# Patient Record
Sex: FEMALE | Race: WHITE | NOT HISPANIC OR LATINO | Employment: FULL TIME | URBAN - METROPOLITAN AREA
[De-identification: names, ages, dates, MRNs, and addresses within clinical notes are randomized per-mention and may not be internally consistent; named-entity substitution may affect disease eponyms.]

---

## 2017-01-25 ENCOUNTER — TELEPHONE (OUTPATIENT)
Dept: SMOKING CESSATION | Facility: CLINIC | Age: 52
End: 2017-01-25

## 2017-02-20 ENCOUNTER — OFFICE VISIT (OUTPATIENT)
Dept: FAMILY MEDICINE | Facility: CLINIC | Age: 52
End: 2017-02-20
Payer: COMMERCIAL

## 2017-02-20 VITALS
SYSTOLIC BLOOD PRESSURE: 120 MMHG | DIASTOLIC BLOOD PRESSURE: 70 MMHG | RESPIRATION RATE: 16 BRPM | HEART RATE: 74 BPM | WEIGHT: 178.13 LBS | OXYGEN SATURATION: 97 % | TEMPERATURE: 98 F | BODY MASS INDEX: 34.79 KG/M2

## 2017-02-20 DIAGNOSIS — R68.89 FLU-LIKE SYMPTOMS: ICD-10-CM

## 2017-02-20 DIAGNOSIS — J06.9 UPPER RESPIRATORY TRACT INFECTION, UNSPECIFIED TYPE: ICD-10-CM

## 2017-02-20 DIAGNOSIS — Z72.0 TOBACCO ABUSE: Primary | ICD-10-CM

## 2017-02-20 PROCEDURE — 99999 PR PBB SHADOW E&M-EST. PATIENT-LVL IV: CPT | Mod: PBBFAC,,, | Performed by: FAMILY MEDICINE

## 2017-02-20 PROCEDURE — 99214 OFFICE O/P EST MOD 30 MIN: CPT | Mod: S$GLB,,, | Performed by: FAMILY MEDICINE

## 2017-02-20 RX ORDER — ALBUTEROL SULFATE 90 UG/1
AEROSOL, METERED RESPIRATORY (INHALATION)
Refills: 3 | COMMUNITY
Start: 2016-12-14 | End: 2020-12-29 | Stop reason: SDUPTHER

## 2017-02-20 RX ORDER — LISINOPRIL 40 MG/1
TABLET ORAL
Refills: 6 | COMMUNITY
Start: 2017-01-27 | End: 2019-02-08 | Stop reason: SDUPTHER

## 2017-02-20 RX ORDER — FLUTICASONE PROPIONATE 50 MCG
1 SPRAY, SUSPENSION (ML) NASAL DAILY
Qty: 1 BOTTLE | Refills: 1 | Status: ON HOLD | OUTPATIENT
Start: 2017-02-20 | End: 2021-03-12 | Stop reason: HOSPADM

## 2017-02-20 RX ORDER — ZOLPIDEM TARTRATE 5 MG/1
5 TABLET ORAL NIGHTLY PRN
Qty: 30 TABLET | Refills: 0 | Status: SHIPPED | OUTPATIENT
Start: 2017-02-20 | End: 2017-03-28 | Stop reason: SDUPTHER

## 2017-02-20 RX ORDER — METOPROLOL SUCCINATE 25 MG/1
TABLET, EXTENDED RELEASE ORAL
Refills: 6 | Status: ON HOLD | COMMUNITY
Start: 2017-01-27 | End: 2017-12-08 | Stop reason: HOSPADM

## 2017-02-20 RX ORDER — GABAPENTIN 600 MG/1
600 TABLET ORAL
COMMUNITY
End: 2017-02-20

## 2017-02-20 RX ORDER — CETIRIZINE HYDROCHLORIDE 10 MG/1
10 TABLET ORAL DAILY
Qty: 30 TABLET | Refills: 0 | Status: SHIPPED | OUTPATIENT
Start: 2017-02-20 | End: 2019-02-08

## 2017-02-20 RX ORDER — CYCLOBENZAPRINE HCL 10 MG
10 TABLET ORAL
COMMUNITY
End: 2017-02-20

## 2017-02-20 RX ORDER — NITROGLYCERIN 0.4 MG/1
TABLET SUBLINGUAL
Refills: 6 | Status: ON HOLD | COMMUNITY
Start: 2017-01-27 | End: 2018-07-31 | Stop reason: HOSPADM

## 2017-02-20 RX ORDER — IBUPROFEN 200 MG
TABLET ORAL
Refills: 3 | Status: ON HOLD | COMMUNITY
Start: 2017-01-05 | End: 2018-07-31 | Stop reason: HOSPADM

## 2017-02-20 RX ORDER — PRAVASTATIN SODIUM 40 MG/1
40 TABLET ORAL DAILY
Refills: 1 | COMMUNITY
Start: 2016-12-07 | End: 2017-02-20 | Stop reason: SDUPTHER

## 2017-02-20 RX ORDER — HYDROCODONE BITARTRATE AND ACETAMINOPHEN 5; 325 MG/1; MG/1
1 TABLET ORAL EVERY 6 HOURS PRN
Refills: 0 | COMMUNITY
Start: 2016-12-14 | End: 2017-02-20

## 2017-02-20 NOTE — PATIENT INSTRUCTIONS
Viral Upper Respiratory Illness (Adult)  You have a viral upper respiratory illness (URI), which is another term for the common cold. This illness is contagious during the first few days. It is spread through the air by coughing and sneezing. It may also be spread by direct contact (touching the sick person and then touching your own eyes, nose, or mouth). Frequent handwashing will decrease risk of spread. Most viral illnesses go away within 7 to 10 days with rest and simple home remedies. Sometimes the illness may last for several weeks. Antibiotics will not kill a virus, and they are generally not prescribed for this condition.    Home care  · If symptoms are severe, rest at home for the first 2 to 3 days. When you resume activity, don't let yourself get too tired.  · Avoid being exposed to cigarette smoke (yours or others).  · You may use acetaminophen or ibuprofen to control pain and fever, unless another medicine was prescribed. (Note: If you have chronic liver or kidney disease, have ever had a stomach ulcer or gastrointestinal bleeding, or are taking blood-thinning medicines, talk with your healthcare provider before using these medicines.) Aspirin should never be given to anyone under 18 years of age who is ill with a viral infection or fever. It may cause severe liver or brain damage.  · Your appetite may be poor, so a light diet is fine. Avoid dehydration by drinking 6 to 8 glasses of fluids per day (water, soft drinks, juices, tea, or soup). Extra fluids will help loosen secretions in the nose and lungs.  · Over-the-counter cold medicines will not shorten the length of time youre sick, but they may be helpful for the following symptoms: cough, sore throat, and nasal and sinus congestion. (Note: Do not use decongestants if you have high blood pressure.)  Follow-up care  Follow up with your healthcare provider, or as advised.  When to seek medical advice  Call your healthcare provider right away if any  of these occur:  · Cough with lots of colored sputum (mucus)  · Severe headache; face, neck, or ear pain  · Difficulty swallowing due to throat pain  · Fever of 100.4°F (38°C)  Call 911, or get immediate medical care  Call emergency services right away if any of these occur:  · Chest pain, shortness of breath, wheezing, or difficulty breathing  · Coughing up blood  · Inability to swallow due to throat pain  Date Last Reviewed: 9/13/2015  © 2413-0192 [employer]. [address] [place] [address]. All rights reserved. This information is not intended as a substitute for professional medical care. Always follow your healthcare professional's instructions.

## 2017-02-20 NOTE — MR AVS SNAPSHOT
[place] [id number] [facility] [place] [address]  Phone: [phone]  Fax: [phone]                  Loi Cordova   2017 11:20 AM   Office Visit    Description:  Female : 1965   Provider:  Almita Price MD   Department:  [facility]           Reason for Visit     Sinus Problem     Headache     Sore Throat     Otalgia     Generalized Body Aches           Diagnoses this Visit        Comments    Tobacco abuse    -  Primary     Upper respiratory tract infection, unspecified type         Flu-like symptoms                To Do List           Goals (5 Years of Data)     None      Follow-Up and Disposition     Return if symptoms worsen or fail to improve.    Follow-up and Disposition History       These Medications        Disp Refills Start End    cetirizine (ZYRTEC) 10 MG tablet 30 tablet 0 2017    Take 1 tablet (10 mg total) by mouth once daily. - Oral    Pharmacy: [facility] - [place], [place] - [address] Ph #: [phone]       fluticasone (FLONASE) 50 mcg/actuation nasal spray 1 Bottle 1 2017     1 spray by Each Nare route once daily. - Each Nare    Pharmacy: [facility] - [place], [place] - [address] Ph #: [phone]       zolpidem (AMBIEN) 5 MG Tab 30 tablet 0 2017    Take 1 tablet (5 mg total) by mouth nightly as needed. - Oral    Pharmacy: [facility] - [place], [place] - [address] Ph #: [phone]         [facility]     [facility] -  Assistance  Registered nurses in the [facility] provide clinical advisement, health education, appointment booking, and other advisory services.  Call for this free service at [phone].             Medications           Message regarding Medications     Verify the changes and/or additions to your medication regime listed below are the same as discussed  with your clinician today.  If any of these changes or additions are incorrect, please notify your healthcare provider.        START taking these NEW medications        Refills    cetirizine (ZYRTEC) 10 MG tablet 0    Sig: Take 1 tablet (10 mg total) by mouth once daily.    Class: Normal    Route: Oral    fluticasone (FLONASE) 50 mcg/actuation nasal spray 1    Si spray by Each Nare route once daily.    Class: Normal    Route: Each Nare    zolpidem (AMBIEN) 5 MG Tab 0    Sig: Take 1 tablet (5 mg total) by mouth nightly as needed.    Class: Print    Route: Oral      STOP taking these medications     ASPIR-LOW 81 mg EC tablet Take 81 mg by mouth once daily. FOR 30 DAYS    cyclobenzaprine (FLEXERIL) 10 MG tablet Take 10 mg by mouth.    gabapentin (NEURONTIN) 600 MG tablet Take 600 mg by mouth.    hydrocodone-acetaminophen 5-325mg (NORCO) 5-325 mg per tablet Take 1 tablet by mouth every 6 (six) hours as needed.    lisinopril-hydrochlorothiazide (PRINZIDE,ZESTORETIC) 20-25 mg Tab Take 1 tablet by mouth once daily.    ranitidine (ZANTAC) 75 MG tablet Take 75 mg by mouth 2 (two) times daily.           Verify that the below list of medications is an accurate representation of the medications you are currently taking.  If none reported, the list may be blank. If incorrect, please contact your healthcare provider. Carry this list with you in case of emergency.           Current Medications     albuterol sulfate 90 mcg/actuation AePB Inhale 180 mcg into the lungs 4 (four) times daily as needed.    aspirin 81 MG Chew Take 81 mg by mouth once daily.    fenofibrate 160 MG Tab Take 160 mg by mouth once daily.    lisinopril (PRINIVIL,ZESTRIL) 40 MG tablet Take 1 tablet orally once a day.    metoprolol succinate (TOPROL-XL) 25 MG 24 hr tablet Take 1 tablet orally once a day.    nicotine (NICODERM CQ) 14 mg/24 hr PLACE 1 PATCH TRANSDERMALLY ONCE DAILY EVERY 24 HOURS    nitroGLYCERIN (NITROSTAT) 0.4 MG SL tablet Take 1 tablet  (Sublingual) every 5 minutes for 3 doses then call 911    omeprazole (PRILOSEC) 40 MG capsule Take 40 mg by mouth once daily.    prasugrel (EFFIENT) 10 mg Tab Take 1 tablet (10 mg total) by mouth once daily.    pravastatin (PRAVACHOL) 80 MG tablet Take 80 mg by mouth once daily.    VENTOLIN HFA 90 mcg/actuation inhaler Inhale 2 puffs by mouth every 4-6 hours as needed.    cetirizine (ZYRTEC) 10 MG tablet Take 1 tablet (10 mg total) by mouth once daily.    fluticasone (FLONASE) 50 mcg/actuation nasal spray 1 spray by Each Nare route once daily.    loratadine (CLARITIN) 10 mg tablet Take 1 tablet (10 mg total) by mouth once daily.    zolpidem (AMBIEN) 5 MG Tab Take 1 tablet (5 mg total) by mouth nightly as needed.           Clinical Reference Information           Your Vitals Were     BP Pulse Temp Resp Weight SpO2    120/70 (BP Location: Right arm, Patient Position: Sitting, BP Method: Manual) 74 97.7 °F (36.5 °C) (Oral) 16 80.8 kg (178 lb 2.1 oz) 97%    BMI                34.79 kg/m2          Blood Pressure          Most Recent Value    BP  120/70      Allergies as of 2/20/2017     Iodine And Iodide Containing Products      Immunizations Administered on Date of Encounter - 2/20/2017     None      Orders Placed During Today's Visit     Future Labs/Procedures Expected by Expires    Influenza antigen Nasopharyngeal Swab  2/20/2017 4/21/2018      Instructions      Viral Upper Respiratory Illness (Adult)  You have a viral upper respiratory illness (URI), which is another term for the common cold. This illness is contagious during the first few days. It is spread through the air by coughing and sneezing. It may also be spread by direct contact (touching the sick person and then touching your own eyes, nose, or mouth). Frequent handwashing will decrease risk of spread. Most viral illnesses go away within 7 to 10 days with rest and simple home remedies. Sometimes the illness may last for several weeks. Antibiotics will not  kill a virus, and they are generally not prescribed for this condition.    Home care  · If symptoms are severe, rest at home for the first 2 to 3 days. When you resume activity, don't let yourself get too tired.  · Avoid being exposed to cigarette smoke (yours or others).  · You may use acetaminophen or ibuprofen to control pain and fever, unless another medicine was prescribed. (Note: If you have chronic liver or kidney disease, have ever had a stomach ulcer or gastrointestinal bleeding, or are taking blood-thinning medicines, talk with your healthcare provider before using these medicines.) Aspirin should never be given to anyone under 18 years of age who is ill with a viral infection or fever. It may cause severe liver or brain damage.  · Your appetite may be poor, so a light diet is fine. Avoid dehydration by drinking 6 to 8 glasses of fluids per day (water, soft drinks, juices, tea, or soup). Extra fluids will help loosen secretions in the nose and lungs.  · Over-the-counter cold medicines will not shorten the length of time youre sick, but they may be helpful for the following symptoms: cough, sore throat, and nasal and sinus congestion. (Note: Do not use decongestants if you have high blood pressure.)  Follow-up care  Follow up with your healthcare provider, or as advised.  When to seek medical advice  Call your healthcare provider right away if any of these occur:  · Cough with lots of colored sputum (mucus)  · Severe headache; face, neck, or ear pain  · Difficulty swallowing due to throat pain  · Fever of 100.4°F (38°C)  Call 911, or get immediate medical care  Call emergency services right away if any of these occur:  · Chest pain, shortness of breath, wheezing, or difficulty breathing  · Coughing up blood  · Inability to swallow due to throat pain  Date Last Reviewed: 9/13/2015  © 2555-5804 [employer]. [address] [place] [address], [place] [address]. All rights reserved. This information is not  intended as a substitute for professional medical care. Always follow your healthcare professional's instructions.             Language Assistance Services     ATTENTION: Language assistance services are available, free of charge. Please call [phone].      ATENCIÓN: Si eve ksy, tiene a ibarra disposición servicios gratuitos de asistencia lingüística. Llame al [phone].     CHÚ Ý: N?u b?n nói Ti?ng Vi?t, có các d?ch v? h? tr? ngôn ng? mi?n phí dành cho b?n. G?i s? [phone].         [facility] complies with applicable Federal civil rights laws and does not discriminate on the basis of race, color, national origin, age, disability, or sex.

## 2017-02-20 NOTE — PROGRESS NOTES
Subjective:       Patient ID: Loi Cordova is a 51 y.o. female.    Chief Complaint: Sinus Problem; Headache; Sore Throat; Otalgia; and Generalized Body Aches    HPI 51 year old female here for headache, sore throat, ear pain, body aches, and subjective fever for 12 hours. Patient states her boss had similar symptoms. Patient has tried tylenol, madeleine seltzer plus which provided mild relief.   Review of Systems   Constitutional: Positive for chills, fatigue and fever. Negative for appetite change.   HENT: Positive for sinus pressure and sore throat. Negative for congestion.    Respiratory: Negative for shortness of breath and wheezing.    Cardiovascular: Negative for chest pain and leg swelling.   Gastrointestinal: Negative for abdominal pain, diarrhea, nausea and vomiting.   Genitourinary: Negative for dysuria and hematuria.   Neurological: Positive for headaches. Negative for weakness.       Objective:      Vitals:    02/20/17 1129   BP: 120/70   Pulse: 74   Resp: 16   Temp: 97.7 °F (36.5 °C)     Physical Exam   Constitutional: She is oriented to person, place, and time. She appears well-developed and well-nourished. No distress.   HENT:   Right Ear: External ear normal.   Left Ear: External ear normal.   Mouth/Throat: Oropharynx is clear and moist. No oropharyngeal exudate.   TTP over maxillary sinuses   Eyes: EOM are normal. Right eye exhibits no discharge. Left eye exhibits no discharge.   Neck: Normal range of motion.   Cardiovascular: Normal rate and regular rhythm.    Pulmonary/Chest: Effort normal. She has wheezes.   Mild wheezing more pronounced in  right lung fields   Lymphadenopathy:     She has cervical adenopathy.   Neurological: She is alert and oriented to person, place, and time.   Psychiatric: She has a normal mood and affect. Her behavior is normal.       Assessment:       1. Tobacco abuse    2. Upper respiratory tract infection, unspecified type    3. Flu-like symptoms        Plan:         1. URI:  will check flu. Advised on complete tobacco cessation and adequate hydration. I have advised on symptomatic treatment with coricidin prn, salt water gargles, and/or tea with honey. Zyrtec/flonase for sinus pressure/post nasal drip. Patient advised on using inhaler as she typically gets flare up of asthma symptoms during viral illness.  Patient given work excuse.   RTC if symptoms worsen.    Tobacco abuse    Upper respiratory tract infection, unspecified type    Flu-like symptoms  -     Influenza antigen Nasopharyngeal Swab; Future; Expected date: 2/20/17    Other orders  -     cetirizine (ZYRTEC) 10 MG tablet; Take 1 tablet (10 mg total) by mouth once daily.  Dispense: 30 tablet; Refill: 0  -     fluticasone (FLONASE) 50 mcg/actuation nasal spray; 1 spray by Each Nare route once daily.  Dispense: 1 Bottle; Refill: 1      Return if symptoms worsen or fail to improve.

## 2017-03-28 RX ORDER — ZOLPIDEM TARTRATE 5 MG/1
5 TABLET ORAL NIGHTLY PRN
Qty: 30 TABLET | Refills: 0 | Status: SHIPPED | OUTPATIENT
Start: 2017-03-28 | End: 2017-04-28 | Stop reason: SDUPTHER

## 2017-03-28 RX ORDER — ZOLPIDEM TARTRATE 5 MG/1
TABLET ORAL
Qty: 30 TABLET | Status: CANCELLED | OUTPATIENT
Start: 2017-03-28

## 2017-03-28 NOTE — TELEPHONE ENCOUNTER
----- Message from Mariah Pate sent at 3/28/2017 11:31 AM CDT -----  REFILLS:   Patient is requesting a medication refill.   RX name:ambien  Strength: 5mg  Directions:   [facility] name[facility]  Pharmacy phone #:

## 2017-03-29 ENCOUNTER — TELEPHONE (OUTPATIENT)
Dept: FAMILY MEDICINE | Facility: CLINIC | Age: 52
End: 2017-03-29

## 2017-03-29 NOTE — TELEPHONE ENCOUNTER
----- Message from Mariah Pate sent at 3/29/2017 11:40 AM CDT -----  Contact: [phone]  Need written rx for ambien 5 mg.

## 2017-04-20 ENCOUNTER — OFFICE VISIT (OUTPATIENT)
Dept: UROLOGY | Facility: CLINIC | Age: 52
End: 2017-04-20
Payer: COMMERCIAL

## 2017-04-20 VITALS
DIASTOLIC BLOOD PRESSURE: 88 MMHG | WEIGHT: 183 LBS | TEMPERATURE: 98 F | HEART RATE: 90 BPM | BODY MASS INDEX: 35.93 KG/M2 | SYSTOLIC BLOOD PRESSURE: 133 MMHG | HEIGHT: 60 IN

## 2017-04-20 DIAGNOSIS — R31.0 GROSS HEMATURIA: ICD-10-CM

## 2017-04-20 DIAGNOSIS — Z72.0 TOBACCO ABUSE: ICD-10-CM

## 2017-04-20 DIAGNOSIS — N20.0 NEPHROLITHIASIS: Primary | ICD-10-CM

## 2017-04-20 DIAGNOSIS — N39.3 FEMALE STRESS INCONTINENCE: ICD-10-CM

## 2017-04-20 DIAGNOSIS — N32.81 OAB (OVERACTIVE BLADDER): ICD-10-CM

## 2017-04-20 PROCEDURE — 99204 OFFICE O/P NEW MOD 45 MIN: CPT | Mod: S$GLB,,, | Performed by: UROLOGY

## 2017-04-20 PROCEDURE — 87077 CULTURE AEROBIC IDENTIFY: CPT

## 2017-04-20 PROCEDURE — 87088 URINE BACTERIA CULTURE: CPT

## 2017-04-20 PROCEDURE — 99999 PR PBB SHADOW E&M-EST. PATIENT-LVL IV: CPT | Mod: PBBFAC,,, | Performed by: UROLOGY

## 2017-04-20 PROCEDURE — 87186 SC STD MICRODIL/AGAR DIL: CPT

## 2017-04-20 PROCEDURE — 87086 URINE CULTURE/COLONY COUNT: CPT

## 2017-04-20 RX ORDER — TAMSULOSIN HYDROCHLORIDE 0.4 MG/1
0.4 CAPSULE ORAL
Qty: 30 CAPSULE | Refills: 1 | Status: SHIPPED | OUTPATIENT
Start: 2017-04-20 | End: 2017-05-04 | Stop reason: ALTCHOICE

## 2017-04-20 RX ORDER — HYDROCODONE BITARTRATE AND ACETAMINOPHEN 7.5; 3 MG/1; MG/1
1 TABLET ORAL EVERY 6 HOURS PRN
Refills: 0 | COMMUNITY
Start: 2017-03-28 | End: 2017-05-04

## 2017-04-20 RX ORDER — ONDANSETRON 4 MG/1
4 TABLET, ORALLY DISINTEGRATING ORAL EVERY 8 HOURS PRN
Qty: 10 TABLET | Refills: 0 | Status: SHIPPED | OUTPATIENT
Start: 2017-04-20 | End: 2017-04-27

## 2017-04-20 NOTE — PROGRESS NOTES
HPI:  Loi Cordova is a 51 y.o. year old female that  presents with No chief complaint on file.  .  This patient comes for ER follow-up with gross hematuria.  She was seen in the emergency room with right flank pain where CT scan was negative.  Reviewed by me and I agree that no stones are present and kidneys appear normal.  Patient continues to have right flank pain with some nausea and no vomiting.  She has some pressure, no dysuria and no frequency.  She spontaneously passed a stone many years ago and states that this is reminiscent of that episode.    The patient is been smoking one half pack per day cigarettes since age 18    She has baseline urgency and urge incontinence.  So has leakage with coughing laughing and sneezing and uses up to 4 pads per day    She has never had any urological operations and there is no family history of urological cancer    Chart review shows a GFR yesterday greater than 60      Past Medical History:   Diagnosis Date    Allergy     Asthma     GERD (gastroesophageal reflux disease)     Hyperlipidemia     Hypertension      Social History     Social History    Marital status:      Spouse name: N/A    Number of children: N/A    Years of education: N/A     Occupational History    Not on file.     Social History Main Topics    Smoking status: Former Smoker     Packs/day: 0.50     Years: 30.00     Types: Cigarettes    Smokeless tobacco: Not on file      Comment: States she quit smoking last week.    Alcohol use No    Drug use: No    Sexual activity: Yes     Partners: Male     Other Topics Concern    Not on file     Social History Narrative     Past Surgical History:   Procedure Laterality Date    CHOLECYSTECTOMY      HYSTERECTOMY      SHOULDER SURGERY      TOTAL KNEE ARTHROPLASTY       Family History   Problem Relation Age of Onset    Heart disease Mother     Hyperlipidemia Mother     Hypertension Mother     Diabetes Mother     Heart disease Father      Hyperlipidemia Father     Cancer Father     Prostate cancer Neg Hx     Kidney disease Neg Hx            Review of Systems  The patient has no chest pains.  The patient has no shortness of breath  Patient wears glasses.  All other review of systems are negative.      Physical Exam:  /88  Pulse 90  Temp 98.3 °F (36.8 °C)  Ht 5' (1.524 m)  Wt 83 kg (183 lb)  BMI 35.74 kg/m2  General appearance: alert, cooperative, no distress  Constitutional:Oriented to person, place, and time.appears well-developed and well-nourished.   HEENT: Normocephalic, atraumatic, neck symmetrical, no nasal discharge   Eyes: conjunctivae/corneas clear, PERRL, EOM's intact  Lungs: clear to auscultation bilaterally, no dullness to percussion bilaterally  Heart: regular rate and rhythm without rub; no displacement of the PMI   Abdomen: soft, non-tender; bowel sounds normoactive; no organomegaly  :Urethral meatus without lesion, no urethral masses noted, bladder nontender /nondistended, vagina normal appearing,      no      cystocele, anus and perineum without lesions, patient status post hysterectomy   Extremities: extremities symmetric; no clubbing, cyanosis, or edema  Integument: Skin color, texture, turgor normal; no rashes; hair distrubution normal  Neurologic: Alert and oriented X 3, normal strength, normal coordination and gait  Psychiatric: no pressured speech; normal affect; no evidence of impaired cognition     LABS:    Complete Blood Count  Lab Results   Component Value Date    RBC 4.54 04/19/2017    HGB 13.6 04/19/2017    HCT 40.9 04/19/2017    MCV 90 04/19/2017    MCH 30.0 04/19/2017    MCHC 33.3 04/19/2017    RDW 13.5 04/19/2017     (H) 04/19/2017    MPV 8.8 (L) 04/19/2017    GRAN 9.7 (H) 04/19/2017    GRAN 75.4 (H) 04/19/2017    LYMPH 2.3 04/19/2017    LYMPH 17.6 (L) 04/19/2017    MONO 0.6 04/19/2017    MONO 4.5 04/19/2017    EOS 0.3 04/19/2017    BASO 0.03 04/19/2017    EOSINOPHIL 2.3 04/19/2017    BASOPHIL 0.2  04/19/2017    DIFFMETHOD Automated 04/19/2017       Comprehensive Metabolic Panel  Lab Results   Component Value Date    GLU 97 04/19/2017    BUN 15 04/19/2017    CREATININE 0.76 04/19/2017     04/19/2017    K 4.5 04/19/2017     04/19/2017    PROT 7.5 04/19/2017    ALBUMIN 4.5 04/19/2017    BILITOT 0.6 04/19/2017    AST 35 04/19/2017    ALKPHOS 71 04/19/2017    CO2 24 04/19/2017    ALT 58 (H) 04/19/2017    ANIONGAP 14 04/19/2017    EGFRNONAA >60.0 04/19/2017    ESTGFRAFRICA >60.0 04/19/2017       PSA  No results found for: PSA      Assessment:    ICD-10-CM ICD-9-CM    1. Nephrolithiasis N20.0 592.0    2. Gross hematuria R31.0 599.71 Urine culture   3. Tobacco abuse Z72.0 305.1    4. OAB (overactive bladder) N32.81 596.51    5. Female stress incontinence N39.3 625.6      The primary encounter diagnosis was Nephrolithiasis. Diagnoses of Gross hematuria, Tobacco abuse, OAB (overactive bladder), and Female stress incontinence were also pertinent to this visit.      Plan: #1 nephrolithiasis.  Plan.  Using the recent CT scan was negative, patient's history is classic for ureteral colic.  I recommend increasing fluid intake.  Patient already has pain medicine at home.  We will start her on tamsulosin to promote stone passage and see her back in 2 weeks.    Numbers 23 gross hematuria and tobacco abuse.  Plan.  Most likely episode of gross hematuria related to stone passage however given his strong smoking history, I recommend workup once this current episode resolved.  She voices understanding and agrees    #4 and 5.  Overactive bladder and stress incontinence.  Plan.  These issues will be addressed after current episode has resolved  Orders Placed This Encounter   Procedures    Urine culture           Carlos Macario MD

## 2017-04-20 NOTE — MR AVS SNAPSHOT
[facility]  [address] [place] [address] [place] [address]  Phone: [phone]                  Loi Cordova   2017 1:30 PM   Office Visit    Description:  Female : 1965   Provider:  Carlos Macario MD   Department:  [facility]           Diagnoses this Visit        Comments    Gross hematuria    -  Primary     Tobacco abuse         OAB (overactive bladder)         Female stress incontinence                To Do List           Future Appointments        Provider Department Dept Phone    2017 2:00 PM Carlos Macario MD [facility] [phone]      Goals (5 Years of Data)     None      Follow-Up and Disposition     Return in about 2 weeks (around 2017).       These Medications        Disp Refills Start End    tamsulosin (FLOMAX) 0.4 mg Cp24 30 capsule 1 2017    Take 1 capsule (0.4 mg total) by mouth after dinner. - Oral    Pharmacy: [facility] - [place] [address] [facility] Ph #: [phone]       ondansetron (ZOFRAN ODT) 4 MG TbDL 10 tablet 0 2017    Take 1 tablet (4 mg total) by mouth every 8 (eight) hours as needed. - Oral    Pharmacy: [facility] - [place] [address] [facility] Ph #: [phone]         [facility]     [facility] Care [facility] -  Assistance  Unless otherwise directed by your provider, please contact [facility], our nurse care line that is available for  assistance.     Registered nurses in the [facility] provide: appointment scheduling, clinical advisement, health education, and other advisory services.  Call: [phone] (toll free)               Medications           Message regarding Medications     Verify the changes and/or additions to your medication regime listed below are the same as discussed with your clinician today.  If any of these changes or additions are incorrect, please notify your healthcare  provider.        START taking these NEW medications        Refills    tamsulosin (FLOMAX) 0.4 mg Cp24 1    Sig: Take 1 capsule (0.4 mg total) by mouth after dinner.    Class: Normal    Route: Oral    ondansetron (ZOFRAN ODT) 4 MG TbDL 0    Sig: Take 1 tablet (4 mg total) by mouth every 8 (eight) hours as needed.    Class: Normal    Route: Oral           Verify that the below list of medications is an accurate representation of the medications you are currently taking.  If none reported, the list may be blank. If incorrect, please contact your healthcare provider. Carry this list with you in case of emergency.           Current Medications     albuterol sulfate 90 mcg/actuation AePB Inhale 180 mcg into the lungs 4 (four) times daily as needed.    aspirin 81 MG Chew Take 81 mg by mouth once daily.    cetirizine (ZYRTEC) 10 MG tablet Take 1 tablet (10 mg total) by mouth once daily.    ciprofloxacin HCl (CIPRO) 500 MG tablet Take 1 tablet (500 mg total) by mouth 2 (two) times daily.    fenofibrate 160 MG Tab Take 160 mg by mouth once daily.    fluticasone (FLONASE) 50 mcg/actuation nasal spray 1 spray by Each Nare route once daily.    hydrocodone-acetaminophen 5-325mg (NORCO) 5-325 mg per tablet Take 1 tablet by mouth every 4 (four) hours as needed for Pain.    hydrocodone-acetaminophen 7.5-300 mg Tab Take 1 tablet by mouth every 6 (six) hours as needed.    lisinopril (PRINIVIL,ZESTRIL) 40 MG tablet Take 1 tablet orally once a day.    metoprolol succinate (TOPROL-XL) 25 MG 24 hr tablet Take 1 tablet orally once a day.    nicotine (NICODERM CQ) 14 mg/24 hr PLACE 1 PATCH TRANSDERMALLY ONCE DAILY EVERY 24 HOURS    nitroGLYCERIN (NITROSTAT) 0.4 MG SL tablet Take 1 tablet (Sublingual) every 5 minutes for 3 doses then call 911    omeprazole (PRILOSEC) 40 MG capsule Take 40 mg by mouth once daily.    prasugrel (EFFIENT) 10 mg Tab Take 1 tablet (10 mg total) by mouth once daily.    pravastatin (PRAVACHOL) 80 MG tablet Take 80  mg by mouth once daily.    VENTOLIN HFA 90 mcg/actuation inhaler Inhale 2 puffs by mouth every 4-6 hours as needed.    zolpidem (AMBIEN) 5 MG Tab Take 1 tablet (5 mg total) by mouth nightly as needed.    loratadine (CLARITIN) 10 mg tablet Take 1 tablet (10 mg total) by mouth once daily.    ondansetron (ZOFRAN ODT) 4 MG TbDL Take 1 tablet (4 mg total) by mouth every 8 (eight) hours as needed.    tamsulosin (FLOMAX) 0.4 mg Cp24 Take 1 capsule (0.4 mg total) by mouth after dinner.           Clinical Reference Information           Your Vitals Were     BP Pulse Temp Height Weight BMI    133/88 90 98.3 °F (36.8 °C) 5' (1.524 m) 83 kg (183 lb) 35.74 kg/m2      Blood Pressure          Most Recent Value    BP  133/88      Allergies as of 4/20/2017     Iodine And Iodide Containing Products      Immunizations Administered on Date of Encounter - 4/20/2017     None      Orders Placed During Today's Visit      Normal Orders This Visit    Urine culture       Language Assistance Services     ATTENTION: Language assistance services are available, free of charge. Please call [phone].      ATENCIÓN: Si habla asya, tiene a ibarra disposición servicios gratuitos de asistencia lingüística. Llame al [phone].     [facility] Ý: N?u b?n nói Ti?ng Vi?t, có các d?ch v? h? tr? ngôn ng? mi?n phí dành cho b?n. G?i s? [phone].         [facility] complies with applicable Federal civil rights laws and does not discriminate on the basis of race, color, national origin, age, disability, or sex.

## 2017-04-21 DIAGNOSIS — Z12.31 OTHER SCREENING MAMMOGRAM: ICD-10-CM

## 2017-04-24 ENCOUNTER — TELEPHONE (OUTPATIENT)
Dept: UROLOGY | Facility: CLINIC | Age: 52
End: 2017-04-24

## 2017-04-24 LAB — BACTERIA UR CULT: NORMAL

## 2017-04-24 RX ORDER — NITROFURANTOIN (MACROCRYSTALS) 100 MG/1
100 CAPSULE ORAL 2 TIMES DAILY
Qty: 20 CAPSULE | Refills: 0 | Status: SHIPPED | OUTPATIENT
Start: 2017-04-24 | End: 2017-05-04

## 2017-04-24 RX ORDER — CEPHALEXIN 500 MG/1
500 CAPSULE ORAL EVERY 12 HOURS
Qty: 20 CAPSULE | Refills: 0 | Status: SHIPPED | OUTPATIENT
Start: 2017-04-24 | End: 2017-04-24

## 2017-04-24 NOTE — TELEPHONE ENCOUNTER
----- Message from Smita Nguyễn sent at 4/24/2017 10:17 AM CDT -----  Contact: self/[phone]  She is returning Dr. Macario's call.

## 2017-04-24 NOTE — TELEPHONE ENCOUNTER
Please let patient know that she still has a urinary tract infection.  Have her stop the Cipro.  Prescription for Keflex sent.  Keep scheduled follow-up appointment.

## 2017-04-24 NOTE — TELEPHONE ENCOUNTER
Please call patient and let her know that the sensitivities of her urine culture came back that the cephalexin will also not work.  I have written a prescription for Macrobid which should eradicate her infection.

## 2017-04-28 ENCOUNTER — TELEPHONE (OUTPATIENT)
Dept: FAMILY MEDICINE | Facility: CLINIC | Age: 52
End: 2017-04-28

## 2017-04-28 RX ORDER — ZOLPIDEM TARTRATE 5 MG/1
5 TABLET ORAL NIGHTLY PRN
Qty: 30 TABLET | Refills: 3 | Status: SHIPPED | OUTPATIENT
Start: 2017-04-28 | End: 2017-08-02 | Stop reason: SDUPTHER

## 2017-04-28 NOTE — TELEPHONE ENCOUNTER
----- Message from Robyn Castellanos sent at 4/28/2017  1:01 PM CDT -----  Needs script for her Ambien      Call when ready  /[phone]

## 2017-05-04 ENCOUNTER — OFFICE VISIT (OUTPATIENT)
Dept: UROLOGY | Facility: CLINIC | Age: 52
End: 2017-05-04
Payer: COMMERCIAL

## 2017-05-04 VITALS
WEIGHT: 183 LBS | SYSTOLIC BLOOD PRESSURE: 132 MMHG | HEART RATE: 98 BPM | HEIGHT: 60 IN | TEMPERATURE: 98 F | DIASTOLIC BLOOD PRESSURE: 88 MMHG | BODY MASS INDEX: 35.93 KG/M2

## 2017-05-04 DIAGNOSIS — Z72.0 TOBACCO ABUSE: ICD-10-CM

## 2017-05-04 DIAGNOSIS — R31.9 URINARY TRACT INFECTION WITH HEMATURIA, SITE UNSPECIFIED: ICD-10-CM

## 2017-05-04 DIAGNOSIS — N39.0 URINARY TRACT INFECTION WITH HEMATURIA, SITE UNSPECIFIED: ICD-10-CM

## 2017-05-04 DIAGNOSIS — R31.0 GROSS HEMATURIA: Primary | ICD-10-CM

## 2017-05-04 DIAGNOSIS — N39.3 FEMALE STRESS INCONTINENCE: ICD-10-CM

## 2017-05-04 DIAGNOSIS — N32.81 OAB (OVERACTIVE BLADDER): ICD-10-CM

## 2017-05-04 PROCEDURE — 99213 OFFICE O/P EST LOW 20 MIN: CPT | Mod: S$GLB,,, | Performed by: UROLOGY

## 2017-05-04 PROCEDURE — 87077 CULTURE AEROBIC IDENTIFY: CPT

## 2017-05-04 PROCEDURE — 87088 URINE BACTERIA CULTURE: CPT

## 2017-05-04 PROCEDURE — 87086 URINE CULTURE/COLONY COUNT: CPT

## 2017-05-04 PROCEDURE — 99999 PR PBB SHADOW E&M-EST. PATIENT-LVL III: CPT | Mod: PBBFAC,,, | Performed by: UROLOGY

## 2017-05-04 PROCEDURE — 87186 SC STD MICRODIL/AGAR DIL: CPT

## 2017-05-04 RX ORDER — OXYBUTYNIN CHLORIDE 5 MG/1
5 TABLET ORAL 3 TIMES DAILY
Qty: 90 TABLET | Refills: 12 | Status: SHIPPED | OUTPATIENT
Start: 2017-05-04 | End: 2017-06-03

## 2017-05-04 NOTE — MR AVS SNAPSHOT
[facility]  [address] [place] [place] [address] [place] [address]  Phone: [phone]                  Loi Cordova   2017 2:00 PM   Office Visit    Description:  Female : 1965   Provider:  Carlos Macario MD   Department:  [facility]           Reason for Visit     Nephrolithiasis           Diagnoses this Visit        Comments    Gross hematuria    -  Primary     Tobacco abuse         Urinary tract infection with hematuria, site unspecified         OAB (overactive bladder)         Female stress incontinence                To Do List           Future Appointments        Provider Department Dept Phone    5/10/2017 9:00 AM Carlos Macario MD [facility] [phone]      Goals (5 Years of Data)     None       These Medications        Disp Refills Start End    oxybutynin (DITROPAN) 5 MG Tab 90 tablet 12 2017 6/3/2017    Take 1 tablet (5 mg total) by mouth 3 (three) times daily. - Oral    Pharmacy: [facility] - [place], [place] - [address] Ph #: [phone]         [facility]     [facility] Care [facility] -  Assistance  Unless otherwise directed by your provider, please contact [facility], our nurse care line that is available for  assistance.     Registered nurses in the [facility] provide: appointment scheduling, clinical advisement, health education, and other advisory services.  Call: [phone] (toll free)               Medications           Message regarding Medications     Verify the changes and/or additions to your medication regime listed below are the same as discussed with your clinician today.  If any of these changes or additions are incorrect, please notify your healthcare provider.        START taking these NEW medications        Refills    oxybutynin (DITROPAN) 5 MG Tab 12    Sig: Take 1 tablet (5 mg total) by mouth 3 (three) times daily.    Class: Normal    Route: Oral      STOP taking these  medications     hydrocodone-acetaminophen 7.5-300 mg Tab Take 1 tablet by mouth every 6 (six) hours as needed.    loratadine (CLARITIN) 10 mg tablet Take 1 tablet (10 mg total) by mouth once daily.    albuterol sulfate 90 mcg/actuation AePB Inhale 180 mcg into the lungs 4 (four) times daily as needed.    tamsulosin (FLOMAX) 0.4 mg Cp24 Take 1 capsule (0.4 mg total) by mouth after dinner.           Verify that the below list of medications is an accurate representation of the medications you are currently taking.  If none reported, the list may be blank. If incorrect, please contact your healthcare provider. Carry this list with you in case of emergency.           Current Medications     aspirin 81 MG Chew Take 81 mg by mouth once daily.    cetirizine (ZYRTEC) 10 MG tablet Take 1 tablet (10 mg total) by mouth once daily.    fenofibrate 160 MG Tab Take 160 mg by mouth once daily.    fluticasone (FLONASE) 50 mcg/actuation nasal spray 1 spray by Each Nare route once daily.    hydrocodone-acetaminophen 5-325mg (NORCO) 5-325 mg per tablet Take 1 tablet by mouth every 4 (four) hours as needed for Pain.    lisinopril (PRINIVIL,ZESTRIL) 40 MG tablet Take 1 tablet orally once a day.    nicotine (NICODERM CQ) 14 mg/24 hr PLACE 1 PATCH TRANSDERMALLY ONCE DAILY EVERY 24 HOURS    nitrofurantoin (MACRODANTIN) 100 MG capsule Take 1 capsule (100 mg total) by mouth 2 (two) times daily.    omeprazole (PRILOSEC) 40 MG capsule Take 40 mg by mouth once daily.    prasugrel (EFFIENT) 10 mg Tab Take 1 tablet (10 mg total) by mouth once daily.    pravastatin (PRAVACHOL) 80 MG tablet Take 80 mg by mouth once daily.    VENTOLIN HFA 90 mcg/actuation inhaler Inhale 2 puffs by mouth every 4-6 hours as needed.    zolpidem (AMBIEN) 5 MG Tab Take 1 tablet (5 mg total) by mouth nightly as needed.    metoprolol succinate (TOPROL-XL) 25 MG 24 hr tablet Take 1 tablet orally once a day.    nitroGLYCERIN (NITROSTAT) 0.4 MG SL tablet Take 1 tablet  (Sublingual) every 5 minutes for 3 doses then call 911    oxybutynin (DITROPAN) 5 MG Tab Take 1 tablet (5 mg total) by mouth 3 (three) times daily.           Clinical Reference Information           Your Vitals Were     BP Pulse Temp Height Weight BMI    132/88 98 97.7 °F (36.5 °C) (Oral) 5' (1.524 m) 83 kg (182 lb 15.7 oz) 35.74 kg/m2      Blood Pressure          Most Recent Value    BP  132/88      Allergies as of 5/4/2017     Iodine And Iodide Containing Products      Immunizations Administered on Date of Encounter - 5/4/2017     None      Orders Placed During Today's Visit      Normal Orders This Visit    Urine culture     Future Labs/Procedures Expected by Expires    Cystoscopy  As directed 5/4/2018      Smoking Cessation     If you would like to quit smoking:   You may be eligible for free services if you are a [place] resident and started smoking cigarettes before September 1, 1988.  Call the Smoking Cessation [employer] ([facility]) toll free at [phone] or [phone] 984-2554.   Call 1-800-QUIT-NOW if you do not meet the above criteria.   Contact us via email: [email or web address]   View our website for more information: [email or web address]        Language Assistance Services     ATTENTION: Language assistance services are available, free of charge. Please call [phone].      ATENCIÓN: Si habla español, tiene a ibarra disposición servicios gratuitos de asistencia lingüística. Llame al [phone].     CHÚ Ý: N?u b?n nói Ti?ng Vi?t, có các d?ch v? h? tr? ngôn ng? mi?n phí dành cho b?n. G?i s? [phone].         [facility] complies with applicable Federal civil rights laws and does not discriminate on the basis of race, color, national origin, age, disability, or sex.

## 2017-05-04 NOTE — PROGRESS NOTES
This patient was last seen by me April 20, 2017 in follow-up for possible stone versus a tract infection.  Her in culture came back positive and she is currently finishing a course of Macrobid.  Resolved and she reports no further gross hematuria.  Stone protocol CT did not show any specific stone    Patient has both stress incontinence and urinary urgency with occasional urge incontinence.    Physical exam reveals reveals a well-developed well-nourished patient  in no acute distress.  Patient is alert and oriented ×3 with normal mood and affect.  Respiratory effort is normal and there is no peripheral edema.  Skin is normal to inspection and palpation    /88  Pulse 98  Temp 97.7 °F (36.5 °C) (Oral)   Ht 5' (1.524 m)  Wt 83 kg (182 lb 15.7 oz)  BMI 35.74 kg/m2  Review of Systems  General ROS: negative for chills, fever or weight loss  Respiratory ROS: no cough, shortness of breath, or wheezing  Cardiovascular ROS: no chest pain or dyspnea on exertion  Musculoskeletal ROS: negative for gait disturbance or muscular weakness    Family History   Problem Relation Age of Onset    Heart disease Mother     Hyperlipidemia Mother     Hypertension Mother     Diabetes Mother     Heart disease Father     Hyperlipidemia Father     Cancer Father     Prostate cancer Neg Hx     Kidney disease Neg Hx      Past Medical History:   Diagnosis Date    Allergy     Asthma     GERD (gastroesophageal reflux disease)     Hyperlipidemia     Hypertension      Family History   Problem Relation Age of Onset    Heart disease Mother     Hyperlipidemia Mother     Hypertension Mother     Diabetes Mother     Heart disease Father     Hyperlipidemia Father     Cancer Father     Prostate cancer Neg Hx     Kidney disease Neg Hx      Social History   Substance Use Topics    Smoking status: Current Every Day Smoker     Packs/day: 0.50     Years: 30.00     Types: Cigarettes    Smokeless tobacco: Not on file      Comment:  down to 6 cigarettes daily/ trying to quit    Alcohol use No       Impression:      ICD-10-CM ICD-9-CM    1. Gross hematuria R31.0 599.71 Cystoscopy   2. Tobacco abuse Z72.0 305.1    3. Urinary tract infection with hematuria, site unspecified N39.0 599.0 Urine culture    R31.9     4. OAB (overactive bladder) N32.81 596.51    5. Female stress incontinence N39.3 625.6        Plan:    #1.  Gross hematuria and #2 strong smoking history.  Plan.  Recommend cystoscopy to rule out the presence of bladder cancer.  Patient voices understanding and agrees.  This would be scheduled at next available time slot    #3 UTI.  Finish course of Macrobid.  Urine culture today to assure sterility    Numbers for 5.  Overactive bladder and stress incontinence.  Plan.  I discussed at length in detail the differences between these 2 entities.  Discussed risk of infection with use of anticholinergic.  Patient voices understanding and wants to give it a try which I think reasonable.Prescription for oxybutynin 5 mg is given to the patient.  Possible side effects including dry mouth, constipation, and difficulty voiding were discussed.  Patient will titrate to up to a maximum 5 mg 3 times a day and adjust downward as needed according to side effects.  Patient voices understanding    Follow-up with cystoscopy at next available time slot  PLEASE NOTE:  Please be advised that portions of this note were dictated using voice recognition software and may contain dictation related errors in spelling/grammar/appropriate pronouns/syntax or other errors that might have not been found and or corrected on text review.

## 2017-05-05 RX ORDER — ZOLPIDEM TARTRATE 5 MG/1
TABLET ORAL
Qty: 30 TABLET | OUTPATIENT
Start: 2017-05-05

## 2017-05-08 ENCOUNTER — TELEPHONE (OUTPATIENT)
Dept: UROLOGY | Facility: CLINIC | Age: 52
End: 2017-05-08

## 2017-05-08 LAB — BACTERIA UR CULT: NORMAL

## 2017-05-08 RX ORDER — NITROFURANTOIN (MACROCRYSTALS) 100 MG/1
100 CAPSULE ORAL 2 TIMES DAILY
Qty: 20 CAPSULE | Refills: 0 | Status: SHIPPED | OUTPATIENT
Start: 2017-05-08 | End: 2017-05-18

## 2017-05-08 NOTE — TELEPHONE ENCOUNTER
Please inform patient that she needs another course of the Macrobid.  Prescription written.  Keep scheduled appointment for cystoscopy

## 2017-05-08 NOTE — TELEPHONE ENCOUNTER
----- Message from Kristal Bui sent at 5/8/2017  3:03 PM CDT -----  Contact: [phone]/ self   Patient called in returning your call. Please advise

## 2017-05-10 ENCOUNTER — PROCEDURE VISIT (OUTPATIENT)
Dept: UROLOGY | Facility: CLINIC | Age: 52
End: 2017-05-10
Payer: COMMERCIAL

## 2017-05-10 VITALS
HEART RATE: 93 BPM | DIASTOLIC BLOOD PRESSURE: 86 MMHG | SYSTOLIC BLOOD PRESSURE: 134 MMHG | HEIGHT: 60 IN | BODY MASS INDEX: 35.73 KG/M2 | TEMPERATURE: 98 F | WEIGHT: 182 LBS

## 2017-05-10 DIAGNOSIS — N32.81 OAB (OVERACTIVE BLADDER): Primary | ICD-10-CM

## 2017-05-10 DIAGNOSIS — R31.0 GROSS HEMATURIA: ICD-10-CM

## 2017-05-10 DIAGNOSIS — Z87.440 HISTORY OF UTI: ICD-10-CM

## 2017-05-10 PROCEDURE — 87086 URINE CULTURE/COLONY COUNT: CPT

## 2017-05-10 PROCEDURE — 87186 SC STD MICRODIL/AGAR DIL: CPT

## 2017-05-10 PROCEDURE — 52000 CYSTOURETHROSCOPY: CPT | Mod: S$GLB,,, | Performed by: UROLOGY

## 2017-05-10 PROCEDURE — 87088 URINE BACTERIA CULTURE: CPT

## 2017-05-10 PROCEDURE — 87077 CULTURE AEROBIC IDENTIFY: CPT

## 2017-05-10 PROCEDURE — 99213 OFFICE O/P EST LOW 20 MIN: CPT | Mod: 25,S$GLB,, | Performed by: UROLOGY

## 2017-05-10 NOTE — PROCEDURES
Procedures PLEASE NOTE:  Please be advised that portions of this note were dictated using voice recognition software and may contain dictation related errors in spelling/grammar/appropriate pronouns/syntax or other errors that might have not been found and or corrected on text review.      Procedures: Flexible cystourethroscopy    Pre Procedure Diagnosis: Gross hematuria    Post Procedure Diagnosis: Same of presumed benign etiology    Date of Procedure. 05/10/2017    Indications.  This female with gross hematuria presents now for evaluation.  Previous stone protocol CT was negative.  She is currently on appropriate antibiotics for positive urine culture.  She has no dysuria or gross hematuria.    Surgeon: Carlos Macario MD    Anesthesia: 2% uro-jet lidocaine jelly for local analgesia    Flexible cysto-urethroscopy was performed after consent was obtained.    2% lidocaine urojet was used for local analgesia.  The genitalia were prepped and draped in the usual sterile fashion.    The flexible scope was advanced into the urethra and into the bladder.  Bilateral ureteral orifices were evaluated and noted to be normal with clear efflux.  The bladder was completely surveyed in a systematic fashion.   No bladder tumors or lesions were seen.    The patient tolerated the procedure well without complication.    Impression: Gross hematuria of presumed benign etiology.    Plan.  Gross hematuria of presumed benign etiology.    Plan.  This completes necessary evaluation on this patient.  No further evaluation needed unless patient develops recurrent gross hematuria.  I discussed that he did not have a contrast CT study and therefore evaluation would require cystoscopy with retrograde pyelography versus repeat CT scan with contrast.  At this point patient wants to hold off on further study.  She will contact me if she has recurrent gross hematuria.    #2 overactive bladder.  Plan.  Patient just started the oxybutynin.  She is  not sure that it is helping.  She will continue this and we'll see her back in 6 weeks for recheck    #3.  History of urinary tract infection.  Plan.Patient's urine will be cultured and once results are available ,we will contact the patient if antibiotics are indicated.    Physical exam reveals reveals a well-developed well-nourished patient  in no acute distress.  Patient is alert and oriented ×3 with normal mood and affect.  Respiratory effort is normal and there is no peripheral edema.  Skin is normal to inspection and palpation.  Patient has normal female external genitalia.  Urethra normal.  No lesions and perineum.    /86  Pulse 93  Temp 97.8 °F (36.6 °C)  Ht 5' (1.524 m)  Wt 82.6 kg (182 lb)  BMI 35.54 kg/m2  Review of Systems  General ROS: negative for chills, fever or weight loss  Respiratory ROS: no cough, shortness of breath, or wheezing  Cardiovascular ROS: no chest pain or dyspnea on exertion  Musculoskeletal ROS: negative for gait disturbance or muscular weakness    Family History   Problem Relation Age of Onset    Heart disease Mother     Hyperlipidemia Mother     Hypertension Mother     Diabetes Mother     Heart disease Father     Hyperlipidemia Father     Cancer Father     Prostate cancer Neg Hx     Kidney disease Neg Hx      Past Medical History:   Diagnosis Date    Allergy     Asthma     GERD (gastroesophageal reflux disease)     Hyperlipidemia     Hypertension      Family History   Problem Relation Age of Onset    Heart disease Mother     Hyperlipidemia Mother     Hypertension Mother     Diabetes Mother     Heart disease Father     Hyperlipidemia Father     Cancer Father     Prostate cancer Neg Hx     Kidney disease Neg Hx      Social History   Substance Use Topics    Smoking status: Current Every Day Smoker     Packs/day: 0.50     Years: 30.00     Types: Cigarettes    Smokeless tobacco: Not on file      Comment: down to 6 cigarettes daily/ trying to quit     Alcohol use No       Impression:      ICD-10-CM ICD-9-CM    1. OAB (overactive bladder) N32.81 596.51    2. Gross hematuria R31.0 599.71 Cystoscopy      Urine culture   3. History of UTI Z87.440 V13.02

## 2017-05-12 LAB — BACTERIA UR CULT: NORMAL

## 2017-06-02 ENCOUNTER — TELEPHONE (OUTPATIENT)
Dept: FAMILY MEDICINE | Facility: CLINIC | Age: 52
End: 2017-06-02

## 2017-07-10 RX ORDER — TAMSULOSIN HYDROCHLORIDE 0.4 MG/1
0.4 CAPSULE ORAL
Qty: 30 CAPSULE | OUTPATIENT
Start: 2017-07-10 | End: 2017-08-09

## 2017-08-07 RX ORDER — TAMSULOSIN HYDROCHLORIDE 0.4 MG/1
CAPSULE ORAL
Refills: 1 | COMMUNITY
Start: 2017-07-08 | End: 2018-01-16

## 2017-08-07 RX ORDER — OMEPRAZOLE 40 MG/1
40 CAPSULE, DELAYED RELEASE ORAL DAILY
Qty: 30 CAPSULE | Refills: 3 | Status: SHIPPED | OUTPATIENT
Start: 2017-08-07 | End: 2017-11-11 | Stop reason: SDUPTHER

## 2017-08-07 RX ORDER — ZOLPIDEM TARTRATE 5 MG/1
TABLET ORAL
Qty: 30 TABLET | Refills: 3 | Status: SHIPPED | OUTPATIENT
Start: 2017-09-01 | End: 2018-01-16

## 2017-08-07 RX ORDER — OXYBUTYNIN CHLORIDE 5 MG/1
5 TABLET ORAL 3 TIMES DAILY
Refills: 12 | COMMUNITY
Start: 2017-06-05 | End: 2018-01-16

## 2017-08-07 NOTE — TELEPHONE ENCOUNTER
----- Message from Mariah Pate sent at 8/7/2017  9:05 AM CDT -----  Contact: [phone] work  REFILLS:   Patient is requesting a medication refill.   RX name:heartburn  Strength:   Directions:   [facility]   Pharmacy phone #:

## 2017-08-07 NOTE — TELEPHONE ENCOUNTER
----- Message from Mariah Pate sent at 8/7/2017  9:05 AM CDT -----  Contact: [phone] work  REFILLS:   Patient is requesting a medication refill.   RX name:heartburn  Strength:   Directions:   [facility]   Pharmacy phone #:

## 2017-09-06 RX ORDER — ZOLPIDEM TARTRATE 5 MG/1
TABLET ORAL
Qty: 30 TABLET | OUTPATIENT
Start: 2017-09-06

## 2017-09-08 ENCOUNTER — TELEPHONE (OUTPATIENT)
Dept: FAMILY MEDICINE | Facility: CLINIC | Age: 52
End: 2017-09-08

## 2017-09-08 NOTE — TELEPHONE ENCOUNTER
----- Message from Ana Castaneda sent at 9/8/2017 11:33 AM CDT -----  Contact: [phone]  REFILLS:   Patient is requesting a medication refill.   RX name: Marcus  Strength:   Directions:   Pharmacy name: [facility]  Pharmacy phone #:

## 2017-09-21 ENCOUNTER — OFFICE VISIT (OUTPATIENT)
Dept: FAMILY MEDICINE | Facility: CLINIC | Age: 52
End: 2017-09-21
Payer: COMMERCIAL

## 2017-09-21 VITALS
DIASTOLIC BLOOD PRESSURE: 92 MMHG | BODY MASS INDEX: 38.34 KG/M2 | WEIGHT: 195.31 LBS | SYSTOLIC BLOOD PRESSURE: 154 MMHG | RESPIRATION RATE: 18 BRPM | HEART RATE: 96 BPM | HEIGHT: 60 IN | OXYGEN SATURATION: 97 %

## 2017-09-21 DIAGNOSIS — M25.561 ACUTE PAIN OF RIGHT KNEE: Primary | ICD-10-CM

## 2017-09-21 PROCEDURE — 99999 PR PBB SHADOW E&M-EST. PATIENT-LVL V: CPT | Mod: PBBFAC,,, | Performed by: NURSE PRACTITIONER

## 2017-09-21 PROCEDURE — 96372 THER/PROPH/DIAG INJ SC/IM: CPT | Mod: S$GLB,,, | Performed by: NURSE PRACTITIONER

## 2017-09-21 PROCEDURE — 99214 OFFICE O/P EST MOD 30 MIN: CPT | Mod: 25,S$GLB,, | Performed by: NURSE PRACTITIONER

## 2017-09-21 PROCEDURE — 3077F SYST BP >= 140 MM HG: CPT | Mod: S$GLB,,, | Performed by: NURSE PRACTITIONER

## 2017-09-21 PROCEDURE — 3008F BODY MASS INDEX DOCD: CPT | Mod: S$GLB,,, | Performed by: NURSE PRACTITIONER

## 2017-09-21 PROCEDURE — 3080F DIAST BP >= 90 MM HG: CPT | Mod: S$GLB,,, | Performed by: NURSE PRACTITIONER

## 2017-09-21 RX ORDER — CYCLOBENZAPRINE HCL 10 MG
10 TABLET ORAL
COMMUNITY
End: 2018-01-16

## 2017-09-21 RX ORDER — DICLOFENAC SODIUM 10 MG/G
2 GEL TOPICAL 4 TIMES DAILY
Qty: 100 G | Refills: 0 | Status: ON HOLD | OUTPATIENT
Start: 2017-09-21 | End: 2017-12-08 | Stop reason: HOSPADM

## 2017-09-21 RX ORDER — KETOROLAC TROMETHAMINE 30 MG/ML
60 INJECTION, SOLUTION INTRAMUSCULAR; INTRAVENOUS
Status: COMPLETED | OUTPATIENT
Start: 2017-09-21 | End: 2017-09-21

## 2017-09-21 RX ADMIN — KETOROLAC TROMETHAMINE 60 MG: 30 INJECTION, SOLUTION INTRAMUSCULAR; INTRAVENOUS at 03:09

## 2017-09-21 NOTE — PROGRESS NOTES
Subjective:       Patient ID: Loi Cordova is a 52 y.o. female.    Chief Complaint: Knee Pain (right)    Patient was seen in the ER on Monday (9/18/17). No imaging done at that time. Patient referred to Ortho, but when arrived to appointment her insurance was not accepted.       Knee Pain    Incident onset: 3 days ago. The incident occurred at work. Injury mechanism: Walking and when she came to a stop her knee popped. The pain is present in the right knee. The pain is at a severity of 7/10. The pain is moderate. The pain has been intermittent since onset. Associated symptoms include an inability to bear weight. Pertinent negatives include no loss of sensation, numbness or tingling. She reports no foreign bodies present. The symptoms are aggravated by weight bearing and movement. She has tried NSAIDs for the symptoms. The treatment provided mild relief.     Review of Systems   Constitutional: Negative.    Musculoskeletal: Positive for joint swelling.        Right knee pain   Skin: Negative.    Neurological: Negative for tingling and numbness.   Psychiatric/Behavioral: Negative.        Objective:      Physical Exam   Constitutional: She is oriented to person, place, and time. She appears well-developed and well-nourished.   HENT:   Head: Normocephalic and atraumatic.   Eyes: EOM are normal. Pupils are equal, round, and reactive to light.   Neck: Normal range of motion.   Cardiovascular: Normal rate and intact distal pulses.    Pulmonary/Chest: Effort normal. No respiratory distress.   Musculoskeletal: She exhibits no edema.        Right knee: She exhibits decreased range of motion and swelling. She exhibits no laceration and no erythema.        Legs:  Increased pain with knee extension and flexion. Patient currently ambulating with crutches.    Neurological: She is alert and oriented to person, place, and time. Coordination normal.   Skin: Skin is warm and dry.   Psychiatric: She has a normal mood and affect. Her  behavior is normal. Judgment and thought content normal.   Nursing note and vitals reviewed.      Assessment:       1. Acute pain of right knee        Plan:       Loi was seen today for knee pain.    Diagnoses and all orders for this visit:    Acute pain of right knee  -     diclofenac sodium 1 % Gel; Apply 2 g topically 4 (four) times daily.  -     ketorolac injection 60 mg; Inject 2 mLs (60 mg total) into the muscle one time.  -     X-Ray Knee 3 View Right; Future  -     Ambulatory referral to Orthopedics    Other orders  1. Take OTC acetaminophen (Tylenol) 325-1000 mg orally every 6 hours as needed for pain OR Ibuprofen (if use, start tomorrow).   2. Start using Diclofenac gel on tomorrow.     Follow-up in 2 weeks if symptoms worsen or fail to improve.     Roseann Oviedo NP

## 2017-09-21 NOTE — PATIENT INSTRUCTIONS
1. Take OTC acetaminophen (Tylenol) 325-1000 mg orally every 6 hours as needed for pain OR Ibuprofen (if use, start tomorrow).   2. Start using Diclofenac gel on tomorrow.   3. Follow-up in 2 weeks if symptoms worsen or fail to improve.

## 2017-09-28 ENCOUNTER — OFFICE VISIT (OUTPATIENT)
Dept: ORTHOPEDICS | Facility: CLINIC | Age: 52
End: 2017-09-28
Payer: COMMERCIAL

## 2017-09-28 VITALS — WEIGHT: 195.31 LBS | HEIGHT: 60 IN | BODY MASS INDEX: 38.34 KG/M2

## 2017-09-28 DIAGNOSIS — M25.361 KNEE INSTABILITY, RIGHT: ICD-10-CM

## 2017-09-28 DIAGNOSIS — M25.561 ACUTE PAIN OF RIGHT KNEE: Primary | ICD-10-CM

## 2017-09-28 PROCEDURE — 99203 OFFICE O/P NEW LOW 30 MIN: CPT | Mod: S$GLB,,, | Performed by: PHYSICIAN ASSISTANT

## 2017-09-28 PROCEDURE — 99999 PR PBB SHADOW E&M-EST. PATIENT-LVL IV: CPT | Mod: PBBFAC,,, | Performed by: PHYSICIAN ASSISTANT

## 2017-09-28 RX ORDER — HYDROCODONE BITARTRATE AND ACETAMINOPHEN 5; 325 MG/1; MG/1
1 TABLET ORAL EVERY 6 HOURS PRN
Qty: 20 TABLET | Refills: 0 | Status: SHIPPED | OUTPATIENT
Start: 2017-09-28 | End: 2018-01-16

## 2017-09-28 NOTE — LETTER
September 28, 2017      Roseann Oviedo, DARRELL  [address]   [address]  [facility] [address]           [facility]  [address]  [place] [address]  Phone: [phone]  Fax: [phone]          Patient: Loi Cordova   MR Number: [id number]   YOB: 1965   Date of Visit: 9/28/2017       Dear Roseann Oviedo:    Thank you for referring Loi Cordova to me for evaluation. Attached you will find relevant portions of my assessment and plan of care.    If you have questions, please do not hesitate to call me. I look forward to following Loi Cordova along with you.    Sincerely,    Jamar Lau PA-C    Enclosure  CC:  No Recipients    If you would like to receive this communication electronically, please contact [email or web address] or [phone] to request more information on [employer] Link access.    For providers and/or their staff who would like to refer a patient to [facility], please contact us through our one-stop-shop provider referral line, Critical access hospitalierge, at [phone].    If you feel you have received this communication in error or would no longer like to receive these types of communications, please e-mail [email or web address]

## 2017-09-28 NOTE — PROGRESS NOTES
SUBJECTIVE:     Chief Complaint & History of Present Illness:  Loi Cordova is a New patient 52 y.o. female who is seen here today with a complaint of    Chief Complaint   Patient presents with    Right Knee - Pain    . She reports developing sudden onset right knee pain and giving way while standing in line at her office. She had great difficulty bearing weight and heard an audible pop in the knee. She was seen by ED and PCP has been placed on crutches and is here for follow on care.  On a scale of 1-10, with 10 being worst pain imaginable, he rates this pain as 4 on good days and 10 on bad days.  she describes the pain as tender.    Review of patient's allergies indicates:   Allergen Reactions    Iodine and iodide containing products Swelling     Pt states that she experiences throat swelling when eating crawfish         Current Outpatient Prescriptions   Medication Sig Dispense Refill    aspirin 81 MG Chew Take 81 mg by mouth once daily.      cetirizine (ZYRTEC) 10 MG tablet Take 1 tablet (10 mg total) by mouth once daily. 30 tablet 0    cyclobenzaprine (FLEXERIL) 10 MG tablet Take 10 mg by mouth.      diclofenac sodium 1 % Gel Apply 2 g topically 4 (four) times daily. 100 g 0    fenofibrate 160 MG Tab Take 160 mg by mouth once daily.      fluticasone (FLONASE) 50 mcg/actuation nasal spray 1 spray by Each Nare route once daily. 1 Bottle 1    hydrocodone-acetaminophen 5-325mg (NORCO) 5-325 mg per tablet Take 1 tablet by mouth every 6 (six) hours as needed for Pain. 20 tablet 0    lisinopril (PRINIVIL,ZESTRIL) 40 MG tablet Take 1 tablet orally once a day.  6    metoprolol succinate (TOPROL-XL) 25 MG 24 hr tablet Take 1 tablet orally once a day.  6    nicotine (NICODERM CQ) 14 mg/24 hr PLACE 1 PATCH TRANSDERMALLY ONCE DAILY EVERY 24 HOURS  3    nitroGLYCERIN (NITROSTAT) 0.4 MG SL tablet Take 1 tablet (Sublingual) every 5 minutes for 3 doses then call 911  6    omeprazole (PRILOSEC) 40 MG capsule Take  1 capsule (40 mg total) by mouth once daily. 30 capsule 3    oxybutynin (DITROPAN) 5 MG Tab Take 5 mg by mouth 3 (three) times daily.  12    prasugrel (EFFIENT) 10 mg Tab Take 1 tablet (10 mg total) by mouth once daily. 30 tablet 11    pravastatin (PRAVACHOL) 80 MG tablet Take 80 mg by mouth once daily.      tamsulosin (FLOMAX) 0.4 mg Cp24 Take 1 capsule (0.4 mg total) by mouth after dinner.  1    VENTOLIN HFA 90 mcg/actuation inhaler Inhale 2 puffs by mouth every 4-6 hours as needed.  3    zolpidem (AMBIEN) 5 MG Tab TAKE 1 TABLET BY MOUTH AT BEDTIME AS NEEDED 30 tablet 3     No current facility-administered medications for this visit.        Past Medical History:   Diagnosis Date    Allergy     Asthma     GERD (gastroesophageal reflux disease)     Hyperlipidemia     Hypertension        Past Surgical History:   Procedure Laterality Date    CHOLECYSTECTOMY      HYSTERECTOMY      SHOULDER SURGERY      TOTAL KNEE ARTHROPLASTY         Vital Signs (Most Recent)  There were no vitals filed for this visit.        Review of Systems:  ROS:  Constitutional: no fever or chills  Eyes: no visual changes  ENT: no nasal congestion or sore throat  Respiratory: no cough or shortness of breath, Positive for Asthma  Cardiovascular: no chest pain or palpitations, Positive PAD, CAD,   Gastrointestinal: no nausea or vomiting, tolerating diet, positive for GERD  Genitourinary: no hematuria or dysuria  Integument/Breast: no rash or pruritis  Hematologic/Lymphatic: no easy bruising or lymphadenopathy  Musculoskeletal: no arthralgias or myalgias  Neurological: no seizures or tremors  Behavioral/Psych: no auditory or visual hallucinations  Endocrine: no heat or cold intolerance                OBJECTIVE:     PHYSICAL EXAM:  Height: 5' (152.4 cm) Weight: 88.6 kg (195 lb 5.2 oz), General Appearance: Well nourished, well developed, in no acute distress.  Neurological: Mood & affect are normal.  right Knee Exam:  Knee Range of  Motion:3-90 degrees flexion   Effusion:not significant  Condition of skin:intact  Location of tenderness:Medial joint line and popliteal fossa   Strength:limited by pain  Stability:  Lachman: stable, LCL: stable, MCL: stable, PCL: stable and posteromedial (dial): unstable  Varus /Valgus stress:  normal  Juan:   Positive Medial/Positive Medial    left Knee Exam: S/P TKA  Knee Range of Motion:0-115 degrees flexion   Effusion:none  Condition of skin:intact  Location of tenderness:None   Strength:5 of 5  Stability:  stable to testing  Varus /Valgus stress:  normal  Juan:   negative/negative      Hip Examination:  normal    RADIOGRAPHS:  X-rays from previous visit demonstrate no Fracture dislocation or bony abnormality     ASSESSMENT/PLAN:     Plan: We discussed with the patient at length all the different treatment options available for  the knee including anti-inflammatories, acetaminophen, rest, ice, knee strengthening exercise, occasional cortisone injections for temporary relief, Viscosupplimentation injections, arthroscopic debridement osteotomy, and finally knee arthroplasty.   Knee immoblizer  Crutch walking WBAT  Norco 5-325 pain control  MRI right knee  Follow up after MRI

## 2017-09-29 DIAGNOSIS — Z12.11 COLON CANCER SCREENING: ICD-10-CM

## 2017-10-05 ENCOUNTER — OFFICE VISIT (OUTPATIENT)
Dept: ORTHOPEDICS | Facility: CLINIC | Age: 52
End: 2017-10-05
Payer: COMMERCIAL

## 2017-10-05 VITALS — HEIGHT: 60 IN | BODY MASS INDEX: 36.71 KG/M2 | WEIGHT: 187 LBS

## 2017-10-05 DIAGNOSIS — M17.11 PRIMARY OSTEOARTHRITIS OF RIGHT KNEE: Primary | ICD-10-CM

## 2017-10-05 PROCEDURE — 99999 PR PBB SHADOW E&M-EST. PATIENT-LVL III: CPT | Mod: PBBFAC,,, | Performed by: NURSE PRACTITIONER

## 2017-10-05 PROCEDURE — 20610 DRAIN/INJ JOINT/BURSA W/O US: CPT | Mod: RT,S$GLB,, | Performed by: NURSE PRACTITIONER

## 2017-10-05 PROCEDURE — 99213 OFFICE O/P EST LOW 20 MIN: CPT | Mod: 25,S$GLB,, | Performed by: NURSE PRACTITIONER

## 2017-10-05 RX ORDER — TRIAMCINOLONE ACETONIDE 40 MG/ML
40 INJECTION, SUSPENSION INTRA-ARTICULAR; INTRAMUSCULAR
Status: COMPLETED | OUTPATIENT
Start: 2017-10-05 | End: 2017-10-05

## 2017-10-05 RX ORDER — IBUPROFEN 800 MG/1
800 TABLET ORAL 3 TIMES DAILY
Qty: 90 TABLET | Refills: 0 | Status: SHIPPED | OUTPATIENT
Start: 2017-10-05 | End: 2018-01-16

## 2017-10-05 RX ADMIN — TRIAMCINOLONE ACETONIDE 40 MG: 40 INJECTION, SUSPENSION INTRA-ARTICULAR; INTRAMUSCULAR at 07:10

## 2017-10-05 NOTE — PROGRESS NOTES
CC: Pain of the Right Knee      HPI: Pt with right knee pain for the past month or so. She was seen by INOCENC[facility] Moreno and already had an MRI which showed cartilage fissuring under the medial femoral condyle and patellar facet. She has been taking ibuprofen 800mg and norco for the pain. She has been non weight bearing and partial weight bearing with crutches. 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 The pain is aching and medial and worse with weight bearing activity.    ROS  General: denies fever and chills  Resp: no c/o sob  CVS: no c/o cp  MSK: c/o right knee pain which is aching and medial and worse with weight bearing    PE  General: AAOx3, pleasant and cooperative  Resp: respirations even and unlabored  MSK: right knee exam  0 degrees extension  90 degrees flexion  No warmth or erythema   - effusion    Xray:  Reviewed MRI with the patient    Assessment:  Right knee djd    Plan:  Discussed treatment options with the patient. She would like to try a cortisone injection tonight and continue ibuprofen prn. She will message me if the cortisone injection doesn't help her pain and we will try euflexxa injections. Continue RICE and ibuprofen  F/u prn    Knee Injection Procedure Note    Pre-operative Diagnosis: right knee degenerative arthritis    Post-operative Diagnosis: same    Indications: right knee pain    Anesthesia: none    Procedure Details     Verbal consent was obtained for the procedure. The injection site was identified and the skin was prepared with alcohol. The right knee was injected from an anterolateral approach with 1 ml of Kenalog and 5 ml Lidocaine under sterile technique using a 22 gauge needle. The needle was removed and the area cleansed and dressed.    Complications:  None; patient tolerated the procedure well.    she was advised to rest the knee today, using ice and elevation as needed for comfort and swelling. she did receive immediate relief of  the knee pain. she was told this would be short lived and is secondary to the lidocaine. she may have an increase in discomfort tonight followed by steady improvement over the next several days. It may take 1-3 weeks following the injection to get the full benefit of the medication.

## 2017-11-13 RX ORDER — OMEPRAZOLE 40 MG/1
CAPSULE, DELAYED RELEASE ORAL
Qty: 30 CAPSULE | Refills: 3 | Status: SHIPPED | OUTPATIENT
Start: 2017-11-13 | End: 2018-04-20 | Stop reason: SDUPTHER

## 2017-11-29 DIAGNOSIS — M17.11 PRIMARY OSTEOARTHRITIS OF RIGHT KNEE: Primary | ICD-10-CM

## 2017-11-29 RX ORDER — HYALURONATE SODIUM 20 MG/2 ML
20 SYRINGE (ML) INTRAARTICULAR WEEKLY
Status: DISCONTINUED | OUTPATIENT
Start: 2017-12-08 | End: 2019-02-08

## 2017-11-29 NOTE — PROGRESS NOTES
Pt with right knee djd. She had a cortisone injection 10/5 with relief until now. She would like to try the euflexxa injections since the cortisone didn't last. Orders entered and she will be scheduled to start once insurance authorization is obtained.

## 2017-12-07 PROBLEM — I20.9 ANGINA, CLASS III: Status: ACTIVE | Noted: 2017-12-07

## 2017-12-07 PROBLEM — I20.9 ANGINA, CLASS III: Status: RESOLVED | Noted: 2017-12-07 | Resolved: 2017-12-07

## 2017-12-07 PROBLEM — I50.33 ACUTE ON CHRONIC DIASTOLIC CHF (CONGESTIVE HEART FAILURE): Status: ACTIVE | Noted: 2017-12-07

## 2017-12-28 ENCOUNTER — OFFICE VISIT (OUTPATIENT)
Dept: ORTHOPEDICS | Facility: CLINIC | Age: 52
End: 2017-12-28
Payer: COMMERCIAL

## 2017-12-28 VITALS — BODY MASS INDEX: 38.09 KG/M2 | WEIGHT: 194 LBS | HEIGHT: 60 IN

## 2017-12-28 DIAGNOSIS — M17.11 PRIMARY OSTEOARTHRITIS OF RIGHT KNEE: ICD-10-CM

## 2017-12-28 PROCEDURE — 99499 UNLISTED E&M SERVICE: CPT | Mod: S$GLB,,, | Performed by: NURSE PRACTITIONER

## 2017-12-28 PROCEDURE — 99999 PR PBB SHADOW E&M-EST. PATIENT-LVL IV: CPT | Mod: PBBFAC,,, | Performed by: NURSE PRACTITIONER

## 2017-12-28 PROCEDURE — 20610 DRAIN/INJ JOINT/BURSA W/O US: CPT | Mod: RT,S$GLB,, | Performed by: NURSE PRACTITIONER

## 2017-12-28 RX ADMIN — Medication 20 MG: at 06:12

## 2017-12-29 NOTE — PROGRESS NOTES
Loi Cordova presents to clinic today for the first right knee Euflexxa injection.    Exam demonstrates the no effusion in the  right knee, and the skin is intact.    Diagnosis: osteoarthritis knee    After time out was performed and patient ID, side, and site were verified, the  right  knee was sterilly prepped in the standard fashion.  A 22-gauge needle was introduced into right knee joint from an marychuy-lateral site without complication and knee was then injected with 2 ml of Euflexxa.  Sterile dressing was applied.  The patient was instructed to resume activities as tolerated and to call with any problems.     We will see Loi Cordova back next week for the second injection.

## 2018-01-03 RX ORDER — ZOLPIDEM TARTRATE 5 MG/1
TABLET ORAL
Qty: 30 TABLET | OUTPATIENT
Start: 2018-01-03

## 2018-01-04 ENCOUNTER — OFFICE VISIT (OUTPATIENT)
Dept: ORTHOPEDICS | Facility: CLINIC | Age: 53
End: 2018-01-04
Payer: COMMERCIAL

## 2018-01-04 VITALS — HEIGHT: 60 IN | WEIGHT: 194 LBS | BODY MASS INDEX: 38.09 KG/M2

## 2018-01-04 DIAGNOSIS — M17.11 PRIMARY OSTEOARTHRITIS OF RIGHT KNEE: ICD-10-CM

## 2018-01-04 PROCEDURE — 20610 DRAIN/INJ JOINT/BURSA W/O US: CPT | Mod: RT,S$GLB,, | Performed by: NURSE PRACTITIONER

## 2018-01-04 PROCEDURE — 99999 PR PBB SHADOW E&M-EST. PATIENT-LVL IV: CPT | Mod: PBBFAC,,, | Performed by: NURSE PRACTITIONER

## 2018-01-04 PROCEDURE — 99499 UNLISTED E&M SERVICE: CPT | Mod: S$GLB,,, | Performed by: NURSE PRACTITIONER

## 2018-01-04 RX ADMIN — Medication 20 MG: at 05:01

## 2018-01-04 NOTE — PROGRESS NOTES
Loi Cordova presents to clinic today for the second right knee Euflexxa injection.    Exam demonstrates the no effusion in the  right knee, and the skin is intact.    Diagnosis: osteoarthritis knee    After time out was performed and patient ID, side, and site were verified, the  right  knee was sterilly prepped in the standard fashion.  A 22-gauge needle was introduced into right knee joint from an marychuy-lateral site without complication and knee was then injected with 2 ml of Euflexxa.  Sterile dressing was applied.  The patient was instructed to resume activities as tolerated and to call with any problems.     We will see Loi Cordova back next week for the third injection.

## 2018-01-11 ENCOUNTER — OFFICE VISIT (OUTPATIENT)
Dept: ORTHOPEDICS | Facility: CLINIC | Age: 53
End: 2018-01-11
Payer: COMMERCIAL

## 2018-01-11 VITALS — BODY MASS INDEX: 38.09 KG/M2 | HEIGHT: 60 IN | WEIGHT: 194 LBS

## 2018-01-11 DIAGNOSIS — M17.11 PRIMARY OSTEOARTHRITIS OF RIGHT KNEE: ICD-10-CM

## 2018-01-11 PROCEDURE — 99499 UNLISTED E&M SERVICE: CPT | Mod: S$GLB,,, | Performed by: NURSE PRACTITIONER

## 2018-01-11 PROCEDURE — 20610 DRAIN/INJ JOINT/BURSA W/O US: CPT | Mod: RT,S$GLB,, | Performed by: NURSE PRACTITIONER

## 2018-01-11 PROCEDURE — 99999 PR PBB SHADOW E&M-EST. PATIENT-LVL IV: CPT | Mod: PBBFAC,,, | Performed by: NURSE PRACTITIONER

## 2018-01-11 RX ADMIN — Medication 20 MG: at 06:01

## 2018-01-12 NOTE — PROGRESS NOTES
Loi Cordova presents to clinic today for the third right knee Euflexxa injection.    Exam demonstrates the no effusion in the  right knee, and the skin is intact.    Diagnosis: osteoarthritis knee    After time out was performed and patient ID, side, and site were verified, the  right  knee was sterilly prepped in the standard fashion.  A 22-gauge needle was introduced into right knee joint from an marychuy-lateral site without complication and knee was then injected with 2 ml of Euflexxa.  Sterile dressing was applied.  The patient was instructed to resume activities as tolerated and to call with any problems.     Pt is not having relief at this point, but she will continue to ice and elevate. She will f/u if no improvement.

## 2018-01-16 ENCOUNTER — OFFICE VISIT (OUTPATIENT)
Dept: FAMILY MEDICINE | Facility: CLINIC | Age: 53
End: 2018-01-16
Payer: COMMERCIAL

## 2018-01-16 VITALS
SYSTOLIC BLOOD PRESSURE: 120 MMHG | HEART RATE: 79 BPM | DIASTOLIC BLOOD PRESSURE: 70 MMHG | BODY MASS INDEX: 38.26 KG/M2 | HEIGHT: 60 IN | OXYGEN SATURATION: 97 % | WEIGHT: 194.88 LBS

## 2018-01-16 DIAGNOSIS — I10 ESSENTIAL HYPERTENSION: Primary | ICD-10-CM

## 2018-01-16 DIAGNOSIS — R73.9 HYPERGLYCEMIA: ICD-10-CM

## 2018-01-16 DIAGNOSIS — Z72.0 TOBACCO ABUSE: ICD-10-CM

## 2018-01-16 DIAGNOSIS — F51.04 PSYCHOPHYSIOLOGICAL INSOMNIA: ICD-10-CM

## 2018-01-16 DIAGNOSIS — Z98.61 CAD S/P PERCUTANEOUS CORONARY ANGIOPLASTY: ICD-10-CM

## 2018-01-16 DIAGNOSIS — D72.829 LEUKOCYTOSIS, UNSPECIFIED TYPE: ICD-10-CM

## 2018-01-16 DIAGNOSIS — E78.00 PURE HYPERCHOLESTEROLEMIA: ICD-10-CM

## 2018-01-16 DIAGNOSIS — E66.01 CLASS 2 SEVERE OBESITY DUE TO EXCESS CALORIES WITH SERIOUS COMORBIDITY AND BODY MASS INDEX (BMI) OF 38.0 TO 38.9 IN ADULT: ICD-10-CM

## 2018-01-16 DIAGNOSIS — I25.10 CAD S/P PERCUTANEOUS CORONARY ANGIOPLASTY: ICD-10-CM

## 2018-01-16 PROBLEM — R68.89 FLU-LIKE SYMPTOMS: Status: RESOLVED | Noted: 2017-02-20 | Resolved: 2018-01-16

## 2018-01-16 PROBLEM — J06.9 UPPER RESPIRATORY TRACT INFECTION: Status: RESOLVED | Noted: 2017-02-20 | Resolved: 2018-01-16

## 2018-01-16 PROBLEM — E66.812 CLASS 2 SEVERE OBESITY DUE TO EXCESS CALORIES WITH SERIOUS COMORBIDITY AND BODY MASS INDEX (BMI) OF 38.0 TO 38.9 IN ADULT: Status: ACTIVE | Noted: 2018-01-16

## 2018-01-16 PROCEDURE — 99214 OFFICE O/P EST MOD 30 MIN: CPT | Mod: S$GLB,,, | Performed by: INTERNAL MEDICINE

## 2018-01-16 PROCEDURE — 99999 PR PBB SHADOW E&M-EST. PATIENT-LVL III: CPT | Mod: PBBFAC,,, | Performed by: INTERNAL MEDICINE

## 2018-01-16 RX ORDER — PRASUGREL HYDROCHLORIDE 10 MG/1
TABLET, COATED ORAL
Refills: 11 | COMMUNITY
Start: 2017-12-09 | End: 2019-12-18

## 2018-01-16 RX ORDER — IBUPROFEN 200 MG
TABLET ORAL
Refills: 3 | Status: ON HOLD | COMMUNITY
Start: 2017-12-14 | End: 2018-07-31 | Stop reason: HOSPADM

## 2018-01-16 RX ORDER — TRAZODONE HYDROCHLORIDE 50 MG/1
50 TABLET ORAL NIGHTLY
Qty: 30 TABLET | Refills: 3 | Status: SHIPPED | OUTPATIENT
Start: 2018-01-16 | End: 2018-04-20 | Stop reason: SDUPTHER

## 2018-01-16 RX ORDER — BUPROPION HYDROCHLORIDE 150 MG/1
TABLET ORAL
Refills: 6 | Status: ON HOLD | COMMUNITY
Start: 2017-12-08 | End: 2018-08-25 | Stop reason: HOSPADM

## 2018-01-16 RX ORDER — POTASSIUM CHLORIDE 20 MEQ/1
TABLET, EXTENDED RELEASE ORAL
Refills: 3 | COMMUNITY
Start: 2017-11-08 | End: 2018-01-16 | Stop reason: SDUPTHER

## 2018-01-16 NOTE — PROGRESS NOTES
Subjective:       Patient ID: Loi Cordova is a 52 y.o. female.    Chief Complaint: Medication Refill and Insomnia    The patient is new to me. She is here to establish care. She has been taking zolpidem for years to help her sleep. She has found that it is losing it's effectiveness. She has never tried anything else. She is on wellbutrin for smoking cessation, and it seems to help some. She has had high BP in the past, but it has been good since her cardiologist added zestoretic. She had a stent angioplastied just before christmas.       Medication Refill   This is a chronic problem. The current episode started more than 1 year ago. The problem occurs daily. The problem has been gradually worsening. Associated symptoms include fatigue. Pertinent negatives include no abdominal pain, arthralgias, change in bowel habit, chest pain, chills, congestion, coughing, fever, headaches, joint swelling, myalgias, nausea, numbness, rash, sore throat, urinary symptoms or vomiting. Nothing aggravates the symptoms. Treatments tried: zolpidem. The treatment provided mild relief.     Review of Systems   Constitutional: Positive for fatigue. Negative for chills and fever.   HENT: Negative for congestion, ear discharge, ear pain, rhinorrhea and sore throat.    Eyes: Negative for discharge, redness and itching.   Respiratory: Negative for cough, chest tightness, shortness of breath and wheezing.    Cardiovascular: Negative for chest pain, palpitations and leg swelling.   Gastrointestinal: Negative for abdominal pain, change in bowel habit, constipation, diarrhea, nausea and vomiting.   Endocrine: Negative for cold intolerance and heat intolerance.   Genitourinary: Negative for dysuria, flank pain, frequency and hematuria.   Musculoskeletal: Negative for arthralgias, back pain, joint swelling and myalgias.   Skin: Negative for color change and rash.   Neurological: Negative for dizziness, tremors, numbness and headaches.    Psychiatric/Behavioral: Negative for dysphoric mood and sleep disturbance. The patient is not nervous/anxious.        Objective:      Physical Exam   Constitutional: She appears well-developed and well-nourished.   HENT:   Head: Normocephalic and atraumatic.   Right Ear: External ear normal. Tympanic membrane is not erythematous. No middle ear effusion.   Left Ear: External ear normal. Tympanic membrane is not erythematous.  No middle ear effusion.   Mouth/Throat: No posterior oropharyngeal edema or posterior oropharyngeal erythema. No tonsillar exudate.   Eyes: Conjunctivae and EOM are normal. Pupils are equal, round, and reactive to light.   Neck: No thyromegaly present.   Cardiovascular: Normal rate, regular rhythm, normal heart sounds and intact distal pulses.    No murmur heard.  Pulmonary/Chest: Effort normal and breath sounds normal. She has no wheezes.   Abdominal: She exhibits no distension. There is no tenderness.   Musculoskeletal: She exhibits no edema or tenderness.   Lymphadenopathy:     She has no cervical adenopathy.   Neurological: She displays normal reflexes. No cranial nerve deficit.   Skin: Skin is warm and dry. No rash noted.   Psychiatric: She has a normal mood and affect. Her behavior is normal.       Assessment and Plan:     Problem List Items Addressed This Visit     CAD S/P percutaneous coronary angioplasty - follow-up with cardiology. She must stop smoking also.     Essential hypertension - Primary - BP looks good on current meds.     Tobacco abuse- Continue wellbutrin. She says that she is already enrolled in the smoking cessation program.     Class 2 severe obesity due to excess calories with serious comorbidity and body mass index (BMI) of 38.0 to 38.9 in adult- we encouraged diet and exercise.     Pure hypercholesterolemia- we will check her cholesterol. She is on fenofibrate.     Hyperglycemia - she needs to return for a HgbA1c. She had very high BS on her last labs.     Relevant  Orders    Hemoglobin A1c      Other Visit Diagnoses     Psychophysiological insomnia   - Continue wellbutrin. We will try trazodone at night. Recheck in 1 month.      Relevant Medications    traZODone (DESYREL) 50 MG tablet    Leukocytosis, unspecified type   - I have requested a repeat cbc in the next 2 weeks to make sure the WBC returned to normal.      Relevant Orders    CBC auto differential

## 2018-01-16 NOTE — PATIENT INSTRUCTIONS
I have prescribed trazodone instead of ambien for sleep. If it is not effective, we can try other things. We also discussed relaxation and writing down tasks the night before so you can address them later. Continue working on smoking cessation. I would like you to get cbc repeated in the next week or so because it was high last time.

## 2018-02-20 ENCOUNTER — OFFICE VISIT (OUTPATIENT)
Dept: FAMILY MEDICINE | Facility: CLINIC | Age: 53
End: 2018-02-20
Payer: COMMERCIAL

## 2018-02-20 VITALS
BODY MASS INDEX: 38.84 KG/M2 | SYSTOLIC BLOOD PRESSURE: 162 MMHG | OXYGEN SATURATION: 98 % | WEIGHT: 198.88 LBS | RESPIRATION RATE: 16 BRPM | HEART RATE: 76 BPM | DIASTOLIC BLOOD PRESSURE: 94 MMHG

## 2018-02-20 DIAGNOSIS — I10 ESSENTIAL HYPERTENSION: ICD-10-CM

## 2018-02-20 DIAGNOSIS — Z98.890 HISTORY OF BACK SURGERY: ICD-10-CM

## 2018-02-20 DIAGNOSIS — D72.829 LEUKOCYTOSIS, UNSPECIFIED TYPE: ICD-10-CM

## 2018-02-20 DIAGNOSIS — M54.50 ACUTE RIGHT-SIDED LOW BACK PAIN WITHOUT SCIATICA: Primary | ICD-10-CM

## 2018-02-20 PROCEDURE — 3008F BODY MASS INDEX DOCD: CPT | Mod: S$GLB,,, | Performed by: INTERNAL MEDICINE

## 2018-02-20 PROCEDURE — 99999 PR PBB SHADOW E&M-EST. PATIENT-LVL III: CPT | Mod: PBBFAC,,, | Performed by: INTERNAL MEDICINE

## 2018-02-20 PROCEDURE — 96372 THER/PROPH/DIAG INJ SC/IM: CPT | Mod: S$GLB,,, | Performed by: INTERNAL MEDICINE

## 2018-02-20 PROCEDURE — 99213 OFFICE O/P EST LOW 20 MIN: CPT | Mod: 25,S$GLB,, | Performed by: INTERNAL MEDICINE

## 2018-02-20 RX ORDER — MELOXICAM 15 MG/1
15 TABLET ORAL DAILY
Qty: 30 TABLET | Refills: 3 | Status: SHIPPED | OUTPATIENT
Start: 2018-02-20 | End: 2018-05-18 | Stop reason: SDUPTHER

## 2018-02-20 RX ORDER — CYCLOBENZAPRINE HCL 10 MG
10 TABLET ORAL 3 TIMES DAILY PRN
Qty: 30 TABLET | Refills: 0 | Status: SHIPPED | OUTPATIENT
Start: 2018-02-20 | End: 2018-03-02

## 2018-02-20 RX ORDER — TRIAMCINOLONE ACETONIDE 40 MG/ML
40 INJECTION, SUSPENSION INTRA-ARTICULAR; INTRAMUSCULAR
Status: COMPLETED | OUTPATIENT
Start: 2018-02-20 | End: 2018-02-20

## 2018-02-20 RX ADMIN — TRIAMCINOLONE ACETONIDE 40 MG: 40 INJECTION, SUSPENSION INTRA-ARTICULAR; INTRAMUSCULAR at 03:02

## 2018-02-20 NOTE — PATIENT INSTRUCTIONS
I am not suere of the cause of your back pain. It appears to be related to the old injury, but it could be a new injury. We will treat with a steroid shot and meloxicam. I have also sent your a prescription for some flexeril. Please only take it when you are going to be at home. It lasts about 8 hours in your system and might make you drowsy or dizzy. If not getting better, let me know and we can order an x-ray.

## 2018-02-20 NOTE — PROGRESS NOTES
Subjective:       Patient ID: Loi Cordova is a 52 y.o. female.    Chief Complaint: No chief complaint on file.    I have reviewed PMH, SH and Fh. She is a return patient. She presents today because of back pain. It started this weekend. She went to the Er. They did a CT abdomen which only showed a fatty liver. She has a history of back surgery 3-4 years ago. This pain is not getting better with muscle relaxer. She does not recall an  Inciting event. She has a numbness that radiates around her right flank. Her UA in the ER was Ok.   This started about 2 weeks ago. No rashes.       Back Pain   This is a new problem. The current episode started 1 to 4 weeks ago. The problem occurs constantly. The problem is unchanged. The pain is present in the lumbar spine. The quality of the pain is described as aching and burning. The pain is moderate. The pain is the same all the time. The symptoms are aggravated by lying down. Associated symptoms include numbness and tingling. Pertinent negatives include no abdominal pain, chest pain, dysuria, fever, headaches, paresis or weakness. Risk factors: hx of back surgery a few years ago. She has tried muscle relaxant for the symptoms. The treatment provided mild relief.     Review of Systems   Constitutional: Negative for chills, fatigue and fever.   HENT: Negative for congestion, ear discharge, ear pain, rhinorrhea and sore throat.    Eyes: Negative for discharge, redness and itching.   Respiratory: Negative for cough, chest tightness, shortness of breath and wheezing.    Cardiovascular: Negative for chest pain, palpitations and leg swelling.   Gastrointestinal: Negative for abdominal pain, constipation, diarrhea, nausea and vomiting.   Endocrine: Negative for cold intolerance and heat intolerance.   Genitourinary: Negative for dysuria, flank pain, frequency and hematuria.   Musculoskeletal: Positive for back pain. Negative for arthralgias, joint swelling and myalgias.   Skin: Negative  for color change and rash.   Neurological: Positive for tingling and numbness. Negative for dizziness, tremors, weakness and headaches.   Psychiatric/Behavioral: Negative for dysphoric mood and sleep disturbance. The patient is not nervous/anxious.        Objective:      Physical Exam   Constitutional: She appears well-developed and well-nourished.   HENT:   Head: Normocephalic and atraumatic.   Right Ear: External ear normal. Tympanic membrane is not erythematous. No middle ear effusion.   Left Ear: External ear normal. Tympanic membrane is not erythematous.  No middle ear effusion.   Mouth/Throat: No posterior oropharyngeal edema or posterior oropharyngeal erythema. No tonsillar exudate.   Eyes: Conjunctivae and EOM are normal. Pupils are equal, round, and reactive to light.   Neck: No thyromegaly present.   Cardiovascular: Normal rate, regular rhythm, normal heart sounds and intact distal pulses.    No murmur heard.  Pulmonary/Chest: Effort normal and breath sounds normal. She has no wheezes.   Abdominal: She exhibits no distension. There is no tenderness.   Musculoskeletal: She exhibits no edema or tenderness.   Lymphadenopathy:     She has no cervical adenopathy.   Neurological: She displays normal reflexes. No cranial nerve deficit.   Skin: Skin is warm and dry. No rash noted.   Scar midline back about 2-3 cm long.    Psychiatric: She has a normal mood and affect. Her behavior is normal.       Assessment and Plan:     Problem List Items Addressed This Visit     Essential hypertension - BP looks good on current meds.       History of back surgery - She had fusion in lumbar spine a few years ago.       Other Visit Diagnoses     Acute right-sided low back pain without sciatica    -  Primary - We will treat with steroid shot and NSAIDs. I will also give a few flexeril with precautions.       Relevant Medications    triamcinolone acetonide injection 40 mg (Completed)    cyclobenzaprine (FLEXERIL) 10 MG tablet     meloxicam (MOBIC) 15 MG tablet    Leukocytosis, unspecified type    - CBC in ER was normal.

## 2018-04-20 DIAGNOSIS — F51.04 PSYCHOPHYSIOLOGICAL INSOMNIA: ICD-10-CM

## 2018-04-20 NOTE — MR AVS SNAPSHOT
[facility]  [address] [place] [address] Ave  [place] [address]  Phone: [phone]                  Loi Cordova   5/10/2017 9:00 AM   Procedure visit    Description:  Female : 1965   Provider:  Carlos Macario MD   Department:  [facility] [facility]           Diagnoses this Visit        Comments    OAB (overactive bladder)    -  Primary     Gross hematuria         History of UTI                To Do List           Future Appointments        Provider Department Dept Phone    2017 9:30 AM Carlos Macario MD [facility] [phone]      Goals (5 Years of Data)     None      Follow-Up and Disposition     Return in about 6 weeks (around 2017).      [facility]     [facility] On Call Nurse Care Line -  Assistance  Unless otherwise directed by your provider, please contact [facility], our nurse care line that is available for  assistance.     Registered nurses in the [facility] On Call Center provide: appointment scheduling, clinical advisement, health education, and other advisory services.  Call: [phone] (toll free)               Medications           Message regarding Medications     Verify the changes and/or additions to your medication regime listed below are the same as discussed with your clinician today.  If any of these changes or additions are incorrect, please notify your healthcare provider.             Verify that the below list of medications is an accurate representation of the medications you are currently taking.  If none reported, the list may be blank. If incorrect, please contact your healthcare provider. Carry this list with you in case of emergency.           Current Medications     aspirin 81 MG Chew Take 81 mg by mouth once daily.    cetirizine (ZYRTEC) 10 MG tablet Take 1 tablet (10 mg total) by mouth once daily.    fenofibrate 160 MG Tab Take 160 mg by mouth once daily.    fluticasone (FLONASE) 50 mcg/actuation nasal spray 1 spray by Each Nare  route once daily.    hydrocodone-acetaminophen 5-325mg (NORCO) 5-325 mg per tablet Take 1 tablet by mouth every 4 (four) hours as needed for Pain.    lisinopril (PRINIVIL,ZESTRIL) 40 MG tablet Take 1 tablet orally once a day.    metoprolol succinate (TOPROL-XL) 25 MG 24 hr tablet Take 1 tablet orally once a day.    nicotine (NICODERM CQ) 14 mg/24 hr PLACE 1 PATCH TRANSDERMALLY ONCE DAILY EVERY 24 HOURS    nitrofurantoin (MACRODANTIN) 100 MG capsule Take 1 capsule (100 mg total) by mouth 2 (two) times daily.    nitroGLYCERIN (NITROSTAT) 0.4 MG SL tablet Take 1 tablet (Sublingual) every 5 minutes for 3 doses then call 911    omeprazole (PRILOSEC) 40 MG capsule Take 40 mg by mouth once daily.    oxybutynin (DITROPAN) 5 MG Tab Take 1 tablet (5 mg total) by mouth 3 (three) times daily.    prasugrel (EFFIENT) 10 mg Tab Take 1 tablet (10 mg total) by mouth once daily.    pravastatin (PRAVACHOL) 80 MG tablet Take 80 mg by mouth once daily.    VENTOLIN HFA 90 mcg/actuation inhaler Inhale 2 puffs by mouth every 4-6 hours as needed.    zolpidem (AMBIEN) 5 MG Tab Take 1 tablet (5 mg total) by mouth nightly as needed.           Clinical Reference Information           Your Vitals Were     BP Pulse Temp Height Weight BMI    134/86 93 97.8 °F (36.6 °C) 5' (1.524 m) 82.6 kg (182 lb) 35.54 kg/m2      Blood Pressure          Most Recent Value    BP  134/86      Allergies as of 5/10/2017     Iodine And Iodide Containing Products      Immunizations Administered on Date of Encounter - 5/10/2017     None      Orders Placed During Today's Visit      Normal Orders This Visit    Cystoscopy     Urine culture       Smoking Cessation     If you would like to quit smoking:   You may be eligible for free services if you are a [place] resident and started smoking cigarettes before September 1, 1988.  Call the Smoking Cessation [employer] (SCT) toll free at (717) 430-1184 or (770) 331-5496.   Call 800-QUIT-NOW if you do not meet the above  criteria.   Contact us via email: [email or web address]   View our website for more information: [email or web address]        Language Assistance Services     ATTENTION: Language assistance services are available, free of charge. Please call [phone].      ATENCIÓN: Si hablori sky, tiene a ibarra disposición servicios gratuitos de asistencia lingüística. Llame al [phone].     CHÚ Ý: N?u b?n nói Ti?ng Vi?t, có các d?ch v? h? tr? ngôn ng? mi?n phí dành cho b?n. G?i s? [phone].         [facility] complies with applicable Federal civil rights laws and does not discriminate on the basis of race, color, national origin, age, disability, or sex.         infrequent

## 2018-04-22 RX ORDER — OMEPRAZOLE 40 MG/1
40 CAPSULE, DELAYED RELEASE ORAL DAILY
Qty: 30 CAPSULE | Refills: 0 | Status: SHIPPED | OUTPATIENT
Start: 2018-04-22 | End: 2018-06-04 | Stop reason: SDUPTHER

## 2018-04-22 RX ORDER — TRAZODONE HYDROCHLORIDE 50 MG/1
TABLET ORAL
Qty: 30 TABLET | Refills: 0 | Status: SHIPPED | OUTPATIENT
Start: 2018-04-22 | End: 2018-06-20 | Stop reason: SDUPTHER

## 2018-05-18 DIAGNOSIS — M54.50 ACUTE RIGHT-SIDED LOW BACK PAIN WITHOUT SCIATICA: ICD-10-CM

## 2018-05-18 RX ORDER — MELOXICAM 15 MG/1
TABLET ORAL
Qty: 30 TABLET | Refills: 2 | Status: ON HOLD | OUTPATIENT
Start: 2018-05-18 | End: 2018-07-31 | Stop reason: HOSPADM

## 2018-06-01 ENCOUNTER — PATIENT MESSAGE (OUTPATIENT)
Dept: ORTHOPEDICS | Facility: CLINIC | Age: 53
End: 2018-06-01

## 2018-06-04 RX ORDER — ATORVASTATIN CALCIUM 80 MG/1
TABLET, FILM COATED ORAL
Refills: 6 | COMMUNITY
Start: 2018-05-18 | End: 2019-02-08 | Stop reason: SDUPTHER

## 2018-06-04 RX ORDER — OMEPRAZOLE 40 MG/1
CAPSULE, DELAYED RELEASE ORAL
Qty: 30 CAPSULE | Refills: 3 | Status: SHIPPED | OUTPATIENT
Start: 2018-06-04 | End: 2018-09-24 | Stop reason: SDUPTHER

## 2018-06-20 DIAGNOSIS — F51.04 PSYCHOPHYSIOLOGICAL INSOMNIA: ICD-10-CM

## 2018-06-20 RX ORDER — TRAZODONE HYDROCHLORIDE 50 MG/1
50 TABLET ORAL NIGHTLY
Qty: 30 TABLET | Refills: 2 | Status: SHIPPED | OUTPATIENT
Start: 2018-06-20 | End: 2018-09-24 | Stop reason: SDUPTHER

## 2018-06-20 NOTE — TELEPHONE ENCOUNTER
----- Message from Janeth Delacruz LPN sent at 6/20/2018  8:37 AM CDT -----    Loi Cordova would like a refill of the following medications:         traZODone (DESYREL) 50 MG tablet [Delicia Fierro MD]     Preferred pharmacy: [facility] [place] - [address]   Delivery method: Pickup

## 2018-06-22 DIAGNOSIS — Z12.39 BREAST CANCER SCREENING: ICD-10-CM

## 2018-07-13 ENCOUNTER — OFFICE VISIT (OUTPATIENT)
Dept: FAMILY MEDICINE | Facility: CLINIC | Age: 53
End: 2018-07-13
Payer: COMMERCIAL

## 2018-07-13 VITALS
SYSTOLIC BLOOD PRESSURE: 120 MMHG | HEIGHT: 60 IN | OXYGEN SATURATION: 96 % | WEIGHT: 202 LBS | HEART RATE: 80 BPM | DIASTOLIC BLOOD PRESSURE: 84 MMHG | BODY MASS INDEX: 39.66 KG/M2 | TEMPERATURE: 98 F

## 2018-07-13 DIAGNOSIS — R53.83 FATIGUE, UNSPECIFIED TYPE: Primary | ICD-10-CM

## 2018-07-13 DIAGNOSIS — G25.81 RESTLESS LEG SYNDROME: ICD-10-CM

## 2018-07-13 DIAGNOSIS — I50.32 CHRONIC DIASTOLIC CHF (CONGESTIVE HEART FAILURE): ICD-10-CM

## 2018-07-13 DIAGNOSIS — E87.6 HYPOKALEMIA: ICD-10-CM

## 2018-07-13 DIAGNOSIS — J30.1 SEASONAL ALLERGIC RHINITIS DUE TO POLLEN: ICD-10-CM

## 2018-07-13 PROCEDURE — 3008F BODY MASS INDEX DOCD: CPT | Mod: CPTII,S$GLB,, | Performed by: INTERNAL MEDICINE

## 2018-07-13 PROCEDURE — 99999 PR PBB SHADOW E&M-EST. PATIENT-LVL III: CPT | Mod: PBBFAC,,, | Performed by: INTERNAL MEDICINE

## 2018-07-13 PROCEDURE — 3074F SYST BP LT 130 MM HG: CPT | Mod: CPTII,S$GLB,, | Performed by: INTERNAL MEDICINE

## 2018-07-13 PROCEDURE — 3079F DIAST BP 80-89 MM HG: CPT | Mod: CPTII,S$GLB,, | Performed by: INTERNAL MEDICINE

## 2018-07-13 PROCEDURE — 99214 OFFICE O/P EST MOD 30 MIN: CPT | Mod: S$GLB,,, | Performed by: INTERNAL MEDICINE

## 2018-07-13 RX ORDER — ROPINIROLE 1 MG/1
1 TABLET, FILM COATED ORAL 2 TIMES DAILY
Qty: 60 TABLET | Refills: 3 | Status: SHIPPED | OUTPATIENT
Start: 2018-07-13 | End: 2019-02-08

## 2018-07-13 NOTE — PATIENT INSTRUCTIONS
Let's check some labs to further evaluate fatigue. Keep your appointment with Dr. Hicks because chf could be contributing to your fatigue. Let's try requip for restless leg syndrome. Also, try switching zyrtec to allegra or claritin because zyrtec can cause sedation.

## 2018-07-15 NOTE — PROGRESS NOTES
Subjective:       Patient ID: Loi Cordova is a 52 y.o. female.    Chief Complaint: Fatigue and Leg Pain     I have reviewed the PMH,  and  for this patient. She is here for evaluation of severe fatigue. She also complains of pains in her legs that occur when she is sitting for a while. She feels like she is tired all the time. She does take zyrtec for allergic rhinitis. She sees Dr. Hicks for CHF. She has been having some swelling in her legs, but they  Look OK today. She has an appointment with Dr. Hicks on Thursday. Cholesterol was high last time with LDL of 144.       Fatigue   This is a new problem. The current episode started 1 to 4 weeks ago. The problem occurs constantly. The problem has been unchanged. Associated symptoms include fatigue. Pertinent negatives include no abdominal pain, anorexia, arthralgias, change in bowel habit, chest pain, chills, congestion, coughing, diaphoresis, fever, headaches, joint swelling, myalgias, nausea, neck pain, numbness, rash, sore throat, swollen glands, urinary symptoms, vertigo, visual change, vomiting or weakness. Nothing aggravates the symptoms. She has tried nothing for the symptoms. The treatment provided no relief.     Review of Systems   Constitutional: Positive for fatigue. Negative for chills, diaphoresis and fever.   HENT: Negative for congestion, ear discharge, ear pain, rhinorrhea and sore throat.    Eyes: Negative for discharge, redness and itching.   Respiratory: Negative for cough, chest tightness, shortness of breath and wheezing.    Cardiovascular: Negative for chest pain, palpitations and leg swelling.   Gastrointestinal: Negative for abdominal pain, anorexia, change in bowel habit, constipation, diarrhea, nausea and vomiting.   Endocrine: Negative for cold intolerance and heat intolerance.   Genitourinary: Negative for dysuria, flank pain, frequency and hematuria.   Musculoskeletal: Negative for arthralgias, back pain, joint swelling, myalgias and  neck pain.   Skin: Negative for color change and rash.   Neurological: Negative for dizziness, vertigo, tremors, weakness, numbness and headaches.   Psychiatric/Behavioral: Negative for dysphoric mood and sleep disturbance. The patient is not nervous/anxious.        Objective:      Physical Exam   Constitutional: She appears well-developed and well-nourished.   HENT:   Head: Normocephalic and atraumatic.   Right Ear: External ear normal. Tympanic membrane is not erythematous. No middle ear effusion.   Left Ear: External ear normal. Tympanic membrane is not erythematous.  No middle ear effusion.   Mouth/Throat: No posterior oropharyngeal edema or posterior oropharyngeal erythema. No tonsillar exudate.   Eyes: Conjunctivae and EOM are normal. Pupils are equal, round, and reactive to light.   Neck: No thyromegaly present.   Cardiovascular: Normal rate, regular rhythm, normal heart sounds and intact distal pulses.    No murmur heard.  Pulmonary/Chest: Effort normal and breath sounds normal. She has no wheezes.   Abdominal: She exhibits no distension. There is no tenderness.   Musculoskeletal: She exhibits no edema or tenderness.   Lymphadenopathy:     She has no cervical adenopathy.   Neurological: She displays normal reflexes. No cranial nerve deficit.   Skin: Skin is warm and dry. No rash noted.   Psychiatric: She has a normal mood and affect. Her behavior is normal.       Assessment and Plan:     Problem List Items Addressed This Visit     Chronic diastolic CHF (congestive heart failure) - follow-up with Dr. Hicks.       Hypokalemia - check levels.       Restless leg syndrome - I believe this is the cause of her leg pain. Try Requip BID since it occurs at work.     Relevant Medications    rOPINIRole (REQUIP) 1 MG tablet    Seasonal allergic rhinitis due to pollen - switch zyrtec to allegra or claritin.       Other Visit Diagnoses     Fatigue, unspecified type    -  Primary - uncertain cause. Check labs.     Relevant  Orders    CBC auto differential (Completed)    TSH (Completed)    Basic metabolic panel (Completed)    Vitamin B12 (Completed)

## 2018-07-18 DIAGNOSIS — D75.839 THROMBOCYTOSIS: Primary | ICD-10-CM

## 2018-07-19 ENCOUNTER — TELEPHONE (OUTPATIENT)
Dept: FAMILY MEDICINE | Facility: CLINIC | Age: 53
End: 2018-07-19

## 2018-07-19 NOTE — TELEPHONE ENCOUNTER
----- Message from Delicia Fierro MD sent at 7/18/2018  3:56 PM CDT -----  Vitamin B12 was low-normal. Consider taking OTC supplement. TSH was normal. BMP showed elevated blood sugar. Platelet count is very high. I recommend that she see hematology. I will place order.

## 2018-07-30 PROBLEM — I50.31 ACUTE DIASTOLIC CHF (CONGESTIVE HEART FAILURE): Status: ACTIVE | Noted: 2018-07-30

## 2018-08-15 ENCOUNTER — LAB VISIT (OUTPATIENT)
Dept: LAB | Facility: HOSPITAL | Age: 53
End: 2018-08-15
Attending: INTERNAL MEDICINE
Payer: COMMERCIAL

## 2018-08-15 ENCOUNTER — CLINICAL SUPPORT (OUTPATIENT)
Dept: SMOKING CESSATION | Facility: CLINIC | Age: 53
End: 2018-08-15
Payer: COMMERCIAL

## 2018-08-15 ENCOUNTER — OFFICE VISIT (OUTPATIENT)
Dept: HEMATOLOGY/ONCOLOGY | Facility: CLINIC | Age: 53
End: 2018-08-15
Payer: COMMERCIAL

## 2018-08-15 VITALS
RESPIRATION RATE: 16 BRPM | SYSTOLIC BLOOD PRESSURE: 148 MMHG | BODY MASS INDEX: 39.78 KG/M2 | TEMPERATURE: 98 F | OXYGEN SATURATION: 96 % | HEART RATE: 73 BPM | WEIGHT: 203.69 LBS | DIASTOLIC BLOOD PRESSURE: 85 MMHG

## 2018-08-15 DIAGNOSIS — F17.210 MODERATE SMOKER (20 OR LESS PER DAY): Primary | ICD-10-CM

## 2018-08-15 DIAGNOSIS — D75.839 THROMBOCYTOSIS: Primary | ICD-10-CM

## 2018-08-15 DIAGNOSIS — Z98.61 CAD S/P PERCUTANEOUS CORONARY ANGIOPLASTY: ICD-10-CM

## 2018-08-15 DIAGNOSIS — D75.839 THROMBOCYTOSIS: ICD-10-CM

## 2018-08-15 DIAGNOSIS — I73.9 PAD (PERIPHERAL ARTERY DISEASE): ICD-10-CM

## 2018-08-15 DIAGNOSIS — I25.10 CAD S/P PERCUTANEOUS CORONARY ANGIOPLASTY: ICD-10-CM

## 2018-08-15 PROBLEM — Z98.890 HISTORY OF BACK SURGERY: Status: RESOLVED | Noted: 2018-02-20 | Resolved: 2018-08-15

## 2018-08-15 LAB
ALBUMIN SERPL BCP-MCNC: 3.8 G/DL
ALP SERPL-CCNC: 107 U/L
ALT SERPL W/O P-5'-P-CCNC: 53 U/L
ANION GAP SERPL CALC-SCNC: 9 MMOL/L
AST SERPL-CCNC: 26 U/L
BASOPHILS # BLD AUTO: 0.02 K/UL
BASOPHILS NFR BLD: 0.3 %
BILIRUB SERPL-MCNC: 0.4 MG/DL
BUN SERPL-MCNC: 14 MG/DL
CALCIUM SERPL-MCNC: 9.6 MG/DL
CHLORIDE SERPL-SCNC: 104 MMOL/L
CO2 SERPL-SCNC: 26 MMOL/L
CREAT SERPL-MCNC: 0.9 MG/DL
DIFFERENTIAL METHOD: ABNORMAL
EOSINOPHIL # BLD AUTO: 0.1 K/UL
EOSINOPHIL NFR BLD: 1.8 %
ERYTHROCYTE [DISTWIDTH] IN BLOOD BY AUTOMATED COUNT: 13.8 %
ERYTHROCYTE [SEDIMENTATION RATE] IN BLOOD BY WESTERGREN METHOD: 31 MM/HR
EST. GFR  (AFRICAN AMERICAN): >60 ML/MIN/1.73 M^2
EST. GFR  (NON AFRICAN AMERICAN): >60 ML/MIN/1.73 M^2
FERRITIN SERPL-MCNC: 110 NG/ML
GLUCOSE SERPL-MCNC: 118 MG/DL
HCT VFR BLD AUTO: 34.8 %
HGB BLD-MCNC: 11.3 G/DL
IRON SERPL-MCNC: 60 UG/DL
LYMPHOCYTES # BLD AUTO: 1.8 K/UL
LYMPHOCYTES NFR BLD: 28 %
MCH RBC QN AUTO: 29.4 PG
MCHC RBC AUTO-ENTMCNC: 32.5 G/DL
MCV RBC AUTO: 90 FL
MONOCYTES # BLD AUTO: 0.3 K/UL
MONOCYTES NFR BLD: 4.9 %
NEUTROPHILS # BLD AUTO: 4.2 K/UL
NEUTROPHILS NFR BLD: 64.8 %
PLATELET # BLD AUTO: 464 K/UL
PMV BLD AUTO: 8.8 FL
POTASSIUM SERPL-SCNC: 3.5 MMOL/L
PROT SERPL-MCNC: 6.9 G/DL
RBC # BLD AUTO: 3.85 M/UL
SATURATED IRON: 13 %
SODIUM SERPL-SCNC: 139 MMOL/L
TOTAL IRON BINDING CAPACITY: 474 UG/DL
TRANSFERRIN SERPL-MCNC: 320 MG/DL
WBC # BLD AUTO: 6.49 K/UL

## 2018-08-15 PROCEDURE — 99404 PREV MED CNSL INDIV APPRX 60: CPT | Mod: S$GLB,,,

## 2018-08-15 PROCEDURE — 3077F SYST BP >= 140 MM HG: CPT | Mod: CPTII,S$GLB,, | Performed by: INTERNAL MEDICINE

## 2018-08-15 PROCEDURE — 85652 RBC SED RATE AUTOMATED: CPT

## 2018-08-15 PROCEDURE — 81270 JAK2 GENE: CPT

## 2018-08-15 PROCEDURE — 99204 OFFICE O/P NEW MOD 45 MIN: CPT | Mod: S$GLB,,, | Performed by: INTERNAL MEDICINE

## 2018-08-15 PROCEDURE — 85025 COMPLETE CBC W/AUTO DIFF WBC: CPT

## 2018-08-15 PROCEDURE — 99999 PR PBB SHADOW E&M-EST. PATIENT-LVL III: CPT | Mod: PBBFAC,,, | Performed by: INTERNAL MEDICINE

## 2018-08-15 PROCEDURE — 80053 COMPREHEN METABOLIC PANEL: CPT

## 2018-08-15 PROCEDURE — 3079F DIAST BP 80-89 MM HG: CPT | Mod: CPTII,S$GLB,, | Performed by: INTERNAL MEDICINE

## 2018-08-15 PROCEDURE — 3008F BODY MASS INDEX DOCD: CPT | Mod: CPTII,S$GLB,, | Performed by: INTERNAL MEDICINE

## 2018-08-15 PROCEDURE — 81219 CALR GENE COM VARIANTS: CPT

## 2018-08-15 PROCEDURE — 82728 ASSAY OF FERRITIN: CPT

## 2018-08-15 PROCEDURE — 88271 CYTOGENETICS DNA PROBE: CPT

## 2018-08-15 PROCEDURE — 83540 ASSAY OF IRON: CPT

## 2018-08-15 PROCEDURE — 81403 MOPATH PROCEDURE LEVEL 4: CPT

## 2018-08-15 PROCEDURE — 88275 CYTOGENETICS 100-300: CPT

## 2018-08-15 NOTE — PROGRESS NOTES
Subjective:       Patient ID: Loi Cordova is a 52 y.o. female.    Chief Complaint: high wbc    HPI HISTORY OF PRESENT ILLNESS:  Ms. Cordova is referred for evaluation of thrombocytosis.  This is a 52-year-old female who was noted to have a platelet count of 544,000 on a CBC done last month.  Hence referred here for further evaluation.  Prior labs showed that her thrombocytosis has been present at least since December 2016 when her platelet count was 617,000, and it has ranged between that level and a low of 499,000.  Hemoglobin and WBC are unremarkable.    She has coronary artery disease and was taking a combination of aspirin and Plavix and was recently switched to aspirin plus Effient.  She sees Dr. Santy Hicks.    She smokes cigarettes and is trying to quit.    Review of Systems   Constitutional: Negative for fever and unexpected weight change.   HENT: Negative for mouth sores and nosebleeds.    Eyes: Negative for photophobia and pain.   Respiratory: Negative for wheezing and stridor.    Cardiovascular: Negative for chest pain and palpitations.   Gastrointestinal: Negative for abdominal pain and vomiting.   Skin: Negative for rash and wound.   Neurological: Negative for seizures and syncope.   Hematological: Negative for adenopathy. Does not bruise/bleed easily.   Psychiatric/Behavioral: Negative for behavioral problems and confusion.       Objective:      Physical Exam   Constitutional: She is cooperative. She does not appear ill. No distress.   HENT:   Head: Head is without laceration, without right periorbital erythema and without left periorbital erythema.   Nose: No epistaxis.   Mouth/Throat: Oropharynx is clear and moist. No oropharyngeal exudate. No tonsillar exudate.   Eyes: Conjunctivae are normal.   Neck: Neck supple. No thyroid mass and no thyromegaly present.   Cardiovascular: Normal rate and regular rhythm. Exam reveals no friction rub.   Pulmonary/Chest: Breath sounds normal. No accessory muscle  usage or stridor. No tachypnea. No respiratory distress. Chest wall is not dull to percussion. She exhibits no tenderness.   Abdominal: Soft. She exhibits no distension, no ascites and no mass. There is no hepatosplenomegaly.   Musculoskeletal: She exhibits no edema.   Lymphadenopathy:        Head (right side): No submental and no submandibular adenopathy present.        Head (left side): No submental and no submandibular adenopathy present.     She has no cervical adenopathy.     She has no axillary adenopathy.   Neurological: She is alert. She has normal strength. No cranial nerve deficit.   Skin: No bruising, no petechiae and no rash noted. She is not diaphoretic.   Psychiatric: Her behavior is normal. Thought content normal. Cognition and memory are normal. She does not exhibit a depressed mood.   Vitals reviewed.      Assessment:       1. Thrombocytosis    2. CAD S/P percutaneous coronary angioplasty    3. PAD (peripheral artery disease)        Plan:   Ms. Cordova has chronic thrombocytosis.  This could be primary or secondary/reactive.  I discussed the potential etiologies for thrombocytosis with her.    We will repeat a CBC to review the smear.  We will check BCR-ABL gene rearrangement testing, EDD-2 mutation testing, iron studies, and ESR.  I will determine if she has a primary thrombocythemia versus reactive thrombocythemia based on these investigations.  I will call her with the test results.    She had abdominal imaging done in the past and did not have splenomegaly.  All questions answered.      Addendum 08/30/2018 - repeat labs reviewed.  BCR-ABL gene rearrangement and JAK2 mutation analysis with reflex testing came back normal/unremarkable.  I reviewed the results with the patient over the phone.  She does not require any active treatment for her thrombocytosis.  This is chronic and can be monitored.  Return to clinic p.r.n..  She will continue dual antiplatelet therapy

## 2018-08-15 NOTE — Clinical Note
The patient is smoking 10 cigarettes/day and is taking Wellbutrin prescribed by another MD.  Has tried the patch, gum, etc and would like to try the Chantix 1 mg BID starter pack. Will be able to attend sessions. She is very motivated and ready to quit due to health reasons.The patient will continue individual sessions and medication monitoring by CTTS. Prescribed medication management will be by practitoner.

## 2018-08-16 RX ORDER — VARENICLINE TARTRATE 0.5 (11)-1
KIT ORAL
Qty: 53 TABLET | Refills: 0 | Status: SHIPPED | OUTPATIENT
Start: 2018-08-16 | End: 2019-01-16 | Stop reason: DRUGHIGH

## 2018-08-22 ENCOUNTER — CLINICAL SUPPORT (OUTPATIENT)
Dept: SMOKING CESSATION | Facility: CLINIC | Age: 53
End: 2018-08-22
Payer: COMMERCIAL

## 2018-08-22 DIAGNOSIS — F17.210 MODERATE SMOKER (20 OR LESS PER DAY): Primary | ICD-10-CM

## 2018-08-22 PROCEDURE — 90853 GROUP PSYCHOTHERAPY: CPT | Mod: S$GLB,,,

## 2018-08-22 NOTE — Clinical Note
The patient has cut down to 6-7 cigarettes/day using the 1 mg Chantix BID. However, taking the 1 mg BID instead of the 0.5 mg BID for 2 days, she has been very constipated and threw up x 1 . She ate a small amount of bread before taking the pill, but the next day ate more and drank water. She said the nausea was a little better. Suggested if it got worse cut doses in half or hold to see if improved.  She is excited about her progress so far.completion of TCRS (Tobacco Cessation Rating Scale) reviewed strategies, cues, and triggers. Introduced the negative impact of tobacco on health, the health advantages of discontinuing the use of tobacco, time line improved health changes after a quit, withdrawal issues to expect from nicotine and habit, and ways to achieve the goal of a quit.The patient will continue individual sessions and medication monitoring by CTTS. Prescribed medication management will be by practitoner.The patient denies any abnormal behavioral or mental changes at this time.

## 2018-08-23 PROBLEM — R07.9 CHEST PAIN: Status: ACTIVE | Noted: 2018-08-23

## 2018-08-23 LAB
CLINICAL CYTOGENETICIST REVIEW: NORMAL
MBCR DISCLAIMER: NORMAL
MBCR REASON FOR REFERRAL: NORMAL
MBCR RELEASED BY: NORMAL
MBCR RESULT SUMMARY: NORMAL
MBCR RESULT TABLE: NORMAL
MBCR SPECIMEN: NORMAL
MPNR  FINAL DIAGNOSIS: NORMAL
MPNR  SPECIMEN TYPE: NORMAL
MPNR RESULT: NORMAL
REF LAB TEST METHOD: NORMAL
SERVICE CMNT-IMP: NORMAL
SPECIMEN SOURCE: NORMAL
T(9;22)(ABL1,BCR) BLD/T FISH: NORMAL

## 2018-08-23 NOTE — PROGRESS NOTES
Individual Follow-Up Form # 1     8/23/2018    Quit Date: TBD    Clinical Status of Patient: Outpatient    Length of Service: 45 minutes    Continuing Medication: yes  Chantix    Other Medications: none     Target Symptoms: Withdrawal and medication side effects. The following were  rated moderate (3) to severe (4) on TCRS:  · Moderate (3): constipation  Nausea, vomiting heartburn nicotine replacement therapy, nicotine withdrawal.  · Severe (4): none    Comments: The patient has cut down to 6-7 cigarettes/day using the 1 mg Chantix BID. However, she stated since she started taking the 1 mg BID instead of the 0.5 mg BID for 2 days, she has been very constipated and threw up x 1 . She ate a small amount of bread before taking the pill, but the next day ate more and drank water. She said the nausea wa a little better. Suggested if it got worse cut doses in half or hold to see if improved.  She is excited about her progress so far.completion of TCRS (Tobacco Cessation Rating Scale) reviewed strategies, cues, and triggers. Introduced the negative impact of tobacco on health, the health advantages of discontinuing the use of tobacco, time line improved health changes after a quit, withdrawal issues to expect from nicotine and habit, and ways to achieve the goal of a quit.  The patient will continue individual sessions and medication monitoring by CTTS. Prescribed medication management will be by practitoner.The patient denies any abnormal behavioral or mental changes at this time.        Diagnosis: F17.210    Next Visit: 2 weeks

## 2018-08-24 PROBLEM — R07.9 CHEST PAIN: Status: ACTIVE | Noted: 2018-08-24

## 2018-08-24 PROBLEM — I20.0 UNSTABLE ANGINA: Status: ACTIVE | Noted: 2018-08-24

## 2018-08-25 PROBLEM — R07.9 CHEST PAIN: Status: RESOLVED | Noted: 2018-08-24 | Resolved: 2018-08-25

## 2018-09-06 ENCOUNTER — CLINICAL SUPPORT (OUTPATIENT)
Dept: SMOKING CESSATION | Facility: CLINIC | Age: 53
End: 2018-09-06
Payer: COMMERCIAL

## 2018-09-06 VITALS — OXYGEN SATURATION: 97 %

## 2018-09-06 DIAGNOSIS — F17.210 LIGHT SMOKER: Primary | ICD-10-CM

## 2018-09-06 PROCEDURE — 90853 GROUP PSYCHOTHERAPY: CPT | Mod: S$GLB,,,

## 2018-09-06 NOTE — PROGRESS NOTES
Smoking Cessation Group Session #2    Site: [facility]  Date:  9/6/2018  Clinical Status of Patient: Outpatient   Length of Service and Code: 60 minutes - 33356   Number in Attendance: 4  Group Activities/Focus of Group:  Sharing last weeks challenges, triggers, and coping activities to remain quit and/ or keep making progress toward cessation, completion of TCRS (Tobacco Cessation Rating Scale) learned addiction model, personal reasons for quitting, medications, goals, quit date.    Specific session focus: completion of TCRS (Tobacco Cessation Rating Scale) reviewed strategies, cues, and triggers. Introduced the negative impact of tobacco on health, the health advantages of discontinuing the use of tobacco, time line improved health changes after a quit, withdrawal issues to expect from nicotine and habit, and ways to achieve the goal of a quit.        Target symptoms:  withdrawal and medication side effects             The following were rated moderate (3) to severe (4) on TCRS:       Moderate 3: desire/crave, anxiety, fatigue nicotine withdrawal nicotine replacement      Severe 4:   Nausea, upset stomach nicotine replacement therapy.   Patient's Response to Intervention: The patient is smoking 4 cigarettes/day using the 1 mg Chantix BID, had to stop for a period while having surgery. She started taking the medication again and is having trouble with nausea. She is eating first, but possibly taking the pill too soon after eating. Did explain this is a side effect of the medication and she may need to adjust the dosage to get started again then build back up. She is doing well with the program. Will continue to try to reduce to 2 cigarettes. Will notify me if nausea continues.  Progress Toward Goals and Other Mental Status Changes: The patient denies any abnormal behavioral or mental changes at this time.    Diagnosis:F17.210    Plan: The patient will continue with group therapy sessions and medication regimen  prescribed with management by physician or by the Cessation Clinic Provider. Patient will inform Smoking Cessation Counselor of symptoms as rated high on TCRS.    Return to Clinic: 2 weeks    Quit Date: TBMARIELY

## 2018-09-06 NOTE — Clinical Note
The patient is smoking 4 cigarettes/day using the 1 mg Chantix BID, had to stop for a period while having surgery. She started taking the medication again and is having trouble with nausea. She is eating first, but possibly taking the pill too soon after eating. Did explain this is a side effect of the medication and she may need to adjust the dosage to get started again then build back up. She is doing well with the program. Will continue to try to reduce to 2 cigarettes. Will notify me if nausea continues.The patient denies any abnormal behavioral or mental changes at this time.

## 2018-09-12 ENCOUNTER — CLINICAL SUPPORT (OUTPATIENT)
Dept: SMOKING CESSATION | Facility: CLINIC | Age: 53
End: 2018-09-12
Payer: COMMERCIAL

## 2018-09-12 DIAGNOSIS — F17.210 LIGHT SMOKER: Primary | ICD-10-CM

## 2018-09-12 PROCEDURE — 90853 GROUP PSYCHOTHERAPY: CPT | Mod: S$GLB,,,

## 2018-09-12 NOTE — Clinical Note
The patient is smoking 4-5 cigarettes/day using the 1 mg Chantix BID. She was having trouble with nausea, but it has improved. She has been around other smokers and did well. Still concerned about occasional alcohol consumption but will take it slow. Will set quit date. completion of TCRS (Tobacco Cessation Rating Scale) reviewed strategies, controlling environment, cues, triggers, new goals set. Introduced high risk situations with preparation interventions, caffeine similarities with withdrawal issues of habit and nicotine, Alcohol, Understanding urges, cravings, stress and relaxation. The patient denies any abnormal behavioral or mental changes at this time.

## 2018-09-13 NOTE — PROGRESS NOTES
Smoking Cessation Group Session #3    Site: [facility]  Date:  9/13/2018  Clinical Status of Patient: Outpatient   Length of Service and Code: 90 minutes - 43047   Number in Attendance: 5  Group Activities/Focus of Group:  Sharing last weeks challenges, triggers, and coping activities to remain quit and/ or keep making progress toward cessation, completion of TCRS (Tobacco Cessation Rating Scale) learned addiction model, personal reasons for quitting, medications, goals, quit date.    Specific session focus: completion of TCRS (Tobacco Cessation Rating Scale) reviewed strategies, controlling environment, cues, triggers, new goals set. Introduced high risk situations with preparation interventions, caffeine similarities with withdrawal issues of habit and nicotine, Alcohol, Understanding urges, cravings, stress and relaxation. Open discussion with intervention discussion.      Target symptoms:  withdrawal and medication side effects: anxiuos,irriatated nose, constipation,fatigue nicotine withdrawal habit             The following were rated moderate (3) to severe (4) on TCRS:       Moderate 3: withdrawal and medication side effects: anxiuos,irriatated nose, constipation,fatigue nicotine withdrawal habit     Severe 4:   none  Patient's Response to Intervention: The patient is smoking 4-5 cigarettes/day using the 1 mg Chantix BID. She was having trouble with nausea, but it has improved. She has been around other smokers and did well. Still concerned about occasional alcohol consumption but will take it slow. Will set quit date.   Progress Toward Goals and Other Mental Status Changes: The patient denies any abnormal behavioral or mental changes at this time.    Diagnosis: F17.210      Plan: The patient will continue with group therapy sessions and medication regimen prescribed with management by physician or by the Cessation Clinic Provider. Patient will inform Smoking Cessation Counselor of symptoms as rated high on  TCRS.    Return to Clinic: 1 week    Quit Date: TBD   Planned Quit Date: TBD

## 2018-09-24 ENCOUNTER — PATIENT MESSAGE (OUTPATIENT)
Dept: HEMATOLOGY/ONCOLOGY | Facility: CLINIC | Age: 53
End: 2018-09-24

## 2018-09-24 DIAGNOSIS — F51.04 PSYCHOPHYSIOLOGICAL INSOMNIA: ICD-10-CM

## 2018-09-24 DIAGNOSIS — E53.8 B12 DEFICIENCY: Primary | ICD-10-CM

## 2018-09-24 RX ORDER — CYANOCOBALAMIN 1000 UG/ML
1000 INJECTION, SOLUTION INTRAMUSCULAR; SUBCUTANEOUS
Qty: 10 ML | Refills: 1 | Status: SHIPPED | OUTPATIENT
Start: 2018-09-24 | End: 2019-02-08

## 2018-09-24 RX ORDER — TRAZODONE HYDROCHLORIDE 50 MG/1
TABLET ORAL
Qty: 30 TABLET | Refills: 2 | Status: SHIPPED | OUTPATIENT
Start: 2018-09-24 | End: 2018-11-19 | Stop reason: SDUPTHER

## 2018-09-24 RX ORDER — OMEPRAZOLE 40 MG/1
CAPSULE, DELAYED RELEASE ORAL
Qty: 30 CAPSULE | Refills: 3 | Status: SHIPPED | OUTPATIENT
Start: 2018-09-24 | End: 2019-02-24 | Stop reason: SDUPTHER

## 2018-09-24 NOTE — TELEPHONE ENCOUNTER
Low b12 levels noted. Pt feels tired despite oral B12. Per her request gave her prescription for B12 injections

## 2018-09-26 ENCOUNTER — CLINICAL SUPPORT (OUTPATIENT)
Dept: SMOKING CESSATION | Facility: CLINIC | Age: 53
End: 2018-09-26
Payer: COMMERCIAL

## 2018-09-26 DIAGNOSIS — F17.210 LIGHT SMOKER: Primary | ICD-10-CM

## 2018-09-26 PROCEDURE — 90853 GROUP PSYCHOTHERAPY: CPT | Mod: S$GLB,,,

## 2018-09-26 NOTE — Clinical Note
The patient is smoking 4-5 cigarettes/day using  The 1 mg Chantix BID when she remembers to take both pills. Discussed using the correct dosage daily and decided on quit date 10/31/18. Discussed has not been really trying after last surgery, but need to focus on quitting.by getting ready to deal with stress. Will encourage pt.pt. To quit and work with motivation to quit.completion of TCRS (Tobacco Cessation Rating Scale) reviewed strategies, habitual behavior, stress, and high risk situations. Introduced stress with addition interventions, SOLVE, relaxation with interventions, nutrition, exercise, weight gain, and the importance of rewarding oneself for accomplishments toward becoming tobacco free. Open discussion of all items with interventions.

## 2018-09-27 NOTE — PROGRESS NOTES
Smoking Cessation Group Session #4    Site: [facility]  Date:  9/27/2018  Clinical Status of Patient: Outpatient   Length of Service and Code: 60 minutes - 10703   Number in Attendance: 5  Group Activities/Focus of Group:  Sharing last weeks challenges, triggers, and coping activities to remain quit and/ or keep making progress toward cessation, completion of TCRS (Tobacco Cessation Rating Scale) learned addiction model, personal reasons for quitting, medications, goals, quit date.    Specific session focus: completion of TCRS (Tobacco Cessation Rating Scale) reviewed strategies, habitual behavior, stress, and high risk situations. Introduced stress with addition interventions, SOLVE, relaxation with interventions, nutrition, exercise, weight gain, and the importance of rewarding oneself for accomplishments toward becoming tobacco free. Open discussion of all items with interventions.       Target symptoms:  withdrawal and medication side effects             The following were rated moderate (3) to severe (4) on TCRS:       Moderate 3: desire/crave habit     Severe 4:   none  Patient's Response to Intervention: The patient is smoking 4-5 cigarettes/day using  The 1 mg Chantix BID when she remembers to take both pills. Discussed using the correct dosage daily and decided on quit date 10/31/18. Discussed has not been really trying after last surgery, but need to focus on quitting.by getting ready to deal with stress. Will encourage pt.pt. To quit and work with motivation to quit.  Progress Toward Goals and Other Mental Status Changes: The patient denies any abnormal behavioral or mental changes at this time.    Interval History: 0    Diagnosis: F17.210  Plan: The patient will continue with group therapy sessions and medication regimen prescribed with management by physician or by the Cessation Clinic Provider. Patient will inform Smoking Cessation Counselor of symptoms as rated high on TCRS.    Return to Clinic: 2  weeks    Quit Date: n/a     Planned Quit Date: 10/31/18

## 2018-10-02 ENCOUNTER — TELEPHONE (OUTPATIENT)
Dept: HEMATOLOGY/ONCOLOGY | Facility: CLINIC | Age: 53
End: 2018-10-02

## 2018-10-02 NOTE — TELEPHONE ENCOUNTER
Pt states she has had stomach bug, so a friend will come and  her B12 injection prescription.     ----- Message from Kalpana Gallegos sent at 10/2/2018  3:34 PM CDT -----  Contact: self, [phone]  Patient called in returning your call. Please advise.

## 2018-10-02 NOTE — TELEPHONE ENCOUNTER
LM returning pt call. Pt was unavailable at number left in original message.     ----- Message from Darby Ocasio sent at 10/2/2018  2:20 PM CDT -----  Contact: Pt [phone]  Pt called to speak to the nurse regarding her care and Rx and would like a call back today asap.    Pt can be reached at [phone].    Thanks

## 2018-10-10 ENCOUNTER — CLINICAL SUPPORT (OUTPATIENT)
Dept: SMOKING CESSATION | Facility: CLINIC | Age: 53
End: 2018-10-10
Payer: COMMERCIAL

## 2018-10-10 DIAGNOSIS — F17.210 LIGHT SMOKER: Primary | ICD-10-CM

## 2018-10-10 PROCEDURE — 99402 PREV MED CNSL INDIV APPRX 30: CPT | Mod: S$GLB,,,

## 2018-10-10 NOTE — Clinical Note
The patient is smoking 5-6 cigarettes/day using 1 mg Chantix QD. The patient set quit date 10/31/18 and will try to remember to take both pills. She has not increased in amount. The patient will continue individual sessions and medication monitoring by CTTS. Prescribed medication management will be by practitoner. completion of TCRS (Tobacco Cessation Rating Scale) reviewed strategies, habitual behavior, stress, and high risk situations. Introduced stress with addition interventions, SOLVE, relaxation with interventions, nutrition, exercise, weight gain, and the importance of rewarding oneself for accomplishments toward becoming tobacco free. The patient denies any abnormal behavioral or mental changes at this time.

## 2018-10-12 NOTE — PROGRESS NOTES
Individual Follow-Up Form    10/10/18    Quit Date: 10/31/18    Clinical Status of Patient: Outpatient    Length of Service: 30 minutes    Continuing Medication: yes  Chantix    Other Medications: none     Target Symptoms: Withdrawal and medication side effects. The following were  rated moderate (3) to severe (4) on TCRS:  · Moderate (3): desire/crave habit  · Severe (4): none    Comments: The patient is smoking 5-6 cigarettes/day using 1 mg Chantix QD. The patient set quit date 10/31/18 and will try to remember to take both pills. She has not increased in amount. The patient will continue individual sessions and medication monitoring by CTTS. Prescribed medication management will be by practitoner. completion of TCRS (Tobacco Cessation Rating Scale) reviewed strategies, habitual behavior, stress, and high risk situations. Introduced stress with addition interventions, SOLVE, relaxation with interventions, nutrition, exercise, weight gain, and the importance of rewarding oneself for accomplishments toward becoming tobacco free. The patient denies any abnormal behavioral or mental changes at this time.    Diagnosis: F17.210    Next Visit: 2 weeks

## 2018-10-17 ENCOUNTER — CLINICAL SUPPORT (OUTPATIENT)
Dept: SMOKING CESSATION | Facility: CLINIC | Age: 53
End: 2018-10-17
Payer: COMMERCIAL

## 2018-10-17 DIAGNOSIS — F17.210 LIGHT SMOKER: Primary | ICD-10-CM

## 2018-10-17 PROCEDURE — 90853 GROUP PSYCHOTHERAPY: CPT | Mod: S$GLB,,,

## 2018-10-17 NOTE — Clinical Note
The patient is smoking 5-6 cigarettes/day using the 1 mg  Chantix BID. She states the cigarettes are beginning to taste bad and she does not like the smell. She does smoke in the car and suggested she start to detail her car. She will try to cut back to 3-4 cigarettes/day.

## 2018-10-17 NOTE — PROGRESS NOTES
Smoking Cessation Group Session #3    Site: [facility]  Date:  10/17/2018  Clinical Status of Patient: Outpatient   Length of Service and Code: 60 minutes - 60239   Number in Attendance: 2  Group Activities/Focus of Group:  Sharing last weeks challenges, triggers, and coping activities to remain quit and/ or keep making progress toward cessation, completion of TCRS (Tobacco Cessation Rating Scale) learned addiction model, personal reasons for quitting, medications, goals, quit date.    Specific session focus: completion of TCRS (Tobacco Cessation Rating Scale) reviewed strategies, controlling environment, cues, triggers, new goals set. Introduced high risk situations with preparation interventions, caffeine similarities with withdrawal issues of habit and nicotine, Alcohol, Understanding urges, cravings, stress and relaxation. Open discussion with intervention discussion.      Target symptoms:  withdrawal and medication side effects             The following were rated moderate (3) to severe (4) on TCRS:       Moderate 3: desire/crave habit     Severe 4:   none  Patient's Response to Intervention: The patient is smoking 5-6 cigarettes/day using the 1 mg  Chantix BID. She states the cigarettes are beginning to taste bad and she does not like the smell. She does smoke in the car and suggested she start to detail her car. She will try to cut back to 3-4 cigarettes/day.  Progress Toward Goals and Other Mental Status Changes: The patient denies any abnormal behavioral or mental changes at this time.  Interval History: 0    Diagnosis: The patient denies any abnormal behavioral or mental changes at this time.    Plan: The patient will continue with group therapy sessions and medication regimen prescribed with management by physician or by the Cessation Clinic Provider. Patient will inform Smoking Cessation Counselor of symptoms as rated high on TCRS.    Return to Clinic: 1 week    Quit Date: 10/31/18    Planned Quit Date:  10/31/18

## 2018-10-22 DIAGNOSIS — Z12.11 COLON CANCER SCREENING: ICD-10-CM

## 2018-10-24 ENCOUNTER — CLINICAL SUPPORT (OUTPATIENT)
Dept: SMOKING CESSATION | Facility: CLINIC | Age: 53
End: 2018-10-24
Payer: COMMERCIAL

## 2018-10-24 DIAGNOSIS — F17.210 LIGHT SMOKER: Primary | ICD-10-CM

## 2018-10-24 PROCEDURE — 90853 GROUP PSYCHOTHERAPY: CPT | Mod: S$GLB,,,

## 2018-10-24 NOTE — Clinical Note
The patient is smoking 2-3 cigarettes/ day but is using the BlowEZ vap with 0 nicotine with 4 puffs/ session with the watermelon flavor. She still wants to reach her goal of not smoking by 10/31/18 and states she is beginning to take 1 puff of a cigarette and that is enough. She is still only taking 1 mg Chantix QD due to the nausea.Progress Toward Goals and Other Mental Status Changes: The patient denies any abnormal behavioral or mental changes at this time.

## 2018-10-25 NOTE — PROGRESS NOTES
Smoking Cessation Group Session #4    Site: [facility]  Date:  10/25/2018  Clinical Status of Patient: Outpatient   Length of Service and Code: 60 minutes - 11362   Number in Attendance: 3  Group Activities/Focus of Group:  Sharing last weeks challenges, triggers, and coping activities to remain quit and/ or keep making progress toward cessation, completion of TCRS (Tobacco Cessation Rating Scale) learned addiction model, personal reasons for quitting, medications, goals, quit date..     Specific session focus: completion of TCRS (Tobacco Cessation Rating Scale) reviewed strategies, habitual behavior, stress, and high risk situations. Introduced stress with addition interventions, SOLVE, relaxation with interventions, nutrition, exercise, weight gain, and the importance of rewarding oneself for accomplishments toward becoming tobacco free. Open discussion of all items with interventions.       Target symptoms:  withdrawal and medication side effects             The following were rated moderate (3) to severe (4) on TCRS:       Moderate 3: increased appetite, anxious, heartburn, unusual/vivid dreams,fatigue,- nicotine withdrawal, habit     Severe 4:   Insomnia, nausea, nicotine replacement therapy  Patient's Response to Intervention: The patient is smoking 2-3 cigarettes/ day but is using the BlowEZ vap with 0 nicotine with 4 puffs/ session with the watermelon flavor. She still wants to reach her goal of not smoking by 10/31/18 and states she is beginning to take 1 puff of a cigarette and that is enough. She is still only taking 1 mg Chantix QD due to the nausea.  Progress Toward Goals and Other Mental Status Changes: The patient denies any abnormal behavioral or mental changes at this time.    Interval History:     Diagnosis: The patient denies any abnormal behavioral or mental changes at this time.    Plan: The patient will continue with group therapy sessions and medication regimen prescribed with management by  physician or by the Cessation Clinic Provider. Patient will inform Smoking Cessation Counselor of symptoms as rated high on TCRS.    Return to Clinic: 1 week    Quit Date: TBD   Planned Quit Date: TBD

## 2018-10-31 ENCOUNTER — CLINICAL SUPPORT (OUTPATIENT)
Dept: SMOKING CESSATION | Facility: CLINIC | Age: 53
End: 2018-10-31
Payer: COMMERCIAL

## 2018-10-31 DIAGNOSIS — F17.210 LIGHT SMOKER: Primary | ICD-10-CM

## 2018-10-31 PROCEDURE — 90853 GROUP PSYCHOTHERAPY: CPT | Mod: S$GLB,,,

## 2018-10-31 NOTE — Clinical Note
The patient is smoking 4 cigarettes/day bit has set quit date as 10/13/18 a the last cigarette using the 1m g Chantix QD. She still has some nausea and stomach issues with the Chantix, but can handle it better at the lower does. Will begin dry runs to stop.

## 2018-10-31 NOTE — PROGRESS NOTES
Smoking Cessation Group Session #4    Site: [facility]  Date:  10/31/2018  Clinical Status of Patient: Outpatient   Length of Service and Code: 60 minutes - 17554   Number in Attendance: 5  Group Activities/Focus of Group:  Sharing last weeks challenges, triggers, and coping activities to remain quit and/ or keep making progress toward cessation, completion of TCRS (Tobacco Cessation Rating Scale) learned addiction model, personal reasons for quitting, medications, goals, quit date.    Specific session focus: completion of TCRS (Tobacco Cessation Rating Scale) reviewed strategies, habitual behavior, stress, and high risk situations. Introduced stress with addition interventions, SOLVE, relaxation with interventions, nutrition, exercise, weight gain, and the importance of rewarding oneself for accomplishments toward becoming tobacco free. Open discussion of all items with interventions.       Target symptoms:  withdrawal and medication side effects             The following were rated moderate (3) to severe (4) on TCRS:       Moderate 3: increased appetite, diarrhea, nausea, fatigue. Habit, nicotine replacement      Severe 4:   none  Patient's Response to Intervention: The patient is smoking 4 cigarettes/day bit has set quit date as 10/13/18 a the last cigarette using the 1m g Chantix QD. She still has some nausea and stomach issues with the Chantix, but can handle it better at the lower does. Will begin dry runs to stop.   Progress Toward Goals and Other Mental Status Changes: The patient denies any abnormal behavioral or mental changes at this time.    Interval History:     Diagnosis: The patient denies any abnormal behavioral or mental changes at this time.    Plan: The patient will continue with group therapy sessions and medication regimen prescribed with management by physician or by the Cessation Clinic Provider. Patient will inform Smoking Cessation Counselor of symptoms as rated high on TCRS.    Return to Clinic:  1 week    Quit Date: TBD   Planned Quit Date: 10/31/18

## 2018-11-07 ENCOUNTER — CLINICAL SUPPORT (OUTPATIENT)
Dept: SMOKING CESSATION | Facility: CLINIC | Age: 53
End: 2018-11-07
Payer: COMMERCIAL

## 2018-11-07 DIAGNOSIS — F17.210 LIGHT SMOKER: Primary | ICD-10-CM

## 2018-11-07 PROCEDURE — 99403 PREV MED CNSL INDIV APPRX 45: CPT | Mod: S$GLB,,,

## 2018-11-07 NOTE — Clinical Note
The patient is not smoking cigarettes but is vaping with watermelon flavor with no nicotine. I do not agree with vaping, but watching patient closely.  She understands the hazards of vaping, but is concerned with coming this far after last heart stent. She is really doing well with the nicotine cravings and was congratulated. Will start working more on habit and not vaping  Vapes about 4 x / day, 2 puffs each time. The patient will continue with group therapy sessions and medication monitoring by CTTS. Prescribed medication management will be by physician. The patient denies any abnormal behavioral or mental changes at this time.

## 2018-11-08 ENCOUNTER — PATIENT MESSAGE (OUTPATIENT)
Dept: HEMATOLOGY/ONCOLOGY | Facility: CLINIC | Age: 53
End: 2018-11-08

## 2018-11-08 NOTE — PROGRESS NOTES
Individual Follow-Up Form    11/7/8    Quit Date: TBBD    Clinical Status of Patient: Outpatient    Length of Service: 45 minutes    Continuing Medication: yes  Chantix    Other Medications: none     Target Symptoms: Withdrawal and medication side effects. The following were  rated moderate (3) to severe (4) on TCRS:  · Moderate (3): none  · Severe (4): none    Comments: The patient is not smoking cigarettes but is vaping with watermelon flavor with no nicotine. I do not agree with vaping, but watching patient closely.  She understands the hazards of vaping, but is concerned with coming this far after last heart stent. She is really doing well with the nicotine cravings and was congratulated. Will start working more on habit and not vaping  Vapes about 4 x / day, 2 puffs each time. The patient will continue with group therapy sessions and medication monitoring by CTTS. Prescribed medication management will be by physician. The patient denies any abnormal behavioral or mental changes at this time.        Diagnosis: F17.210    Next Visit: 2 weeks

## 2018-11-09 ENCOUNTER — PATIENT MESSAGE (OUTPATIENT)
Dept: HEMATOLOGY/ONCOLOGY | Facility: CLINIC | Age: 53
End: 2018-11-09

## 2018-11-09 ENCOUNTER — PATIENT MESSAGE (OUTPATIENT)
Dept: ADMINISTRATIVE | Facility: OTHER | Age: 53
End: 2018-11-09

## 2018-11-19 DIAGNOSIS — F51.04 PSYCHOPHYSIOLOGICAL INSOMNIA: ICD-10-CM

## 2018-11-19 RX ORDER — TRAZODONE HYDROCHLORIDE 50 MG/1
TABLET ORAL
Qty: 30 TABLET | Refills: 2 | Status: SHIPPED | OUTPATIENT
Start: 2018-11-19 | End: 2019-04-23 | Stop reason: SDUPTHER

## 2018-12-11 ENCOUNTER — TELEPHONE (OUTPATIENT)
Dept: SMOKING CESSATION | Facility: CLINIC | Age: 53
End: 2018-12-11

## 2018-12-11 DIAGNOSIS — F17.210 LIGHT CIGARETTE SMOKER (1-9 CIGS/DAY): Primary | ICD-10-CM

## 2018-12-11 RX ORDER — VARENICLINE TARTRATE 1 MG/1
1 TABLET, FILM COATED ORAL 2 TIMES DAILY
Qty: 60 TABLET | Refills: 1 | Status: SHIPPED | OUTPATIENT
Start: 2018-12-11 | End: 2019-02-08

## 2018-12-12 NOTE — TELEPHONE ENCOUNTER
Ordered Chantix. Pt .stated she has gone back  Up to smoking 10/day. Did run out of Chantix. Renewed smoking trust benenfits. Will continue sessions.

## 2018-12-19 ENCOUNTER — CLINICAL SUPPORT (OUTPATIENT)
Dept: SMOKING CESSATION | Facility: CLINIC | Age: 53
End: 2018-12-19
Payer: COMMERCIAL

## 2018-12-19 DIAGNOSIS — F17.210 LIGHT CIGARETTE SMOKER (1-9 CIGS/DAY): Primary | ICD-10-CM

## 2018-12-19 PROCEDURE — 90853 GROUP PSYCHOTHERAPY: CPT | Mod: S$GLB,,,

## 2018-12-19 RX ORDER — IBUPROFEN 200 MG
1 TABLET ORAL DAILY
Qty: 28 PATCH | Refills: 0 | Status: SHIPPED | OUTPATIENT
Start: 2018-12-19 | End: 2019-02-08

## 2018-12-19 NOTE — PROGRESS NOTES
Site: [facility]  Date:  12/19/2018  Clinical Status of Patient: Outpatient   Length of Service and Code: 60 minutes - 32450   Number in Attendance: 3  Group Activities/Focus of Group:  orientation, client introductions, completion of TCRS (Tobacco Cessation Rating Scale) learned addiction model, cues/triggers, personal reasons for quitting, medications, goals, quit date    Target symptoms:  withdrawal and medication side effects             The following were rated moderate (3) to severe (4) on TCRS:       Moderate 3: desire/crave, frustration- habit      Severe 4:   none  Patient's Response to Intervention: The patient has smoked 6-7 cigarettes/day, but had run out of 1 mg Chanix QD for 5 days. She has been back on the Chantix for 3 days. Discussed using the 14 mg nicotine patches to help with the cravings and since she cannot take the full dose of Chantix due to nausea. She stated she did quit with the patches before. She stated she is ready to stop smoking but does not know how to get going again after being late to get her medication.   Progress Toward Goals and Other Mental Status Changes: The patient denies any abnormal behavioral or mental changes at this time.  Interval History: 0    Diagnosis:F17.210    Plan: The patient will continue with group therapy sessions and medication regimen prescribed with management by physician or Cessation Clinic Provider. Patient will inform Smoking Clinic Cessation Counselor of symptoms as rated high on TCRS.    Return to Clinic: 3 weeks

## 2018-12-19 NOTE — Clinical Note
The patient has smoked 6-7 cigarettes/day, but had run out of 1 mg Chanix QD for 5 days. She has been back on the Chantix for 3 days. Discussed using the 14 mg nicotine patches to help with the cravings and since she cannot take the full dose of Chantix due to nausea. She stated she did quit with the patches before. She stated she is ready to stop smoking but does not know how to get going again after being late to get her medication.

## 2018-12-27 ENCOUNTER — CLINICAL SUPPORT (OUTPATIENT)
Dept: SMOKING CESSATION | Facility: CLINIC | Age: 53
End: 2018-12-27
Payer: COMMERCIAL

## 2018-12-27 DIAGNOSIS — F17.200 NICOTINE DEPENDENCE: Primary | ICD-10-CM

## 2018-12-27 PROCEDURE — 99407 BEHAV CHNG SMOKING > 10 MIN: CPT | Mod: S$GLB,,,

## 2018-12-27 NOTE — PROGRESS NOTES
Successful contact with patient regarding tobacco cessation quit #1. Pt states, she currently smoke 6 cigarettes per day; down from one pack and she is determined to quit. Pt is scheduled to follow up with CTTS via group therapy on 1/2/2019. Pt commended for the accomplishment thus far.  Pt informed of her  benefit status, future telephone follow ups, and contact information. Will update the tobacco cessation smart form for 3 months on quit #1.

## 2019-01-03 ENCOUNTER — CLINICAL SUPPORT (OUTPATIENT)
Dept: SMOKING CESSATION | Facility: CLINIC | Age: 54
End: 2019-01-03
Payer: COMMERCIAL

## 2019-01-03 DIAGNOSIS — F17.210 LIGHT CIGARETTE SMOKER (1-9 CIGS/DAY): Primary | ICD-10-CM

## 2019-01-03 PROCEDURE — 90853 PR GROUP PSYCHOTHERAPY: ICD-10-PCS | Mod: S$GLB,,,

## 2019-01-03 PROCEDURE — 90853 GROUP PSYCHOTHERAPY: CPT | Mod: S$GLB,,,

## 2019-01-03 NOTE — PROGRESS NOTES
Smoking Cessation Group Session #2    Site: [facility]  Date:  1/3/2019  Clinical Status of Patient: Outpatient   Length of Service and Code: 60 minutes - 94203   Number in Attendance: 2  Group Activities/Focus of Group:  Sharing last weeks challenges, triggers, and coping activities to remain quit and/ or keep making progress toward cessation, completion of TCRS (Tobacco Cessation Rating Scale) learned addiction model, personal reasons for quitting, medications, goals, quit date.    Specific session focus: completion of TCRS (Tobacco Cessation Rating Scale) reviewed strategies, habitual behavior, stress, and high risk situations. Introduced stress with addition interventions, SOLVE, relaxation with interventions, nutrition, exercise, weight gain, and the importance of rewarding oneself for accomplishments toward becoming tobacco free. Open discussion of all items with interventions.       Target symptoms:  withdrawal and medication side effects             The following were rated moderate (3) to severe (4) on TCRS:       Moderate 3: none     Severe 4:   Insomnia, desire/crave- nicotine withdrawal, nicotine replacement.  Patient's Response to Intervention: The patient is smoking 6 cigarettes/day using the 14 mg nicotine patch when she remembers. She has not made progress, but has not slipped. She states she is ready to quit and notices that cigarettes have a bad smell. She will make an effort to wear the patch and to start cutting back on the amount she smokes.   Progress Toward Goals and Other Mental Status Changes: The patient denies any abnormal behavioral or mental changes at this time.    Interval History: 0    Diagnosis: F17.210    Plan: The patient will continue with group therapy sessions and medication regimen prescribed with management by physician or by the Cessation Clinic Provider. Patient will inform Smoking Cessation Counselor of symptoms as rated high on TCRS.    Return to Clinic: 1 week    Quit Date:  TBD   Planned Quit Date: TBD

## 2019-01-03 NOTE — Clinical Note
he patient is smoking 6 cigarettes/day using the 14 mg nicotine patch when she remembers. She has not made progress, but has not slipped. She states she is ready to quit and notices that cigarettes have a bad smell. She will make an effort to wear the patch and to start cutting back on the amount she smokes.

## 2019-01-09 ENCOUNTER — CLINICAL SUPPORT (OUTPATIENT)
Dept: SMOKING CESSATION | Facility: CLINIC | Age: 54
End: 2019-01-09
Payer: COMMERCIAL

## 2019-01-09 DIAGNOSIS — F17.210 LIGHT CIGARETTE SMOKER (1-9 CIGS/DAY): Primary | ICD-10-CM

## 2019-01-09 PROCEDURE — 90853 PR GROUP PSYCHOTHERAPY: ICD-10-PCS | Mod: S$GLB,,,

## 2019-01-09 PROCEDURE — 90853 GROUP PSYCHOTHERAPY: CPT | Mod: S$GLB,,,

## 2019-01-09 NOTE — PROGRESS NOTES
Smoking Cessation Group Session #3    Site: [facility]  Date:  1/9/2019  Clinical Status of Patient: Outpatient   Length of Service and Code: 60 minutes - 37865   Number in Attendance: 2  Group Activities/Focus of Group:  Sharing last weeks challenges, triggers, and coping activities to remain quit and/ or keep making progress toward cessation, completion of TCRS (Tobacco Cessation Rating Scale) learned addiction model, personal reasons for quitting, medications, goals, quit date.    Specific session focus: completion of TCRS (Tobacco Cessation Rating Scale) reviewed strategies, controlling environment, cues, triggers, new goals set. Introduced high risk situations with preparation interventions, caffeine similarities with withdrawal issues of habit and nicotine, Alcohol, Understanding urges, cravings, stress and relaxation. Open discussion with intervention discussion.    Target symptoms:  withdrawal and medication side effects             The following were rated moderate (3) to severe (4) on TCRS:       Moderate 3: desire/crave, insomnia,drowsiness- nicotine withdrawal habit     Severe 4:   none  Patient's Response to Intervention: The patient is smoking 3-6 cigarettes/day using the 1 mg Chantix QD and was trying the 14 mg nicotine patch, but it would not stay on. Explained to move away from arms more toward trunk and make sure the area is clean. States she is beginning to notice her 's menthol cigarette smells and his smoking inside is beginning to make her sick. Suggested she may need a filtration system or see if he will not smoke outside more. Congratulated on her progress.  Progress Toward Goals and Other Mental Status Changes: The patient denies any abnormal behavioral or mental changes at this time.    Interval History: 0    Diagnosis: F17.210    Plan: The patient will continue with group therapy sessions and medication regimen prescribed with management by physician or by the Cessation Clinic Provider.  Patient will inform Smoking Cessation Counselor of symptoms as rated high on TCRS.    Return to Clinic: 1 week    Quit Date: TBD    Planned Quit Date: TBD

## 2019-01-16 ENCOUNTER — CLINICAL SUPPORT (OUTPATIENT)
Dept: SMOKING CESSATION | Facility: CLINIC | Age: 54
End: 2019-01-16
Payer: COMMERCIAL

## 2019-01-16 DIAGNOSIS — F17.210 LIGHT CIGARETTE SMOKER (1-9 CIGS/DAY): Primary | ICD-10-CM

## 2019-01-16 PROCEDURE — 99403 PREV MED CNSL INDIV APPRX 45: CPT | Mod: S$GLB,,,

## 2019-01-16 PROCEDURE — 99403 PR PREVENT COUNSEL,INDIV,45 MIN: ICD-10-PCS | Mod: S$GLB,,,

## 2019-01-16 NOTE — Clinical Note
The patient did not smoke yesterday but has been smoking 3-4 cigarettes using the above medications. She states cigarettes are beginning to taste bad and her 's smoke is bothering her. She states she has upper respiratory congestion now and tried to smoke and it made her sick. She is going to try not to smoke, but does state the craving is there. Will use diversion and avoidance to not to smoke. The patient will continue individual sessions and medication monitoring by CTTS. Prescribed medication management will be by practitoner.The patient denies any abnormal behavioral or mental changes at this time.

## 2019-01-17 ENCOUNTER — OFFICE VISIT (OUTPATIENT)
Dept: URGENT CARE | Facility: CLINIC | Age: 54
End: 2019-01-17
Payer: COMMERCIAL

## 2019-01-17 VITALS
SYSTOLIC BLOOD PRESSURE: 118 MMHG | DIASTOLIC BLOOD PRESSURE: 82 MMHG | WEIGHT: 200 LBS | BODY MASS INDEX: 39.27 KG/M2 | TEMPERATURE: 97 F | HEART RATE: 95 BPM | HEIGHT: 60 IN | RESPIRATION RATE: 16 BRPM | OXYGEN SATURATION: 97 %

## 2019-01-17 DIAGNOSIS — J11.1 INFLUENZA: Primary | ICD-10-CM

## 2019-01-17 LAB
CTP QC/QA: YES
FLUAV AG NPH QL: POSITIVE
FLUBV AG NPH QL: NEGATIVE

## 2019-01-17 PROCEDURE — 87804 POCT INFLUENZA A/B: ICD-10-PCS | Mod: QW,S$GLB,, | Performed by: NURSE PRACTITIONER

## 2019-01-17 PROCEDURE — 3008F PR BODY MASS INDEX (BMI) DOCUMENTED: ICD-10-PCS | Mod: CPTII,S$GLB,, | Performed by: NURSE PRACTITIONER

## 2019-01-17 PROCEDURE — 3008F BODY MASS INDEX DOCD: CPT | Mod: CPTII,S$GLB,, | Performed by: NURSE PRACTITIONER

## 2019-01-17 PROCEDURE — 3074F PR MOST RECENT SYSTOLIC BLOOD PRESSURE < 130 MM HG: ICD-10-PCS | Mod: CPTII,S$GLB,, | Performed by: NURSE PRACTITIONER

## 2019-01-17 PROCEDURE — 99214 PR OFFICE/OUTPT VISIT, EST, LEVL IV, 30-39 MIN: ICD-10-PCS | Mod: S$GLB,,, | Performed by: NURSE PRACTITIONER

## 2019-01-17 PROCEDURE — 3079F DIAST BP 80-89 MM HG: CPT | Mod: CPTII,S$GLB,, | Performed by: NURSE PRACTITIONER

## 2019-01-17 PROCEDURE — 3079F PR MOST RECENT DIASTOLIC BLOOD PRESSURE 80-89 MM HG: ICD-10-PCS | Mod: CPTII,S$GLB,, | Performed by: NURSE PRACTITIONER

## 2019-01-17 PROCEDURE — 99214 OFFICE O/P EST MOD 30 MIN: CPT | Mod: S$GLB,,, | Performed by: NURSE PRACTITIONER

## 2019-01-17 PROCEDURE — 3074F SYST BP LT 130 MM HG: CPT | Mod: CPTII,S$GLB,, | Performed by: NURSE PRACTITIONER

## 2019-01-17 PROCEDURE — 87804 INFLUENZA ASSAY W/OPTIC: CPT | Mod: QW,S$GLB,, | Performed by: NURSE PRACTITIONER

## 2019-01-17 RX ORDER — BENZONATATE 100 MG/1
100 CAPSULE ORAL EVERY 6 HOURS PRN
Qty: 30 CAPSULE | Refills: 1 | Status: SHIPPED | OUTPATIENT
Start: 2019-01-17 | End: 2019-02-08

## 2019-01-17 RX ORDER — OSELTAMIVIR PHOSPHATE 75 MG/1
75 CAPSULE ORAL 2 TIMES DAILY
Qty: 10 CAPSULE | Refills: 0 | Status: SHIPPED | OUTPATIENT
Start: 2019-01-17 | End: 2019-01-22

## 2019-01-17 RX ORDER — METOPROLOL SUCCINATE 100 MG/1
100 TABLET, EXTENDED RELEASE ORAL DAILY
Refills: 6 | COMMUNITY
Start: 2018-12-24 | End: 2019-02-08 | Stop reason: SDUPTHER

## 2019-01-17 RX ORDER — CODEINE PHOSPHATE AND GUAIFENESIN 10; 100 MG/5ML; MG/5ML
10 SOLUTION ORAL NIGHTLY PRN
Qty: 50 ML | Refills: 0 | Status: SHIPPED | OUTPATIENT
Start: 2019-01-17 | End: 2019-01-22

## 2019-01-17 NOTE — PATIENT INSTRUCTIONS
"Please follow up with your Primary care provider within 2-5 days if your signs and symptoms have not resolved or worsen.  The usual course of cold symptoms are 10-14 days.     If your condition worsens or fails to improve we recommend that you receive another evaluation at the emergency room immediately or contact your primary medical clinic to discuss your concerns.     You must understand that you have received an Urgent Care treatment only and that you may be released before all of your medical problems are known or treated.   You, the patient, will arrange for follow up care as instructed.     Tylenol or Ibuprofen can also be used as directed for pain/fever unless you have an allergy to them or medical condition such as stomach ulcers, kidney or liver disease or blood thinners etc for which you should not be taking these type of medications.     Take over the counter cough medication as directed as needed for cough.  You should avoid medications with pseudoephedrine or phenylephrine (any medication with "D") if you have high blood pressure as this can cause an elevation in your blood pressure. Instead consider Corcidin HBP as needed to prevent an elevated blood pressure.     Natural remedies of symptoms (as needed) include humidification, saline nasal sprays, and/or steamy showers.  Increase fluids, warm tea with honey, cough drops as needed.  You may also use salt water gargles for sore throat.    IF you received a steroid shot today - As discussed, this can elevate your blood pressure, elevate your blood sugar, water weight gain, nervous energy, redness to the face and dimpling of the skin at the injection site.   You have been given an antiviral today for treatment of your condition.  Please complete the antiviral as directed.     If the antiviral is Tamiflu: It can cause nausea and/or vomiting due to irritation of the GI tract in some people.  Please take tamiflu with food.      The Flu (Influenza)     The " virus that causes the flu spreads through the air in droplets when someone who has the flu coughs, sneezes, laughs, or talks.   The flu (influenza) is an infection that affects your respiratory tract. This tract is made up of your mouth, nose, and lungs, and the passages between them. Unlike a cold, the flu can make you very ill. And it can lead to pneumonia, a serious lung infection. The flu can have serious complications and even cause death.  Who is at risk for the flu?  Anyone can get the flu. But you are more likely to become infected if you:  · Have a weakened immune system  · Work in a healthcare setting where you may be exposed to flu germs  · Live or work with someone who has the flu  · Havent had an annual flu shot  How does the flu spread?  The flu is caused by a virus. The virus spreads through the air in droplets when someone who has the flu coughs, sneezes, laughs, or talks. You can become infected when you inhale these viruses directly. You can also become infected when you touch a surface on which the droplets have landed and then transfer the germs to your eyes, nose, or mouth. Touching used tissues, or sharing utensils, drinking glasses, or a toothbrush from an infected person can expose you to flu viruses, too.  What are the symptoms of the flu?  Flu symptoms tend to come on quickly and may last a few days to a few weeks. They include:  · Fever usually higher than 100.4°F  (38°C) and chills  · Sore throat and headache  · Dry cough  · Runny nose  · Tiredness and weakness  · Muscle aches  Who is at risk for flu complications?  For some people, the flu can be very serious. The risk for complications is greater for:  · Children younger than age 5  · Adults ages 65 and older  · People with a chronic illness such as diabetes or heart, kidney, or lung disease  · People who live in a nursing home or long-term care facility   How is the flu treated?  The flu usually gets better after 7 days or so. In some  cases, your healthcare provider may prescribe an antiviral medicine. This may help you get well a little sooner. For the medicine to help, you need to take it as soon as possible (ideally within 48 hours) after your symptoms start. If you develop pneumonia or other serious illness, you may need to stay in the hospital.  Easing flu symptoms  · Drink lots of fluids such as water, juice, and warm soup. A good rule is to drink enough so that you urinate your normal amount.  · Get plenty of rest.  · Ask your healthcare provider what to take for fever and pain.  · Call your provider if your fever is 100.4°F (38°C) or higher, or you become dizzy, lightheaded, or short of breath.  Taking steps to protect others  · Wash your hands often, especially after coughing or sneezing. Or clean your hands with an alcohol-based hand  containing at least 60% alcohol.  · Cough or sneeze into a tissue. Then throw the tissue away and wash your hands. If you dont have a tissue, cough and sneeze into your elbow.  · Stay home until at least 24 hours after you no longer have a fever or chills. Be sure the fever isnt being hidden by fever-reducing medicine.  · Dont share food, utensils, drinking glasses, or a toothbrush with others.  · Ask your healthcare provider if others in your household should get antiviral medicine to help them avoid infection.  How can the flu be prevented?  · One of the best ways to avoid the flu is to get a flu vaccine each year. The virus that causes the flu changes from year to year. For that reason, healthcare providers recommend getting the flu vaccine each year, as soon as it's available in your area. The vaccine is given as a shot. Your healthcare provider can tell you which vaccine is right for you. A nasal spray is also available but is not recommended for the 4451-9520 flu season. The CDC says this is because the nasal spray did not seem to protect against the flu over the last several flu seasons.  In the past, it was meant for people ages 2 to 49.  · Wash your hands often. Frequent handwashing is a proven way to help prevent infection.  · Carry an alcohol-based hand gel containing at least 60% alcohol. Use it when you can't use soap and water. Then wash your hands as soon as you can.  · Avoid touching your eyes, nose, and mouth.  · At home and work, clean phones, computer keyboards, and toys often with disinfectant wipes.  · If possible, avoid close contact with others who have the flu or symptoms of the flu.  Handwashing tips  Handwashing is one of the best ways to prevent many common infections. If you are caring for or visiting someone with the flu, wash your hands each time you enter and leave the room. Follow these steps:  · Use warm water and plenty of soap. Rub your hands together well.  · Clean the whole hand, including under your nails, between your fingers, and up the wrists.  · Wash for at least 15 seconds.  · Rinse, letting the water run down your fingers, not up your wrists.  · Dry your hands well. Use a paper towel to turn off the faucet and open the door.  Using alcohol-based hand   Alcohol-based hand  are also a good choice. Use them when you can't use soap and water. Follow these steps:  · Squeeze about a tablespoon of gel into the palm of one hand.  · Rub your hands together briskly, cleaning the backs of your hands, the palms, between your fingers, and up the wrists.  · Rub until the gel is gone and your hands are completely dry.  Preventing the flu in healthcare settings  The flu is a special concern for people in hospitals and long-term care facilities. To help prevent the spread of flu, many hospitals and nursing homes take these steps:  · Healthcare providers wash their hands or use an alcohol-based hand  before and after treating each patient.  · People with the flu have private rooms and bathrooms or share a room with someone with the same infection.  · People  who are at high risk for the flu but don't have it are encouraged to get the flu and pneumonia vaccines.  · All healthcare workers are encouraged or required to get flu shots.   Date Last Reviewed: 12/1/2016  © 8754-4786 [employer]. [address] [place] [address], [place] [address]. All rights reserved. This information is not intended as a substitute for professional medical care. Always follow your healthcare professional's instructions.

## 2019-01-17 NOTE — PROGRESS NOTES
Individual Follow-Up Form    1/16/2019    Quit Date: TBD    Clinical Status of Patient: Outpatient    Length of Service: 45 minutes    Continuing Medication: yes  Chantix    Other Medications: 14 mg nicotine patch     Target Symptoms: Withdrawal and medication side effects. The following were  rated moderate (3) to severe (4) on TCRS:  · Moderate (3): insomnia, nausea,constipation, nicotine withdrawal habit  · Severe (4): desire/crave habit.    Comments: The patient did not smoke yesterday but has been smoking 3-4 cigarettes using the above medications. She states cigarettes are beginning to taste bad and her 's smoke is bothering her. She states she has upper respiratory congestion now and tried to smoke and it made her sick. She is going to try not to smoke, but does state the craving is there. Will use diversion and avoidance to not to smoke. The patient will continue individual sessions and medication monitoring by CTTS. Prescribed medication management will be by practitoner.The patient denies any abnormal behavioral or mental changes at this time.      Diagnosis: F17.210    Next Visit: 1 week

## 2019-01-17 NOTE — PROGRESS NOTES
Subjective:       Patient ID: Loi Cordova is a 53 y.o. female.    Vitals:  height is 5' (1.524 m) and weight is 90.7 kg (200 lb). Her oral temperature is 97 °F (36.1 °C). Her blood pressure is 118/82 and her pulse is 95. Her respiration is 16 and oxygen saturation is 97%.     Chief Complaint: URI    Allergy symptoms for 2 weeks.       URI    This is a new problem. The current episode started in the past 7 days (2 days ago). The problem has been gradually worsening. There has been no fever. Associated symptoms include congestion, coughing, diarrhea, ear pain, headaches, a plugged ear sensation and sneezing. Pertinent negatives include no abdominal pain, chest pain, dysuria, joint pain, joint swelling, nausea, neck pain, rash, rhinorrhea, sinus pain, sore throat, swollen glands, vomiting or wheezing. Treatments tried: vitamin C. The treatment provided no relief.       Constitution: Negative for chills, sweating, fatigue and fever.   HENT: Positive for ear pain and congestion. Negative for sinus pain, sinus pressure, sore throat and voice change.    Neck: Negative for neck pain and painful lymph nodes.   Cardiovascular: Negative for chest pain.   Eyes: Negative for eye redness.   Respiratory: Positive for cough. Negative for chest tightness, sputum production, bloody sputum, COPD, shortness of breath, stridor, wheezing and asthma.    Gastrointestinal: Positive for diarrhea. Negative for abdominal pain, nausea and vomiting.   Genitourinary: Negative for dysuria.   Musculoskeletal: Negative for muscle ache.   Skin: Negative for rash.   Allergic/Immunologic: Positive for sneezing. Negative for seasonal allergies and asthma.   Neurological: Positive for headaches.   Hematologic/Lymphatic: Negative for swollen lymph nodes.       Objective:      Physical Exam    Assessment:       1. Influenza        Plan:       Results for orders placed or performed in visit on 01/17/19   POCT Influenza A/B   Result Value Ref Range    Rapid  "Influenza A Ag Positive (A) Negative    Rapid Influenza B Ag Negative Negative     Acceptable Yes        Influenza  -     POCT Influenza A/B  -     oseltamivir (TAMIFLU) 75 MG capsule; Take 1 capsule (75 mg total) by mouth 2 (two) times daily. for 5 days  Dispense: 10 capsule; Refill: 0  -     benzonatate (TESSALON PERLES) 100 MG capsule; Take 1 capsule (100 mg total) by mouth every 6 (six) hours as needed for Cough.  Dispense: 30 capsule; Refill: 1  -     guaifenesin-codeine 100-10 mg/5 ml (TUSSI-ORGANIDIN NR)  mg/5 mL syrup; Take 10 mLs by mouth nightly as needed.  Dispense: 50 mL; Refill: 0      Patient Instructions     Please follow up with your Primary care provider within 2-5 days if your signs and symptoms have not resolved or worsen.  The usual course of cold symptoms are 10-14 days.     If your condition worsens or fails to improve we recommend that you receive another evaluation at the emergency room immediately or contact your primary medical clinic to discuss your concerns.     You must understand that you have received an Urgent Care treatment only and that you may be released before all of your medical problems are known or treated.   You, the patient, will arrange for follow up care as instructed.     Tylenol or Ibuprofen can also be used as directed for pain/fever unless you have an allergy to them or medical condition such as stomach ulcers, kidney or liver disease or blood thinners etc for which you should not be taking these type of medications.     Take over the counter cough medication as directed as needed for cough.  You should avoid medications with pseudoephedrine or phenylephrine (any medication with "D") if you have high blood pressure as this can cause an elevation in your blood pressure. Instead consider Corcidin HBP as needed to prevent an elevated blood pressure.     Natural remedies of symptoms (as needed) include humidification, saline nasal sprays, and/or steamy " showers.  Increase fluids, warm tea with honey, cough drops as needed.  You may also use salt water gargles for sore throat.    IF you received a steroid shot today - As discussed, this can elevate your blood pressure, elevate your blood sugar, water weight gain, nervous energy, redness to the face and dimpling of the skin at the injection site.   You have been given an antiviral today for treatment of your condition.  Please complete the antiviral as directed.     If the antiviral is Tamiflu: It can cause nausea and/or vomiting due to irritation of the GI tract in some people.  Please take tamiflu with food.      The Flu (Influenza)     The virus that causes the flu spreads through the air in droplets when someone who has the flu coughs, sneezes, laughs, or talks.   The flu (influenza) is an infection that affects your respiratory tract. This tract is made up of your mouth, nose, and lungs, and the passages between them. Unlike a cold, the flu can make you very ill. And it can lead to pneumonia, a serious lung infection. The flu can have serious complications and even cause death.  Who is at risk for the flu?  Anyone can get the flu. But you are more likely to become infected if you:  · Have a weakened immune system  · Work in a healthcare setting where you may be exposed to flu germs  · Live or work with someone who has the flu  · Havent had an annual flu shot  How does the flu spread?  The flu is caused by a virus. The virus spreads through the air in droplets when someone who has the flu coughs, sneezes, laughs, or talks. You can become infected when you inhale these viruses directly. You can also become infected when you touch a surface on which the droplets have landed and then transfer the germs to your eyes, nose, or mouth. Touching used tissues, or sharing utensils, drinking glasses, or a toothbrush from an infected person can expose you to flu viruses, too.  What are the symptoms of the flu?  Flu symptoms  tend to come on quickly and may last a few days to a few weeks. They include:  · Fever usually higher than 100.4°F  (38°C) and chills  · Sore throat and headache  · Dry cough  · Runny nose  · Tiredness and weakness  · Muscle aches  Who is at risk for flu complications?  For some people, the flu can be very serious. The risk for complications is greater for:  · Children younger than age 5  · Adults ages 65 and older  · People with a chronic illness such as diabetes or heart, kidney, or lung disease  · People who live in a nursing home or long-term care facility   How is the flu treated?  The flu usually gets better after 7 days or so. In some cases, your healthcare provider may prescribe an antiviral medicine. This may help you get well a little sooner. For the medicine to help, you need to take it as soon as possible (ideally within 48 hours) after your symptoms start. If you develop pneumonia or other serious illness, you may need to stay in the hospital.  Easing flu symptoms  · Drink lots of fluids such as water, juice, and warm soup. A good rule is to drink enough so that you urinate your normal amount.  · Get plenty of rest.  · Ask your healthcare provider what to take for fever and pain.  · Call your provider if your fever is 100.4°F (38°C) or higher, or you become dizzy, lightheaded, or short of breath.  Taking steps to protect others  · Wash your hands often, especially after coughing or sneezing. Or clean your hands with an alcohol-based hand  containing at least 60% alcohol.  · Cough or sneeze into a tissue. Then throw the tissue away and wash your hands. If you dont have a tissue, cough and sneeze into your elbow.  · Stay home until at least 24 hours after you no longer have a fever or chills. Be sure the fever isnt being hidden by fever-reducing medicine.  · Dont share food, utensils, drinking glasses, or a toothbrush with others.  · Ask your healthcare provider if others in your household  should get antiviral medicine to help them avoid infection.  How can the flu be prevented?  · One of the best ways to avoid the flu is to get a flu vaccine each year. The virus that causes the flu changes from year to year. For that reason, healthcare providers recommend getting the flu vaccine each year, as soon as it's available in your area. The vaccine is given as a shot. Your healthcare provider can tell you which vaccine is right for you. A nasal spray is also available but is not recommended for the 3026-8201 flu season. The CDC says this is because the nasal spray did not seem to protect against the flu over the last several flu seasons. In the past, it was meant for people ages 2 to 49.  · Wash your hands often. Frequent handwashing is a proven way to help prevent infection.  · Carry an alcohol-based hand gel containing at least 60% alcohol. Use it when you can't use soap and water. Then wash your hands as soon as you can.  · Avoid touching your eyes, nose, and mouth.  · At home and work, clean phones, computer keyboards, and toys often with disinfectant wipes.  · If possible, avoid close contact with others who have the flu or symptoms of the flu.  Handwashing tips  Handwashing is one of the best ways to prevent many common infections. If you are caring for or visiting someone with the flu, wash your hands each time you enter and leave the room. Follow these steps:  · Use warm water and plenty of soap. Rub your hands together well.  · Clean the whole hand, including under your nails, between your fingers, and up the wrists.  · Wash for at least 15 seconds.  · Rinse, letting the water run down your fingers, not up your wrists.  · Dry your hands well. Use a paper towel to turn off the faucet and open the door.  Using alcohol-based hand   Alcohol-based hand  are also a good choice. Use them when you can't use soap and water. Follow these steps:  · Squeeze about a tablespoon of gel into the  palm of one hand.  · Rub your hands together briskly, cleaning the backs of your hands, the palms, between your fingers, and up the wrists.  · Rub until the gel is gone and your hands are completely dry.  Preventing the flu in healthcare settings  The flu is a special concern for people in hospitals and long-term care facilities. To help prevent the spread of flu, many hospitals and nursing homes take these steps:  · Healthcare providers wash their hands or use an alcohol-based hand  before and after treating each patient.  · People with the flu have private rooms and bathrooms or share a room with someone with the same infection.  · People who are at high risk for the flu but don't have it are encouraged to get the flu and pneumonia vaccines.  · All healthcare workers are encouraged or required to get flu shots.   Date Last Reviewed: 12/1/2016  © 9699-3257 [employer]. [address] [place] [address], [place] [address]. All rights reserved. This information is not intended as a substitute for professional medical care. Always follow your healthcare professional's instructions.

## 2019-01-17 NOTE — LETTER
January 17, 2019      [facility]  [address] [address], [address]  [place] [address]  Phone: [phone]  Fax: [phone]       Patient: Loi Cordova   YOB: 1965  Date of Visit: 01/17/2019    To Whom It May Concern:    Paul Cordova  was at [facility] on 01/17/2019. She may return to work/school on 1/21/2019 with no restrictions. If you have any questions or concerns, or if I can be of further assistance, please do not hesitate to contact me.    Sincerely,      Sarah Kohli, NP

## 2019-01-20 ENCOUNTER — TELEPHONE (OUTPATIENT)
Dept: URGENT CARE | Facility: CLINIC | Age: 54
End: 2019-01-20

## 2019-01-23 ENCOUNTER — CLINICAL SUPPORT (OUTPATIENT)
Dept: SMOKING CESSATION | Facility: CLINIC | Age: 54
End: 2019-01-23
Payer: COMMERCIAL

## 2019-01-23 DIAGNOSIS — F17.210 LIGHT CIGARETTE SMOKER (1-9 CIGS/DAY): Primary | ICD-10-CM

## 2019-01-23 PROCEDURE — 99404 PREV MED CNSL INDIV APPRX 60: CPT | Mod: S$GLB,,,

## 2019-01-23 PROCEDURE — 99404 PR PREVENT COUNSEL,INDIV,60 MIN: ICD-10-PCS | Mod: S$GLB,,,

## 2019-01-23 NOTE — PROGRESS NOTES
Individual Follow-Up Form    1/23/2019    Quit Date: TBD    Clinical Status of Patient: Outpatient    Length of Service: 45 minutes    Continuing Medication: yes  Chantix    Other Medications: 14 mg nicotine patches     Target Symptoms: Withdrawal and medication side effects. The following were  rated moderate (3) to severe (4) on TCRS:  · Moderate (3): nausea nicotine replacement therapy   · Severe (4): desire/crave, drowsiness -nicotine withdrawal habit,     Comments: The patient is smoking 4-5 cigarettes/day using the 1 mg Chantix QD and 14 mg nicotine patch, but she has trouble with the patch sticking on. She states her cravings desires are still there.Suggest she start dry runs and see how long she can go.completion of TCRS (Tobacco Cessation Rating Scale) reviewed strategies, habitual behavior, stress, and high risk situations. Introduced stress with addition interventions, SOLVE, relaxation with interventions, nutrition, exercise, weight gain, and the importance of rewarding oneself for accomplishments toward becoming tobacco free.The patient will continue individual sessions and medication monitoring by CTTS. Prescribed medication management will be by practitoner.            Diagnosis: F17.210    Next Visit: 1 week

## 2019-01-23 NOTE — Clinical Note
The patient is smoking 4-5 cigarettes/day using the 1 mg Chantix QD and 14 mg nicotine patch, but she has trouble with the patch sticking on. She states her cravings desires are still there.Suggest she start dry runs and see how long she can go.completion of TCRS (Tobacco Cessation Rating Scale) reviewed strategies, habitual behavior, stress, and high risk situations. Introduced stress with addition interventions,

## 2019-01-29 ENCOUNTER — OFFICE VISIT (OUTPATIENT)
Dept: URGENT CARE | Facility: CLINIC | Age: 54
End: 2019-01-29
Payer: COMMERCIAL

## 2019-01-29 VITALS
DIASTOLIC BLOOD PRESSURE: 92 MMHG | BODY MASS INDEX: 39.27 KG/M2 | WEIGHT: 200 LBS | TEMPERATURE: 97 F | SYSTOLIC BLOOD PRESSURE: 171 MMHG | HEIGHT: 60 IN | OXYGEN SATURATION: 97 % | RESPIRATION RATE: 18 BRPM | HEART RATE: 93 BPM

## 2019-01-29 DIAGNOSIS — R31.9 HEMATURIA, UNSPECIFIED TYPE: Primary | ICD-10-CM

## 2019-01-29 LAB
BILIRUB UR QL STRIP: NEGATIVE
GLUCOSE UR QL STRIP: NEGATIVE
KETONES UR QL STRIP: NEGATIVE
LEUKOCYTE ESTERASE UR QL STRIP: NEGATIVE
PH, POC UA: 5.5 (ref 5–8)
POC BLOOD, URINE: POSITIVE
POC NITRATES, URINE: NEGATIVE
PROT UR QL STRIP: NEGATIVE
SP GR UR STRIP: 1.02 (ref 1–1.03)
UROBILINOGEN UR STRIP-ACNC: NORMAL (ref 0.1–1.1)

## 2019-01-29 PROCEDURE — 81003 URINALYSIS AUTO W/O SCOPE: CPT | Mod: QW,S$GLB,, | Performed by: EMERGENCY MEDICINE

## 2019-01-29 PROCEDURE — 3008F PR BODY MASS INDEX (BMI) DOCUMENTED: ICD-10-PCS | Mod: CPTII,S$GLB,, | Performed by: EMERGENCY MEDICINE

## 2019-01-29 PROCEDURE — 99000 PR SPECIMEN HANDLING,DR OFF->LAB: ICD-10-PCS | Mod: S$GLB,,, | Performed by: EMERGENCY MEDICINE

## 2019-01-29 PROCEDURE — 99000 SPECIMEN HANDLING OFFICE-LAB: CPT | Mod: S$GLB,,, | Performed by: EMERGENCY MEDICINE

## 2019-01-29 PROCEDURE — 99214 OFFICE O/P EST MOD 30 MIN: CPT | Mod: 25,S$GLB,, | Performed by: EMERGENCY MEDICINE

## 2019-01-29 PROCEDURE — 3080F DIAST BP >= 90 MM HG: CPT | Mod: CPTII,S$GLB,, | Performed by: EMERGENCY MEDICINE

## 2019-01-29 PROCEDURE — 99214 PR OFFICE/OUTPT VISIT, EST, LEVL IV, 30-39 MIN: ICD-10-PCS | Mod: 25,S$GLB,, | Performed by: EMERGENCY MEDICINE

## 2019-01-29 PROCEDURE — 3080F PR MOST RECENT DIASTOLIC BLOOD PRESSURE >= 90 MM HG: ICD-10-PCS | Mod: CPTII,S$GLB,, | Performed by: EMERGENCY MEDICINE

## 2019-01-29 PROCEDURE — 3008F BODY MASS INDEX DOCD: CPT | Mod: CPTII,S$GLB,, | Performed by: EMERGENCY MEDICINE

## 2019-01-29 PROCEDURE — 81003 POCT URINALYSIS, DIPSTICK, AUTOMATED, W/O SCOPE: ICD-10-PCS | Mod: QW,S$GLB,, | Performed by: EMERGENCY MEDICINE

## 2019-01-29 PROCEDURE — 3077F SYST BP >= 140 MM HG: CPT | Mod: CPTII,S$GLB,, | Performed by: EMERGENCY MEDICINE

## 2019-01-29 PROCEDURE — 3077F PR MOST RECENT SYSTOLIC BLOOD PRESSURE >= 140 MM HG: ICD-10-PCS | Mod: CPTII,S$GLB,, | Performed by: EMERGENCY MEDICINE

## 2019-01-29 NOTE — PATIENT INSTRUCTIONS
Layo Champagne MD  Go to the Emergency Department for any problems  Call your PCP for follow up next available.    Hematuria: Possible Causes     Many things can lead to blood in the urine (hematuria). The blood may be seen with the eye (macroscopic or gross hematuria). Or it may only be seen when the urine is looked at under a microscope (microscopic hematuria). Some of the most common causes of blood in the urine are listed below. Often, no cause for the blood can be found. This is called idiopathic hematuria.  · Kidney or bladder stones are collections of crystals. They form in the urine. Stones may be found anywhere in the urinary tract. But they form most often in the kidneys or bladder. In addition to blood in the urine, they can cause severe pain.  · BPH stands for benign prostatic hyperplasia. It is enlargement of the prostate gland. It happens as men age. BPH often causes problems with urination. It sometimes causes blood in the urine.  · A urinary tract infection (UTI) is due to bacteria growing in the urinary tract. It can cause blood in the urine. Other symptoms include burning or pain with urination. You may need to urinate often or urgently. You may also have a fever.  · Damage to the urinary tract may cause blood in the urine. This damage may be due to a blow or accident. It may also result from the use of a urinary catheter. Very hard exercise may sometimes irritate the urinary tract and cause bleeding.  · Cancer may occur anywhere in the urinary tract. A tumor may sometimes cause no symptoms other than bleeding.     Other possible causes of bleeding include:  · Prostatitis (infection of the prostate gland)  · Taking anticoagulants  · Blockage in the urinary tract  · Disease or inflammation of the kidney  · Cystic diseases of the kidneys  · Sickle cell anemia  · Vigorous exercise  · Endometriosis  Date Last Reviewed: 12/1/2016  © 9264-4294 [employer]. [address] [place] [address]  [place] [address]. All rights reserved. This information is not intended as a substitute for professional medical care. Always follow your healthcare professional's instructions.

## 2019-01-29 NOTE — PROGRESS NOTES
Subjective:       Patient ID: Loi Cordova is a 53 y.o. female.    Vitals:  height is 5' (1.524 m) and weight is 90.7 kg (200 lb). Her oral temperature is 97.2 °F (36.2 °C). Her blood pressure is 171/92 (abnormal) and her pulse is 93. Her respiration is 18 and oxygen saturation is 97%.     Chief Complaint: Dysuria    Noticed blood in urine PTA, no fever/pain, hx of same 2017 and txed for UTI, taking blood thinners, NOC.      Dysuria    This is a new problem. The current episode started today. The problem has been gradually improving. The quality of the pain is described as aching. The pain is at a severity of 4/10. The pain is moderate. There has been no fever. Associated symptoms include frequency, hematuria and urgency. Pertinent negatives include no chills, nausea, vomiting or rash. She has tried nothing for the symptoms.       Constitution: Negative for chills and fever.   Neck: Negative for painful lymph nodes.   Gastrointestinal: Negative for abdominal pain, nausea and vomiting.   Genitourinary: Positive for frequency, urgency and hematuria. Negative for dysuria, urine decreased, history of kidney stones, painful menstruation, irregular menstruation, missed menses, heavy menstrual bleeding, ovarian cysts, genital trauma, vaginal pain, vaginal discharge, vaginal bleeding, vaginal odor, painful intercourse, genital sore, painful ejaculation and pelvic pain.   Musculoskeletal: Positive for back pain.   Skin: Negative for rash and lesion.   Hematologic/Lymphatic: Negative for swollen lymph nodes.       Objective:      Physical Exam   Constitutional: She is oriented to person, place, and time. She appears well-developed and well-nourished.   Overweight   Neck: Normal range of motion. Neck supple.   Cardiovascular: Normal rate, regular rhythm and normal heart sounds.   Pulmonary/Chest: Breath sounds normal.   Abdominal: Soft. There is no tenderness.   No bilat CVAT/bladder tenderness to palp   Musculoskeletal:  Normal range of motion.   Neurological: She is alert and oriented to person, place, and time.   Skin: Skin is warm and dry.   Psychiatric: She has a normal mood and affect. Her behavior is normal.       Assessment:       1. Hematuria, unspecified type        Plan:         Hematuria, unspecified type  -     POCT Urinalysis, Dipstick, Automated, W/O Scope  -     Urine culture        Layo Champagne MD  Go to the Emergency Department for any problems  Call your PCP for follow up next available.

## 2019-01-30 ENCOUNTER — CLINICAL SUPPORT (OUTPATIENT)
Dept: SMOKING CESSATION | Facility: CLINIC | Age: 54
End: 2019-01-30
Payer: COMMERCIAL

## 2019-01-30 DIAGNOSIS — F17.210 LIGHT CIGARETTE SMOKER (1-9 CIGS/DAY): Primary | ICD-10-CM

## 2019-01-30 PROCEDURE — 90853 PR GROUP PSYCHOTHERAPY: ICD-10-PCS | Mod: S$GLB,,,

## 2019-01-30 PROCEDURE — 90853 GROUP PSYCHOTHERAPY: CPT | Mod: S$GLB,,,

## 2019-01-30 NOTE — Clinical Note
The patient is smoking  cigarettes 4-5 / day using nothing. Has trouble keeping the patch on, does nor stick. Discussed placing he patch in a different area where it will stay and to use cream if skin turns red. Has identified when she smokes and mostly when watching tv or bored. Will use diversion at the first sign of a craving to work toward quit. Provided sheets to work on habit and cigarette log.

## 2019-01-31 NOTE — PROGRESS NOTES
Smoking Cessation Group Session #4    Site: [facility]  Date:  1/30/2019  Clinical Status of Patient: Outpatient   Length of Service and Code: 60 minutes - 27600   Number in Attendance: 6  Group Activities/Focus of Group:  Sharing last weeks challenges, triggers, and coping activities to remain quit and/ or keep making progress toward cessation, completion of TCRS (Tobacco Cessation Rating Scale) learned addiction model, personal reasons for quitting, medications, goals, quit date.    Specific session focus: completion of TCRS (Tobacco Cessation Rating Scale) reviewed strategies, habitual behavior, stress, and high risk situations. Introduced stress with addition interventions, SOLVE, relaxation with interventions, nutrition, exercise, weight gain, and the importance of rewarding oneself for accomplishments toward becoming tobacco free. Open discussion of all items with interventions.       Target symptoms:  withdrawal and medication side effects             The following were rated moderate (3) to severe (4) on TCRS:       Moderate 3: desire,insomnia,diarrhea, urinate more often- - Nicotine replacement therapy, withdrawal symptoms      Severe 4:   none  Patient's Response to Intervention: The patient is smoking  cigarettes 4-5 / day using nothing. Has trouble keeping the patch on, does nor stick. Discussed placing he patch in a different area where it will stay and to use cream if skin turns red. Has identified when she smokes and mostly when watching tv or bored. Will use diversion at the first sign of a craving to work toward quit. Provided sheets to work on habit and cigarette log.   Progress Toward Goals and Other Mental Status Changes: The patient denies any abnormal behavioral or mental changes at this time.    Interval History: 0    Diagnosis: F17.210    Plan: The patient will continue with group therapy sessions and medication regimen prescribed with management by physician or by the Cessation [facility] Provider.  Patient will inform Smoking Cessation Counselor of symptoms as rated high on TCRS.    Return to Clinic: 1 week  Planned Quit Date: 3/1/19

## 2019-02-01 ENCOUNTER — OFFICE VISIT (OUTPATIENT)
Dept: UROLOGY | Facility: CLINIC | Age: 54
End: 2019-02-01
Payer: COMMERCIAL

## 2019-02-01 VITALS — TEMPERATURE: 99 F | DIASTOLIC BLOOD PRESSURE: 94 MMHG | SYSTOLIC BLOOD PRESSURE: 160 MMHG | HEART RATE: 93 BPM

## 2019-02-01 DIAGNOSIS — Z87.440 HISTORY OF UTI: ICD-10-CM

## 2019-02-01 DIAGNOSIS — N32.81 OAB (OVERACTIVE BLADDER): ICD-10-CM

## 2019-02-01 DIAGNOSIS — R31.0 GROSS HEMATURIA: Primary | ICD-10-CM

## 2019-02-01 LAB
BILIRUB SERPL-MCNC: ABNORMAL MG/DL
BLOOD URINE, POC: 250
COLOR, POC UA: YELLOW
GLUCOSE UR QL STRIP: 100
KETONES UR QL STRIP: ABNORMAL
LEUKOCYTE ESTERASE URINE, POC: ABNORMAL
NITRITE, POC UA: ABNORMAL
PH, POC UA: 5
PROTEIN, POC: ABNORMAL
SPECIFIC GRAVITY, POC UA: 1.02
UROBILINOGEN, POC UA: ABNORMAL

## 2019-02-01 PROCEDURE — 87077 CULTURE AEROBIC IDENTIFY: CPT

## 2019-02-01 PROCEDURE — 99999 PR PBB SHADOW E&M-EST. PATIENT-LVL III: CPT | Mod: PBBFAC,,, | Performed by: UROLOGY

## 2019-02-01 PROCEDURE — 81002 URINALYSIS NONAUTO W/O SCOPE: CPT | Mod: S$GLB,,, | Performed by: UROLOGY

## 2019-02-01 PROCEDURE — 81002 POCT URINE DIPSTICK WITHOUT MICROSCOPE: ICD-10-PCS | Mod: S$GLB,,, | Performed by: UROLOGY

## 2019-02-01 PROCEDURE — 99999 PR PBB SHADOW E&M-EST. PATIENT-LVL III: ICD-10-PCS | Mod: PBBFAC,,, | Performed by: UROLOGY

## 2019-02-01 PROCEDURE — 87088 URINE BACTERIA CULTURE: CPT

## 2019-02-01 PROCEDURE — 99213 OFFICE O/P EST LOW 20 MIN: CPT | Mod: 25,S$GLB,, | Performed by: UROLOGY

## 2019-02-01 PROCEDURE — 87086 URINE CULTURE/COLONY COUNT: CPT

## 2019-02-01 PROCEDURE — 87186 SC STD MICRODIL/AGAR DIL: CPT

## 2019-02-01 PROCEDURE — 99213 PR OFFICE/OUTPT VISIT, EST, LEVL III, 20-29 MIN: ICD-10-PCS | Mod: 25,S$GLB,, | Performed by: UROLOGY

## 2019-02-01 RX ORDER — NITROFURANTOIN (MACROCRYSTALS) 100 MG/1
100 CAPSULE ORAL 2 TIMES DAILY
Qty: 20 CAPSULE | Refills: 0 | Status: SHIPPED | OUTPATIENT
Start: 2019-02-01 | End: 2019-02-08

## 2019-02-01 NOTE — PROGRESS NOTES
This patient was last seen by me May 2017 at which time she had negative workup for gross hematuria.  Noncontrast CT scan obtained due to allergy    At that time she was on oxybutynin for overactive bladder. Patient states that she is not currently taking this and she is not bother with symptoms referable to overactive bladder    Patient has had recent gross hematuria.  Patient currently having no dysuria but does have suprapubic pressure.  No fever    Patient seen at urgent care over urine culture not sent    Back in May 2017 she had an E coli infection    Physical exam reveals reveals a well-developed well-nourished patient  in no acute distress.  Patient is alert and oriented ×3 with normal mood and affect.  Respiratory effort is normal and there is no peripheral edema.  Skin is normal to inspection and palpation    LMP 07/29/1990 (Within Years) Comment: Partical Hysterectomy in 1990, Gutierrez Ooopherectomy in 2000  Review of Systems  General ROS: negative for chills, fever or weight loss  Respiratory ROS: no cough, shortness of breath, or wheezing  Cardiovascular ROS: no chest pain or dyspnea on exertion  Musculoskeletal ROS: negative for gait disturbance or muscular weakness    Family History   Problem Relation Age of Onset    Heart disease Mother     Hyperlipidemia Mother     Hypertension Mother     Diabetes Mother     Heart disease Father     Hyperlipidemia Father     Cancer Father     Prostate cancer Neg Hx     Kidney disease Neg Hx      Past Medical History:   Diagnosis Date    Allergy     Asthma     GERD (gastroesophageal reflux disease)     History of back surgery 2/20/2018    Hyperlipidemia     Hypertension      Family History   Problem Relation Age of Onset    Heart disease Mother     Hyperlipidemia Mother     Hypertension Mother     Diabetes Mother     Heart disease Father     Hyperlipidemia Father     Cancer Father     Prostate cancer Neg Hx     Kidney disease Neg Hx      Social  History     Tobacco Use    Smoking status: Current Every Day Smoker     Packs/day: 1.00     Years: 34.00     Pack years: 34.00     Types: Cigarettes    Smokeless tobacco: Never Used    Tobacco comment: down to 6 cigarettes daily/ trying to quit   Substance Use Topics    Alcohol use: Yes     Comment: occassionly       Impression:      ICD-10-CM ICD-9-CM    1. Gross hematuria R31.0 599.71 Urine culture   2. History of UTI Z87.440 V13.02    3. OAB (overactive bladder) N32.81 596.51        Plan:    #1 And 2.  Gross hematuria history of urinary tract infection.  Will presumptively treat with a course of Macrobid.  Culture urine today and adjust as needed.  Follow-up 1 month for recheck.  If urine culture negative and if gross hematuria continues, then will discussed with the patient possible need for cystoscopy and retrograde pyelography    3.  Overactive bladder. Symptoms have resolved.  Not currently taking oxybutynin and anticholinergic not needed    PLEASE NOTE:  Please be advised that portions of this note were dictated using voice recognition software and may contain dictation related errors in spelling/grammar/appropriate pronouns/syntax or other errors that might have not been found and or corrected on text review.

## 2019-02-02 ENCOUNTER — TELEPHONE (OUTPATIENT)
Dept: URGENT CARE | Facility: CLINIC | Age: 54
End: 2019-02-02

## 2019-02-02 LAB
BACTERIA UR CULT: ABNORMAL
BACTERIA UR CULT: ABNORMAL
OTHER ANTIBIOTIC SUSC ISLT: ABNORMAL

## 2019-02-02 NOTE — TELEPHONE ENCOUNTER
Left msg to call back re:  Urine culture results.  Pt saw Urology yesterday, taking macro bid, OK.

## 2019-02-02 NOTE — TELEPHONE ENCOUNTER
2/1/2019 tried to contact patient to give negative urine culture result .Left message requesting call back ;was logged in binder as first attempt.

## 2019-02-04 LAB — BACTERIA UR CULT: NORMAL

## 2019-02-05 ENCOUNTER — TELEPHONE (OUTPATIENT)
Dept: URGENT CARE | Facility: CLINIC | Age: 54
End: 2019-02-05

## 2019-02-07 ENCOUNTER — CLINICAL SUPPORT (OUTPATIENT)
Dept: SMOKING CESSATION | Facility: CLINIC | Age: 54
End: 2019-02-07
Payer: COMMERCIAL

## 2019-02-07 DIAGNOSIS — F17.210 LIGHT CIGARETTE SMOKER (1-9 CIGS/DAY): Primary | ICD-10-CM

## 2019-02-07 PROCEDURE — 90853 PR GROUP PSYCHOTHERAPY: ICD-10-PCS | Mod: S$GLB,,,

## 2019-02-07 PROCEDURE — 90853 GROUP PSYCHOTHERAPY: CPT | Mod: S$GLB,,,

## 2019-02-07 NOTE — PROGRESS NOTES
Smoking Cessation Group Session #3    Site: [facility]  Date:  2/7/2019  Clinical Status of Patient: Outpatient   Length of Service and Code: 60 minutes - 46167   Number in Attendance: 2  Group Activities/Focus of Group:  Sharing last weeks challenges, triggers, and coping activities to remain quit and/ or keep making progress toward cessation, completion of TCRS (Tobacco Cessation Rating Scale) learned addiction model, personal reasons for quitting, medications, goals, quit date.    Specific session focus: completion of TCRS (Tobacco Cessation Rating Scale) reviewed strategies, controlling environment, cues, triggers, new goals set. Introduced high risk situations with preparation interventions, caffeine similarities with withdrawal issues of habit and nicotine, Alcohol, Understanding urges, cravings, stress and relaxation. Open discussion with intervention discussion.      Target symptoms:  withdrawal and medication side effects             The following were rated moderate (3) to severe (4) on TCRS:       Moderate 3: urinate more often, drowsiness, fatigue,      Severe 4:   Desire/crave,insomnia,diarrhea, -- Nicotine replacement therapy, withdrawal symptoms   Patient's Response to Intervention: The patient is smoking cigarettes 6-7 / day using 14 mg nicotine patch and is not using the Chantix due to diarrhea, stomach ache, and has only been able to take 1 dose which has not been effective. Tried patches, but not staying on, will try another brand, but may mot be paid for by the trust. Is ready to quit due to health reasons.   Progress Toward Goals and Other Mental Status Changes: The patient denies any abnormal behavioral or mental changes at this time.    Interval History: 0    Diagnosis: F17.210  Plan: The patient will continue with group therapy sessions and medication regimen prescribed with management by physician or by the Cessation [facility] Provider. Patient will inform Smoking Cessation Counselor of symptoms as  rated high on TCRS.    Return to Clinic: 1 week    Quit Date: TBD   Planned Quit Date: TBD

## 2019-02-08 ENCOUNTER — OFFICE VISIT (OUTPATIENT)
Dept: FAMILY MEDICINE | Facility: CLINIC | Age: 54
End: 2019-02-08
Payer: COMMERCIAL

## 2019-02-08 VITALS
DIASTOLIC BLOOD PRESSURE: 80 MMHG | WEIGHT: 201.19 LBS | SYSTOLIC BLOOD PRESSURE: 130 MMHG | HEART RATE: 85 BPM | HEIGHT: 60 IN | RESPIRATION RATE: 16 BRPM | TEMPERATURE: 98 F | BODY MASS INDEX: 39.5 KG/M2 | OXYGEN SATURATION: 96 %

## 2019-02-08 DIAGNOSIS — E11.42 TYPE 2 DIABETES MELLITUS WITH DIABETIC POLYNEUROPATHY, WITHOUT LONG-TERM CURRENT USE OF INSULIN: Primary | ICD-10-CM

## 2019-02-08 DIAGNOSIS — I25.10 CORONARY ARTERY DISEASE, ANGINA PRESENCE UNSPECIFIED, UNSPECIFIED VESSEL OR LESION TYPE, UNSPECIFIED WHETHER NATIVE OR TRANSPLANTED HEART: ICD-10-CM

## 2019-02-08 DIAGNOSIS — Z12.39 SCREENING FOR BREAST CANCER: ICD-10-CM

## 2019-02-08 DIAGNOSIS — E78.00 PURE HYPERCHOLESTEROLEMIA: ICD-10-CM

## 2019-02-08 DIAGNOSIS — I50.32 CHRONIC DIASTOLIC CHF (CONGESTIVE HEART FAILURE): ICD-10-CM

## 2019-02-08 DIAGNOSIS — E66.01 CLASS 2 SEVERE OBESITY DUE TO EXCESS CALORIES WITH SERIOUS COMORBIDITY AND BODY MASS INDEX (BMI) OF 38.0 TO 38.9 IN ADULT: ICD-10-CM

## 2019-02-08 DIAGNOSIS — E11.9 TYPE 2 DIABETES MELLITUS WITHOUT COMPLICATION, UNSPECIFIED WHETHER LONG TERM INSULIN USE: ICD-10-CM

## 2019-02-08 DIAGNOSIS — I10 ESSENTIAL HYPERTENSION: ICD-10-CM

## 2019-02-08 PROCEDURE — 3045F PR MOST RECENT HEMOGLOBIN A1C LEVEL 7.0-9.0%: CPT | Mod: CPTII,S$GLB,, | Performed by: INTERNAL MEDICINE

## 2019-02-08 PROCEDURE — 3008F PR BODY MASS INDEX (BMI) DOCUMENTED: ICD-10-PCS | Mod: CPTII,S$GLB,, | Performed by: INTERNAL MEDICINE

## 2019-02-08 PROCEDURE — 99214 PR OFFICE/OUTPT VISIT, EST, LEVL IV, 30-39 MIN: ICD-10-PCS | Mod: S$GLB,,, | Performed by: INTERNAL MEDICINE

## 2019-02-08 PROCEDURE — 3045F PR MOST RECENT HEMOGLOBIN A1C LEVEL 7.0-9.0%: ICD-10-PCS | Mod: CPTII,S$GLB,, | Performed by: INTERNAL MEDICINE

## 2019-02-08 PROCEDURE — 3079F DIAST BP 80-89 MM HG: CPT | Mod: CPTII,S$GLB,, | Performed by: INTERNAL MEDICINE

## 2019-02-08 PROCEDURE — 99214 OFFICE O/P EST MOD 30 MIN: CPT | Mod: S$GLB,,, | Performed by: INTERNAL MEDICINE

## 2019-02-08 PROCEDURE — 3008F BODY MASS INDEX DOCD: CPT | Mod: CPTII,S$GLB,, | Performed by: INTERNAL MEDICINE

## 2019-02-08 PROCEDURE — 3079F PR MOST RECENT DIASTOLIC BLOOD PRESSURE 80-89 MM HG: ICD-10-PCS | Mod: CPTII,S$GLB,, | Performed by: INTERNAL MEDICINE

## 2019-02-08 PROCEDURE — 3075F PR MOST RECENT SYSTOLIC BLOOD PRESS GE 130-139MM HG: ICD-10-PCS | Mod: CPTII,S$GLB,, | Performed by: INTERNAL MEDICINE

## 2019-02-08 PROCEDURE — 3075F SYST BP GE 130 - 139MM HG: CPT | Mod: CPTII,S$GLB,, | Performed by: INTERNAL MEDICINE

## 2019-02-08 PROCEDURE — 99999 PR PBB SHADOW E&M-EST. PATIENT-LVL IV: ICD-10-PCS | Mod: PBBFAC,,, | Performed by: INTERNAL MEDICINE

## 2019-02-08 PROCEDURE — 99999 PR PBB SHADOW E&M-EST. PATIENT-LVL IV: CPT | Mod: PBBFAC,,, | Performed by: INTERNAL MEDICINE

## 2019-02-08 RX ORDER — LISINOPRIL 40 MG/1
40 TABLET ORAL DAILY
Qty: 90 TABLET | Refills: 1 | Status: SHIPPED | OUTPATIENT
Start: 2019-02-08 | End: 2019-08-07 | Stop reason: SDUPTHER

## 2019-02-08 RX ORDER — METFORMIN HYDROCHLORIDE 500 MG/1
500 TABLET ORAL
Qty: 90 TABLET | Refills: 1 | Status: SHIPPED | OUTPATIENT
Start: 2019-02-08 | End: 2020-04-25

## 2019-02-08 RX ORDER — METOPROLOL SUCCINATE 100 MG/1
100 TABLET, EXTENDED RELEASE ORAL DAILY
Qty: 90 TABLET | Refills: 1 | Status: SHIPPED | OUTPATIENT
Start: 2019-02-08 | End: 2019-11-05

## 2019-02-08 RX ORDER — NITROGLYCERIN 0.4 MG/1
TABLET SUBLINGUAL
Status: ON HOLD | COMMUNITY
Start: 2019-02-01 | End: 2021-03-11

## 2019-02-08 RX ORDER — ATORVASTATIN CALCIUM 80 MG/1
80 TABLET, FILM COATED ORAL DAILY
Qty: 90 TABLET | Refills: 1 | Status: SHIPPED | OUTPATIENT
Start: 2019-02-08 | End: 2019-08-07 | Stop reason: SDUPTHER

## 2019-02-08 NOTE — PATIENT INSTRUCTIONS
We have reviewed your prescription medications. I have refilled your cholesterol and BP medicines. I have prescribed metformin for diabetes. I have given you a prescription for a glucose meter. Sugars should be  before eating in the morning. We will refer to dietician. We did your foot exam today. I am ordering eye exam and mammogram.

## 2019-02-11 NOTE — PROGRESS NOTES
Subjective:       Patient ID: Loi Cordova is a 53 y.o. female.    Chief Complaint: Abnormal Labs (showing High Reading A1C on 2/4/2019); Rash (rash generalize started yesterday evening with itching); Diabetic Foot Exam (pt due); Diabetic Eye Photo (pt due); Tetanus (pt due); Pneumonia Vaccine (pt due); Mammogram (pt due); Colonoscopy (pt due); and Aspirin (pt taking 81 mg daily)     I have reviewed the PMH,  and  for this patient.  The patient presents today for follow-up of chronic conditions. She  has a past medical history of Allergy, Asthma, GERD (gastroesophageal reflux disease), History of back surgery (2/20/2018), Hyperlipidemia, Hypertension, and Type 2 diabetes mellitus with diabetic polyneuropathy, without long-term current use of insulin (2/8/2019). She has been watching her diet for diabetes for a long time. Recently, her HgbA1c became elevated to 7.2.  She is here to get started on diabetic medication. She is not sure what she should be eating. She would like to see a dietician.     She is currently smoking. She is trying to quit. She has a rash on her leg. She thinks it is due to something she ate last night. She has been seeing urology for recurrent uti.       Active Ambulatory Problems     Diagnosis Date Noted    CAD S/P percutaneous coronary angioplasty 12/06/2016    PAD (peripheral artery disease) 12/06/2016    Essential hypertension 12/06/2016    GERD (gastroesophageal reflux disease) 12/06/2016    Tobacco abuse 12/07/2016    Coronary artery disease 12/13/2016    Chronic diastolic CHF (congestive heart failure) 12/07/2017    Class 2 severe obesity due to excess calories with serious comorbidity and body mass index (BMI) of 38.0 to 38.9 in adult 01/16/2018    Pure hypercholesterolemia 01/16/2018    Hyperglycemia 01/16/2018    Hypokalemia 07/13/2018    Restless leg syndrome 07/13/2018    Seasonal allergic rhinitis due to pollen 07/13/2018    Acute diastolic CHF (congestive heart  failure) 07/30/2018    Unstable angina 08/24/2018    Hematuria 01/29/2019    Type 2 diabetes mellitus with diabetic polyneuropathy, without long-term current use of insulin 02/08/2019     Resolved Ambulatory Problems     Diagnosis Date Noted    Chest pain 12/06/2016    Upper respiratory tract infection 02/20/2017    Flu-like symptoms 02/20/2017    Angina, class III 12/07/2017    History of back surgery 02/20/2018    Chest pain 08/24/2018     Past Medical History:   Diagnosis Date    Allergy     Asthma     GERD (gastroesophageal reflux disease)     History of back surgery 2/20/2018    Hyperlipidemia     Hypertension     Type 2 diabetes mellitus with diabetic polyneuropathy, without long-term current use of insulin 2/8/2019       HEALTH MAINTENANCE:   Health Maintenance   Topic Date Due    Eye Exam  09/20/1977    TETANUS VACCINE  09/20/1985    Pneumococcal Vaccine (Medium Risk) (1 of 1 - PPSV23) 09/20/1986    Mammogram  09/20/2007    Colonoscopy  09/20/2017    Hemoglobin A1c  08/04/2019    Lipid Panel  02/04/2020    Foot Exam  02/08/2020    Influenza Vaccine  Completed       Review of Systems   Constitutional: Negative for chills, fatigue and fever.   HENT: Negative for congestion, ear discharge, ear pain, rhinorrhea and sore throat.    Eyes: Negative for discharge, redness and itching.   Respiratory: Negative for cough, chest tightness, shortness of breath and wheezing.    Cardiovascular: Negative for chest pain, palpitations and leg swelling.   Gastrointestinal: Negative for abdominal pain, constipation, diarrhea, nausea and vomiting.   Endocrine: Negative for cold intolerance and heat intolerance.   Genitourinary: Negative for dysuria, flank pain, frequency and hematuria.   Musculoskeletal: Negative for arthralgias, back pain, joint swelling and myalgias.   Skin: Negative for color change and rash.   Neurological: Negative for dizziness, tremors, numbness and headaches.    Psychiatric/Behavioral: Negative for dysphoric mood and sleep disturbance. The patient is not nervous/anxious.        Objective:          LABS    CMP  Sodium   Date Value Ref Range Status   02/04/2019 141 136 - 145 mmol/L Final     Potassium   Date Value Ref Range Status   02/04/2019 4.3 3.5 - 5.1 mmol/L Final     Chloride   Date Value Ref Range Status   02/04/2019 104 95 - 110 mmol/L Final     CO2   Date Value Ref Range Status   02/04/2019 23 23 - 29 mmol/L Final     Glucose   Date Value Ref Range Status   02/04/2019 137 (H) 70 - 110 mg/dL Final     BUN, Bld   Date Value Ref Range Status   02/04/2019 12 7 - 17 mg/dL Final     Creatinine   Date Value Ref Range Status   02/04/2019 0.73 0.50 - 1.40 mg/dL Final     Calcium   Date Value Ref Range Status   02/04/2019 10.0 8.7 - 10.5 mg/dL Final     Total Protein   Date Value Ref Range Status   02/04/2019 8.0 6.0 - 8.4 g/dL Final     Albumin   Date Value Ref Range Status   02/04/2019 4.3 3.5 - 5.2 g/dL Final     Total Bilirubin   Date Value Ref Range Status   02/04/2019 0.4 0.1 - 1.0 mg/dL Final     Comment:     For infants and newborns, interpretation of results should be based  on gestational age, weight and in agreement with clinical  observations.  Premature Infant recommended reference ranges:  Up to 24 hours.............<8.0 mg/dL  Up to 48 hours............<12.0 mg/dL  3-5 days..................<15.0 mg/dL  6-29 days.................<15.0 mg/dL       Alkaline Phosphatase   Date Value Ref Range Status   02/04/2019 129 (H) 38 - 126 U/L Final     AST   Date Value Ref Range Status   02/04/2019 26 15 - 46 U/L Final     ALT   Date Value Ref Range Status   02/04/2019 42 10 - 44 U/L Final     Anion Gap   Date Value Ref Range Status   02/04/2019 14 8 - 16 mmol/L Final     eGFR if    Date Value Ref Range Status   02/04/2019 >60.0 >60 mL/min/1.73 m^2 Final     eGFR if non    Date Value Ref Range Status   02/04/2019 >60.0 >60 mL/min/1.73 m^2  Final     Comment:     Calculation used to obtain the estimated glomerular filtration  rate (eGFR) is the CKD-EPI equation.          Lab Results   Component Value Date    WBC 9.73 02/04/2019    HGB 13.8 02/04/2019    HCT 42.0 02/04/2019    MCV 89 02/04/2019     (H) 02/04/2019       Recent Labs   Lab Result Units 02/04/19  0826   TSH uIU/mL 1.540   HDL mg/dL 51   Cholesterol mg/dL 302*   Triglycerides mg/dL 341*   LDL Cholesterol mg/dL 182.8*   HDL/Chol Ratio % 16.9*   Non-HDL Cholesterol mg/dL 251   Total Cholesterol/HDL Ratio  5.9*         A1C:  Recent Labs   Lab Result Units 02/04/19  0832   Hemoglobin A1C % 7.2*         Physical Exam   Constitutional: She appears well-developed and well-nourished.   HENT:   Head: Normocephalic and atraumatic.   Right Ear: External ear normal. Tympanic membrane is not erythematous. No middle ear effusion.   Left Ear: External ear normal. Tympanic membrane is not erythematous.  No middle ear effusion.   Mouth/Throat: No posterior oropharyngeal edema or posterior oropharyngeal erythema. No tonsillar exudate.   Eyes: Conjunctivae and EOM are normal. Pupils are equal, round, and reactive to light.   Neck: No thyromegaly present.   Cardiovascular: Normal rate, regular rhythm, normal heart sounds and intact distal pulses.   No murmur heard.  Pulses:       Dorsalis pedis pulses are 2+ on the right side, and 2+ on the left side.        Posterior tibial pulses are 2+ on the right side, and 2+ on the left side.   Pulmonary/Chest: Effort normal and breath sounds normal. She has no wheezes.   Abdominal: She exhibits no distension. There is no tenderness.   Musculoskeletal: She exhibits no edema or tenderness.        Right foot: There is normal range of motion and no deformity.        Left foot: There is normal range of motion and no deformity.   Feet:   Right Foot:   Protective Sensation: 10 sites tested. 7 sites sensed.   Skin Integrity: Positive for callus. Negative for ulcer,  blister, skin breakdown, erythema, warmth or dry skin.   Left Foot:   Protective Sensation: 10 sites tested. 7 sites sensed.   Skin Integrity: Positive for callus. Negative for ulcer, blister, skin breakdown, erythema, warmth or dry skin.   Lymphadenopathy:     She has no cervical adenopathy.   Neurological: She displays normal reflexes. No cranial nerve deficit.   Skin: Skin is warm and dry. Purpura and rash noted.   Psychiatric: She has a normal mood and affect. Her behavior is normal.             Assessment and Plan:     Problem List Items Addressed This Visit        Cardiac/Vascular    Essential hypertension  - stable. Continue current meds.       Coronary artery disease - stable. Follow-up with cardiology      Chronic diastolic CHF (congestive heart failure) - stable. Follow-up with cradiology      Pure hypercholesterolemia - on lipitor. We will check labs again.        Endocrine    Type 2 diabetes mellitus with diabetic polyneuropathy, without long-term current use of insulin - Primary - begin metformin for diabetes. We will also refer to dietician for counseling. Foot exam done today.     Relevant Medications    metFORMIN (GLUCOPHAGE) 500 MG tablet    Other Relevant Orders    Diabetic Eye Screening Photo    Ambulatory referral to Nutrition Services       Other    Class 2 severe obesity due to excess calories with serious comorbidity and body mass index (BMI) of 38.0 to 38.9 in adult - working on diet. Refer to dietician.       Other Visit Diagnoses     Screening for breast cancer        Relevant Orders    Mammo Digital Screening Bilateral With CAD          Orders Placed This Encounter    Mammo Digital Screening Bilateral With CAD    Ambulatory referral to Nutrition Services    Diabetic Eye Screening Photo    atorvastatin (LIPITOR) 80 MG tablet    lisinopril (PRINIVIL,ZESTRIL) 40 MG tablet    metoprolol succinate (TOPROL-XL) 100 MG 24 hr tablet    metFORMIN (GLUCOPHAGE) 500 MG tablet         Follow-up  with me in 1 month.

## 2019-02-12 ENCOUNTER — TELEPHONE (OUTPATIENT)
Dept: FAMILY MEDICINE | Facility: CLINIC | Age: 54
End: 2019-02-12

## 2019-02-12 DIAGNOSIS — E11.9 DIABETES MELLITUS WITHOUT COMPLICATION: Primary | ICD-10-CM

## 2019-02-12 DIAGNOSIS — E11.42 TYPE 2 DIABETES MELLITUS WITH POLYNEUROPATHY: ICD-10-CM

## 2019-02-12 NOTE — TELEPHONE ENCOUNTER
----- Message from Ana Castaneda sent at 2/12/2019  2:37 PM CST -----  Patient has a referral to see the Nutrition for diabetic type 2, just wanted to clarify it is not Diabetic education.

## 2019-02-13 ENCOUNTER — CLINICAL SUPPORT (OUTPATIENT)
Dept: SMOKING CESSATION | Facility: CLINIC | Age: 54
End: 2019-02-13
Payer: COMMERCIAL

## 2019-02-13 DIAGNOSIS — F17.210 LIGHT CIGARETTE SMOKER (1-9 CIGS/DAY): Primary | ICD-10-CM

## 2019-02-13 PROCEDURE — 90853 PR GROUP PSYCHOTHERAPY: ICD-10-PCS | Mod: S$GLB,,,

## 2019-02-13 PROCEDURE — 90853 GROUP PSYCHOTHERAPY: CPT | Mod: S$GLB,,,

## 2019-02-13 NOTE — Clinical Note
The patient is smoking 4-6 cigarettes/day using 14 mg nicotine patch when it would stay on. Encouraged patient to move patch to where it would stick. Pt . Recently diagnosed with Diabetes. Discussed the effects of smoking causing diabetes had to regulate possibly due to the sugar added to the cigarettes to disguise the taste. She stated she understood and has been able to wear the patch without problems for 24 hrs. She states she knows now she has got to quit. Ordered more 14 mg nicotine patches to help with quit.

## 2019-02-14 ENCOUNTER — TELEPHONE (OUTPATIENT)
Dept: FAMILY MEDICINE | Facility: CLINIC | Age: 54
End: 2019-02-14

## 2019-02-14 DIAGNOSIS — Z12.39 BREAST CANCER SCREENING: ICD-10-CM

## 2019-02-14 DIAGNOSIS — R92.8 ABNORMAL MAMMOGRAM OF RIGHT BREAST: Primary | ICD-10-CM

## 2019-02-14 RX ORDER — IBUPROFEN 200 MG
1 TABLET ORAL DAILY
Qty: 28 PATCH | Refills: 0 | Status: SHIPPED | OUTPATIENT
Start: 2019-02-14 | End: 2019-04-06 | Stop reason: SDUPTHER

## 2019-02-14 NOTE — TELEPHONE ENCOUNTER
----- Message from Delicia Fierro MD sent at 2/14/2019 11:40 AM CST -----  There is assymetry in one of her breasts. Please make sure that she has follow-up studies ordered. If not, I can order them.

## 2019-02-14 NOTE — TELEPHONE ENCOUNTER
Spoke with patient. Informed patient that mammo shows assymetry in one of her breasts and Dr. Fierro recommends further testing to be completed. Pt verbalizes understanding. Additional testing is currently in the computer. Call forwarded to [facility] for scheduling.

## 2019-02-14 NOTE — PROGRESS NOTES
Smoking Cessation Group Session #4    Site: [facility]  Date:  2/14/2019  Clinical Status of Patient: Outpatient   Length of Service and Code: 60 minutes - 03994   Number in Attendance: 4  Group Activities/Focus of Group:  Sharing last weeks challenges, triggers, and coping activities to remain quit and/ or keep making progress toward cessation, completion of TCRS (Tobacco Cessation Rating Scale) learned addiction model, personal reasons for quitting, medications, goals, quit date.    Specific session focus: completion of TCRS (Tobacco Cessation Rating Scale) reviewed strategies, habitual behavior, high risks situations, understanding urges and cravings, stress and relaxation with open discussion and additional interventions, Introduced lapses, relapses, understanding them and analyzing the situation of a lapse, conflict issues that may be linked to a lapse.       Target symptoms:  withdrawal and medication side effects             The following were rated moderate (3) to severe (4) on TCRS:       Moderate 3: angry, frustrated desire/crave - Nicotine replacement therapy, withdrawal symptoms      Severe 4:   none  Patient's Response to Intervention: The patient is smoking 4-6 cigarettes/day using 14 mg nicotine patch when it would stay on. Encouraged patient to move patch to where it would stick. Pt . Recently diagnosed with Diabetes. Discussed the effects of smoking causing diabetes had to regulate possibly due to the sugar added to the cigarettes to disguise the taste. She stated she understood and has been able to wear the patch without problems for 24 hrs. She states she knows now she has got to quit. Ordered more 14 mg nicotine patches to help with quit.   Progress Toward Goals and Other Mental Status Changes: The patient denies any abnormal behavioral or mental changes at this time.  Interval History:     Diagnosis: The patient denies any abnormal behavioral or mental changes at this time.    Plan: The patient will  continue with group therapy sessions and medication regimen prescribed with management by physician or by the Cessation Clinic Provider. Patient will inform Smoking Cessation Counselor of symptoms as rated high on TCRS.    Return to Clinic: 1 week    Quit Date: 4/1/19   Planned Quit Date: 4/1/19

## 2019-02-20 ENCOUNTER — CLINICAL SUPPORT (OUTPATIENT)
Dept: SMOKING CESSATION | Facility: CLINIC | Age: 54
End: 2019-02-20
Payer: COMMERCIAL

## 2019-02-20 DIAGNOSIS — F17.210 LIGHT CIGARETTE SMOKER (1-9 CIGS/DAY): Primary | ICD-10-CM

## 2019-02-20 PROCEDURE — 90853 PR GROUP PSYCHOTHERAPY: ICD-10-PCS | Mod: S$GLB,,,

## 2019-02-20 PROCEDURE — 90853 GROUP PSYCHOTHERAPY: CPT | Mod: S$GLB,,,

## 2019-02-21 ENCOUNTER — CLINICAL SUPPORT (OUTPATIENT)
Dept: DIABETES | Facility: CLINIC | Age: 54
End: 2019-02-21
Payer: COMMERCIAL

## 2019-02-21 DIAGNOSIS — E11.42 TYPE 2 DIABETES MELLITUS WITH POLYNEUROPATHY: ICD-10-CM

## 2019-02-21 PROCEDURE — G0108 PR DIAB MANAGE TRN  PER INDIV: ICD-10-PCS | Mod: S$GLB,,, | Performed by: INTERNAL MEDICINE

## 2019-02-21 PROCEDURE — 99999 PR PBB SHADOW E&M-EST. PATIENT-LVL I: CPT | Mod: PBBFAC,,,

## 2019-02-21 PROCEDURE — G0108 DIAB MANAGE TRN  PER INDIV: HCPCS | Mod: S$GLB,,, | Performed by: INTERNAL MEDICINE

## 2019-02-21 PROCEDURE — 99999 PR PBB SHADOW E&M-EST. PATIENT-LVL I: ICD-10-PCS | Mod: PBBFAC,,,

## 2019-02-21 NOTE — PROGRESS NOTES
Smoking Cessation Group Session #7    Site: [facility]  Date:  2/20/19  Clinical Status of Patient: Outpatient   Length of Service and Code: 60 minutes - 98763   Number in Attendance: 2  Group Activities/Focus of Group:  Sharing last weeks challenges, triggers, and coping activities to remain quit and/ or keep making progress toward cessation, completion of TCRS (Tobacco Cessation Rating Scale) learned addiction model, personal reasons for quitting, medications, goals, quit date.    Specific session focus: completion of TCRS (Tobacco Cessation Rating Scale) reviewed strategies, habitual behavior, high risks situations, understanding urges and cravings, stress and relaxation with open discussion and additional interventions, Introduced lapses, relapses, understanding them and analyzing the situation of a lapse, conflict issues that may be linked to a lapse.       Target symptoms:  withdrawal and medication side effects             The following were rated moderate (3) to severe (4) on TCRS:       Moderate 3: angry frustrated - Nicotine replacement therapy, withdrawal symptoms      Severe 4:   none  Patient's Response to Intervention: The patient is smoking 6-7  cigarettes/day using 14 mg nicotine patch and is still having trouble with them sticking. Found a [place] brand that is thicker that may stay on better. Discussed cues and triggers which was smoking in the car. Does not smoke with child in car so may have a stuffed animal in the car seat like a child, will look for hand toys, listen to talk shows, etc. May use vap with 0 nicotine, knows she is not nicotine dependent, knows it is not good, but will limit to 10 puffs/day then reduce.   Progress Toward Goals and Other Mental Status Changes: The patient denies any abnormal behavioral or mental changes at this time.      Interval History: 0    Diagnosis: Z72.0  Plan: The patient will continue with group therapy sessions and medication regimen prescribed with management by  physician or by the Cessation Clinic Provider. Patient will inform Smoking Cessation Counselor of symptoms as rated high on TCRS.    Return to Clinic: 1 week    Quit Date: 4/1/19   Planned Quit Date: 4/1/19

## 2019-02-22 VITALS — WEIGHT: 196 LBS | BODY MASS INDEX: 38.28 KG/M2

## 2019-02-22 NOTE — PROGRESS NOTES
Diabetes Education  Author: Apryl Hood RN  Date: 2/22/2019    Diabetes Care Management Summary  Diabetes Education Record Assessment/Progress: Initial  Current Diabetes Risk Level: Moderate   Last A1c:   Lab Results   Component Value Date    HGBA1C 7.2 (H) 02/04/2019     Last visit with Diabetes Educator: Last Education Visit: Not Found  Diabetes Type  Diabetes Type : Type II  Diabetes History  Diabetes Diagnosis: 0-1 year  Current Treatment: Oral Medication, Diet, Exercise  Health Maintenance was reviewed today with patient. Discussed with patient importance of routine eye exams, foot exams/foot care, blood work (i.e.: A1c, microalbumin, and lipid), dental visits, yearly flu vaccine, and pneumonia vaccine as indicated by PCP. Patient verbalized understanding.     Health Maintenance Topics with due status: Not Due       Topic Last Completion Date    Lipid Panel 02/04/2019    Hemoglobin A1c 02/04/2019    High Dose Statin 02/08/2019    Aspirin/Antiplatelet Therapy 02/08/2019    Foot Exam 02/08/2019    Mammogram 02/18/2019     Health Maintenance Due   Topic Date Due    Eye Exam  09/20/1977    TETANUS VACCINE  09/20/1985    Pneumococcal Vaccine (Medium Risk) (1 of 1 - PPSV23) 09/20/1986    Colonoscopy  09/20/2017   Nutrition  Meal Planning: water, 3 meals per day, diet drinks, eats out often  What type of sweetener do you use?: Splenda  What type of beverages do you drink?: diet soda/tea, water  Meal Plan 24 Hour Recall - Breakfast: english muffin with sausage and egg  Meal Plan 24 Hour Recall - Lunch: cheese burger and onion rings  Meal Plan 24 Hour Recall - Dinner: 2 pieces of pizza  Monitoring   Self Monitoring : pt has a meter and is testing 2 times a day. instructed pt to test once a day fasting in the am. instructed pt on the normal bs ranges. instructed pt to keep a record of her bs's and to bring the record to her md visits,  Blood Glucose Logs: No  Do you use a personal continuous glucose monitor?:  No  In the last month, how often have you had a low blood sugar reaction?: never  Can you tell when your blood sugar is too high?: no  Exercise   Exercise Type: none  Current Diabetes Treatment   Current Treatment: Oral Medication, Diet, Exercise  Social History  Preferred Learning Method: Face to Face, Demonstration, Reading Materials  Primary Support: Spouse  Educational Level: High School  Occupation: pt access  Smoking Status: Current Smoker  Alcohol Use: Rarely    DDS-2 Score  ( > 3 = SIGNIFICANT DISTRESS): 1        Barriers to Change  Barriers to Change: None  Learning Challenges : None  Readiness to Learn   Readiness to Learn : Acceptance  Cultural Influences  Cultural Influences: None  Diabetes Education Assessment/Progress  Diabetes Disease Process (diabetes disease process and treatment options): Discussion, Instructed, Demonstrates Understanding/Competency(verbalizes/demonstrates), Individual Session, Written Materials Provided  Nutrition (Incorporating nutritional management into one's lifestyle): Discussion, Demonstration, Instructed, Individual Session, Demonstrates Understanding/Competency (verbalizes/demonstrates), Written Materials Provided  Physical Activity (incorporating physical activity into one's lifestyle): Discussion, Instructed, Demonstrates Understanding/Competency (verbalizes/demonstrates), Individual Session, Written Materials Provided  Medications (states correct name, dose, onset, peak, duration, side effects & timing of meds): Discussion, Instructed, Demonstrates Understanding/Competency(verbalizes/demonstrates), Individual Session, Written Materials Provided  Monitoring (monitoring blood glucose/other parameters & using results): Discussion, Instructed, Demonstrates Understanding/Competency (verbalizes/demonstrates), Individual Session, Written Materials Provided  Acute Complications (preventing, detecting, and treating acute complications): Discussion, Instructed, Demonstrates  Understanding/Competency (verbalizes/demonstrates), Individual Session, Written Materials Provided  Chronic Complications (preventing, detecting, and treating chronic complications): Discussion, Instructed, Demonstrates Understanding/Competency (verbalizes/demonstrates), Individual Session, Written Materials Provided  Clinical (diabetes, other pertinent medical history, and relevant comorbidities reviewed during visit): Discussion, Instructed, Demonstrates Understanding/Competency (verbalizes/demonstrates), Individual Session  Cognitive (knowledge of self-management skills, functional health literacy): Discussion, Instructed, Demonstrates Understanding/Competency (verbalizes/demonstrates), Individual Session  Psychosocial (emotional response to diabetes): Discussion, Instructed, Demonstrates Understanding/Competency (verbalizes/demonstrates), Individual Session  Diabetes Distress and Support Systems: Discussion, Instructed, Demonstrates Understanding/Competency (verbalizes/demonstrates), Individual Session  Behavioral (readiness for change, lifestyle practices, self-care behaviors): Discussion, Instructed, Demonstrates Understanding/Competency (verbalizes/demonstrates), Individual Session  Goals  Patient has selected/evaluated goals during today's session: Yes, selected  Healthy Eating: Set  Start Date: 02/21/19  Target Date: 05/22/19  Diabetes Care Plan/Intervention  Education Plan/Intervention: Individual Follow-Up DSMT, Foot Exam  Diabetes Meal Plan  Restrictions: Restricted Carbohydrate  Calories: 1800  Carbohydrate Per Meal: 30-45g  Carbohydrate Per Snack : 15-20g  Instructed pt on the food groups, how to read labels and count carbs. Pt was given sample menus and meal plans as examples. Pt eats lunch out every day in the cafe. Pt states that she has stopped frying her food. Discussed with pt the importance of eating 3 balanced and portioned meals per day. Discussed with pt how to put her meals together.  Discussed with pt foods that she likes that she could include in her meal plan. Pt had good understanding of meal plan and compliance is expected. Pt was advised to call for any problems or questions. Pt unable to attend group class due to work schedule.  Today's Self-Management Care Plan was developed with the patient's input and is based on barriers identified during today's assessment.    The long and short-term goals in the care plan were written with the patient/caregiver's input. The patient has agreed to work toward these goals to improve her overall diabetes control.      The patient received a copy of today's self-management plan and verbalized understanding of the care plan, goals, and all of today's instructions.      The patient was encouraged to communicate with her physician and care team regarding her condition(s) and treatment.  I provided the patient with my contact information today and encouraged her to contact me via phone or patient portal as needed.     Education Units of Time   Time Spent: 60 min

## 2019-02-24 RX ORDER — OMEPRAZOLE 40 MG/1
CAPSULE, DELAYED RELEASE ORAL
Qty: 30 CAPSULE | Refills: 5 | Status: SHIPPED | OUTPATIENT
Start: 2019-02-24 | End: 2019-04-08 | Stop reason: SDUPTHER

## 2019-02-27 ENCOUNTER — CLINICAL SUPPORT (OUTPATIENT)
Dept: SMOKING CESSATION | Facility: CLINIC | Age: 54
End: 2019-02-27
Payer: COMMERCIAL

## 2019-02-27 DIAGNOSIS — F17.210 LIGHT CIGARETTE SMOKER (1-9 CIGS/DAY): Primary | ICD-10-CM

## 2019-02-27 PROCEDURE — 90853 PR GROUP PSYCHOTHERAPY: ICD-10-PCS | Mod: S$GLB,,,

## 2019-02-27 PROCEDURE — 90853 GROUP PSYCHOTHERAPY: CPT | Mod: S$GLB,,,

## 2019-02-27 NOTE — Clinical Note
The patient is smoking 1 cigarettes/day using 14 mg nicotine patch and is vaping with 0 nicotine 8 /2 puffs/day because she feels she needs to do something with her hands. Will try vaping for 2 weeks, then quit. Will continue to use the patches and work on doing something with her hands. Will work on diversion - not smoking in car. Would perfer not to use cap, but will set time limit.

## 2019-02-28 NOTE — PROGRESS NOTES
Smoking Cessation Group Session #8    Site: [facility]  Date:  2/28/2019  Clinical Status of Patient: Outpatient   Length of Service and Code: 90 minutes - 24362   Number in Attendance: 2  Group Activities/Focus of Group:  Sharing last weeks challenges, triggers, and coping activities to remain quit and/ or keep making progress toward cessation, completion of TCRS (Tobacco Cessation Rating Scale) learned addiction model, personal reasons for quitting, medications, goals, quit date.    Specific session focus: completion of TCRS (Tobacco Cessation Rating Scale) reviewed strategies, habitual behavior, stress, and high risk situations. Introduced stress with addition interventions, SOLVE, relaxation with interventions, nutrition, exercise, weight gain, and the importance of rewarding oneself for accomplishments toward becoming tobacco free. Open discussion of all items with interventions.       Target symptoms:  withdrawal and medication side effects             The following were rated moderate (3) to severe (4) on TCRS:       Moderate 3: desire/crave The patient is smoking 1 cigarettes/day using 14 mg nicotine patch and is vaping with 0 nicotine 8 /2 puffs/day because she feels she is      Severe 4:   none  Patient's Response to Intervention: The patient is smoking 1 cigarettes/day using 14 mg nicotine patch and is vaping with 0 nicotine 8 /2 puffs/day because she feels she needs to do something with her hands. Will try vaping for 2 weeks, then quit. Will continue to use the patches and work on doing something with her hands. Will work on diversion - not smoking in car. Would perfer not to use cap, but will set time limit.     Progress Toward Goals and Other Mental Status Changes: The patient denies any abnormal behavioral or mental changes at this time.    Interval History: o    Diagnosis: F17.210  Plan: The patient will continue with group therapy sessions and medication regimen prescribed with management by physician or  by the Cessation Clinic Provider. Patient will inform Smoking Cessation Counselor of symptoms as rated high on TCRS.    Return to Clinic: 1 week    Quit Date: 3/17/19   Planned Quit Date: 3/17/19

## 2019-03-06 ENCOUNTER — CLINICAL SUPPORT (OUTPATIENT)
Dept: SMOKING CESSATION | Facility: CLINIC | Age: 54
End: 2019-03-06
Payer: COMMERCIAL

## 2019-03-06 DIAGNOSIS — F17.210 LIGHT CIGARETTE SMOKER (1-9 CIGS/DAY): Primary | ICD-10-CM

## 2019-03-06 PROCEDURE — 99404 PR PREVENT COUNSEL,INDIV,60 MIN: ICD-10-PCS | Mod: S$GLB,,,

## 2019-03-06 PROCEDURE — 99404 PREV MED CNSL INDIV APPRX 60: CPT | Mod: S$GLB,,,

## 2019-03-07 NOTE — PROGRESS NOTES
Individual Follow-Up Form    3/6/19    Quit Date: TBD    Clinical Status of Patient: Outpatient    Length of Service: 60 minutes    Continuing Medication: yes  Patches    Other Medications: none     Target Symptoms: Withdrawal and medication side effects. The following were  rated moderate (3) to severe (4) on TCRS:  · Moderate (3): desire/crave - Nicotine replacement therapy, withdrawal symptoms   · Severe (4): none    Comments: The patient is smoking 0-8 cigarettes/day using the 14 mg nicotine patch. She was using her vap with 0 nicotine and doing well, till [place] and drinking, then had 8. She had 3 cigarettes then, but will continue to vap for 2 weeks. She working on [employer] to keep hands busy. Will stress the need to watch alcohol intake, which is not often, but try to limit what she drinks at one time. memory loss.completion of TCRS (Tobacco Cessation Rating Scale) reviewed strategies, cues, triggers, high risk situations, lapses, relapses, diet, exercise, stress, relaxation, sleep, habitual behavior, and life style changes.The patient will continue individual sessions and medication monitoring by CTTS. Prescribed medication management will be by practitoner The patient denies any abnormal behavioral or mental changes at this time.         Diagnosis: F17.210    Next Visit: 1 week

## 2019-03-14 ENCOUNTER — CLINICAL SUPPORT (OUTPATIENT)
Dept: SMOKING CESSATION | Facility: CLINIC | Age: 54
End: 2019-03-14
Payer: COMMERCIAL

## 2019-03-14 DIAGNOSIS — F17.210 LIGHT CIGARETTE SMOKER (1-9 CIGS/DAY): Primary | ICD-10-CM

## 2019-03-14 PROCEDURE — 99403 PREV MED CNSL INDIV APPRX 45: CPT | Mod: S$GLB,,,

## 2019-03-14 PROCEDURE — 99403 PR PREVENT COUNSEL,INDIV,45 MIN: ICD-10-PCS | Mod: S$GLB,,,

## 2019-03-14 NOTE — Clinical Note
: The patient is smoking 5 cigarettes/day using 14 mg nicotine patch. She had gotten to 0 with vaping no nicotine, then to 2 when working and 5 on the week-ends. She had quit using the vap, so we missed the 2 week vap deadline. Discussed setting a quit date and making sure she wears the patches. Will work on consistency with smoking and not going over a certain amount. .The patient will continue individual sessions and medication monitoring by CTTS. Prescribed medication management will be by practitoner.The patient denies any abnormal behavioral or mental changes at this time.

## 2019-03-15 NOTE — PROGRESS NOTES
Individual Follow-Up Form 4  3/14/2019    Quit Date: TBD    Clinical Status of Patient: Outpatient    Length of Service: 45 minutes    Continuing Medication: yes  Patches    Other Medications: none     Target Symptoms: Withdrawal and medication side effects. The following were  rated moderate (3) to severe (4) on TCRS:  · Moderate (3): desire/crve - Nicotine replacement therapy, withdrawal symptoms   · Severe (4): none    Comments: The patient is smoking 5 cigarettes/day using 14 mg nicotine patch. She had gotten to 0 with vaping no nicotine, then to 2 when working and 5 on the week-ends. She had quit using the vap, so we missed the 2 week vap deadline. Discussed setting a quit date and making sure she wears the patches. Will work on consistency with smoking and not going over a certain amount. .The patient will continue individual sessions and medication monitoring by CTTS. Prescribed medication management will be by practitoner.The patient denies any abnormal behavioral or mental changes at this time.        Diagnosis: F17.210    Next Visit: 1 week

## 2019-03-21 ENCOUNTER — CLINICAL SUPPORT (OUTPATIENT)
Dept: SMOKING CESSATION | Facility: CLINIC | Age: 54
End: 2019-03-21
Payer: COMMERCIAL

## 2019-03-21 DIAGNOSIS — F17.210 LIGHT CIGARETTE SMOKER (1-9 CIGS/DAY): Primary | ICD-10-CM

## 2019-03-21 PROCEDURE — 99403 PREV MED CNSL INDIV APPRX 45: CPT | Mod: S$GLB,,,

## 2019-03-21 PROCEDURE — 99403 PR PREVENT COUNSEL,INDIV,45 MIN: ICD-10-PCS | Mod: S$GLB,,,

## 2019-03-21 NOTE — Clinical Note
The patient is smoking 5-6 cigarettes/day using 14 mg nicotine patch except for the [place] parade where other people were smoking and she smoked about 15 cigarettes. Discussed quitting tips, reasons for smoking, stress and what to do and the best strategy to quit. Will try just not keeping any an see if she can quit. She very concerned now about her child and emotional problems, but should be getting some resolution soon. The patient will continue individual sessions and medication monitoring by CTTS. Prescribed medication management will be by practitioner The patient denies any abnormal behavioral or mental changes at this time.

## 2019-03-21 NOTE — PROGRESS NOTES
Individual Follow-Up Form    3/21/2019    Quit Date: TBD    Clinical Status of Patient: Outpatient    Length of Service: 45 minutes    Continuing Medication: yes  Patches    Other Medications: none     Target Symptoms: Withdrawal and medication side effects. The following were  rated moderate (3) to severe (4) on TCRS:  · Moderate (3): diarrhea, drowsiness - Nicotine replacement therapy, withdrawal symptoms   · Severe (4): desire/crave - Nicotine replacement therapy, withdrawal symptoms     Comments: The patient is smoking 5-6 cigarettes/day using 14 mg nicotine patch except for the [employer] parade where other people were smoking and she smoked about 15 cigarettes. Discussed quitting tips, reasons for smoking, stress and what to do and the best strategy to quit. Will try just not keeping any an see if she can quit. She very concerned now about her child and emotional problems, but should be getting some resolution soon. The patient will continue individual sessions and medication monitoring by CTTS. Prescribed medication management will be by practitioner The patient denies any abnormal behavioral or mental changes at this time.  Diagnosis: F17.210    Next Visit: 2 weeks

## 2019-04-06 DIAGNOSIS — F17.210 LIGHT CIGARETTE SMOKER (1-9 CIGS/DAY): ICD-10-CM

## 2019-04-08 RX ORDER — IBUPROFEN 200 MG
1 TABLET ORAL DAILY
Qty: 28 PATCH | Refills: 0 | Status: SHIPPED | OUTPATIENT
Start: 2019-04-08 | End: 2019-07-12

## 2019-04-08 RX ORDER — OMEPRAZOLE 40 MG/1
CAPSULE, DELAYED RELEASE ORAL
Qty: 90 CAPSULE | Refills: 1 | Status: SHIPPED | OUTPATIENT
Start: 2019-04-08 | End: 2019-09-19 | Stop reason: SDUPTHER

## 2019-04-10 ENCOUNTER — CLINICAL SUPPORT (OUTPATIENT)
Dept: SMOKING CESSATION | Facility: CLINIC | Age: 54
End: 2019-04-10
Payer: COMMERCIAL

## 2019-04-10 DIAGNOSIS — F17.210 LIGHT CIGARETTE SMOKER (1-9 CIGS/DAY): Primary | ICD-10-CM

## 2019-04-10 PROCEDURE — 99402 PR PREVENT COUNSEL,INDIV,30 MIN: ICD-10-PCS | Mod: S$GLB,,,

## 2019-04-10 PROCEDURE — 99402 PREV MED CNSL INDIV APPRX 30: CPT | Mod: S$GLB,,,

## 2019-04-10 NOTE — Clinical Note
The patient is smoking 4 cigarettes/day using nothing. She had been doing well with vaping with 0 nicotine, but was concerned about the safety issues and quit vaping. She had other medical and family issues that started taking extra time an has been unable to focus on quitting. She decided to focus on working with her diabetes and child to get stable first and will get focused on smoking again.  She has run out of trust benefits but has managed to stay stable at 4-5 cigarettes/day. She was congratulated on her efforts.

## 2019-04-11 NOTE — PROGRESS NOTES
Individual Follow-Up Form    4/10/2019    Quit Date: TBD    Clinical Status of Patient: Outpatient    Length of Service: 30 minutes    Continuing Medication: no    Other Medications: none     Target Symptoms: Withdrawal and medication side effects. The following were  rated moderate (3) to severe (4) on TCRS:  · Moderate (3): desire/crave - Nicotine replacement therapy, withdrawal symptoms   · Severe (4): none    Comments: The patient is smoking 4 cigarettes/day using nothing. She had been doing well with vaping with 0 nicotine, but was concerned about the safety issues and quit vaping. She had other medical and family issues that started taking extra time an has been unable to focus on quitting. She decided to focus on working with her diabetes and child to get stable first and will get focused on smoking again.  She has run out of trust benefits but has managed to stay stable at 4-5 cigarettes/day. She was congratulated on her efforts.     Diagnosis: F17.210    Next Visit: finished program

## 2019-04-23 DIAGNOSIS — F51.04 PSYCHOPHYSIOLOGICAL INSOMNIA: ICD-10-CM

## 2019-04-23 RX ORDER — TRAZODONE HYDROCHLORIDE 50 MG/1
TABLET ORAL
Qty: 30 TABLET | Refills: 2 | Status: SHIPPED | OUTPATIENT
Start: 2019-04-23 | End: 2019-07-23 | Stop reason: SDUPTHER

## 2019-06-26 ENCOUNTER — PATIENT OUTREACH (OUTPATIENT)
Dept: ADMINISTRATIVE | Facility: OTHER | Age: 54
End: 2019-06-26

## 2019-06-27 ENCOUNTER — OFFICE VISIT (OUTPATIENT)
Dept: GASTROENTEROLOGY | Facility: CLINIC | Age: 54
End: 2019-06-27
Payer: COMMERCIAL

## 2019-06-27 VITALS
HEART RATE: 78 BPM | HEIGHT: 60 IN | WEIGHT: 198 LBS | BODY MASS INDEX: 38.87 KG/M2 | DIASTOLIC BLOOD PRESSURE: 92 MMHG | SYSTOLIC BLOOD PRESSURE: 150 MMHG

## 2019-06-27 DIAGNOSIS — K52.9 CHRONIC DIARRHEA: Primary | ICD-10-CM

## 2019-06-27 DIAGNOSIS — Z12.11 SCREEN FOR COLON CANCER: ICD-10-CM

## 2019-06-27 DIAGNOSIS — R10.84 ABDOMINAL PAIN, GENERALIZED: ICD-10-CM

## 2019-06-27 PROBLEM — I50.31 ACUTE DIASTOLIC CHF (CONGESTIVE HEART FAILURE): Status: RESOLVED | Noted: 2018-07-30 | Resolved: 2019-06-27

## 2019-06-27 PROBLEM — I20.0 UNSTABLE ANGINA: Status: RESOLVED | Noted: 2018-08-24 | Resolved: 2019-06-27

## 2019-06-27 PROCEDURE — 3008F PR BODY MASS INDEX (BMI) DOCUMENTED: ICD-10-PCS | Mod: CPTII,S$GLB,, | Performed by: INTERNAL MEDICINE

## 2019-06-27 PROCEDURE — 99999 PR PBB SHADOW E&M-EST. PATIENT-LVL III: ICD-10-PCS | Mod: PBBFAC,,, | Performed by: INTERNAL MEDICINE

## 2019-06-27 PROCEDURE — 3008F BODY MASS INDEX DOCD: CPT | Mod: CPTII,S$GLB,, | Performed by: INTERNAL MEDICINE

## 2019-06-27 PROCEDURE — 3077F PR MOST RECENT SYSTOLIC BLOOD PRESSURE >= 140 MM HG: ICD-10-PCS | Mod: CPTII,S$GLB,, | Performed by: INTERNAL MEDICINE

## 2019-06-27 PROCEDURE — 99204 OFFICE O/P NEW MOD 45 MIN: CPT | Mod: S$GLB,,, | Performed by: INTERNAL MEDICINE

## 2019-06-27 PROCEDURE — 3080F DIAST BP >= 90 MM HG: CPT | Mod: CPTII,S$GLB,, | Performed by: INTERNAL MEDICINE

## 2019-06-27 PROCEDURE — 99999 PR PBB SHADOW E&M-EST. PATIENT-LVL III: CPT | Mod: PBBFAC,,, | Performed by: INTERNAL MEDICINE

## 2019-06-27 PROCEDURE — 3080F PR MOST RECENT DIASTOLIC BLOOD PRESSURE >= 90 MM HG: ICD-10-PCS | Mod: CPTII,S$GLB,, | Performed by: INTERNAL MEDICINE

## 2019-06-27 PROCEDURE — 3077F SYST BP >= 140 MM HG: CPT | Mod: CPTII,S$GLB,, | Performed by: INTERNAL MEDICINE

## 2019-06-27 PROCEDURE — 99204 PR OFFICE/OUTPT VISIT, NEW, LEVL IV, 45-59 MIN: ICD-10-PCS | Mod: S$GLB,,, | Performed by: INTERNAL MEDICINE

## 2019-06-27 RX ORDER — DICYCLOMINE HYDROCHLORIDE 20 MG/1
20 TABLET ORAL 3 TIMES DAILY PRN
Qty: 60 TABLET | Refills: 2 | Status: SHIPPED | OUTPATIENT
Start: 2019-06-27 | End: 2020-04-25

## 2019-06-27 RX ORDER — MONTELUKAST SODIUM 4 MG/1
1 TABLET, CHEWABLE ORAL 2 TIMES DAILY
Qty: 60 TABLET | Refills: 2 | Status: SHIPPED | OUTPATIENT
Start: 2019-06-27 | End: 2019-12-18

## 2019-06-27 NOTE — LETTER
June 27, 2019      Delicia Fierro MD  [address] [facility] [address]  [address]   [facility] [address]           [place] [facility]  [address] [place] [address], [address]   [facility] [address]  Phone: [phone]  Fax: [phone]          Patient: Loi Cordova   MR Number: [id number]   YOB: 1965   Date of Visit: 6/27/2019       Dear Dr. Delicia Fierro:    Thank you for referring Loi Cordova to me for evaluation. Attached you will find relevant portions of my assessment and plan of care.    If you have questions, please do not hesitate to call me. I look forward to following Loi Cordova along with you.    Sincerely,    Sarah Gamez MD    Enclosure  CC:  No Recipients    If you would like to receive this communication electronically, please contact [email or web address] or [phone] to request more information on [email or web address] Link access.    For providers and/or their staff who would like to refer a patient to [facility], please contact us through our one-stop-shop provider referral line, [facility], at [phone].    If you feel you have received this communication in error or would no longer like to receive these types of communications, please e-mail [email or web address]

## 2019-06-27 NOTE — PATIENT INSTRUCTIONS
PREPOPIK Instructions    You are scheduled for a colonoscopy with Dr. Gamez on Tuesday, July 16 at [facility] located at [address] in [place].  Enter through the [facility] Entrance.  Check-in at Same Day Surgery.      You will receive a call 1-2 days before your colonoscopy to tell you the time to arrive.  If you have not received a call by the day before your procedure, call the Endoscopy Lab at [phone].    To ensure that your test is accurate and complete, you MUST follow these instructions listed below.  If you have any questions, please call our office at [phone].  Plan on being at the hospital for your procedure for 3-4 hours.    1.  Follow a CLEAR LIQUID DIET for the entire day before your scheduled colonoscopy.  This means no solid food the entire day starting when you wake.  You may have as much of the clear liquids as you want throughout the day.   CLEAR LIQUID DIET:   - Avoid Red, Orange, Purple, and/or Blue food coloring   - NO DAIRY   - You can have:  Coffee with sugar (no creamer), tea, water, soda, apple or white grape juice, chicken or beef broth/bouillon (no meat, noodles, or veggies), green/yellow popsicles, green/yellow Jell-O, lemonade.    2.  AT 5 pm the evening before your colonoscopy, FILL THE DOSING CUP (provided inside the Prepopik box) WITH COOL WATER TO THE LOWER LINE. POUR PACKET #1 INTO CONTAINER AND STIR FOR 3 MINUTES TO MIX WELL. (it is normal for the cup to feel warm as the medicine dissolves). DRINK THE ENTIRE MIXTURE. THEN DRINK FIVE (5) DOSING CUPS OF WATER TO THE UPPER LINE OVER THE NEXT FIVE (5) HOURS.     3.  The endoscopy department will call you 2 days before your colonoscopy to tell you the exact time to arrive, AND to tell you the exact time to drink the 2nd portion of your prep (which will be FIVE HOURS BEFORE YOUR ARRIVAL TIME).  At this time given to you, FILL THE DOSING CUP (provided inside the Prepopik box) WITH COOL WATER TO THE  LOWER LINE. POUR PACKET #1 INTO CONTAINER AND STIR FOR 3 MINUTES TO MIX WELL. (it is normal for the cup to feel warm as the medicine dissolves). DRINK THE ENTIRE MIXTURE. THEN DRINK THREE (3) DOSING CUPS OF WATER TO THE UPPER LINE OVER THE NEXT ONE (1) HOUR. Once this is complete, you may not have ANYTHING else by mouth!    4.  You must have someone with you to DRIVE YOU HOME since you will be receiving IV sedation for the colonoscopy.    5.  It is ok to take your heart, blood pressure, and seizure medications in the morning of your test with a SIP of water.  Hold other medications until after your procedure.  Do NOT have anything else to eat or drink the morning of your colonoscopy.  It is ok to brush your teeth.    6.  If you are on blood thinners THAT YOU HAVE BEEN INSTRUCTED TO HOLD BY YOUR DOCTOR FOR THIS PROCEDURE, then do NOT take this the morning of your colonoscopy.  Do NOT stop these medications on your own, they must be approved to be held by your doctor.  Your colonoscopy can NOT be done if you are on these medications.  Examples of blood thinners include: Coumadin, Aggrenox, Plavix, Pradaxa, Reapro, Pletal, Xarelto, Ticagrelor, Brilinta, Eliquis, and high dose aspirin (325 mg).  You do not have to stop baby aspirin 81 mg.    7.  IF YOU ARE DIABETIC:  NO INSULIN OR ORAL MEDICATIONS THE MORNING OF THE COLONOSCOPY.  TAKE ONLY HALF THE DOSE OF YOUR INSULIN THE DAY BEFORE THE COLONOSCOPY.  DO NOT TAKE ANY ORAL DIABETIC MEDICATIONS THE DAY BEFORE THE COLONOSCOPY.  IF YOU ARE AN INSULIN DEPENDENT DIABETIC WITH UNSTABLE BLOOD SUGARS, NOTIFY YOUR PRIMARY CARE PHYSICIAN FOR INSTRUCTIONS.

## 2019-06-27 NOTE — PROGRESS NOTES
Subjective:       Patient ID: Loi Cordova is a 53 y.o. female.    Chief Complaint: Diarrhea and Abdominal Pain    54 yo F complains of chronic diarrhea and abdominal pain.  She states that since having her gallbladder removed 3 years ago she has postprandial diarrhea.  Every time she eats, she has a watery BM approximately 5 mins later.  If she doesn't eat, she won't have a BM.  She has had issues with it being very urgent and having accidents as a result.  She denies rectal bleeding or weight loss.  She has chronic intermittent generalized abdominal pain that does not seem related to the diarrhea but is associated with bloating.  These symptoms have not changed since cholecystectomy.  Imodium helped initially but does not help any longer.  She had a colonoscopy 10 years ago due to maternal aunt having CRC and she did not have polyps, and neither did her mother have polyps.    Review of Systems   Constitutional: Negative for appetite change and unexpected weight change.   Eyes: Negative for photophobia and visual disturbance.   Respiratory: Negative for chest tightness, shortness of breath and wheezing.    Cardiovascular: Negative for chest pain, palpitations and leg swelling.   Genitourinary: Negative for dysuria, flank pain and hematuria.   Musculoskeletal: Negative for joint swelling and myalgias.   Skin: Negative for color change and rash.   Neurological: Negative for dizziness and speech difficulty.   Psychiatric/Behavioral: Negative for confusion and hallucinations.       Objective:       BP (!) 150/92 (BP Location: Right arm, Patient Position: Sitting)   Pulse 78   Ht 5' (1.524 m)   Wt 89.8 kg (198 lb)   LMP 07/29/1990 (Within Years) Comment: Partical Hysterectomy in 1990, Gutierrez Ooopherectomy in 2000  BMI 38.67 kg/m²     Physical Exam   Constitutional: She is oriented to person, place, and time. She appears well-developed and well-nourished.   Eyes: Pupils are equal, round, and reactive to light. EOM are  normal.   Neck: Normal range of motion. Neck supple.   Cardiovascular: Normal rate and regular rhythm.   Pulmonary/Chest: Effort normal and breath sounds normal.   Abdominal: Soft. Bowel sounds are normal. She exhibits no distension and no mass. There is no tenderness. There is no rebound and no guarding.   Musculoskeletal: Normal range of motion. She exhibits no edema.   Neurological: She is alert and oriented to person, place, and time.   Psychiatric: She has a normal mood and affect. Her behavior is normal. Judgment and thought content normal.       Lab Results   Component Value Date    WBC 9.73 02/04/2019    HGB 13.8 02/04/2019    HCT 42.0 02/04/2019    MCV 89 02/04/2019     (H) 02/04/2019     CMP  Sodium   Date Value Ref Range Status   02/04/2019 141 136 - 145 mmol/L Final     Potassium   Date Value Ref Range Status   02/04/2019 4.3 3.5 - 5.1 mmol/L Final     Chloride   Date Value Ref Range Status   02/04/2019 104 95 - 110 mmol/L Final     CO2   Date Value Ref Range Status   02/04/2019 23 23 - 29 mmol/L Final     Glucose   Date Value Ref Range Status   02/04/2019 137 (H) 70 - 110 mg/dL Final     BUN, Bld   Date Value Ref Range Status   02/04/2019 12 7 - 17 mg/dL Final     Creatinine   Date Value Ref Range Status   02/04/2019 0.73 0.50 - 1.40 mg/dL Final     Calcium   Date Value Ref Range Status   02/04/2019 10.0 8.7 - 10.5 mg/dL Final     Total Protein   Date Value Ref Range Status   02/04/2019 8.0 6.0 - 8.4 g/dL Final     Albumin   Date Value Ref Range Status   02/04/2019 4.3 3.5 - 5.2 g/dL Final     Total Bilirubin   Date Value Ref Range Status   02/04/2019 0.4 0.1 - 1.0 mg/dL Final     Comment:     For infants and newborns, interpretation of results should be based  on gestational age, weight and in agreement with clinical  observations.  Premature Infant recommended reference ranges:  Up to 24 hours.............<8.0 mg/dL  Up to 48 hours............<12.0 mg/dL  3-5 days..................<15.0  "mg/dL  6-29 days.................<15.0 mg/dL       Alkaline Phosphatase   Date Value Ref Range Status   02/04/2019 129 (H) 38 - 126 U/L Final     AST   Date Value Ref Range Status   02/04/2019 26 15 - 46 U/L Final     ALT   Date Value Ref Range Status   02/04/2019 42 10 - 44 U/L Final     Anion Gap   Date Value Ref Range Status   02/04/2019 14 8 - 16 mmol/L Final     eGFR if    Date Value Ref Range Status   02/04/2019 >60.0 >60 mL/min/1.73 m^2 Final     eGFR if non    Date Value Ref Range Status   02/04/2019 >60.0 >60 mL/min/1.73 m^2 Final     Comment:     Calculation used to obtain the estimated glomerular filtration  rate (eGFR) is the CKD-EPI equation.        CXR was independently visualized and reviewed by me and showed poor quality film with enlarged cardiac silhouette.    Assessment:       1. Chronic diarrhea    2. Abdominal pain, generalized    3. Screen for colon cancer        Plan:       Chronic diarrhea; Abdominal pain, generalized  -     colestipol (COLESTID) 1 gram Tab; Take 1 tablet (1 g total) by mouth 2 (two) times daily.  Dispense: 60 tablet; Refill: 2  -     dicyclomine (BENTYL) 20 mg tablet; Take 1 tablet (20 mg total) by mouth 3 (three) times daily as needed.  Dispense: 60 tablet; Refill: 2  -     Despite her having "Och1" it does not appear that cholestyramine nor Colestid are covered.  If not, will have to try something else if Bentyl alone is not sufficient.    Screen for colon cancer  -     PREPOPIK 10 mg-3.5 gram-12 gram PwPk; Take 1 Package by mouth once. Use as directed on prep instructions given to you from the office for 1 dose  Dispense: 1 each; Refill: 0  -     Case request GI: COLONOSCOPY           "

## 2019-07-23 ENCOUNTER — TELEPHONE (OUTPATIENT)
Dept: GASTROENTEROLOGY | Facility: CLINIC | Age: 54
End: 2019-07-23

## 2019-07-23 DIAGNOSIS — F51.04 PSYCHOPHYSIOLOGICAL INSOMNIA: ICD-10-CM

## 2019-07-23 RX ORDER — TRAZODONE HYDROCHLORIDE 50 MG/1
TABLET ORAL
Qty: 30 TABLET | Refills: 0 | Status: SHIPPED | OUTPATIENT
Start: 2019-07-23 | End: 2019-08-28

## 2019-07-23 NOTE — TELEPHONE ENCOUNTER
Patient was notified of test results and aware of follow-up care plan. Recall was made for 3-6 months. Patient is to RTC as needed.

## 2019-07-23 NOTE — TELEPHONE ENCOUNTER
----- Message from Sarah Gamez MD sent at 7/23/2019 10:17 AM CDT -----  Large benign adenomatous polyp.  She needs repeat 6-12 months due to poor prep.  Her children need colonoscopy at age 40 and her siblings need colonoscopy now.  RTC as needed.

## 2019-07-29 ENCOUNTER — PATIENT MESSAGE (OUTPATIENT)
Dept: ADMINISTRATIVE | Facility: OTHER | Age: 54
End: 2019-07-29

## 2019-07-30 DIAGNOSIS — E11.9 TYPE 2 DIABETES MELLITUS: ICD-10-CM

## 2019-08-07 RX ORDER — ATORVASTATIN CALCIUM 80 MG/1
TABLET, FILM COATED ORAL
Qty: 30 TABLET | Refills: 0 | Status: SHIPPED | OUTPATIENT
Start: 2019-08-07 | End: 2020-12-29 | Stop reason: SDUPTHER

## 2019-08-07 RX ORDER — LISINOPRIL 40 MG/1
TABLET ORAL
Qty: 30 TABLET | Refills: 0 | Status: SHIPPED | OUTPATIENT
Start: 2019-08-07 | End: 2019-08-12

## 2019-08-12 RX ORDER — LISINOPRIL 40 MG/1
40 TABLET ORAL DAILY
Qty: 30 TABLET | Refills: 0 | Status: SHIPPED | OUTPATIENT
Start: 2019-08-12 | End: 2019-11-05

## 2019-08-19 ENCOUNTER — CLINICAL SUPPORT (OUTPATIENT)
Dept: SMOKING CESSATION | Facility: CLINIC | Age: 54
End: 2019-08-19
Payer: COMMERCIAL

## 2019-08-19 DIAGNOSIS — F17.200 NICOTINE DEPENDENCE: Primary | ICD-10-CM

## 2019-08-19 PROCEDURE — 99407 PR TOBACCO USE CESSATION INTENSIVE >10 MINUTES: ICD-10-PCS | Mod: S$GLB,,,

## 2019-08-19 PROCEDURE — 99407 BEHAV CHNG SMOKING > 10 MIN: CPT | Mod: S$GLB,,,

## 2019-08-19 NOTE — PROGRESS NOTES
Spoke with patient today in regards to smoking cessation progress for 6,12  month follow up, states not tobacco free at this time. Patient has scheduled an appointment for Quit #2. Informed patient of benefit period, future follow ups, and contact information. Will resolve episode and complete smart form for Quit attempt #1.

## 2019-08-21 ENCOUNTER — CLINICAL SUPPORT (OUTPATIENT)
Dept: SMOKING CESSATION | Facility: CLINIC | Age: 54
End: 2019-08-21
Payer: COMMERCIAL

## 2019-08-21 DIAGNOSIS — F17.200 NICOTINE DEPENDENCE: Primary | ICD-10-CM

## 2019-08-21 PROCEDURE — 99404 PR PREVENT COUNSEL,INDIV,60 MIN: ICD-10-PCS | Mod: S$GLB,,,

## 2019-08-21 PROCEDURE — 99404 PREV MED CNSL INDIV APPRX 60: CPT | Mod: S$GLB,,,

## 2019-08-21 RX ORDER — BUPROPION HYDROCHLORIDE 150 MG/1
TABLET, EXTENDED RELEASE ORAL
Qty: 60 TABLET | Refills: 0 | Status: SHIPPED | OUTPATIENT
Start: 2019-08-21 | End: 2019-11-06 | Stop reason: SDUPTHER

## 2019-08-21 RX ORDER — VARENICLINE TARTRATE 0.5 (11)-1
KIT ORAL
Qty: 53 TABLET | Refills: 0 | Status: SHIPPED | OUTPATIENT
Start: 2019-08-21 | End: 2019-09-21

## 2019-08-21 NOTE — Clinical Note
Pt was seen today for quit attempt #2. Patient is smoking 1 ppd.  Patient will be participating in bi-weekly tobacco cessation meetings and will begin the prescribed tobacco cessation medication regimen of the Chantix starter pack and 150 mg Wellbutrin BID. She has used both before and had no negative side effects. Pt is to follow up in 2 weeks for individual session.

## 2019-08-22 ENCOUNTER — PATIENT OUTREACH (OUTPATIENT)
Dept: ADMINISTRATIVE | Facility: HOSPITAL | Age: 54
End: 2019-08-22

## 2019-08-22 RX ORDER — ATORVASTATIN CALCIUM 80 MG/1
80 TABLET, FILM COATED ORAL DAILY
Qty: 90 TABLET | Refills: 1 | OUTPATIENT
Start: 2019-08-22

## 2019-08-22 NOTE — TELEPHONE ENCOUNTER
Spoke with pt she states she has an appointment scheduled for 9/5/19. I also informed Dr. Fierro of appointment.

## 2019-08-28 DIAGNOSIS — F51.04 PSYCHOPHYSIOLOGICAL INSOMNIA: ICD-10-CM

## 2019-08-28 RX ORDER — TRAZODONE HYDROCHLORIDE 50 MG/1
TABLET ORAL
Qty: 30 TABLET | Refills: 0 | Status: SHIPPED | OUTPATIENT
Start: 2019-08-28 | End: 2019-09-30

## 2019-08-30 ENCOUNTER — TELEPHONE (OUTPATIENT)
Dept: FAMILY MEDICINE | Facility: CLINIC | Age: 54
End: 2019-08-30

## 2019-08-30 DIAGNOSIS — E11.9 TYPE 2 DIABETES MELLITUS WITHOUT COMPLICATION: ICD-10-CM

## 2019-08-30 NOTE — TELEPHONE ENCOUNTER
"----- Message from Venu Doty sent at 8/30/2019  8:25 AM CDT -----  Contact: self  [phone]  "Please reschedule my appointment Tuesday or Wednesday. Please call me."    Thanks.  "

## 2019-08-30 NOTE — TELEPHONE ENCOUNTER
Spoke to patient, Dr. Fierro does not have appointments next Tuesday or Wednesday. Due to pts work schedule restrictions appt had to be rescheduled to 10/1/19 bc she needed appointment after 3 pm.

## 2019-09-04 ENCOUNTER — CLINICAL SUPPORT (OUTPATIENT)
Dept: SMOKING CESSATION | Facility: CLINIC | Age: 54
End: 2019-09-04
Payer: COMMERCIAL

## 2019-09-04 DIAGNOSIS — F17.200 NICOTINE DEPENDENCE: Primary | ICD-10-CM

## 2019-09-04 PROCEDURE — 99407 PR TOBACCO USE CESSATION INTENSIVE >10 MINUTES: ICD-10-PCS | Mod: S$GLB,,,

## 2019-09-04 PROCEDURE — 99407 BEHAV CHNG SMOKING > 10 MIN: CPT | Mod: S$GLB,,,

## 2019-09-04 NOTE — PROGRESS NOTES
Called patient in regards to missed appointment for smoking cessation. Patient is down from 1 ppd to 15 cpd. Patient remains on the prescribed tobacco cessation medication regimen of 1 mg Chantix QAM and 150 mg Wellbutrin BID; without any major negative side effects. Informed patient to increase her water intake for the dry mouth. Patient is to continue to with rate reduction and will start using the 14 mg nicotine patches she already has from coming through program the first time. Pt stated the cigarettes are starting to taste bad, but she continues to smoke out of habit. Her goal for the week is to start wearing patch, cut back to < 10cpd, and move location of where she keeps her cigarettes. Advised patient to call me with any questions or concerns. Pt is to follow up in clinic next week.

## 2019-09-09 ENCOUNTER — PATIENT MESSAGE (OUTPATIENT)
Dept: FAMILY MEDICINE | Facility: CLINIC | Age: 54
End: 2019-09-09

## 2019-09-10 ENCOUNTER — OFFICE VISIT (OUTPATIENT)
Dept: FAMILY MEDICINE | Facility: CLINIC | Age: 54
End: 2019-09-10
Payer: COMMERCIAL

## 2019-09-10 VITALS
SYSTOLIC BLOOD PRESSURE: 160 MMHG | HEART RATE: 80 BPM | HEIGHT: 61 IN | TEMPERATURE: 98 F | BODY MASS INDEX: 36.94 KG/M2 | DIASTOLIC BLOOD PRESSURE: 90 MMHG | OXYGEN SATURATION: 97 % | WEIGHT: 195.63 LBS | RESPIRATION RATE: 18 BRPM

## 2019-09-10 DIAGNOSIS — I10 ESSENTIAL HYPERTENSION: ICD-10-CM

## 2019-09-10 DIAGNOSIS — E78.00 PURE HYPERCHOLESTEROLEMIA: ICD-10-CM

## 2019-09-10 DIAGNOSIS — J20.9 ACUTE BRONCHITIS, UNSPECIFIED ORGANISM: ICD-10-CM

## 2019-09-10 DIAGNOSIS — Z72.0 TOBACCO ABUSE: ICD-10-CM

## 2019-09-10 DIAGNOSIS — J01.00 ACUTE NON-RECURRENT MAXILLARY SINUSITIS: ICD-10-CM

## 2019-09-10 DIAGNOSIS — Z98.61 CAD S/P PERCUTANEOUS CORONARY ANGIOPLASTY: ICD-10-CM

## 2019-09-10 DIAGNOSIS — I25.10 CAD S/P PERCUTANEOUS CORONARY ANGIOPLASTY: ICD-10-CM

## 2019-09-10 DIAGNOSIS — E11.42 TYPE 2 DIABETES MELLITUS WITH DIABETIC POLYNEUROPATHY, WITHOUT LONG-TERM CURRENT USE OF INSULIN: Primary | ICD-10-CM

## 2019-09-10 DIAGNOSIS — I25.10 CORONARY ARTERY DISEASE, ANGINA PRESENCE UNSPECIFIED, UNSPECIFIED VESSEL OR LESION TYPE, UNSPECIFIED WHETHER NATIVE OR TRANSPLANTED HEART: ICD-10-CM

## 2019-09-10 PROCEDURE — 3008F BODY MASS INDEX DOCD: CPT | Mod: CPTII,S$GLB,, | Performed by: INTERNAL MEDICINE

## 2019-09-10 PROCEDURE — 3008F PR BODY MASS INDEX (BMI) DOCUMENTED: ICD-10-PCS | Mod: CPTII,S$GLB,, | Performed by: INTERNAL MEDICINE

## 2019-09-10 PROCEDURE — 99999 PR PBB SHADOW E&M-EST. PATIENT-LVL IV: ICD-10-PCS | Mod: PBBFAC,,, | Performed by: INTERNAL MEDICINE

## 2019-09-10 PROCEDURE — 99214 OFFICE O/P EST MOD 30 MIN: CPT | Mod: 25,S$GLB,, | Performed by: INTERNAL MEDICINE

## 2019-09-10 PROCEDURE — 3080F PR MOST RECENT DIASTOLIC BLOOD PRESSURE >= 90 MM HG: ICD-10-PCS | Mod: CPTII,S$GLB,, | Performed by: INTERNAL MEDICINE

## 2019-09-10 PROCEDURE — 3045F PR MOST RECENT HEMOGLOBIN A1C LEVEL 7.0-9.0%: CPT | Mod: CPTII,S$GLB,, | Performed by: INTERNAL MEDICINE

## 2019-09-10 PROCEDURE — 3077F SYST BP >= 140 MM HG: CPT | Mod: CPTII,S$GLB,, | Performed by: INTERNAL MEDICINE

## 2019-09-10 PROCEDURE — 96372 THER/PROPH/DIAG INJ SC/IM: CPT | Mod: S$GLB,,, | Performed by: INTERNAL MEDICINE

## 2019-09-10 PROCEDURE — 3080F DIAST BP >= 90 MM HG: CPT | Mod: CPTII,S$GLB,, | Performed by: INTERNAL MEDICINE

## 2019-09-10 PROCEDURE — 3077F PR MOST RECENT SYSTOLIC BLOOD PRESSURE >= 140 MM HG: ICD-10-PCS | Mod: CPTII,S$GLB,, | Performed by: INTERNAL MEDICINE

## 2019-09-10 PROCEDURE — 3045F PR MOST RECENT HEMOGLOBIN A1C LEVEL 7.0-9.0%: ICD-10-PCS | Mod: CPTII,S$GLB,, | Performed by: INTERNAL MEDICINE

## 2019-09-10 PROCEDURE — 99999 PR PBB SHADOW E&M-EST. PATIENT-LVL IV: CPT | Mod: PBBFAC,,, | Performed by: INTERNAL MEDICINE

## 2019-09-10 PROCEDURE — 96372 PR INJECTION,THERAP/PROPH/DIAG2ST, IM OR SUBCUT: ICD-10-PCS | Mod: S$GLB,,, | Performed by: INTERNAL MEDICINE

## 2019-09-10 PROCEDURE — 99214 PR OFFICE/OUTPT VISIT, EST, LEVL IV, 30-39 MIN: ICD-10-PCS | Mod: 25,S$GLB,, | Performed by: INTERNAL MEDICINE

## 2019-09-10 RX ORDER — AZITHROMYCIN 250 MG/1
TABLET, FILM COATED ORAL
Qty: 6 TABLET | Refills: 0 | Status: SHIPPED | OUTPATIENT
Start: 2019-09-10 | End: 2019-09-15

## 2019-09-10 RX ORDER — TRIAMCINOLONE ACETONIDE 40 MG/ML
40 INJECTION, SUSPENSION INTRA-ARTICULAR; INTRAMUSCULAR
Status: COMPLETED | OUTPATIENT
Start: 2019-09-10 | End: 2019-09-10

## 2019-09-10 RX ORDER — AMLODIPINE BESYLATE 5 MG/1
5 TABLET ORAL DAILY
Qty: 30 TABLET | Refills: 3 | Status: SHIPPED | OUTPATIENT
Start: 2019-09-10 | End: 2020-03-30

## 2019-09-10 RX ORDER — BENZONATATE 200 MG/1
200 CAPSULE ORAL 3 TIMES DAILY PRN
Qty: 30 CAPSULE | Refills: 1 | Status: SHIPPED | OUTPATIENT
Start: 2019-09-10 | End: 2020-04-17

## 2019-09-10 RX ADMIN — TRIAMCINOLONE ACETONIDE 40 MG: 40 INJECTION, SUSPENSION INTRA-ARTICULAR; INTRAMUSCULAR at 03:09

## 2019-09-10 NOTE — PATIENT INSTRUCTIONS
We have reviewed your prescription medications. We will treat your sinus infectiona nd bronchitis with a zpak and a steroid shot. You have been given a steroid shot to help manage your symptoms. Possible side effects of this medication are sleeplessness, irritability, and increased hunger. The steroid shot may make your sugars go up also. I have ordered labs to get done before you return in a month. I am adding amlodipine for blood pressure. Good work in reducing cigarette use.

## 2019-09-10 NOTE — PROGRESS NOTES
Subjective:       Patient ID: Loi Cordova is a 53 y.o. female.    Chief Complaint: Sinus Problem; Cough; and Numbness (right side of face )     I have reviewed the PMH,  and  for this patient. She  has a past medical history of Allergy, Asthma, Coronary artery disease, GERD (gastroesophageal reflux disease), History of back surgery (2/20/2018), Hyperlipidemia, Hypertension, and Type 2 diabetes mellitus with diabetic polyneuropathy, without long-term current use of insulin (2/8/2019).  She is overdue for follow-up of chronic conditions.  She presents today for evaluation of sinus issues.  She reports that she has had her medicines today, but her blood pressure is very high.  She has not been checking her blood pressure at home.  She has not been to see Dr. Hicks either.  Her last lab work here was done in February of 2019.  She reports that she has been having sinus issues for about 10 days.  She has been having green nasal discharge and a cough.  She has tenderness in her maxillary sinuses especially on the right.  She also has a cough.  She reports that she has been taking allergy medicine but it does not seem to be helping.  Her blood pressure was initially 160/90.  On repeat I got 170/90.  She states that she will come back for a follow-up on her blood pressure in about a month.    Active Ambulatory Problems     Diagnosis Date Noted    CAD S/P percutaneous coronary angioplasty 12/06/2016    PAD (peripheral artery disease) 12/06/2016    Essential hypertension 12/06/2016    GERD (gastroesophageal reflux disease) 12/06/2016    Tobacco abuse 12/07/2016    Coronary artery disease 12/13/2016    Chronic diastolic CHF (congestive heart failure) 12/07/2017    Class 2 severe obesity due to excess calories with serious comorbidity and body mass index (BMI) of 38.0 to 38.9 in adult 01/16/2018    Pure hypercholesterolemia 01/16/2018    Hyperglycemia 01/16/2018    Hypokalemia 07/13/2018    Restless leg  syndrome 07/13/2018    Seasonal allergic rhinitis due to pollen 07/13/2018    Hematuria 01/29/2019    Type 2 diabetes mellitus with diabetic polyneuropathy, without long-term current use of insulin 02/08/2019    Chronic diarrhea 06/27/2019    Screen for colon cancer 06/27/2019    Abdominal pain, generalized 06/27/2019     Resolved Ambulatory Problems     Diagnosis Date Noted    Chest pain 12/06/2016    Upper respiratory tract infection 02/20/2017    Flu-like symptoms 02/20/2017    Angina, class III 12/07/2017    History of back surgery 02/20/2018    Acute diastolic CHF (congestive heart failure) 07/30/2018    Unstable angina 08/24/2018    Chest pain 08/24/2018     Past Medical History:   Diagnosis Date    Allergy     Asthma     Coronary artery disease     GERD (gastroesophageal reflux disease)     History of back surgery 2/20/2018    Hyperlipidemia     Hypertension     Type 2 diabetes mellitus with diabetic polyneuropathy, without long-term current use of insulin 2/8/2019         MEDICATIONS:  Current Outpatient Medications:     aspirin 81 MG Chew, Take 81 mg by mouth once daily., Disp: , Rfl:     atorvastatin (LIPITOR) 80 MG tablet, TAKE 1 TABLET BY MOUTH EVERY DAY, Disp: 30 tablet, Rfl: 0    buPROPion (WELLBUTRIN SR) 150 MG TBSR 12 hr tablet, Take one tablet by mouth once daily X3 days, then increase to one tablet twice daily, Disp: 60 tablet, Rfl: 0    dicyclomine (BENTYL) 20 mg tablet, Take 1 tablet (20 mg total) by mouth 3 (three) times daily as needed., Disp: 60 tablet, Rfl: 2    EFFIENT 10 mg Tab, Take 1 tablet orally once a day., Disp: , Rfl: 11    fluticasone (FLONASE) 50 mcg/actuation nasal spray, 1 spray by Each Nare route once daily., Disp: 1 Bottle, Rfl: 1    furosemide (LASIX) 40 MG tablet, Take 1 tablet (40 mg total) by mouth 2 (two) times daily., Disp: , Rfl:     lisinopril (PRINIVIL,ZESTRIL) 40 MG tablet, TAKE 1 TABLET BY MOUTH EVERY DAY, Disp: 30 tablet, Rfl: 0     metFORMIN (GLUCOPHAGE) 500 MG tablet, Take 1 tablet (500 mg total) by mouth daily with breakfast., Disp: 90 tablet, Rfl: 1    metoprolol succinate (TOPROL-XL) 100 MG 24 hr tablet, Take 1 tablet (100 mg total) by mouth once daily., Disp: 90 tablet, Rfl: 1    nitroGLYCERIN (NITROSTAT) 0.4 MG SL tablet, , Disp: , Rfl:     omeprazole (PRILOSEC) 40 MG capsule, TAKE 1 CAPSULE BY MOUTH DAILY, Disp: 90 capsule, Rfl: 1    potassium chloride (MICRO-K) 10 MEQ CpSR, Take 20 mEq by mouth 2 (two) times daily. , Disp: , Rfl:     prasugrel (EFFIENT) 10 mg Tab, Take 1 tablet (10 mg total) by mouth once daily., Disp: 90 tablet, Rfl: 4    traZODone (DESYREL) 50 MG tablet, TAKE 1 TABLET BY MOUTH EVERY EVENING, Disp: 30 tablet, Rfl: 0    varenicline (CHANTIX STARTING MONTH BOX) 0.5 mg (11)- 1 mg (42) tablet, Take 1/2 tablet by mouth once daily X3 days,then increase to 1/2 tablet twice daily X4 days,then increase one tablet twice daily, Disp: 53 tablet, Rfl: 0    VENTOLIN HFA 90 mcg/actuation inhaler, Inhale 2 puffs by mouth every 4-6 hours as needed., Disp: , Rfl: 3    amLODIPine (NORVASC) 5 MG tablet, Take 1 tablet (5 mg total) by mouth once daily., Disp: 30 tablet, Rfl: 3    azithromycin (Z-IGNACIA) 250 MG tablet, Take 2 tablets by mouth on day 1; then Take 1 tablet by mouth on days 2-5, Disp: 6 tablet, Rfl: 0    benzonatate (TESSALON) 200 MG capsule, Take 1 capsule (200 mg total) by mouth 3 (three) times daily as needed for Cough., Disp: 30 capsule, Rfl: 1    colestipol (COLESTID) 1 gram Tab, Take 1 tablet (1 g total) by mouth 2 (two) times daily., Disp: 60 tablet, Rfl: 2  No current facility-administered medications for this visit.     Facility-Administered Medications Ordered in Other Visits:     0.9%  NaCl infusion, , Intravenous, Continuous, Sarah Gamez MD, Stopped at 07/16/19 1113    sodium chloride 0.9% flush 10 mL, 10 mL, Intravenous, PRN, Sarah Gamez MD      HEALTH MAINTENANCE:   Health Maintenance    Topic Date Due    Eye Exam  09/20/1977    Pneumococcal Vaccine (Medium Risk) (1 of 1 - PPSV23) 09/20/1986    Hemoglobin A1c  05/04/2019    Lipid Panel  02/04/2020    Foot Exam  02/08/2020    High Dose Statin  09/10/2020    Aspirin/Antiplatelet Therapy  09/10/2020    Mammogram  02/18/2021    TETANUS VACCINE  04/08/2029    Colonoscopy  07/16/2029       Review of Systems   Constitutional: Negative for chills, fatigue and fever.   HENT: Positive for congestion, rhinorrhea and sinus pressure. Negative for ear discharge, ear pain and sore throat.    Eyes: Negative for discharge, redness and itching.   Respiratory: Positive for cough. Negative for chest tightness, shortness of breath and wheezing.    Cardiovascular: Negative for chest pain, palpitations and leg swelling.   Gastrointestinal: Negative for abdominal pain, constipation, diarrhea, nausea and vomiting.   Endocrine: Negative for cold intolerance and heat intolerance.   Genitourinary: Negative for dysuria, flank pain, frequency and hematuria.   Musculoskeletal: Negative for arthralgias, back pain, joint swelling and myalgias.   Skin: Negative for color change and rash.   Neurological: Negative for dizziness, tremors, numbness and headaches.   Psychiatric/Behavioral: Negative for dysphoric mood and sleep disturbance. The patient is not nervous/anxious.        Objective:          A1C:  Recent Labs   Lab 12/23/16  0933 02/04/19  0832   Hemoglobin A1C 6.3 H 7.2 H     CBC:  Recent Labs   Lab 11/30/17  1024 12/06/17  1029 12/08/17  0413 02/16/18  0053 04/12/18  1642 07/13/18  1505 07/31/18  0407 08/15/18  1530 08/23/18  1432 08/24/18  0522 08/25/18  0557 02/04/19  0832   WBC 8.95 7.56 15.09 H 6.99 8.24 8.92 12.42 6.49 9.74 12.95 H 14.64 H 9.73   RBC 4.64 4.56 4.47 4.55 4.29 4.28 4.25 3.85 L 4.38 4.11 4.43 4.70   Hemoglobin 13.8 13.4 13.3 13.5 12.8 12.8 12.8 11.3 L 13.1 12.4 13.3 13.8   Hematocrit 42.2 41.0 40.5 40.5 39.0 39.8 39.4 34.8 L 40.1 37.8 40.5 42.0    Platelets 553 H 517 H 499 H 557 H 541 H 580 H 544 H 464 H 516 H 472 H 507 H 441 H   Mean Corpuscular Volume 91 90 91 89 91 93 93 90 92 92 91 89   Mean Corpuscular Hemoglobin 29.7 29.4 29.8 29.7 29.8 29.9 30.1 29.4 29.9 30.2 30.0 29.4   Mean Corpuscular Hemoglobin Conc 32.7 32.7 32.8 33.3 32.8 32.2 32.5 32.5 32.7 32.8 32.8 32.9     CMP:  Recent Labs   Lab 04/19/17  1131 11/30/17  1024  02/16/18  0053  08/15/18  1530 08/23/18  1432 08/24/18  0522  02/04/19  0827   Glucose 97 104   < > 164 H   < > 118 H 161 H 201 H   < > 137 H   Calcium 9.8 9.8   < > 9.5   < > 9.6 10.6 H 9.7   < > 10.0   Albumin 4.5 4.5  --  4.5  --  3.8 4.7 4.1  --  4.3   Total Protein 7.5 7.8  --  7.7  --  6.9 7.7 7.0  --  8.0   Sodium 144 142   < > 138   < > 139 144 138   < > 141   Potassium 4.5 3.9   < > 4.1   < > 3.5 3.6 4.6   < > 4.3   CO2 24 27   < > 22 L   < > 26 32 H 25   < > 23   Chloride 106 104   < > 106   < > 104 100 103   < > 104   BUN, Bld 15 14   < > 15   < > 14 14 17   < > 12   Creatinine 0.76 0.80   < > 0.79   < > 0.9 1.09 0.88   < > 0.73   Alkaline Phosphatase 71 92  --  78  --  107 104 100  --  129 H   ALT 58 H 54 H  --  44  --  53 H 72 H 56 H  --  42   AST 35 36  --  31  --  26 50 H 33  --  26   Total Bilirubin 0.6 0.6  --  0.4  --  0.4 0.4 0.3  --  0.4    < > = values in this interval not displayed.     LIPIDS:  Recent Labs   Lab 12/23/16  0933 11/30/17  1024 07/13/18  1505 02/04/19  0826   TSH  --  1.940 1.620 1.540   HDL 71 53  --  51   Cholesterol 239 H 240 H  --  302 H   Triglycerides 126 215 H  --  341 H   LDL Cholesterol 142.8 144.0  --  182.8 H   Hdl/Cholesterol Ratio 29.7 22.1  --  16.9 L   Non-HDL Cholesterol 168 187  --  251   Total Cholesterol/HDL Ratio 3.4 4.5  --  5.9 H     TSH:  Recent Labs   Lab 11/30/17  1024 07/13/18  1505 02/04/19  0826   TSH 1.940 1.620 1.540           Physical Exam   Constitutional: She appears well-developed and well-nourished. She does not have a sickly appearance.   HENT:   Head:  Normocephalic and atraumatic.   Right Ear: External ear normal. Tympanic membrane is not erythematous. No middle ear effusion.   Left Ear: External ear normal. Tympanic membrane is not erythematous.  No middle ear effusion.   Nose: No rhinorrhea. Right sinus exhibits maxillary sinus tenderness. Right sinus exhibits no frontal sinus tenderness. Left sinus exhibits maxillary sinus tenderness. Left sinus exhibits no frontal sinus tenderness.   Mouth/Throat: No posterior oropharyngeal edema or posterior oropharyngeal erythema. No tonsillar exudate.   Eyes: Pupils are equal, round, and reactive to light. Conjunctivae and EOM are normal. Right eye exhibits no discharge. Left eye exhibits no discharge. Right conjunctiva is not injected. Left conjunctiva is not injected.   Neck: No thyromegaly present.   Cardiovascular: Normal rate, regular rhythm, normal heart sounds and intact distal pulses.   No murmur heard.  Pulmonary/Chest: Effort normal. She has no wheezes. She has rhonchi in the right lower field. She has no rales.   Abdominal: Bowel sounds are normal. She exhibits no distension. There is no tenderness.   Musculoskeletal: She exhibits no edema or tenderness.   Lymphadenopathy:     She has cervical adenopathy.   Neurological: She displays normal reflexes. No cranial nerve deficit.   Skin: Skin is warm and dry. No lesion and no rash noted.   Psychiatric: She has a normal mood and affect. Her behavior is normal. Her mood appears not anxious. Her speech is not rapid and/or pressured. She is not agitated and not aggressive. She does not exhibit a depressed mood.             Assessment and Plan:     Problem List Items Addressed This Visit        Cardiac/Vascular    CAD S/P percutaneous coronary angioplasty - taking aspirn and effient. Needs follow-up with Dr Hicks.       Essential hypertension - BP is too high. She reports compliance with meds. We will add amlodipine 5 mg a day and recheck in a month.       Coronary  artery disease - as above      Pure hypercholesterolemia - she is on atorvastatin. Check labs.        Endocrine    Type 2 diabetes mellitus with diabetic polyneuropathy, without long-term current use of insulin - Primary - We will order routine labs. She has not been checking sugars at home.     Relevant Orders    Hemoglobin A1c    Comprehensive metabolic panel    CBC auto differential    Lipid panel    Microalbumin/creatinine urine ratio       Other    Tobacco abuse - She is taking chantix and wellbutrin. She has cut down to about 5-6 cigs a day while sick.       Other Visit Diagnoses     Acute non-recurrent maxillary sinusitis    - treat with a steroid shot and a zpak      Acute bronchitis, unspecified organism    - treat with a steroid shot and tessalon perles.           Orders Placed This Encounter    Hemoglobin A1c    Comprehensive metabolic panel    CBC auto differential    Lipid panel    Microalbumin/creatinine urine ratio    amLODIPine (NORVASC) 5 MG tablet    triamcinolone acetonide injection 40 mg    azithromycin (Z-IGNACIA) 250 MG tablet    benzonatate (TESSALON) 200 MG capsule         Follow-up with me in 1 month.   Lauryn stay off work til Thursday    Clyde Fierro MD,  FACP  Internal Medicine

## 2019-09-10 NOTE — LETTER
September 10, 2019      [facility] [address], [address]  [place] [address]  Phone: [phone]  Fax: [phone]       Patient: Loi Cordova   YOB: 1965  Date of Visit: 09/10/2019    To Whom It May Concern:    Paul Cordova  was at [facility] on 09/10/2019. She may return to work/school on 9/12/19 with no restrictions. If you have any questions or concerns, or if I can be of further assistance, please do not hesitate to contact me.    Sincerely,      LIZBETH Prasad LPN

## 2019-09-11 ENCOUNTER — CLINICAL SUPPORT (OUTPATIENT)
Dept: SMOKING CESSATION | Facility: CLINIC | Age: 54
End: 2019-09-11
Payer: COMMERCIAL

## 2019-09-11 DIAGNOSIS — F17.210 MODERATE SMOKER (20 OR LESS PER DAY): Primary | ICD-10-CM

## 2019-09-11 PROCEDURE — 99402 PREV MED CNSL INDIV APPRX 30: CPT | Mod: S$GLB,,,

## 2019-09-11 PROCEDURE — 99402 PR PREVENT COUNSEL,INDIV,30 MIN: ICD-10-PCS | Mod: S$GLB,,,

## 2019-09-11 NOTE — Clinical Note
The patient is smoking 4 cigarettes/day  from 20 cigarettes/day. She currently has bronchitis and a sinus infection an states when she smokes it burns so she will only take a few puffs and maybe is only trying to smoke 3 cigarettes/day. She  Was not able to come to session today and agreed to phone follow up.She stated the Chantix was making the cigarettes taste bad, but she had not been taking either medication while sick. Suggested she try to take the !50 mg Wellbutrin SR BID and if the Chantix made her nauseated, not take

## 2019-09-11 NOTE — PROGRESS NOTES
Individual Follow-Up Form    9/11/2019    Quit Date: TBD    Clinical Status of Patient: Outpatient    Length of Service: 30 minutes    Continuing Medication: yes  Wellbutrin    Other Medications: 1 mg Chantix BID      Target Symptoms: Withdrawal and medication side effects. The following were  rated moderate (3) to severe (4) on TCRS:  · Moderate (3): none  · Severe (4): none    Comments: The patient is smoking 4 cigarettes/day  from 20 cigarettes/day. She currently has bronchitis and a sinus infection an states when she smokes it burns so she will only take a few puffs and maybe is only trying to smoke 3 cigarettes/day. She  Was not able to come to session today and agreed to phone follow up.She stated the Chantix was making the cigarettes taste bad, but she had not been taking either medication while sick. Suggested she try to take the !50 mg Wellbutrin SR BID and if the Chantix made her nauseated, not take it. She does not need any refills at this time. Will try to keep smoking down as much as possible. Will work on habit and further rate reduction at this time. The patient will continue individual sessions and medication monitoring by CTTS. Prescribed medication management will be by practitoner.The patient denies any abnormal behavioral or mental changes at this time.    Diagnosis: F17.210    Next Visit: 1 week

## 2019-09-12 ENCOUNTER — PATIENT MESSAGE (OUTPATIENT)
Dept: FAMILY MEDICINE | Facility: CLINIC | Age: 54
End: 2019-09-12

## 2019-09-18 ENCOUNTER — CLINICAL SUPPORT (OUTPATIENT)
Dept: SMOKING CESSATION | Facility: CLINIC | Age: 54
End: 2019-09-18
Payer: COMMERCIAL

## 2019-09-18 DIAGNOSIS — F17.210 MODERATE SMOKER (20 OR LESS PER DAY): Primary | ICD-10-CM

## 2019-09-18 PROCEDURE — 90853 PR GROUP PSYCHOTHERAPY: ICD-10-PCS | Mod: S$GLB,,,

## 2019-09-18 PROCEDURE — 90853 GROUP PSYCHOTHERAPY: CPT | Mod: S$GLB,,,

## 2019-09-19 RX ORDER — OMEPRAZOLE 40 MG/1
CAPSULE, DELAYED RELEASE ORAL
Qty: 90 CAPSULE | Refills: 1 | Status: SHIPPED | OUTPATIENT
Start: 2019-09-19 | End: 2020-03-19

## 2019-09-30 DIAGNOSIS — F51.04 PSYCHOPHYSIOLOGICAL INSOMNIA: ICD-10-CM

## 2019-09-30 RX ORDER — TRAZODONE HYDROCHLORIDE 50 MG/1
TABLET ORAL
Qty: 30 TABLET | Refills: 4 | Status: SHIPPED | OUTPATIENT
Start: 2019-09-30 | End: 2019-10-22

## 2019-10-01 ENCOUNTER — PATIENT MESSAGE (OUTPATIENT)
Dept: FAMILY MEDICINE | Facility: CLINIC | Age: 54
End: 2019-10-01

## 2019-10-03 ENCOUNTER — CLINICAL SUPPORT (OUTPATIENT)
Dept: SMOKING CESSATION | Facility: CLINIC | Age: 54
End: 2019-10-03
Payer: COMMERCIAL

## 2019-10-03 DIAGNOSIS — F17.210 MODERATE SMOKER (20 OR LESS PER DAY): Primary | ICD-10-CM

## 2019-10-03 PROCEDURE — 90853 PR GROUP PSYCHOTHERAPY: ICD-10-PCS | Mod: S$GLB,,,

## 2019-10-03 PROCEDURE — 90853 GROUP PSYCHOTHERAPY: CPT | Mod: S$GLB,,,

## 2019-10-03 NOTE — Clinical Note
The patient is smoking 8 cigarettes/day  from 20 cigarettes/day. The CO measurement =  13 ppm which indicates possibly a  Moderate smoker. Also, she is exposed to second hand smoke. She is doing much better and is reducing. She taking 150 mg Wellbutrin SR QD and 1 mg Chantix QD. which appears to help. She did state she would like to be further along, but assured her she is doing well- better to work on the habits than go fast. She may try to increase the Chantix if she can remember to take the second pill. Encouraged to keep going, the  Cigarettes are beginning to taste bad. Will try to get back down to 5 cigarettes by next session.

## 2019-10-03 NOTE — PROGRESS NOTES
Smoking Cessation Group Session #2    Site: [facility]  Date:  10/3/2019  Clinical Status of Patient: Outpatient   Length of Service and Code: 90 minutes - 52354   Number in Attendance: 2  Group Activities/Focus of Group:  Sharing last weeks challenges, triggers, and coping activities to remain quit and/ or keep making progress toward cessation, completion of TCRS (Tobacco Cessation Rating Scale) learned addiction model, personal reasons for quitting, medications, goals, quit date.    Specific session focus: completion of TCRS (Tobacco Cessation Rating Scale) reviewed strategies, cues, and triggers. Introduced the negative impact of tobacco on health, the health advantages of discontinuing the use of tobacco, time line improved health changes after a quit, withdrawal issues to expect from nicotine and habit, and ways to achieve the goal of a quit.      Target symptoms:  withdrawal and medication side effects             The following were rated moderate (3) to severe (4) on TCRS:       Moderate 3: desire/crave, - Nicotine replacement therapy, withdrawal symptoms, habit     Severe 4:   none  Patient's Response to Intervention: The patient is smoking 8 cigarettes/day  from 20 cigarettes/day. The CO measurement =  13 ppm which indicates possibly a  Moderate smoker. Also, she is exposed to second hand smoke. She is doing much better and is reducing. She taking 150 mg Wellbutrin SR QD and 1 mg Chantix QD. which appears to help. She did state she would like to be further along, but assured her she is doing well- better to work on the habits than go fast. She may try to increase the Chantix if she can remember to take the second pill. Encouraged to keep going, the  Cigarettes are beginning to taste bad. Will try to get back down to 5 cigarettes by next session.     Progress Toward Goals and Other Mental Status Changes: The patient denies any abnormal behavioral or mental changes at this time.  Interval History: .  Diagnosis:  Z72.0  Plan: The patient will continue with group therapy sessions and medication regimen prescribed with management by physician or by the Cessation Clinic Provider. Patient will inform Smoking Cessation Counselor of symptoms as rated high on TCRS.    Return to Clinic: 1 week    Quit Date: OVIDIO   Planned Quit Date: 10/31/19

## 2019-10-08 ENCOUNTER — PATIENT OUTREACH (OUTPATIENT)
Dept: ADMINISTRATIVE | Facility: HOSPITAL | Age: 54
End: 2019-10-08

## 2019-10-16 ENCOUNTER — CLINICAL SUPPORT (OUTPATIENT)
Dept: SMOKING CESSATION | Facility: CLINIC | Age: 54
End: 2019-10-16
Payer: COMMERCIAL

## 2019-10-16 DIAGNOSIS — F17.210 MODERATE SMOKER (20 OR LESS PER DAY): Primary | ICD-10-CM

## 2019-10-16 PROCEDURE — 90853 PR GROUP PSYCHOTHERAPY: ICD-10-PCS | Mod: S$GLB,,,

## 2019-10-16 PROCEDURE — 90853 GROUP PSYCHOTHERAPY: CPT | Mod: S$GLB,,,

## 2019-10-16 NOTE — PROGRESS NOTES
Smoking Cessation Group Session #2    Site: [facility]  Date:  10/16/2019  Clinical Status of Patient: Outpatient   Length of Service and Code: 90 minutes - 19781   Number in Attendance: 2  Group Activities/Focus of Group:  Sharing last weeks challenges, triggers, and coping activities to remain quit and/ or keep making progress toward cessation, completion of TCRS (Tobacco Cessation Rating Scale) learned addiction model, personal reasons for quitting, medications, goals, quit date.    Specific session focus: completion of TCRS (Tobacco Cessation Rating Scale) reviewed strategies, habitual behavior, stress, and high risk situations. Introduced stress with addition interventions, SOLVE, relaxation with interventions, nutrition, exercise, weight gain, and the importance of rewarding oneself for accomplishments toward becoming tobacco free. Open discussion of all items with interventions.       Target symptoms:  withdrawal and medication side effects             The following were rated moderate (3) to severe (4) on TCRS:       Moderate 3: insomnia - Nicotine replacement therapy, withdrawal symptoms, habit       Severe 4:   none  Patient's Response to Intervention: The patient is smoking 5 cigarettes/day  from 20 cigarettes/day.The CO measurement =  4   ppm which indicates possibly a Normal smoker . The patient is taking 1 mg Chantix and 150 mg Wellbutrin BID when she remembers. She has been doing better and is slowly cutting down but is getting frustrated and feels she would do better cold turkey at this point. Explained dry runs and using this as a cold turkey approach. Explained not to get discouraged but do feel this is a better approach for her. Explained to se alarm to take second dose of medication.   Progress Toward Goals and Other Mental Status Changes: The patient denies any abnormal behavioral or mental changes at this time.    Interval History:     Diagnosis: Z72.0  Plan: The patient will continue with group  therapy sessions and medication regimen prescribed with management by physician or by the Cessation Clinic Provider. Patient will inform Smoking Cessation Counselor of symptoms as rated high on TCRS.    Return to Clinic: 1 week    Quit Date: 10/31/19   Planned Quit Date: 10/31/19

## 2019-10-16 NOTE — Clinical Note
The patient is smoking 5 cigarettes/day  from 20 cigarettes/day.The CO measurement =  4   ppm which indicates possibly a Normal smoker . The patient is taking 1 mg Chantix and 150 mg Wellbutrin BID when she remembers. She has been doing better and is slowly cutting down but is getting frustrated and feels she would do better cold turkey at this point. Explained dry runs and using this as a cold turkey approach. Explained not to get discouraged but do feel this is a better approach for her. Explained to se alarm to take second dose of medication.

## 2019-10-18 ENCOUNTER — PATIENT OUTREACH (OUTPATIENT)
Dept: ADMINISTRATIVE | Facility: HOSPITAL | Age: 54
End: 2019-10-18

## 2019-10-22 ENCOUNTER — CLINICAL SUPPORT (OUTPATIENT)
Dept: FAMILY MEDICINE | Facility: CLINIC | Age: 54
End: 2019-10-22
Attending: INTERNAL MEDICINE
Payer: COMMERCIAL

## 2019-10-22 ENCOUNTER — OFFICE VISIT (OUTPATIENT)
Dept: FAMILY MEDICINE | Facility: CLINIC | Age: 54
End: 2019-10-22
Payer: COMMERCIAL

## 2019-10-22 VITALS
RESPIRATION RATE: 18 BRPM | WEIGHT: 195 LBS | OXYGEN SATURATION: 98 % | HEIGHT: 61 IN | DIASTOLIC BLOOD PRESSURE: 60 MMHG | HEART RATE: 51 BPM | SYSTOLIC BLOOD PRESSURE: 100 MMHG | TEMPERATURE: 98 F | BODY MASS INDEX: 36.82 KG/M2

## 2019-10-22 DIAGNOSIS — F51.04 PSYCHOPHYSIOLOGICAL INSOMNIA: ICD-10-CM

## 2019-10-22 DIAGNOSIS — E11.42 TYPE 2 DIABETES MELLITUS WITH DIABETIC POLYNEUROPATHY, WITHOUT LONG-TERM CURRENT USE OF INSULIN: ICD-10-CM

## 2019-10-22 DIAGNOSIS — I10 ESSENTIAL HYPERTENSION: Primary | ICD-10-CM

## 2019-10-22 PROCEDURE — 99214 PR OFFICE/OUTPT VISIT, EST, LEVL IV, 30-39 MIN: ICD-10-PCS | Mod: S$GLB,,, | Performed by: INTERNAL MEDICINE

## 2019-10-22 PROCEDURE — 3074F SYST BP LT 130 MM HG: CPT | Mod: CPTII,S$GLB,, | Performed by: INTERNAL MEDICINE

## 2019-10-22 PROCEDURE — 92250 DIABETIC EYE SCREENING PHOTO: ICD-10-PCS | Mod: 26,S$GLB,, | Performed by: OPTOMETRIST

## 2019-10-22 PROCEDURE — 3074F PR MOST RECENT SYSTOLIC BLOOD PRESSURE < 130 MM HG: ICD-10-PCS | Mod: CPTII,S$GLB,, | Performed by: INTERNAL MEDICINE

## 2019-10-22 PROCEDURE — 3044F PR MOST RECENT HEMOGLOBIN A1C LEVEL <7.0%: ICD-10-PCS | Mod: CPTII,S$GLB,, | Performed by: INTERNAL MEDICINE

## 2019-10-22 PROCEDURE — 99999 PR PBB SHADOW E&M-EST. PATIENT-LVL III: CPT | Mod: PBBFAC,,, | Performed by: INTERNAL MEDICINE

## 2019-10-22 PROCEDURE — 3078F PR MOST RECENT DIASTOLIC BLOOD PRESSURE < 80 MM HG: ICD-10-PCS | Mod: CPTII,S$GLB,, | Performed by: INTERNAL MEDICINE

## 2019-10-22 PROCEDURE — 2024F PR 7 FIELD PHOTOS WITH INTERP/ REVIEW: ICD-10-PCS | Mod: S$GLB,,, | Performed by: INTERNAL MEDICINE

## 2019-10-22 PROCEDURE — 2024F 7 FLD RTA PHOTO EVC RTNOPTHY: CPT | Mod: S$GLB,,, | Performed by: INTERNAL MEDICINE

## 2019-10-22 PROCEDURE — 3044F HG A1C LEVEL LT 7.0%: CPT | Mod: CPTII,S$GLB,, | Performed by: INTERNAL MEDICINE

## 2019-10-22 PROCEDURE — 92250 FUNDUS PHOTOGRAPHY W/I&R: CPT | Mod: 26,S$GLB,, | Performed by: OPTOMETRIST

## 2019-10-22 PROCEDURE — 99999 PR PBB SHADOW E&M-EST. PATIENT-LVL III: ICD-10-PCS | Mod: PBBFAC,,, | Performed by: INTERNAL MEDICINE

## 2019-10-22 PROCEDURE — 92250 FUNDUS PHOTOGRAPHY W/I&R: CPT | Mod: TC,S$GLB,, | Performed by: INTERNAL MEDICINE

## 2019-10-22 PROCEDURE — 3008F PR BODY MASS INDEX (BMI) DOCUMENTED: ICD-10-PCS | Mod: CPTII,S$GLB,, | Performed by: INTERNAL MEDICINE

## 2019-10-22 PROCEDURE — 3078F DIAST BP <80 MM HG: CPT | Mod: CPTII,S$GLB,, | Performed by: INTERNAL MEDICINE

## 2019-10-22 PROCEDURE — 92250 DIABETIC EYE SCREENING PHOTO: ICD-10-PCS | Mod: TC,S$GLB,, | Performed by: INTERNAL MEDICINE

## 2019-10-22 PROCEDURE — 99214 OFFICE O/P EST MOD 30 MIN: CPT | Mod: S$GLB,,, | Performed by: INTERNAL MEDICINE

## 2019-10-22 PROCEDURE — 3008F BODY MASS INDEX DOCD: CPT | Mod: CPTII,S$GLB,, | Performed by: INTERNAL MEDICINE

## 2019-10-22 RX ORDER — TRAZODONE HYDROCHLORIDE 100 MG/1
100 TABLET ORAL NIGHTLY
Qty: 30 TABLET | Refills: 3 | Status: SHIPPED | OUTPATIENT
Start: 2019-10-22 | End: 2020-02-27

## 2019-10-22 RX ORDER — AMLODIPINE BESYLATE 2.5 MG/1
2.5 TABLET ORAL DAILY
Qty: 30 TABLET | Refills: 3 | Status: SHIPPED | OUTPATIENT
Start: 2019-10-22 | End: 2019-12-18

## 2019-10-22 NOTE — PATIENT INSTRUCTIONS
We have reviewed your prescription medications. BP looks good . Let's reduce amlodipine to 2.5 mg since you are having some dizziness. We will also increase the trazodone to 100 mg at night . Your HgbA1c looked great! We will recheck you in about 4 months.

## 2019-10-23 ENCOUNTER — CLINICAL SUPPORT (OUTPATIENT)
Dept: SMOKING CESSATION | Facility: CLINIC | Age: 54
End: 2019-10-23
Payer: COMMERCIAL

## 2019-10-23 DIAGNOSIS — F17.210 MODERATE SMOKER (20 OR LESS PER DAY): Primary | ICD-10-CM

## 2019-10-23 PROCEDURE — 99403 PREV MED CNSL INDIV APPRX 45: CPT | Mod: S$GLB,,,

## 2019-10-23 PROCEDURE — 99403 PR PREVENT COUNSEL,INDIV,45 MIN: ICD-10-PCS | Mod: S$GLB,,,

## 2019-10-23 NOTE — PROGRESS NOTES
Individual Follow-Up Form    10/23/2019    Quit Date: 10/31/19    Clinical Status of Patient: Outpatient    Length of Service: 45 minutes    Continuing Medication: yes  Wellbutrin    Other Medications: 1 mg chantix BID     Target Symptoms: Withdrawal and medication side effects. The following were  rated moderate (3) to severe (4) on TCRS:  · Moderate (3): desire/ crave, angry - Nicotine replacement therapy, withdrawal symptoms, habit  · Severe (4): none    Comments: The patient is smoking 4-5 cigarettes/day  from 20 cigarettes/day. She is back to her origanla starting point of 5 cigarettes/day. She is not taking the Chantix and takes the 150 mg Wellbutrin BID when she remembers. She is ready to quit and is not doing well with reduction vs quitting. Set quit date 10/31/19. Discussed quitting strategies removing smoking items, cleaning area. She does have high risk at home son and  smoke, so it will be a challenge. Will order 21 mg nicotine patches to use and will provide tape to help the patches stay on. .    Diagnosis: F17.210    Next Visit: 2 weeks

## 2019-10-23 NOTE — PROGRESS NOTES
Loi Cordova is a 54 y.o. female here for a diabetic eye screening with non-dilated fundus photos per Dr. Fierro.    Patient cooperative?: Yes  Small pupils?: Yes  Last eye exam: unknown    For exam results, see Encounter Report.

## 2019-10-23 NOTE — Clinical Note
The patient is smoking 4-5 cigarettes/day  from 20 cigarettes/day. She is back to her origanla starting point of 5 cigarettes/day. She is not taking the Chantix and takes the 150 mg Wellbutrin BID when she remembers. She is ready to quit and is not doing well with reduction vs quitting. Set quit date 10/31/19. Discussed quitting strategies removing smoking items, cleaning area. She does have high risk at home son and  smoke, so it will be a challenge. Will order 21 mg nicotine patches to use and will provide tape to help the patches stay on. .

## 2019-10-25 ENCOUNTER — TELEPHONE (OUTPATIENT)
Dept: OPHTHALMOLOGY | Facility: CLINIC | Age: 54
End: 2019-10-25

## 2019-10-25 NOTE — TELEPHONE ENCOUNTER
Called patient regarding diabetic eye screening results lvm; Requires eye exam due to inadequate image quality for one or both eyes. Follow up in 2-4 months.     ----- Message from Juan Wan OD sent at 10/25/2019  8:39 AM CDT -----  No image found in OIS, needs eye exam

## 2019-10-29 NOTE — PROGRESS NOTES
Subjective:       Patient ID: Loi Cordova is a 54 y.o. female.    Chief Complaint: Follow-up (pt would like to see about upping her sleeping medication )     I have reviewed the PMH, SH and FH for this patient. She  has a past medical history of Allergy, Asthma, Coronary artery disease, GERD (gastroesophageal reflux disease), History of back surgery (2/20/2018), Hyperlipidemia, Hypertension, and Type 2 diabetes mellitus with diabetic polyneuropathy, without long-term current use of insulin (2/8/2019).  The patient presents today for follow-up of chronic conditions. We had added amlodipine last time 5 mg a day. Her BP is too low at 100/60. She reports some dizziness when she stands. She has been taking trazodone for sleep. She says that it helps, but she is still waking up a lot. She would like to try a higher dose. She has had her flu shot already. She reports that she had the pneumonia vaccine here 2 years ago.     Active Ambulatory Problems     Diagnosis Date Noted    CAD S/P percutaneous coronary angioplasty 12/06/2016    PAD (peripheral artery disease) 12/06/2016    Essential hypertension 12/06/2016    GERD (gastroesophageal reflux disease) 12/06/2016    Tobacco abuse 12/07/2016    Coronary artery disease 12/13/2016    Chronic diastolic CHF (congestive heart failure) 12/07/2017    Class 2 severe obesity due to excess calories with serious comorbidity and body mass index (BMI) of 38.0 to 38.9 in adult 01/16/2018    Pure hypercholesterolemia 01/16/2018    Hyperglycemia 01/16/2018    Hypokalemia 07/13/2018    Restless leg syndrome 07/13/2018    Seasonal allergic rhinitis due to pollen 07/13/2018    Hematuria 01/29/2019    Type 2 diabetes mellitus with diabetic polyneuropathy, without long-term current use of insulin 02/08/2019    Chronic diarrhea 06/27/2019    Screen for colon cancer 06/27/2019    Abdominal pain, generalized 06/27/2019     Resolved Ambulatory Problems     Diagnosis Date Noted     Chest pain 12/06/2016    Upper respiratory tract infection 02/20/2017    Flu-like symptoms 02/20/2017    Angina, class III 12/07/2017    History of back surgery 02/20/2018    Acute diastolic CHF (congestive heart failure) 07/30/2018    Unstable angina 08/24/2018    Chest pain 08/24/2018     Past Medical History:   Diagnosis Date    Allergy     Asthma     Hyperlipidemia     Hypertension          MEDICATIONS:  Current Outpatient Medications:     amLODIPine (NORVASC) 5 MG tablet, Take 1 tablet (5 mg total) by mouth once daily., Disp: 30 tablet, Rfl: 3    aspirin 81 MG Chew, Take 81 mg by mouth once daily., Disp: , Rfl:     atorvastatin (LIPITOR) 80 MG tablet, TAKE 1 TABLET BY MOUTH EVERY DAY, Disp: 30 tablet, Rfl: 0    buPROPion (WELLBUTRIN SR) 150 MG TBSR 12 hr tablet, Take one tablet by mouth once daily X3 days, then increase to one tablet twice daily, Disp: 60 tablet, Rfl: 0    colestipol (COLESTID) 1 gram Tab, Take 1 tablet (1 g total) by mouth 2 (two) times daily., Disp: 60 tablet, Rfl: 2    dicyclomine (BENTYL) 20 mg tablet, Take 1 tablet (20 mg total) by mouth 3 (three) times daily as needed., Disp: 60 tablet, Rfl: 2    fluticasone (FLONASE) 50 mcg/actuation nasal spray, 1 spray by Each Nare route once daily., Disp: 1 Bottle, Rfl: 1    furosemide (LASIX) 40 MG tablet, Take 1 tablet (40 mg total) by mouth 2 (two) times daily., Disp: , Rfl:     lisinopril (PRINIVIL,ZESTRIL) 40 MG tablet, TAKE 1 TABLET BY MOUTH EVERY DAY, Disp: 30 tablet, Rfl: 0    metFORMIN (GLUCOPHAGE) 500 MG tablet, Take 1 tablet (500 mg total) by mouth daily with breakfast., Disp: 90 tablet, Rfl: 1    metoprolol succinate (TOPROL-XL) 100 MG 24 hr tablet, Take 1 tablet (100 mg total) by mouth once daily., Disp: 90 tablet, Rfl: 1    nitroGLYCERIN (NITROSTAT) 0.4 MG SL tablet, , Disp: , Rfl:     omeprazole (PRILOSEC) 40 MG capsule, TAKE 1 CAPSULE BY MOUTH EVERY DAY, Disp: 90 capsule, Rfl: 1    potassium chloride (MICRO-K)  10 MEQ CpSR, Take 20 mEq by mouth 2 (two) times daily. , Disp: , Rfl:     VENTOLIN HFA 90 mcg/actuation inhaler, Inhale 2 puffs by mouth every 4-6 hours as needed., Disp: , Rfl: 3    amLODIPine (NORVASC) 2.5 MG tablet, Take 1 tablet (2.5 mg total) by mouth once daily., Disp: 30 tablet, Rfl: 3    EFFIENT 10 mg Tab, Take 1 tablet orally once a day., Disp: , Rfl: 11    prasugrel (EFFIENT) 10 mg Tab, Take 1 tablet (10 mg total) by mouth once daily. (Patient not taking: Reported on 10/22/2019), Disp: 90 tablet, Rfl: 4    traZODone (DESYREL) 100 MG tablet, Take 1 tablet (100 mg total) by mouth every evening., Disp: 30 tablet, Rfl: 3  No current facility-administered medications for this visit.     Facility-Administered Medications Ordered in Other Visits:     0.9%  NaCl infusion, , Intravenous, Continuous, Sarah Gamez MD, Stopped at 07/16/19 1113    sodium chloride 0.9% flush 10 mL, 10 mL, Intravenous, PRN, Sarah Gamez MD      HEALTH MAINTENANCE:   Health Maintenance   Topic Date Due    Pneumococcal Vaccine (Medium Risk) (1 of 1 - PPSV23) 09/20/1986    Hemoglobin A1c  01/10/2020    Foot Exam  02/08/2020    Lipid Panel  10/10/2020    High Dose Statin  10/22/2020    Aspirin/Antiplatelet Therapy  10/22/2020    Eye Exam  10/25/2020    Mammogram  02/18/2021    TETANUS VACCINE  04/08/2029    Colonoscopy  07/16/2029       Review of Systems   Constitutional: Negative for activity change, chills, fatigue, fever and unexpected weight change.   HENT: Negative for congestion, ear discharge, ear pain, hearing loss, rhinorrhea, sore throat and trouble swallowing.    Eyes: Negative for discharge, redness, itching and visual disturbance.   Respiratory: Negative for cough, chest tightness, shortness of breath and wheezing.    Cardiovascular: Negative for chest pain, palpitations and leg swelling.   Gastrointestinal: Positive for diarrhea. Negative for abdominal pain, blood in stool, constipation, nausea  and vomiting.   Endocrine: Negative for cold intolerance, heat intolerance, polydipsia and polyuria.   Genitourinary: Negative for difficulty urinating, dysuria, flank pain, frequency, hematuria and menstrual problem.   Musculoskeletal: Negative for arthralgias, back pain, joint swelling, myalgias and neck pain.   Skin: Negative for color change and rash.   Neurological: Negative for dizziness, tremors, weakness, numbness and headaches.   Psychiatric/Behavioral: Negative for confusion, dysphoric mood and sleep disturbance. The patient is not nervous/anxious.        Objective:          A1C:  Recent Labs   Lab 12/23/16  0933 02/04/19  0832 10/10/19  0708   Hemoglobin A1C 6.3 H 7.2 H 6.8 H     CBC:  Recent Labs   Lab 12/06/17  1029 12/08/17  0413 02/16/18  0053 04/12/18  1642 07/13/18  1505 07/31/18  0407 08/15/18  1530 08/23/18  1432 08/24/18  0522 08/25/18  0557 02/04/19  0832 10/10/19  0708   WBC 7.56 15.09 H 6.99 8.24 8.92 12.42 6.49 9.74 12.95 H 14.64 H 9.73 8.13   RBC 4.56 4.47 4.55 4.29 4.28 4.25 3.85 L 4.38 4.11 4.43 4.70 4.70   Hemoglobin 13.4 13.3 13.5 12.8 12.8 12.8 11.3 L 13.1 12.4 13.3 13.8 14.1   Hematocrit 41.0 40.5 40.5 39.0 39.8 39.4 34.8 L 40.1 37.8 40.5 42.0 42.6   Platelets 517 H 499 H 557 H 541 H 580 H 544 H 464 H 516 H 472 H 507 H 441 H 489 H   Mean Corpuscular Volume 90 91 89 91 93 93 90 92 92 91 89 91   Mean Corpuscular Hemoglobin 29.4 29.8 29.7 29.8 29.9 30.1 29.4 29.9 30.2 30.0 29.4 30.0   Mean Corpuscular Hemoglobin Conc 32.7 32.8 33.3 32.8 32.2 32.5 32.5 32.7 32.8 32.8 32.9 33.1     CMP:  Recent Labs   Lab 04/19/17  1131 11/30/17  1024  02/16/18  0053  08/15/18  1530 08/23/18  1432 08/24/18  0522  02/04/19  0827 10/10/19  0708   Glucose 97 104   < > 164 H   < > 118 H 161 H 201 H   < > 137 H 151 H   Calcium 9.8 9.8   < > 9.5   < > 9.6 10.6 H 9.7   < > 10.0 9.9   Albumin 4.5 4.5  --  4.5  --  3.8 4.7 4.1  --  4.3 4.4   Total Protein 7.5 7.8  --  7.7  --  6.9 7.7 7.0  --  8.0 7.8   Sodium 144  142   < > 138   < > 139 144 138   < > 141 139   Potassium 4.5 3.9   < > 4.1   < > 3.5 3.6 4.6   < > 4.3 4.4   CO2 24 27   < > 22 L   < > 26 32 H 25   < > 23 29   Chloride 106 104   < > 106   < > 104 100 103   < > 104 101   BUN, Bld 15 14   < > 15   < > 14 14 17   < > 12 15   Creatinine 0.76 0.80   < > 0.79   < > 0.9 1.09 0.88   < > 0.73 0.77   Alkaline Phosphatase 71 92  --  78  --  107 104 100  --  129 H 117   ALT 58 H 54 H  --  44  --  53 H 72 H 56 H  --  42 36   AST 35 36  --  31  --  26 50 H 33  --  26 38   Total Bilirubin 0.6 0.6  --  0.4  --  0.4 0.4 0.3  --  0.4 0.5    < > = values in this interval not displayed.     LIPIDS:  Recent Labs   Lab 12/23/16  0933 11/30/17  1024 07/13/18  1505 02/04/19  0826 10/10/19  0708   TSH  --  1.940 1.620 1.540  --    HDL 71 53  --  51 58   Cholesterol 239 H 240 H  --  302 H 194   Triglycerides 126 215 H  --  341 H 116   LDL Cholesterol 142.8 144.0  --  182.8 H 112.8   Hdl/Cholesterol Ratio 29.7 22.1  --  16.9 L 29.9   Non-HDL Cholesterol 168 187  --  251 136   Total Cholesterol/HDL Ratio 3.4 4.5  --  5.9 H 3.3     TSH:  Recent Labs   Lab 11/30/17  1024 07/13/18  1505 02/04/19  0826   TSH 1.940 1.620 1.540           Physical Exam   Constitutional: She appears well-developed and well-nourished. She does not have a sickly appearance.   HENT:   Head: Normocephalic and atraumatic.   Right Ear: External ear normal. Tympanic membrane is not erythematous. No middle ear effusion.   Left Ear: External ear normal. Tympanic membrane is not erythematous.  No middle ear effusion.   Nose: No rhinorrhea. Right sinus exhibits no maxillary sinus tenderness and no frontal sinus tenderness. Left sinus exhibits no maxillary sinus tenderness and no frontal sinus tenderness.   Mouth/Throat: No posterior oropharyngeal edema or posterior oropharyngeal erythema. No tonsillar exudate.   Eyes: Pupils are equal, round, and reactive to light. Conjunctivae and EOM are normal. Right eye exhibits no  discharge. Left eye exhibits no discharge. Right conjunctiva is not injected. Left conjunctiva is not injected.   Neck: No thyromegaly present.   Cardiovascular: Normal rate, regular rhythm, normal heart sounds and intact distal pulses.   No murmur heard.  Pulmonary/Chest: Effort normal and breath sounds normal. She has no wheezes. She has no rhonchi. She has no rales.   Abdominal: Bowel sounds are normal. She exhibits no distension. There is no tenderness.   Musculoskeletal: She exhibits no edema or tenderness.   Lymphadenopathy:     She has no cervical adenopathy.   Neurological: She displays normal reflexes. No cranial nerve deficit.   Skin: Skin is warm and dry. No lesion and no rash noted.   Psychiatric: She has a normal mood and affect. Her behavior is normal. Her mood appears not anxious. Her speech is not rapid and/or pressured. She is not agitated and not aggressive. She does not exhibit a depressed mood.             Assessment and Plan:     Problem List Items Addressed This Visit        Cardiac/Vascular    Essential hypertension - Primary - decrease amlodipine to 2.5 mg a day.        Endocrine    Type 2 diabetes mellitus with diabetic polyneuropathy, without long-term current use of insulin -   Lab Results   Component Value Date    HGBA1C 6.8 (H) 10/10/2019         Other Visit Diagnoses     Psychophysiological insomnia    - we will try increasing trazodone to 100 mg a day.           Orders Placed This Encounter    traZODone (DESYREL) 100 MG tablet    amLODIPine (NORVASC) 2.5 MG tablet         Follow-up with me in 4 months.       Clyde Fierro MD,  FACP  Internal Medicine

## 2019-11-05 RX ORDER — METOPROLOL SUCCINATE 100 MG/1
100 TABLET, EXTENDED RELEASE ORAL DAILY
Qty: 90 TABLET | Refills: 1 | Status: SHIPPED | OUTPATIENT
Start: 2019-11-05 | End: 2020-05-19

## 2019-11-05 RX ORDER — LISINOPRIL 40 MG/1
40 TABLET ORAL DAILY
Qty: 90 TABLET | Refills: 1 | Status: SHIPPED | OUTPATIENT
Start: 2019-11-05 | End: 2020-05-07

## 2019-11-06 ENCOUNTER — CLINICAL SUPPORT (OUTPATIENT)
Dept: SMOKING CESSATION | Facility: CLINIC | Age: 54
End: 2019-11-06
Payer: COMMERCIAL

## 2019-11-06 DIAGNOSIS — F17.200 NICOTINE DEPENDENCE: ICD-10-CM

## 2019-11-06 DIAGNOSIS — F17.210 MODERATE SMOKER (20 OR LESS PER DAY): Primary | ICD-10-CM

## 2019-11-06 PROCEDURE — 99403 PREV MED CNSL INDIV APPRX 45: CPT | Mod: S$GLB,,,

## 2019-11-06 PROCEDURE — 99403 PR PREVENT COUNSEL,INDIV,45 MIN: ICD-10-PCS | Mod: S$GLB,,,

## 2019-11-06 RX ORDER — IBUPROFEN 200 MG
1 TABLET ORAL DAILY
Qty: 28 PATCH | Refills: 0 | Status: SHIPPED | OUTPATIENT
Start: 2019-11-06 | End: 2019-12-05

## 2019-11-06 RX ORDER — BUPROPION HYDROCHLORIDE 150 MG/1
TABLET, EXTENDED RELEASE ORAL
Qty: 60 TABLET | Refills: 0 | Status: SHIPPED | OUTPATIENT
Start: 2019-11-06 | End: 2019-11-26 | Stop reason: SDUPTHER

## 2019-11-06 NOTE — PROGRESS NOTES
Individual Follow-Up Form    2019    Quit Date: TBD    Clinical Status of Patient: Outpatient    Length of Service: 45 minutes    Continuing Medication: yes  Wellbutrin    Other Medications: 14 mg nicotine patches.      Target Symptoms: Withdrawal and medication side effects. The following were  rated moderate (3) to severe (4) on TCRS:  · Moderate (3): none  · Severe (4): desire/craving - Nicotine replacement therapy, withdrawal symptoms, habit  ·     Comments: The patient is smoking 6-10 cigarettes/day  from 20 cigarettes/day. The patient has not been using the 1 mg Chantix BID an the order . She does use 150 mg Wellbutrin QD, not BID, and 14 mg nicotine patches when she remembers. Discussed. She has been slowly going back up on the amount she smokes. Explained the need to consistently take the medication. She does at times has some stress that may cause her to smoke more. Suggested this would help with the stress and not smoke, but again the main point is to take the medication. Discussed ways to reduce stress, hand toys, deep breathing, etc. Will continue to encourage to stay down on the number smoked because she will do better quitting form a level of 3-4 with dry runs. The patient will continue individual sessions and medication monitoring by CTTS. Prescribed medication management will be by practitoner.The patient denies any abnormal behavioral or mental changes at this time.    Diagnosis: F17.210    Next Visit: 2 weeks

## 2019-11-06 NOTE — Clinical Note
he patient is smoking 6-10 cigarettes/day  from 20 cigarettes/day. The patient has not been using the 1 mg Chantix BID an the order . She does use 150 mg Wellbutrin QD, not BID, and 14 mg nicotine patches when she remembers. Discussed. She has been slowly going back up on the amount she smokes. Explained the need to consistently take the medication. She does at times has some stress that may cause her to smoke more. Suggested this would help with the stress and not smoke, but again the main point is to take the medication. Discussed ways to reduce stress, hand toys, deep breathing, etc. Will continue to encourage to stay down on the number smoked because she will do better quitting form a level of 3-4 with dry runs.

## 2019-11-13 ENCOUNTER — CLINICAL SUPPORT (OUTPATIENT)
Dept: SMOKING CESSATION | Facility: CLINIC | Age: 54
End: 2019-11-13
Payer: COMMERCIAL

## 2019-11-13 DIAGNOSIS — F17.210 LIGHT SMOKER: Primary | ICD-10-CM

## 2019-11-13 PROCEDURE — 99403 PR PREVENT COUNSEL,INDIV,45 MIN: ICD-10-PCS | Mod: S$GLB,,,

## 2019-11-13 PROCEDURE — 99403 PREV MED CNSL INDIV APPRX 45: CPT | Mod: S$GLB,,,

## 2019-11-14 NOTE — PROGRESS NOTES
Individual Follow-Up Form    11/14/2019    Quit Date: TBD    Clinical Status of Patient: Outpatient    Length of Service: 45 minutes    Continuing Medication: yes  Wellbutrin    Other Medications: 21 mg nicotine patches, 1 mg Chantix QD     Target Symptoms: Withdrawal and medication side effects. The following were  rated moderate (3) to severe (4) on TCRS:  · Moderate (3): desire/crave, - Nicotine replacement therapy, withdrawal symptoms, habit  ·   · Severe (4): none    Comments: The patient is smoking 8 cigarettes/day  from 20 cigarettes/day. She is using the 150 mg Wellbutrin SR BID, and 1 mg Chantix QD when she remembers. The 21 mg nicotine patches will not stay on. Suggested she try using tape or placing the patch near the foot or ankle with socks. She stated she would try. Discussed the importance of using the medication consistently and she does well when she does use it. She states it is time to quit, but gets of track. Will work on keeping her focused. The patient will continue individual  and/or group sessions and medication monitoring by CTTS. Prescribed medication management will be by practitoner.The patient denies any abnormal behavioral or mental changes at this time.      Diagnosis: F17.210    Next Visit: 2 weeks

## 2019-11-15 ENCOUNTER — OFFICE VISIT (OUTPATIENT)
Dept: URGENT CARE | Facility: CLINIC | Age: 54
End: 2019-11-15
Payer: COMMERCIAL

## 2019-11-15 VITALS
TEMPERATURE: 99 F | HEART RATE: 104 BPM | BODY MASS INDEX: 35.12 KG/M2 | SYSTOLIC BLOOD PRESSURE: 131 MMHG | DIASTOLIC BLOOD PRESSURE: 76 MMHG | WEIGHT: 186 LBS | HEIGHT: 61 IN | OXYGEN SATURATION: 97 %

## 2019-11-15 DIAGNOSIS — J11.1 INFLUENZA: Primary | ICD-10-CM

## 2019-11-15 PROCEDURE — 3008F BODY MASS INDEX DOCD: CPT | Mod: CPTII,S$GLB,, | Performed by: NURSE PRACTITIONER

## 2019-11-15 PROCEDURE — 99214 OFFICE O/P EST MOD 30 MIN: CPT | Mod: S$GLB,,, | Performed by: NURSE PRACTITIONER

## 2019-11-15 PROCEDURE — 3078F DIAST BP <80 MM HG: CPT | Mod: CPTII,S$GLB,, | Performed by: NURSE PRACTITIONER

## 2019-11-15 PROCEDURE — 3008F PR BODY MASS INDEX (BMI) DOCUMENTED: ICD-10-PCS | Mod: CPTII,S$GLB,, | Performed by: NURSE PRACTITIONER

## 2019-11-15 PROCEDURE — 3078F PR MOST RECENT DIASTOLIC BLOOD PRESSURE < 80 MM HG: ICD-10-PCS | Mod: CPTII,S$GLB,, | Performed by: NURSE PRACTITIONER

## 2019-11-15 PROCEDURE — 99214 PR OFFICE/OUTPT VISIT, EST, LEVL IV, 30-39 MIN: ICD-10-PCS | Mod: S$GLB,,, | Performed by: NURSE PRACTITIONER

## 2019-11-15 PROCEDURE — 3075F SYST BP GE 130 - 139MM HG: CPT | Mod: CPTII,S$GLB,, | Performed by: NURSE PRACTITIONER

## 2019-11-15 PROCEDURE — 3075F PR MOST RECENT SYSTOLIC BLOOD PRESS GE 130-139MM HG: ICD-10-PCS | Mod: CPTII,S$GLB,, | Performed by: NURSE PRACTITIONER

## 2019-11-15 RX ORDER — OSELTAMIVIR PHOSPHATE 75 MG/1
75 CAPSULE ORAL 2 TIMES DAILY
Qty: 10 CAPSULE | Refills: 0 | Status: SHIPPED | OUTPATIENT
Start: 2019-11-15 | End: 2019-11-20

## 2019-11-15 RX ORDER — AZITHROMYCIN 250 MG/1
TABLET, FILM COATED ORAL
Qty: 6 TABLET | Refills: 0 | Status: SHIPPED | OUTPATIENT
Start: 2019-11-15 | End: 2019-12-18

## 2019-11-15 RX ORDER — FENOFIBRATE 160 MG/1
TABLET ORAL
Status: ON HOLD | COMMUNITY
Start: 2019-10-25 | End: 2021-03-11

## 2019-11-15 RX ORDER — ONDANSETRON 4 MG/1
4 TABLET, ORALLY DISINTEGRATING ORAL EVERY 8 HOURS PRN
Qty: 12 TABLET | Refills: 0 | Status: SHIPPED | OUTPATIENT
Start: 2019-11-15 | End: 2019-12-18

## 2019-11-15 NOTE — LETTER
November 15, 2019      [facility]  [address], [address]  [place] [address]  Phone: [phone]  Fax: [phone]       Patient: Loi Cordova   YOB: 1965  Date of Visit: 11/15/2019    To Whom It May Concern:    Paul Cordova  was at [facility] on 11/15/2019. She may return to work/school on 11/22/2019 or fever free x 24 hours with no restrictions. If you have any questions or concerns, or if I can be of further assistance, please do not hesitate to contact me.    Sincerely,      Sarah Kohli NP

## 2019-11-15 NOTE — PATIENT INSTRUCTIONS
"Please follow up with your Primary care provider within 2-5 days if your signs and symptoms have not resolved or worsen.  The usual course of cold symptoms are 10-14 days.     If your condition worsens or fails to improve we recommend that you receive another evaluation at the emergency room immediately or contact your primary medical clinic to discuss your concerns.     You must understand that you have received an Urgent Care treatment only and that you may be released before all of your medical problems are known or treated.   You, the patient, will arrange for follow up care as instructed.     Tylenol or Ibuprofen can also be used as directed for pain/fever unless you have an allergy to them or medical condition such as stomach ulcers, kidney or liver disease or blood thinners etc for which you should not be taking these type of medications.     Take over the counter cough medication as directed as needed for cough.  You should avoid medications with pseudoephedrine or phenylephrine (any medication with "D") if you have high blood pressure as this can cause an elevation in your blood pressure. Instead consider Corcidin HBP as needed to prevent an elevated blood pressure.     Natural remedies of symptoms (as needed) include humidification, saline nasal sprays, and/or steamy showers.  Increase fluids, warm tea with honey, cough drops as needed.  You may also use salt water gargles for sore throat.    IF you received a steroid shot today - As discussed, this can elevate your blood pressure, elevate your blood sugar, water weight gain, nervous energy, redness to the face and dimpling of the skin at the injection site.       You have been given an antiviral today for treatment of your condition.  Please complete the antiviral as directed.     If the antiviral is Tamiflu: It can cause nausea and/or vomiting due to irritation of the GI tract in some people.  Please take tamiflu with food.    Wait and See " "Antibiotic    You have been given a "wait and see" antibiotic. You should wait to see if symptoms improve within the next 48-72 hours before you start the antibiotic.      It is important to follow these instructions as antibiotic resistance is high.  Your symptoms will likely resolve without this prescription.      If you do start the antibiotic, please complete the antibiotic as directed on the bottle.      If you are female and on BCP use additional methods to prevent pregnancy while on antibiotics and for one cycle after.     You have been given an antiviral today for treatment of your condition.  Please complete the antiviral as directed.     If the antiviral is Tamiflu: It can cause nausea and/or vomiting due to irritation of the GI tract in some people.  Please take tamiflu with food.      The Flu (Influenza)     The virus that causes the flu spreads through the air in droplets when someone who has the flu coughs, sneezes, laughs, or talks.   The flu (influenza) is an infection that affects your respiratory tract. This tract is made up of your mouth, nose, and lungs, and the passages between them. Unlike a cold, the flu can make you very ill. And it can lead to pneumonia, a serious lung infection. The flu can have serious complications and even cause death.  Who is at risk for the flu?  Anyone can get the flu. But you are more likely to become infected if you:  · Have a weakened immune system  · Work in a healthcare setting where you may be exposed to flu germs  · Live or work with someone who has the flu  · Havent had an annual flu shot  How does the flu spread?  The flu is caused by a virus. The virus spreads through the air in droplets when someone who has the flu coughs, sneezes, laughs, or talks. You can become infected when you inhale these viruses directly. You can also become infected when you touch a surface on which the droplets have landed and then transfer the germs to your eyes, nose, or mouth. " Touching used tissues, or sharing utensils, drinking glasses, or a toothbrush from an infected person can expose you to flu viruses, too.  What are the symptoms of the flu?  Flu symptoms tend to come on quickly and may last a few days to a few weeks. They include:  · Fever usually higher than 100.4°F  (38°C) and chills  · Sore throat and headache  · Dry cough  · Runny nose  · Tiredness and weakness  · Muscle aches  Who is at risk for flu complications?  For some people, the flu can be very serious. The risk for complications is greater for:  · Children younger than age 5  · Adults ages 65 and older  · People with a chronic illness such as diabetes or heart, kidney, or lung disease  · People who live in a nursing home or long-term care facility   How is the flu treated?  The flu usually gets better after 7 days or so. In some cases, your healthcare provider may prescribe an antiviral medicine. This may help you get well a little sooner. For the medicine to help, you need to take it as soon as possible (ideally within 48 hours) after your symptoms start. If you develop pneumonia or other serious illness, you may need to stay in the hospital.  Easing flu symptoms  · Drink lots of fluids such as water, juice, and warm soup. A good rule is to drink enough so that you urinate your normal amount.  · Get plenty of rest.  · Ask your healthcare provider what to take for fever and pain.  · Call your provider if your fever is 100.4°F (38°C) or higher, or you become dizzy, lightheaded, or short of breath.  Taking steps to protect others  · Wash your hands often, especially after coughing or sneezing. Or clean your hands with an alcohol-based hand  containing at least 60% alcohol.  · Cough or sneeze into a tissue. Then throw the tissue away and wash your hands. If you dont have a tissue, cough and sneeze into your elbow.  · Stay home until at least 24 hours after you no longer have a fever or chills. Be sure the fever  isnt being hidden by fever-reducing medicine.  · Dont share food, utensils, drinking glasses, or a toothbrush with others.  · Ask your healthcare provider if others in your household should get antiviral medicine to help them avoid infection.  How can the flu be prevented?  · One of the best ways to avoid the flu is to get a flu vaccine each year. The virus that causes the flu changes from year to year. For that reason, healthcare providers recommend getting the flu vaccine each year, as soon as it's available in your area. The vaccine is given as a shot. Your healthcare provider can tell you which vaccine is right for you. A nasal spray is also available but is not recommended for the 0938-3628 flu season. The CDC says this is because the nasal spray did not seem to protect against the flu over the last several flu seasons. In the past, it was meant for people ages 2 to 49.  · Wash your hands often. Frequent handwashing is a proven way to help prevent infection.  · Carry an alcohol-based hand gel containing at least 60% alcohol. Use it when you can't use soap and water. Then wash your hands as soon as you can.  · Avoid touching your eyes, nose, and mouth.  · At home and work, clean phones, computer keyboards, and toys often with disinfectant wipes.  · If possible, avoid close contact with others who have the flu or symptoms of the flu.  Handwashing tips  Handwashing is one of the best ways to prevent many common infections. If you are caring for or visiting someone with the flu, wash your hands each time you enter and leave the room. Follow these steps:  · Use warm water and plenty of soap. Rub your hands together well.  · Clean the whole hand, including under your nails, between your fingers, and up the wrists.  · Wash for at least 15 seconds.  · Rinse, letting the water run down your fingers, not up your wrists.  · Dry your hands well. Use a paper towel to turn off the faucet and open the door.  Using  alcohol-based hand   Alcohol-based hand  are also a good choice. Use them when you can't use soap and water. Follow these steps:  · Squeeze about a tablespoon of gel into the palm of one hand.  · Rub your hands together briskly, cleaning the backs of your hands, the palms, between your fingers, and up the wrists.  · Rub until the gel is gone and your hands are completely dry.  Preventing the flu in healthcare settings  The flu is a special concern for people in hospitals and long-term care facilities. To help prevent the spread of flu, many hospitals and nursing homes take these steps:  · Healthcare providers wash their hands or use an alcohol-based hand  before and after treating each patient.  · People with the flu have private rooms and bathrooms or share a room with someone with the same infection.  · People who are at high risk for the flu but don't have it are encouraged to get the flu and pneumonia vaccines.  · All healthcare workers are encouraged or required to get flu shots.   Date Last Reviewed: 12/1/2016  © 9254-5806 [employer]. [address] [place] [address]. All rights reserved. This information is not intended as a substitute for professional medical care. Always follow your healthcare professional's instructions.

## 2019-11-15 NOTE — PROGRESS NOTES
"Subjective:       Patient ID: Loi Cordova is a 54 y.o. female.    Vitals:  height is 5' 1" (1.549 m) and weight is 84.4 kg (186 lb). Her oral temperature is 98.7 °F (37.1 °C). Her blood pressure is 131/76 and her pulse is 104. Her oxygen saturation is 97%.     Chief Complaint: Sinus Problem    Patient presents with cough, sinus pressure and sinus pain. Patient says all she did was sleep yesterday and had nausea , vomiting , and diarrhea. Vomiting has stopped ,but still has diarrhea. Onset of symptoms one day ago.   States had sinus pressure x 1.5 weeks.     Sinus Problem   This is a new problem. The current episode started yesterday. The problem is unchanged. The maximum temperature recorded prior to her arrival was 100.4 - 100.9 F. Her pain is at a severity of 4/10. The pain is moderate. Associated symptoms include congestion, coughing, headaches and sinus pressure. Pertinent negatives include no chills, shortness of breath or sore throat. Past treatments include acetaminophen. The treatment provided mild relief.       Constitution: Positive for fever. Negative for chills and fatigue.   HENT: Positive for congestion, sinus pain and sinus pressure. Negative for sore throat.    Neck: Negative for painful lymph nodes.   Cardiovascular: Negative for chest pain and leg swelling.   Eyes: Negative for double vision and blurred vision.   Respiratory: Positive for cough. Negative for shortness of breath.    Gastrointestinal: Positive for abdominal pain, nausea, vomiting and diarrhea.   Genitourinary: Negative for dysuria, frequency, urgency and history of kidney stones.   Musculoskeletal: Negative for joint pain, joint swelling, muscle cramps and muscle ache.   Skin: Negative for color change, pale, rash and bruising.   Allergic/Immunologic: Negative for seasonal allergies.   Neurological: Positive for headaches. Negative for dizziness, history of vertigo, light-headedness and passing out.   Hematologic/Lymphatic: Negative " for swollen lymph nodes.   Psychiatric/Behavioral: Negative for nervous/anxious, sleep disturbance and depression. The patient is not nervous/anxious.        Objective:      Physical Exam   Constitutional: She is oriented to person, place, and time. She appears well-developed and well-nourished. She is cooperative.  Non-toxic appearance. She does not have a sickly appearance. She does not appear ill. No distress.   HENT:   Head: Normocephalic and atraumatic.   Right Ear: Hearing, tympanic membrane, external ear and ear canal normal.   Left Ear: Hearing, tympanic membrane, external ear and ear canal normal.   Nose: Mucosal edema and rhinorrhea present. No nasal deformity. No epistaxis. Right sinus exhibits no maxillary sinus tenderness and no frontal sinus tenderness. Left sinus exhibits no maxillary sinus tenderness and no frontal sinus tenderness.   Mouth/Throat: Uvula is midline, oropharynx is clear and moist and mucous membranes are normal. No trismus in the jaw. Normal dentition. No uvula swelling. No oropharyngeal exudate, posterior oropharyngeal edema or posterior oropharyngeal erythema.   Eyes: Conjunctivae and lids are normal. No scleral icterus.   Neck: Trachea normal, full passive range of motion without pain and phonation normal. Neck supple. No neck rigidity. No edema and no erythema present.   Cardiovascular: Normal rate, regular rhythm, normal heart sounds, intact distal pulses and normal pulses.   Pulmonary/Chest: Effort normal and breath sounds normal. No respiratory distress. She has no decreased breath sounds. She has no rhonchi.   Musculoskeletal: Normal range of motion. She exhibits no edema or deformity.   Lymphadenopathy:     She has no cervical adenopathy.        Right cervical: No superficial cervical, no deep cervical and no posterior cervical adenopathy present.       Left cervical: No superficial cervical, no deep cervical and no posterior cervical adenopathy present.   Neurological: She  is alert and oriented to person, place, and time. She exhibits normal muscle tone. Coordination normal.   Skin: Skin is warm, dry, intact, not diaphoretic and not pale.   Psychiatric: She has a normal mood and affect. Her speech is normal and behavior is normal. Judgment and thought content normal. Cognition and memory are normal.   Nursing note and vitals reviewed.        Assessment:       1. Influenza        Plan:         Influenza  -     ondansetron (ZOFRAN-ODT) 4 MG TbDL; Take 1 tablet (4 mg total) by mouth every 8 (eight) hours as needed (nausea).  Dispense: 12 tablet; Refill: 0  -     oseltamivir (TAMIFLU) 75 MG capsule; Take 1 capsule (75 mg total) by mouth 2 (two) times daily. for 5 days  Dispense: 10 capsule; Refill: 0  -     azithromycin (ZITHROMAX Z-IGNACIA) 250 MG tablet; Take 2 tablets (500 mg) on  Day 1,  followed by 1 tablet (250 mg) once daily on Days 2 through 5.  Dispense: 6 tablet; Refill: 0      Patient Instructions     Please follow up with your Primary care provider within 2-5 days if your signs and symptoms have not resolved or worsen.  The usual course of cold symptoms are 10-14 days.     If your condition worsens or fails to improve we recommend that you receive another evaluation at the emergency room immediately or contact your primary medical clinic to discuss your concerns.     You must understand that you have received an Urgent Care treatment only and that you may be released before all of your medical problems are known or treated.   You, the patient, will arrange for follow up care as instructed.     Tylenol or Ibuprofen can also be used as directed for pain/fever unless you have an allergy to them or medical condition such as stomach ulcers, kidney or liver disease or blood thinners etc for which you should not be taking these type of medications.     Take over the counter cough medication as directed as needed for cough.  You should avoid medications with pseudoephedrine or  "phenylephrine (any medication with "D") if you have high blood pressure as this can cause an elevation in your blood pressure. Instead consider Corcidin HBP as needed to prevent an elevated blood pressure.     Natural remedies of symptoms (as needed) include humidification, saline nasal sprays, and/or steamy showers.  Increase fluids, warm tea with honey, cough drops as needed.  You may also use salt water gargles for sore throat.    IF you received a steroid shot today - As discussed, this can elevate your blood pressure, elevate your blood sugar, water weight gain, nervous energy, redness to the face and dimpling of the skin at the injection site.       You have been given an antiviral today for treatment of your condition.  Please complete the antiviral as directed.     If the antiviral is Tamiflu: It can cause nausea and/or vomiting due to irritation of the GI tract in some people.  Please take tamiflu with food.    Wait and See Antibiotic    You have been given a "wait and see" antibiotic. You should wait to see if symptoms improve within the next 48-72 hours before you start the antibiotic.      It is important to follow these instructions as antibiotic resistance is high.  Your symptoms will likely resolve without this prescription.      If you do start the antibiotic, please complete the antibiotic as directed on the bottle.      If you are female and on BCP use additional methods to prevent pregnancy while on antibiotics and for one cycle after.     You have been given an antiviral today for treatment of your condition.  Please complete the antiviral as directed.     If the antiviral is Tamiflu: It can cause nausea and/or vomiting due to irritation of the GI tract in some people.  Please take tamiflu with food.      The Flu (Influenza)     The virus that causes the flu spreads through the air in droplets when someone who has the flu coughs, sneezes, laughs, or talks.   The flu (influenza) is an infection " that affects your respiratory tract. This tract is made up of your mouth, nose, and lungs, and the passages between them. Unlike a cold, the flu can make you very ill. And it can lead to pneumonia, a serious lung infection. The flu can have serious complications and even cause death.  Who is at risk for the flu?  Anyone can get the flu. But you are more likely to become infected if you:  · Have a weakened immune system  · Work in a healthcare setting where you may be exposed to flu germs  · Live or work with someone who has the flu  · Havent had an annual flu shot  How does the flu spread?  The flu is caused by a virus. The virus spreads through the air in droplets when someone who has the flu coughs, sneezes, laughs, or talks. You can become infected when you inhale these viruses directly. You can also become infected when you touch a surface on which the droplets have landed and then transfer the germs to your eyes, nose, or mouth. Touching used tissues, or sharing utensils, drinking glasses, or a toothbrush from an infected person can expose you to flu viruses, too.  What are the symptoms of the flu?  Flu symptoms tend to come on quickly and may last a few days to a few weeks. They include:  · Fever usually higher than 100.4°F  (38°C) and chills  · Sore throat and headache  · Dry cough  · Runny nose  · Tiredness and weakness  · Muscle aches  Who is at risk for flu complications?  For some people, the flu can be very serious. The risk for complications is greater for:  · Children younger than age 5  · Adults ages 65 and older  · People with a chronic illness such as diabetes or heart, kidney, or lung disease  · People who live in a nursing home or long-term care facility   How is the flu treated?  The flu usually gets better after 7 days or so. In some cases, your healthcare provider may prescribe an antiviral medicine. This may help you get well a little sooner. For the medicine to help, you need to take it as  soon as possible (ideally within 48 hours) after your symptoms start. If you develop pneumonia or other serious illness, you may need to stay in the hospital.  Easing flu symptoms  · Drink lots of fluids such as water, juice, and warm soup. A good rule is to drink enough so that you urinate your normal amount.  · Get plenty of rest.  · Ask your healthcare provider what to take for fever and pain.  · Call your provider if your fever is 100.4°F (38°C) or higher, or you become dizzy, lightheaded, or short of breath.  Taking steps to protect others  · Wash your hands often, especially after coughing or sneezing. Or clean your hands with an alcohol-based hand  containing at least 60% alcohol.  · Cough or sneeze into a tissue. Then throw the tissue away and wash your hands. If you dont have a tissue, cough and sneeze into your elbow.  · Stay home until at least 24 hours after you no longer have a fever or chills. Be sure the fever isnt being hidden by fever-reducing medicine.  · Dont share food, utensils, drinking glasses, or a toothbrush with others.  · Ask your healthcare provider if others in your household should get antiviral medicine to help them avoid infection.  How can the flu be prevented?  · One of the best ways to avoid the flu is to get a flu vaccine each year. The virus that causes the flu changes from year to year. For that reason, healthcare providers recommend getting the flu vaccine each year, as soon as it's available in your area. The vaccine is given as a shot. Your healthcare provider can tell you which vaccine is right for you. A nasal spray is also available but is not recommended for the 3977-7832 flu season. The CDC says this is because the nasal spray did not seem to protect against the flu over the last several flu seasons. In the past, it was meant for people ages 2 to 49.  · Wash your hands often. Frequent handwashing is a proven way to help prevent infection.  · Carry an  alcohol-based hand gel containing at least 60% alcohol. Use it when you can't use soap and water. Then wash your hands as soon as you can.  · Avoid touching your eyes, nose, and mouth.  · At home and work, clean phones, computer keyboards, and toys often with disinfectant wipes.  · If possible, avoid close contact with others who have the flu or symptoms of the flu.  Handwashing tips  Handwashing is one of the best ways to prevent many common infections. If you are caring for or visiting someone with the flu, wash your hands each time you enter and leave the room. Follow these steps:  · Use warm water and plenty of soap. Rub your hands together well.  · Clean the whole hand, including under your nails, between your fingers, and up the wrists.  · Wash for at least 15 seconds.  · Rinse, letting the water run down your fingers, not up your wrists.  · Dry your hands well. Use a paper towel to turn off the faucet and open the door.  Using alcohol-based hand   Alcohol-based hand  are also a good choice. Use them when you can't use soap and water. Follow these steps:  · Squeeze about a tablespoon of gel into the palm of one hand.  · Rub your hands together briskly, cleaning the backs of your hands, the palms, between your fingers, and up the wrists.  · Rub until the gel is gone and your hands are completely dry.  Preventing the flu in healthcare settings  The flu is a special concern for people in hospitals and long-term care facilities. To help prevent the spread of flu, many hospitals and nursing homes take these steps:  · Healthcare providers wash their hands or use an alcohol-based hand  before and after treating each patient.  · People with the flu have private rooms and bathrooms or share a room with someone with the same infection.  · People who are at high risk for the flu but don't have it are encouraged to get the flu and pneumonia vaccines.  · All healthcare workers are encouraged or  required to get flu shots.   Date Last Reviewed: 12/1/2016  © 9323-5561 [employer]. [address] [place] [address], [place] [address]. All rights reserved. This information is not intended as a substitute for professional medical care. Always follow your healthcare professional's instructions.

## 2019-11-21 ENCOUNTER — CLINICAL SUPPORT (OUTPATIENT)
Dept: SMOKING CESSATION | Facility: CLINIC | Age: 54
End: 2019-11-21
Payer: COMMERCIAL

## 2019-11-21 DIAGNOSIS — F17.210 LIGHT SMOKER: Primary | ICD-10-CM

## 2019-11-21 PROCEDURE — 90853 GROUP PSYCHOTHERAPY: CPT | Mod: S$GLB,,,

## 2019-11-21 PROCEDURE — 90853 PR GROUP PSYCHOTHERAPY: ICD-10-PCS | Mod: S$GLB,,,

## 2019-11-21 NOTE — PROGRESS NOTES
Smoking Cessation Group Session #4    Site: [facility]  Date:  11/21/2019  Clinical Status of Patient: Outpatient   Length of Service and Code: 60 minutes - 89068   Number in Attendance: 5  Group Activities/Focus of Group:  Sharing last weeks challenges, triggers, and coping activities to remain quit and/ or keep making progress toward cessation, completion of TCRS (Tobacco Cessation Rating Scale) learned addiction model, personal reasons for quitting, medications, goals, quit date.    Specific session focus: completion of TCRS (Tobacco Cessation Rating Scale) reviewed strategies, habitual behavior, stress, and high risk situations. Introduced stress with addition interventions, SOLVE, relaxation with interventions, nutrition, exercise, weight gain, and the importance of rewarding oneself for accomplishments toward becoming tobacco free. Open discussion of all items with interventions.       Target symptoms:  withdrawal and medication side effects             The following were rated moderate (3) to severe (4) on TCRS:       Moderate 3: none     Severe 4:   none  Patient's Response to Intervention: The patient is smoking 5 cigarettes/day  from  20 cigarettes/day. The patient is cutting back to usual rate, but could not take the Chantix due to nausea and will forget the 150 mg Wellbutrin Sr QD The 21 mg nicotine patches  Do not stay on, but suggested to try using on the ankle by er socks or use tape. Suggested she try dry runs and start working on needle work to stay busy. Will try dry runs to increase time between cigarettes.  Progress Toward Goals and Other Mental Status Changes: The patient denies any abnormal behavioral or mental changes at this time   Interval History:     Diagnosis: Z72.0  Plan: The patient will continue with group therapy sessions and medication regimen prescribed with management by physician or by the Cessation Clinic Provider. Patient will inform Smoking Cessation Counselor of symptoms as rated  high on TCRS.    Return to Clinic: 2 weeks    Quit Date: TBD   Planned Quit Date: TBD

## 2019-11-21 NOTE — Clinical Note
The patient is smoking 5 cigarettes/day  from  20 cigarettes/day. The patient is cutting back to usual rate, but could not take the Chantix due to nausea and will forget the 150 mg Wellbutrin Sr QD The 21 mg nicotine patches  Do not stay on, but suggested to try using on the ankle by er socks or use tape. Suggested she try dry runs and start working on needle work to stay busy. Will try dry runs to increase time between cigarettes.

## 2019-11-26 DIAGNOSIS — F17.200 NICOTINE DEPENDENCE: ICD-10-CM

## 2019-11-26 RX ORDER — BUPROPION HYDROCHLORIDE 150 MG/1
TABLET, EXTENDED RELEASE ORAL
Qty: 60 TABLET | Refills: 5 | Status: SHIPPED | OUTPATIENT
Start: 2019-11-26 | End: 2020-01-24

## 2019-12-05 ENCOUNTER — CLINICAL SUPPORT (OUTPATIENT)
Dept: SMOKING CESSATION | Facility: CLINIC | Age: 54
End: 2019-12-05
Payer: COMMERCIAL

## 2019-12-05 DIAGNOSIS — F17.210 MODERATE SMOKER (20 OR LESS PER DAY): Primary | ICD-10-CM

## 2019-12-05 DIAGNOSIS — F17.200 NICOTINE DEPENDENCE: ICD-10-CM

## 2019-12-05 PROCEDURE — 90853 PR GROUP PSYCHOTHERAPY: ICD-10-PCS | Mod: S$GLB,,,

## 2019-12-05 PROCEDURE — 90853 GROUP PSYCHOTHERAPY: CPT | Mod: S$GLB,,,

## 2019-12-05 RX ORDER — VARENICLINE TARTRATE 1 MG/1
1 TABLET, FILM COATED ORAL 2 TIMES DAILY
Qty: 1 TABLET | Refills: 0 | Status: SHIPPED | OUTPATIENT
Start: 2019-12-05 | End: 2019-12-18

## 2019-12-05 RX ORDER — BUPROPION HYDROCHLORIDE 150 MG/1
TABLET, EXTENDED RELEASE ORAL
Qty: 60 TABLET | Refills: 1 | Status: CANCELLED | OUTPATIENT
Start: 2019-12-05

## 2019-12-05 NOTE — PROGRESS NOTES
Site: [facility]    Date:  12/5/2019  Clinical Status of Patient: Outpatient   Length of Service and Code: 90 minutes - 52829   Number in Attendance: 2  Group Activities/Focus of Group:  orientation, client introductions, completion of TCRS (Tobacco Cessation Rating Scale) learned addiction model, cues/triggers, personal reasons for quitting, medications, goals, quit date    Target symptoms:  withdrawal and medication side effects             The following were rated moderate (3) to severe (4) on TCRS:       Moderate 3: none     Severe 4:   none  Patient's Response to Intervention: The patient is smoking 20 cigarettes/day  from 20 cigarettes/day. The patient has increase again and keeps forgetting to take her medication. Discussed the importance of not forgetting setting timers and trying tr really quit. She does extremely well with the 150 mg Wellbutrin BID when she remembers, the ! Mg Chantix May make he nauseated ans she hall havestomach trouble at times where she cannot take it. Did provide patient with cinnamon toothpicks to use to help with cravings. Really feel if she could get on routine with medication, she would do well with quit. Progress Toward Goals and Other Mental Status Changes: The patient denies any abnormal behavioral or mental changes at this time.    Interval History:     Diagnosis: Z72.0  Plan: The patient will continue with group therapy sessions and medication regimen prescribed with management by physician or Cessation Clinic Provider. Patient will inform Smoking Clinic Cessation Counselor of symptoms as rated high on TCRS.    Return to Clinic: 1 week

## 2019-12-05 NOTE — Clinical Note
The patient is smoking 20 cigarettes/day  from 20 cigarettes/day. The patient has increase again and keeps forgetting to take her medication. Discussed the importance of not forgetting setting timers and trying tr really quit. She does extremely well with the 150 mg Wellbutrin BID when she remembers, the ! Mg Chantix May make he nauseated ans she hall havestomach trouble at times where she cannot take it. Did provide patient with cinnamon toothpicks to use to help with cravings. Really feel if she could get on routine with medication, she would do well with quit.

## 2019-12-11 ENCOUNTER — CLINICAL SUPPORT (OUTPATIENT)
Dept: SMOKING CESSATION | Facility: CLINIC | Age: 54
End: 2019-12-11
Payer: COMMERCIAL

## 2019-12-11 DIAGNOSIS — F17.210 MODERATE SMOKER (20 OR LESS PER DAY): Primary | ICD-10-CM

## 2019-12-11 PROCEDURE — 99403 PR PREVENT COUNSEL,INDIV,45 MIN: ICD-10-PCS | Mod: S$GLB,,,

## 2019-12-11 PROCEDURE — 99403 PREV MED CNSL INDIV APPRX 45: CPT | Mod: S$GLB,,,

## 2019-12-11 NOTE — Clinical Note
The patient is smoking 8 cigarettes/day  from 20 cigarettes/day. The patient has been using the 150 mg Welbutrin QD and 1 mg Chantix QD and seems to do better even with the reduce doses. Discussed working on delay, distractions, but has more trouble with relaxation with a special needs child at home. Use to norma, but may start back. Will continue to work on strategy.

## 2019-12-12 NOTE — PROGRESS NOTES
Individual Follow-Up Form    12/11/2019    Quit Date: TBD    Clinical Status of Patient: Outpatient    Length of Service: 45 minutes    Continuing Medication: yes  Chantix    Other Medications:150 mg Wellbutrin QD     Target Symptoms: Withdrawal and medication side effects. The following were  rated moderate (3) to severe (4) on TCRS:  · Moderate (3): desire/crave frustrated - - Nicotine replacement therapy, withdrawal symptoms, habit  ·    ·   · Severe (4): .none  ·     Comments: The patient is smoking 8 cigarettes/day  from 20 cigarettes/day. The patient has been using the 150 mg Welbutrin QD and 1 mg Chantix QD and seems to do better even with the reduce doses. Discussed working on delay, distractions, but has more trouble with relaxation with a special needs child at home. Use to norma, but may start back. Will continue to work on strategy. The patient will continue individual  and/or group sessions and medication monitoring by CTTS. Prescribed medication management will be by [facility] Medical Practitioner.The patient denies any abnormal behavioral or mental changes at this time.        Diagnosis: F17.210    Next Visit: 2 weeks

## 2019-12-17 ENCOUNTER — PATIENT OUTREACH (OUTPATIENT)
Dept: ADMINISTRATIVE | Facility: OTHER | Age: 54
End: 2019-12-17

## 2019-12-18 ENCOUNTER — OFFICE VISIT (OUTPATIENT)
Dept: GASTROENTEROLOGY | Facility: CLINIC | Age: 54
End: 2019-12-18
Payer: COMMERCIAL

## 2019-12-18 VITALS
DIASTOLIC BLOOD PRESSURE: 84 MMHG | HEART RATE: 70 BPM | WEIGHT: 193.38 LBS | BODY MASS INDEX: 36.54 KG/M2 | SYSTOLIC BLOOD PRESSURE: 128 MMHG

## 2019-12-18 DIAGNOSIS — K21.9 GASTROESOPHAGEAL REFLUX DISEASE, ESOPHAGITIS PRESENCE NOT SPECIFIED: Primary | ICD-10-CM

## 2019-12-18 DIAGNOSIS — R93.3 ABNORMAL FINDING ON GI TRACT IMAGING: ICD-10-CM

## 2019-12-18 DIAGNOSIS — R19.8 ALTERNATING CONSTIPATION AND DIARRHEA: ICD-10-CM

## 2019-12-18 DIAGNOSIS — Z87.19 HISTORY OF ESOPHAGEAL ULCER: ICD-10-CM

## 2019-12-18 PROBLEM — Z12.11 SCREEN FOR COLON CANCER: Status: RESOLVED | Noted: 2019-06-27 | Resolved: 2019-12-18

## 2019-12-18 PROCEDURE — 99999 PR PBB SHADOW E&M-EST. PATIENT-LVL III: CPT | Mod: PBBFAC,,, | Performed by: INTERNAL MEDICINE

## 2019-12-18 PROCEDURE — 3008F BODY MASS INDEX DOCD: CPT | Mod: CPTII,S$GLB,, | Performed by: INTERNAL MEDICINE

## 2019-12-18 PROCEDURE — 3074F PR MOST RECENT SYSTOLIC BLOOD PRESSURE < 130 MM HG: ICD-10-PCS | Mod: CPTII,S$GLB,, | Performed by: INTERNAL MEDICINE

## 2019-12-18 PROCEDURE — 3008F PR BODY MASS INDEX (BMI) DOCUMENTED: ICD-10-PCS | Mod: CPTII,S$GLB,, | Performed by: INTERNAL MEDICINE

## 2019-12-18 PROCEDURE — 3079F PR MOST RECENT DIASTOLIC BLOOD PRESSURE 80-89 MM HG: ICD-10-PCS | Mod: CPTII,S$GLB,, | Performed by: INTERNAL MEDICINE

## 2019-12-18 PROCEDURE — 99214 OFFICE O/P EST MOD 30 MIN: CPT | Mod: S$GLB,,, | Performed by: INTERNAL MEDICINE

## 2019-12-18 PROCEDURE — 99999 PR PBB SHADOW E&M-EST. PATIENT-LVL III: ICD-10-PCS | Mod: PBBFAC,,, | Performed by: INTERNAL MEDICINE

## 2019-12-18 PROCEDURE — 3074F SYST BP LT 130 MM HG: CPT | Mod: CPTII,S$GLB,, | Performed by: INTERNAL MEDICINE

## 2019-12-18 PROCEDURE — 99214 PR OFFICE/OUTPT VISIT, EST, LEVL IV, 30-39 MIN: ICD-10-PCS | Mod: S$GLB,,, | Performed by: INTERNAL MEDICINE

## 2019-12-18 PROCEDURE — 3079F DIAST BP 80-89 MM HG: CPT | Mod: CPTII,S$GLB,, | Performed by: INTERNAL MEDICINE

## 2019-12-18 NOTE — PATIENT INSTRUCTIONS
EGD Prep Instructions    [facility] [address] [place]  [address]    You are scheduled for an EGD with Dr. Gamez on Thursday, January 9 at [facility].  You will enter through the [facility] entrance and check in at Same Day Surgery.    Nothing to eat or drink after midnight before the procedure.  You MAY brush your teeth.    You MAY take your blood pressure, heart, and seizure medication on the morning of the procedure, with a SIP of water.  Hold ALL other medications until after the procedure.    If you are on blood thinners THAT YOU HAVE BEEN INSTRUCTED TO HOLD BY YOUR DOCTOR FOR THIS PROCEDURE, then do NOT take this the morning of your EGD.  Do NOT stop these medications on your own, they must be approved to be held by your doctor.  Your EGD can NOT be done if you are on these medications.  Examples of blood thinners include: Coumadin, Aggrenox, Plavix, Pradaxa, Reapro, Pletal, Xarelto, Ticagrelor, Brilinta, Eliquis, and high dose aspirin (325 mg).  You do not have to stop baby aspirin 81 mg.    You will receive a call a few days before your EGD to tell you the time to arrive.  If you have not received a call by the day before your procedure, call the Pre-op Coordinator at [phone].        GERD (Adult)    The esophagus is a tube that carries food from the mouth to the stomach. A valve at the lower end of the esophagus prevents stomach acid from flowing upward. When this valve doesn't work properly, stomach contents may repeatedly flow back up (reflux) into the esophagus. This is called gastroesophageal reflux disease (GERD). GERD can irritate the esophagus. It can cause problems with swallowing or breathing. In severe cases, GERD can cause recurrent pneumonia or other serious problems.  Symptoms of reflux include burning, pressure or sharp pain in the upper abdomen or mid to lower chest. The pain can spread to the neck, back, or shoulder. There may be  "belching, an acid taste in the back of the throat, chronic cough, or sore throat or hoarseness. GERD symptoms often occur during the day after a big meal. They can also occur at night when lying down.   Home care  Lifestyle changes can help reduce symptoms. If needed, medicines may be prescribed. Symptoms often improve with treatment, but if treatment is stopped, the symptoms often return after a few months. So most persons with GERD will need to continue treatment.  Lifestyle changes  · Limit or avoid fatty, fried, and spicy foods, as well as coffee, chocolate, mint, and foods with high acid content such as tomatoes and citrus fruit and juices (orange, grapefruit, lemon).  · Dont eat large meals, especially at night. Frequent, smaller meals are best. Do not lie down right after eating. And dont eat anything 3 hours before going to bed.  · Avoid drinking alcohol and smoking. As much as possible, stay away from second hand smoke.  · If you are overweight, losing weight will reduce symptoms.   · Avoid wearing tight clothing around your stomach area.  · If your symptoms occur during sleep, use a foam wedge to elevate your upper body (not just your head.) Or, place 4" blocks under the head of your bed.  Medicines  If needed, medicines can help relieve the symptoms of GERD and prevent damage to the esophagus. Discuss a medicine plan with your healthcare provider. This may include one or more of the following medicines:  · Antacids to help neutralize the normal acids in your stomach.  · Acid blockers (H2 blockers) to decrease acid production.  · Acid inhibitors (PPIs) to decrease acid production in a different way than the blockers. They may work better, but can take a little longer to take effect.  Take an antacid 30-60 minutes after eating and at bedtime, but not at the same time as an acid blocker.  Try not to take medicines such as ibuprofen and aspirin. If you are taking aspirin for your heart or other medical " reasons, talk to your healthcare provider about stopping it.  Follow-up care  Follow up with your healthcare provider or as advised by our staff.  When to seek medical advice  Call your healthcare provider if any of the following occur:  · Stomach pain gets worse or moves to the lower right abdomen (appendix area)  · Chest pain appears or gets worse, or spreads to the back, neck, shoulder, or arm  · Frequent vomiting (cant keep down liquids)  · Blood in the stool or vomit (red or black in color)  · Feeling weak or dizzy  · Fever of 100.4ºF (38ºC) or higher, or as directed by your healthcare provider  Date Last Reviewed: 6/23/2015  © 2130-7980 [employer]. [address] [place] [address], [place] [address]. All rights reserved. This information is not intended as a substitute for professional medical care. Always follow your healthcare professional's instructions.

## 2019-12-18 NOTE — PROGRESS NOTES
"Subjective:       Patient ID: Loi Cordova is a 54 y.o. female.    Chief Complaint: Bloated; Diarrhea; and Gastroesophageal Reflux    55 yo F here for f/u.  She had a screening colonoscopy 7/2019 at which time her prep was poor and a large benign adenoma was removed.  She states that she has a formed BM every morning.  She used to always have postprandial BM after every meal during the day, but since starting the Bentyl I gave her, she now only has 1 other BM during the day and it is not necessarily postprandial.  She feels very bloated after eating and has foul smelling belches.  After the colonoscopy she felt better for almost 10 days, having her colon more empty.  Her stools are never hard, and are in fact usually soft.  She has long h/o GERD and states that she had an EGD many years ago and was told she "had an ulcer that could turn into Fontenot's esophagus."    Review of Systems   Constitutional: Negative for appetite change.   Respiratory: Negative for chest tightness, shortness of breath and wheezing.    Cardiovascular: Negative for chest pain, palpitations and leg swelling.       Objective:       /84 (BP Location: Right arm, Patient Position: Sitting, BP Method: X-Large (Manual))   Pulse 70   Wt 87.7 kg (193 lb 6.4 oz)   LMP 07/29/1990 (Within Years) Comment: Partical Hysterectomy in 1990, Gutierrez Ooopherectomy in 2000  BMI 36.54 kg/m²     Physical Exam   Constitutional: She is oriented to person, place, and time. She appears well-developed and well-nourished.   Neurological: She is alert and oriented to person, place, and time.   Psychiatric: She has a normal mood and affect. Her behavior is normal.       Lab Results   Component Value Date    WBC 8.13 10/10/2019    HGB 14.1 10/10/2019    HCT 42.6 10/10/2019    MCV 91 10/10/2019     (H) 10/10/2019     CT chest was independently visualized and reviewed by me and showed markedly distended stomach on  film, not completely visualized on cross " sectional imaging.  Old records from chart reviewed and summarized, significant for cscope 7/2019 with 10 mm TA, poor prep, sigmoid diverticulosis.    Assessment:       1. Gastroesophageal reflux disease, esophagitis presence not specified    2. History of esophageal ulcer    3. Alternating constipation and diarrhea    4. Abnormal finding on GI tract imaging        Plan:       Gastroesophageal reflux disease, esophagitis presence not specified; Abnormal finding on GI tract imaging; History of esophageal ulcer  -     Case request GI: EGD (ESOPHAGOGASTRODUODENOSCOPY)  -     Cont PPI  -     She likely has delayed gastric emptying, but as she is not vomiting food and not losing weight, I will not pursue GES at this time if GOO excluded at EGD.    Alternating constipation and diarrhea  -     X-Ray Abdomen Flat And Erect; Future; Expected date: 12/18/2019, being done to quantify stool burden to help determine which agent will work best for her.  I am leaning toward Amitiza.

## 2020-01-02 ENCOUNTER — CLINICAL SUPPORT (OUTPATIENT)
Dept: SMOKING CESSATION | Facility: CLINIC | Age: 55
End: 2020-01-02
Payer: COMMERCIAL

## 2020-01-02 DIAGNOSIS — F17.210 MODERATE SMOKER (20 OR LESS PER DAY): Primary | ICD-10-CM

## 2020-01-02 PROCEDURE — 99403 PREV MED CNSL INDIV APPRX 45: CPT | Mod: S$GLB,,,

## 2020-01-02 PROCEDURE — 99403 PR PREVENT COUNSEL,INDIV,45 MIN: ICD-10-PCS | Mod: S$GLB,,,

## 2020-01-02 NOTE — Clinical Note
The patient is smoking 4-20 cigarettes/day  from 20 cigarettes/day. She admits to having days when she is busy and will not smoke, but will smoke a pack when off and not busy. She forgets to use the 21 mg nicotine patchor to take 1 mg Chantix which she still has plenty and does admit it works well. Discussed the desire to quit and she does, but does not stay committed.

## 2020-01-03 NOTE — PROGRESS NOTES
Individual Follow-Up Form    1/2/2020    Quit Date: TBD    Clinical Status of Patient: Outpatient    Length of Service: 45 minutes    Continuing Medication: yes  Wellbutrin    Other Medications: Had 1 mg Chantix BID, but not taking.     Target Symptoms: Withdrawal and medication side effects. The following were  rated moderate (3) to severe (4) on TCRS:  · Moderate (3): none  · Severe (4): none    Comments: The patient is smoking 4-20 cigarettes/day  from 20 cigarettes/day. She admits to having days when she is busy and will not smoke, but will smoke a pack when off and not busy. She forgets to use the 21 mg nicotine patchor to take 1 mg Chantix which she still has plenty and does admit it works well. Discussed the desire to quit and she does, but does not stay committed. Would benefit from group, but conflict with work. Will try to find smoking support quit audra to help if possible. The patient will continue individual  and/or group sessions and medication monitoring by CTTS. Prescribed medication management will be by [facility] Medical Practitioner.The patient denies any abnormal behavioral or mental changes at this time.    Diagnosis: F17.210    Next Visit: 2 weeks

## 2020-01-13 ENCOUNTER — TELEPHONE (OUTPATIENT)
Dept: FAMILY MEDICINE | Facility: CLINIC | Age: 55
End: 2020-01-13

## 2020-01-13 NOTE — TELEPHONE ENCOUNTER
Patient came by office wanting to know if there were any available appointments for today. Informed pt that your schedule was full for today.  Pt informed me that she has some breast tenderness, and she has a yellow discharge from her nipple. Pt wants to know is there anyway you can put in orders for her to complete either a mammogram or a ultrasound. Please advise.

## 2020-01-14 ENCOUNTER — OFFICE VISIT (OUTPATIENT)
Dept: FAMILY MEDICINE | Facility: CLINIC | Age: 55
End: 2020-01-14
Payer: COMMERCIAL

## 2020-01-14 VITALS
RESPIRATION RATE: 18 BRPM | TEMPERATURE: 98 F | WEIGHT: 201 LBS | BODY MASS INDEX: 39.46 KG/M2 | OXYGEN SATURATION: 98 % | DIASTOLIC BLOOD PRESSURE: 80 MMHG | HEART RATE: 64 BPM | HEIGHT: 60 IN | SYSTOLIC BLOOD PRESSURE: 110 MMHG

## 2020-01-14 DIAGNOSIS — N64.52 NIPPLE DISCHARGE: ICD-10-CM

## 2020-01-14 DIAGNOSIS — Z23 NEED FOR PNEUMOCOCCAL VACCINATION: ICD-10-CM

## 2020-01-14 DIAGNOSIS — N64.4 BREAST TENDERNESS: Primary | ICD-10-CM

## 2020-01-14 PROCEDURE — 3079F DIAST BP 80-89 MM HG: CPT | Mod: CPTII,S$GLB,, | Performed by: INTERNAL MEDICINE

## 2020-01-14 PROCEDURE — 90732 PPSV23 VACC 2 YRS+ SUBQ/IM: CPT | Mod: S$GLB,,, | Performed by: INTERNAL MEDICINE

## 2020-01-14 PROCEDURE — 90732 PNEUMOCOCCAL POLYSACCHARIDE VACCINE 23-VALENT =>2YO SQ IM: ICD-10-PCS | Mod: S$GLB,,, | Performed by: INTERNAL MEDICINE

## 2020-01-14 PROCEDURE — 99999 PR PBB SHADOW E&M-EST. PATIENT-LVL IV: ICD-10-PCS | Mod: PBBFAC,,, | Performed by: INTERNAL MEDICINE

## 2020-01-14 PROCEDURE — 3079F PR MOST RECENT DIASTOLIC BLOOD PRESSURE 80-89 MM HG: ICD-10-PCS | Mod: CPTII,S$GLB,, | Performed by: INTERNAL MEDICINE

## 2020-01-14 PROCEDURE — 99213 OFFICE O/P EST LOW 20 MIN: CPT | Mod: 25,S$GLB,, | Performed by: INTERNAL MEDICINE

## 2020-01-14 PROCEDURE — 90471 PNEUMOCOCCAL POLYSACCHARIDE VACCINE 23-VALENT =>2YO SQ IM: ICD-10-PCS | Mod: S$GLB,,, | Performed by: INTERNAL MEDICINE

## 2020-01-14 PROCEDURE — 99999 PR PBB SHADOW E&M-EST. PATIENT-LVL IV: CPT | Mod: PBBFAC,,, | Performed by: INTERNAL MEDICINE

## 2020-01-14 PROCEDURE — 3008F PR BODY MASS INDEX (BMI) DOCUMENTED: ICD-10-PCS | Mod: CPTII,S$GLB,, | Performed by: INTERNAL MEDICINE

## 2020-01-14 PROCEDURE — 3074F SYST BP LT 130 MM HG: CPT | Mod: CPTII,S$GLB,, | Performed by: INTERNAL MEDICINE

## 2020-01-14 PROCEDURE — 90471 IMMUNIZATION ADMIN: CPT | Mod: S$GLB,,, | Performed by: INTERNAL MEDICINE

## 2020-01-14 PROCEDURE — 3074F PR MOST RECENT SYSTOLIC BLOOD PRESSURE < 130 MM HG: ICD-10-PCS | Mod: CPTII,S$GLB,, | Performed by: INTERNAL MEDICINE

## 2020-01-14 PROCEDURE — 3008F BODY MASS INDEX DOCD: CPT | Mod: CPTII,S$GLB,, | Performed by: INTERNAL MEDICINE

## 2020-01-14 PROCEDURE — 99213 PR OFFICE/OUTPT VISIT, EST, LEVL III, 20-29 MIN: ICD-10-PCS | Mod: 25,S$GLB,, | Performed by: INTERNAL MEDICINE

## 2020-01-14 RX ORDER — AMOXICILLIN AND CLAVULANATE POTASSIUM 875; 125 MG/1; MG/1
1 TABLET, FILM COATED ORAL 2 TIMES DAILY
Qty: 20 TABLET | Refills: 0 | Status: SHIPPED | OUTPATIENT
Start: 2020-01-14 | End: 2020-01-24

## 2020-01-14 NOTE — PATIENT INSTRUCTIONS
We have reviewed your prescription medications.   We will order a diagnostic mammogram and ultrasound for breast tenderness and discharge.   I am also ordering an antibiotic in case it is an infection.   Follow-up with me in about a month.

## 2020-01-14 NOTE — PROGRESS NOTES
Subjective:       Patient ID: Loi Cordova is a 54 y.o. female.    Chief Complaint: Breast Pain (left breast)     I have reviewed the PMH,  and  for this patient    She  has a past medical history of Allergy, Asthma, Coronary artery disease, GERD (gastroesophageal reflux disease), History of back surgery (2/20/2018), Hyperlipidemia, Hypertension, and Type 2 diabetes mellitus with diabetic polyneuropathy, without long-term current use of insulin (2/8/2019).     She presents today for evaluation of pain and nipple discharge on her left breast.  She reports that she just noticed that her breast was hurting on the left yesterday.  It seemed to get worse as the day went on.  A friend asked her if she was having any nipple discharge so she checked her nipple.  When she squeezed it she got a thick yellow liquid from.  She checked it again later in the day and had some slight blood streaks.  On her last mammogram in February of 2019, there was a question about a focal asymmetry on the right breast behind the nipple.  There were no suspicious findings on her left breast.  Old films were obtained and the radiologist felt that her mammogram was stable.  We will order diagnostic mammogram with ultrasound.    Active Ambulatory Problems     Diagnosis Date Noted    CAD S/P percutaneous coronary angioplasty 12/06/2016    PAD (peripheral artery disease) 12/06/2016    Essential hypertension 12/06/2016    GERD (gastroesophageal reflux disease) 12/06/2016    Tobacco abuse 12/07/2016    Coronary artery disease 12/13/2016    Chronic diastolic CHF (congestive heart failure) 12/07/2017    Class 2 severe obesity due to excess calories with serious comorbidity and body mass index (BMI) of 38.0 to 38.9 in adult 01/16/2018    Pure hypercholesterolemia 01/16/2018    Hyperglycemia 01/16/2018    Hypokalemia 07/13/2018    Restless leg syndrome 07/13/2018    Seasonal allergic rhinitis due to pollen 07/13/2018    Hematuria  01/29/2019    Type 2 diabetes mellitus with diabetic polyneuropathy, without long-term current use of insulin 02/08/2019    Alternating constipation and diarrhea 06/27/2019    Abdominal pain, generalized 06/27/2019    History of esophageal ulcer 12/18/2019    Abnormal finding on GI tract imaging 12/18/2019     Resolved Ambulatory Problems     Diagnosis Date Noted    Chest pain 12/06/2016    Upper respiratory tract infection 02/20/2017    Flu-like symptoms 02/20/2017    Angina, class III 12/07/2017    History of back surgery 02/20/2018    Acute diastolic CHF (congestive heart failure) 07/30/2018    Unstable angina 08/24/2018    Chest pain 08/24/2018    Screen for colon cancer 06/27/2019     Past Medical History:   Diagnosis Date    Allergy     Asthma     Hyperlipidemia     Hypertension          MEDICATIONS:  Current Outpatient Medications:     amLODIPine (NORVASC) 5 MG tablet, Take 1 tablet (5 mg total) by mouth once daily., Disp: 30 tablet, Rfl: 3    aspirin 81 MG Chew, Take 81 mg by mouth once daily., Disp: , Rfl:     atorvastatin (LIPITOR) 80 MG tablet, TAKE 1 TABLET BY MOUTH EVERY DAY, Disp: 30 tablet, Rfl: 0    buPROPion (WELLBUTRIN SR) 150 MG TBSR 12 hr tablet, Take one tablet by mouth once daily for 3 days, then increase to one tablet twice daily, Disp: 60 tablet, Rfl: 5    fenofibrate 160 MG Tab, , Disp: , Rfl:     fluticasone (FLONASE) 50 mcg/actuation nasal spray, 1 spray by Each Nare route once daily., Disp: 1 Bottle, Rfl: 1    furosemide (LASIX) 40 MG tablet, Take 1 tablet (40 mg total) by mouth 2 (two) times daily., Disp: , Rfl:     lisinopril (PRINIVIL,ZESTRIL) 40 MG tablet, TAKE 1 TABLET BY MOUTH EVERY DAY (Patient taking differently: Take 40 mg by mouth daily as needed. ), Disp: 90 tablet, Rfl: 1    metFORMIN (GLUCOPHAGE) 500 MG tablet, Take 1 tablet (500 mg total) by mouth daily with breakfast., Disp: 90 tablet, Rfl: 1    metoprolol succinate (TOPROL-XL) 100 MG 24 hr  tablet, Take 1 tablet (100 mg total) by mouth once daily., Disp: 90 tablet, Rfl: 1    nitroGLYCERIN (NITROSTAT) 0.4 MG SL tablet, , Disp: , Rfl:     omeprazole (PRILOSEC) 40 MG capsule, TAKE 1 CAPSULE BY MOUTH EVERY DAY, Disp: 90 capsule, Rfl: 1    potassium chloride (MICRO-K) 10 MEQ CpSR, Take 20 mEq by mouth 2 (two) times daily. , Disp: , Rfl:     traZODone (DESYREL) 100 MG tablet, Take 1 tablet (100 mg total) by mouth every evening., Disp: 30 tablet, Rfl: 3    VENTOLIN HFA 90 mcg/actuation inhaler, Inhale 2 puffs by mouth every 4-6 hours as needed., Disp: , Rfl: 3    amoxicillin-clavulanate 875-125mg (AUGMENTIN) 875-125 mg per tablet, Take 1 tablet by mouth 2 (two) times daily. for 10 days, Disp: 20 tablet, Rfl: 0    dicyclomine (BENTYL) 20 mg tablet, Take 1 tablet (20 mg total) by mouth 3 (three) times daily as needed. (Patient not taking: Reported on 1/14/2020), Disp: 60 tablet, Rfl: 2  No current facility-administered medications for this visit.     Facility-Administered Medications Ordered in Other Visits:     0.9%  NaCl infusion, , Intravenous, Continuous, Sarah Gamez MD, Stopped at 01/09/20 0925    0.9%  NaCl infusion, , Intravenous, Continuous, Sarah Gamez MD, Stopped at 01/09/20 1026    sodium chloride 0.9% flush 10 mL, 10 mL, Intravenous, PRN, Sarah Gamez MD    sodium chloride 0.9% flush 10 mL, 10 mL, Intravenous, PRN, Sarah Gamez MD      HEALTH MAINTENANCE:   Health Maintenance   Topic Date Due    Pneumococcal Vaccine (Medium Risk) (1 of 1 - PPSV23) 09/20/1986    Hemoglobin A1c  01/10/2020    Foot Exam  02/08/2020    Lipid Panel  10/10/2020    Eye Exam  10/25/2020    High Dose Statin  01/14/2021    Aspirin/Antiplatelet Therapy  01/14/2021    Mammogram  02/18/2021    TETANUS VACCINE  04/08/2029    Colonoscopy  07/16/2029       Review of Systems   Constitutional: Negative for chills, fatigue and fever.   HENT: Negative for congestion, ear discharge, ear  pain, rhinorrhea and sore throat.    Eyes: Negative for discharge, redness and itching.   Respiratory: Negative for cough, chest tightness, shortness of breath and wheezing.    Cardiovascular: Negative for chest pain, palpitations and leg swelling.   Gastrointestinal: Negative for abdominal pain, constipation, diarrhea, nausea and vomiting.   Endocrine: Negative for cold intolerance and heat intolerance.   Genitourinary: Negative for dysuria, flank pain, frequency and hematuria.   Musculoskeletal: Negative for arthralgias, back pain, joint swelling and myalgias.   Skin: Negative for color change and rash.   Neurological: Negative for dizziness, tremors, numbness and headaches.   Psychiatric/Behavioral: Negative for dysphoric mood and sleep disturbance. The patient is not nervous/anxious.        Objective:          A1C:  Recent Labs   Lab 02/04/19  0832 10/10/19  0708   Hemoglobin A1C 7.2 H 6.8 H     CBC:  Recent Labs   Lab 12/06/17  1029 12/08/17  0413 02/16/18  0053 04/12/18  1642 07/13/18  1505 07/31/18  0407 08/15/18  1530 08/23/18  1432 08/24/18  0522 08/25/18  0557 02/04/19  0832 10/10/19  0708   WBC 7.56 15.09 H 6.99 8.24 8.92 12.42 6.49 9.74 12.95 H 14.64 H 9.73 8.13   RBC 4.56 4.47 4.55 4.29 4.28 4.25 3.85 L 4.38 4.11 4.43 4.70 4.70   Hemoglobin 13.4 13.3 13.5 12.8 12.8 12.8 11.3 L 13.1 12.4 13.3 13.8 14.1   Hematocrit 41.0 40.5 40.5 39.0 39.8 39.4 34.8 L 40.1 37.8 40.5 42.0 42.6   Platelets 517 H 499 H 557 H 541 H 580 H 544 H 464 H 516 H 472 H 507 H 441 H 489 H   Mean Corpuscular Volume 90 91 89 91 93 93 90 92 92 91 89 91   Mean Corpuscular Hemoglobin 29.4 29.8 29.7 29.8 29.9 30.1 29.4 29.9 30.2 30.0 29.4 30.0   Mean Corpuscular Hemoglobin Conc 32.7 32.8 33.3 32.8 32.2 32.5 32.5 32.7 32.8 32.8 32.9 33.1     CMP:  Recent Labs   Lab 04/19/17  1131 11/30/17  1024  02/16/18  0053  08/15/18  1530 08/23/18  1432 08/24/18  0522  02/04/19  0827 10/10/19  0708   Glucose 97 104   < > 164 H   < > 118 H 161 H 201 H   <  > 137 H 151 H   Calcium 9.8 9.8   < > 9.5   < > 9.6 10.6 H 9.7   < > 10.0 9.9   Albumin 4.5 4.5  --  4.5  --  3.8 4.7 4.1  --  4.3 4.4   Total Protein 7.5 7.8  --  7.7  --  6.9 7.7 7.0  --  8.0 7.8   Sodium 144 142   < > 138   < > 139 144 138   < > 141 139   Potassium 4.5 3.9   < > 4.1   < > 3.5 3.6 4.6   < > 4.3 4.4   CO2 24 27   < > 22 L   < > 26 32 H 25   < > 23 29   Chloride 106 104   < > 106   < > 104 100 103   < > 104 101   BUN, Bld 15 14   < > 15   < > 14 14 17   < > 12 15   Creatinine 0.76 0.80   < > 0.79   < > 0.9 1.09 0.88   < > 0.73 0.77   Alkaline Phosphatase 71 92  --  78  --  107 104 100  --  129 H 117   ALT 58 H 54 H  --  44  --  53 H 72 H 56 H  --  42 36   AST 35 36  --  31  --  26 50 H 33  --  26 38   Total Bilirubin 0.6 0.6  --  0.4  --  0.4 0.4 0.3  --  0.4 0.5    < > = values in this interval not displayed.     LIPIDS:  Recent Labs   Lab 11/30/17  1024 07/13/18  1505 02/04/19  0826 10/10/19  0708   TSH 1.940 1.620 1.540  --    HDL 53  --  51 58   Cholesterol 240 H  --  302 H 194   Triglycerides 215 H  --  341 H 116   LDL Cholesterol 144.0  --  182.8 H 112.8   Hdl/Cholesterol Ratio 22.1  --  16.9 L 29.9   Non-HDL Cholesterol 187  --  251 136   Total Cholesterol/HDL Ratio 4.5  --  5.9 H 3.3     TSH:  Recent Labs   Lab 11/30/17  1024 07/13/18  1505 02/04/19  0826   TSH 1.940 1.620 1.540           Physical Exam   Constitutional: She appears well-developed and well-nourished. She does not have a sickly appearance.   HENT:   Head: Normocephalic and atraumatic.   Right Ear: External ear normal. Tympanic membrane is not erythematous. No middle ear effusion.   Left Ear: External ear normal. Tympanic membrane is not erythematous.  No middle ear effusion.   Nose: No rhinorrhea. Right sinus exhibits no maxillary sinus tenderness and no frontal sinus tenderness. Left sinus exhibits no maxillary sinus tenderness and no frontal sinus tenderness.   Mouth/Throat: No posterior oropharyngeal edema or posterior  oropharyngeal erythema. No tonsillar exudate.   Eyes: Pupils are equal, round, and reactive to light. Conjunctivae and EOM are normal. Right eye exhibits no discharge. Left eye exhibits no discharge. Right conjunctiva is not injected. Left conjunctiva is not injected.   Neck: No thyromegaly present.   Cardiovascular: Normal rate, regular rhythm, normal heart sounds and intact distal pulses.   No murmur heard.  Pulmonary/Chest: Effort normal and breath sounds normal. She has no wheezes. She has no rhonchi. She has no rales. Right breast exhibits no inverted nipple, no mass, no nipple discharge, no skin change and no tenderness. Left breast exhibits nipple discharge and tenderness. Left breast exhibits no inverted nipple, no mass and no skin change.   Abdominal: Bowel sounds are normal. She exhibits no distension. There is no tenderness.   Musculoskeletal: She exhibits no edema or tenderness.   Lymphadenopathy:     She has no cervical adenopathy.   Neurological: She displays normal reflexes. No cranial nerve deficit.   Skin: Skin is warm and dry. No lesion and no rash noted.   Psychiatric: She has a normal mood and affect. Her behavior is normal. Her mood appears not anxious. Her speech is not rapid and/or pressured. She is not agitated and not aggressive. She does not exhibit a depressed mood.             Assessment and Plan:     Problem List Items Addressed This Visit     None      Visit Diagnoses     Breast tenderness    -  Primary - uncertain cause.  We will treat her with an antibiotic in case this is an infection.  We will also order a diagnostic mammogram and ultrasound.    Relevant Orders    Mammo Digital Diagnostic Left w/ Jatin    US Breast Left Complete    Need for pneumococcal vaccination    - we will update pneumonia shot    Relevant Orders    Pneumococcal Polysaccharide Vaccine (23 Valent) (SQ/IM) (Completed)    Nipple discharge    - uncertain cause.  We will treat with an antibiotic and get diagnostic  mammogram and ultrasound.    Relevant Orders    Mammo Digital Diagnostic Left w/ Jatin    US Breast Left Complete          Orders Placed This Encounter    Mammo Digital Diagnostic Left w/ Jatin    US Breast Left Complete    Pneumococcal Polysaccharide Vaccine (23 Valent) (SQ/IM)    amoxicillin-clavulanate 875-125mg (AUGMENTIN) 875-125 mg per tablet         Follow-up with me in  1 month.       Clyde Fierro MD,  FACP  Internal Medicine

## 2020-01-15 ENCOUNTER — CLINICAL SUPPORT (OUTPATIENT)
Dept: SMOKING CESSATION | Facility: CLINIC | Age: 55
End: 2020-01-15
Payer: COMMERCIAL

## 2020-01-15 DIAGNOSIS — F17.210 MODERATE SMOKER (20 OR LESS PER DAY): Primary | ICD-10-CM

## 2020-01-15 PROCEDURE — 99403 PREV MED CNSL INDIV APPRX 45: CPT | Mod: S$GLB,,,

## 2020-01-15 PROCEDURE — 99403 PR PREVENT COUNSEL,INDIV,45 MIN: ICD-10-PCS | Mod: S$GLB,,,

## 2020-01-15 NOTE — PROGRESS NOTES
Individual Follow-Up Form    1/15/2020    Quit Date: TBD    Clinical Status of Patient: Outpatient    Length of Service: 45 minutes    Continuing Medication: yes  Wellbutrin    Other Medications: none     Target Symptoms: Withdrawal and medication side effects. The following were  rated moderate (3) to severe (4) on TCRS:  · Moderate (3): desire/crave,- Nicotine replacement therapy, withdrawal symptoms, habit  ·   · Severe (4): none    Comments: The patient is smoking 20 cigarettes/day  from 20 cigarettes/day. She states she has health concerns for her and her brother. She still does not remember to take the second 150 mg Wellbutrin BID or to wear the patches. She does make progress when she does. Discussed trying relaxation techniques today and developing exercise schedules.Will keep encouraging to use the medication consistently which is the main problem. The patient will continue individual  and/or group sessions and medication monitoring by CTTS. Prescribed medication management will be by [facility] Medical Practitioner.The patient denies any abnormal behavioral or mental changes at this time.    Diagnosis: F17.210    Next Visit: 2 weeks

## 2020-01-15 NOTE — Clinical Note
The patient is smoking 20 cigarettes/day  from 20 cigarettes/day. She states she has health concerns for her and her brother. She still does not remember to take the second 150 mg Wellbutrin BID or to wear the patches. She does make progress when she does. Discussed trying relaxation techniques today and developing exercise schedules.Will keep encouraging to use the medication consistently which is the main problem. The patient will continue individual  and/or group sessions and medication monitoring by CTTS.

## 2020-01-17 DIAGNOSIS — E11.9 TYPE 2 DIABETES MELLITUS WITHOUT COMPLICATION: ICD-10-CM

## 2020-01-17 DIAGNOSIS — N63.0 BREAST MASS: Primary | ICD-10-CM

## 2020-01-20 ENCOUNTER — PATIENT OUTREACH (OUTPATIENT)
Dept: ADMINISTRATIVE | Facility: OTHER | Age: 55
End: 2020-01-20

## 2020-01-20 RX ORDER — IBUPROFEN 800 MG/1
800 TABLET ORAL 3 TIMES DAILY
Qty: 50 TABLET | Refills: 1 | Status: SHIPPED | OUTPATIENT
Start: 2020-01-20 | End: 2020-04-25

## 2020-01-21 ENCOUNTER — TELEPHONE (OUTPATIENT)
Dept: SURGERY | Facility: CLINIC | Age: 55
End: 2020-01-21

## 2020-01-21 NOTE — TELEPHONE ENCOUNTER
Called and spoke with the patient regarding her referral for brain pain/nipple discharge. She had scheduled herself incorrectly through the portal onto Dr. Espinoza's Melanoma schedule for 2/13. Called and spoke with her and have her scheduled for 1/28 at [facility]. She voiced thanks and understanding. Appt letter sent.    ----- Message from Anmol Garcia sent at 1/21/2020 11:01 AM CST -----  Contact: Pt      The Pt was referred by Dr. Fierro to be seen by you for nipple discharge on the left side.  Pleas contact the Pt to schedule as [facility] didn't let me schedule it.    Phone # [phone](work) or [phone] (cell)

## 2020-01-26 ENCOUNTER — PATIENT OUTREACH (OUTPATIENT)
Dept: ADMINISTRATIVE | Facility: OTHER | Age: 55
End: 2020-01-26

## 2020-01-28 ENCOUNTER — OFFICE VISIT (OUTPATIENT)
Dept: SURGERY | Facility: CLINIC | Age: 55
End: 2020-01-28
Payer: COMMERCIAL

## 2020-01-28 ENCOUNTER — TELEPHONE (OUTPATIENT)
Dept: GASTROENTEROLOGY | Facility: CLINIC | Age: 55
End: 2020-01-28

## 2020-01-28 VITALS
SYSTOLIC BLOOD PRESSURE: 144 MMHG | HEART RATE: 71 BPM | WEIGHT: 200.5 LBS | BODY MASS INDEX: 39.37 KG/M2 | HEIGHT: 60 IN | DIASTOLIC BLOOD PRESSURE: 86 MMHG

## 2020-01-28 DIAGNOSIS — N63.20 BREAST MASS, LEFT: ICD-10-CM

## 2020-01-28 DIAGNOSIS — Z01.818 PRE-OP TESTING: Primary | ICD-10-CM

## 2020-01-28 PROCEDURE — 99999 PR PBB SHADOW E&M-EST. PATIENT-LVL III: ICD-10-PCS | Mod: PBBFAC,,, | Performed by: SURGERY

## 2020-01-28 PROCEDURE — 99244 OFF/OP CNSLTJ NEW/EST MOD 40: CPT | Mod: S$GLB,,, | Performed by: SURGERY

## 2020-01-28 PROCEDURE — 99999 PR PBB SHADOW E&M-EST. PATIENT-LVL III: CPT | Mod: PBBFAC,,, | Performed by: SURGERY

## 2020-01-28 PROCEDURE — 99244 PR OFFICE CONSULTATION,LEVEL IV: ICD-10-PCS | Mod: S$GLB,,, | Performed by: SURGERY

## 2020-01-28 NOTE — H&P (VIEW-ONLY)
History and Physical  [facility]  Department of Surgery    CHIEF COMPLAINT: nipple discharge, breast pain    REFERRING:  Delicia Fierro MD  [address] [facility]  [address] [place] [id number]  Delicia Fierro MD      Subjective:      Loi Cordova is a 54 y.o. postmenopausal female referred for evaluation of left breast discharge and pain. She has had 2 weeks of breast pain and thick, yellow discharge which began on . She subsequently saw her PCP who was unable to express discharge. She reports continued pain, no further discharge and no associated mass from the area.      Patient denies to previous breast biopsy. Patient denies a personal history of breast cancer.    Current smoker (12ppd)    GYN History:  Age of menarche was 15. Age of menopause was 26 or so 2/2 to hysterectomy. Patient reports hormonal therapy use for a few months following her hysterectomy. Patient is . Age of first live birth was 20.  Patient did not breast feed.    FAMILY history:  Mom with ovarian cancer  Dad with lung cancer     Past Medical History:   Diagnosis Date    Allergy     Asthma     Coronary artery disease     GERD (gastroesophageal reflux disease)     History of back surgery 2018    Hyperlipidemia     Hypertension     Type 2 diabetes mellitus with diabetic polyneuropathy, without long-term current use of insulin 2019     Past Surgical History:   Procedure Laterality Date    BILATERAL OOPHORECTOMY Bilateral 2000    CARDIAC CATHETERIZATION      7 stents    CHOLECYSTECTOMY      COLONOSCOPY N/A 2019    Procedure: COLONOSCOPY;  Surgeon: Sarah Gamez MD;  Location: [facility];  Service: Endoscopy;  Laterality: N/A;    ESOPHAGOGASTRODUODENOSCOPY N/A 2020    Procedure: EGD (ESOPHAGOGASTRODUODENOSCOPY);  Surgeon: Sarah Gamez MD;  Location: [facility];  Service: Endoscopy;  Laterality: N/A;    HYSTERECTOMY      PARTIAL HYSTERECTOMY N/A     Uterus taken out    SHOULDER  SURGERY      TOTAL KNEE ARTHROPLASTY       Current Outpatient Medications on File Prior to Visit   Medication Sig Dispense Refill    amLODIPine (NORVASC) 5 MG tablet Take 1 tablet (5 mg total) by mouth once daily. 30 tablet 3    aspirin 81 MG Chew Take 81 mg by mouth once daily.      atorvastatin (LIPITOR) 80 MG tablet TAKE 1 TABLET BY MOUTH EVERY DAY 30 tablet 0    dicyclomine (BENTYL) 20 mg tablet Take 1 tablet (20 mg total) by mouth 3 (three) times daily as needed. 60 tablet 2    fenofibrate 160 MG Tab       fluticasone (FLONASE) 50 mcg/actuation nasal spray 1 spray by Each Nare route once daily. 1 Bottle 1    furosemide (LASIX) 40 MG tablet Take 1 tablet (40 mg total) by mouth 2 (two) times daily.      ibuprofen (ADVIL,MOTRIN) 800 MG tablet Take 1 tablet (800 mg total) by mouth 3 (three) times daily. 50 tablet 1    linaCLOtide (LINZESS) 72 mcg Cap capsule Take 1 capsule (72 mcg total) by mouth once daily. 90 capsule 3    lisinopril (PRINIVIL,ZESTRIL) 40 MG tablet TAKE 1 TABLET BY MOUTH EVERY DAY (Patient taking differently: Take 40 mg by mouth daily as needed. ) 90 tablet 1    metFORMIN (GLUCOPHAGE) 500 MG tablet Take 1 tablet (500 mg total) by mouth daily with breakfast. 90 tablet 1    metoprolol succinate (TOPROL-XL) 100 MG 24 hr tablet Take 1 tablet (100 mg total) by mouth once daily. 90 tablet 1    nitroGLYCERIN (NITROSTAT) 0.4 MG SL tablet       omeprazole (PRILOSEC) 40 MG capsule TAKE 1 CAPSULE BY MOUTH EVERY DAY 90 capsule 1    potassium chloride (MICRO-K) 10 MEQ CpSR Take 20 mEq by mouth 2 (two) times daily.       traZODone (DESYREL) 100 MG tablet Take 1 tablet (100 mg total) by mouth every evening. 30 tablet 3    VENTOLIN HFA 90 mcg/actuation inhaler Inhale 2 puffs by mouth every 4-6 hours as needed.  3     Current Facility-Administered Medications on File Prior to Visit   Medication Dose Route Frequency Provider Last Rate Last Dose    0.9%  NaCl infusion   Intravenous Continuous  Sarah Gamez MD   Stopped at 01/09/20 0925    0.9%  NaCl infusion   Intravenous Continuous Sarah Gamez MD   Stopped at 01/09/20 1026    sodium chloride 0.9% flush 10 mL  10 mL Intravenous PRN Sarah Gamez MD        sodium chloride 0.9% flush 10 mL  10 mL Intravenous PRN Sarah Gamez MD         Social History     Socioeconomic History    Marital status:      Spouse name: Not on file    Number of children: Not on file    Years of education: Not on file    Highest education level: Not on file   Occupational History    Not on file   Social Needs    Financial resource strain: Not hard at all    Food insecurity:     Worry: Never true     Inability: Never true    Transportation needs:     Medical: No     Non-medical: No   Tobacco Use    Smoking status: Current Every Day Smoker     Packs/day: 0.50     Years: 34.00     Pack years: 17.00     Types: Cigarettes    Smokeless tobacco: Never Used   Substance and Sexual Activity    Alcohol use: Yes     Frequency: Monthly or less     Drinks per session: 5 or 6     Binge frequency: Never     Comment: occassionly    Drug use: No    Sexual activity: Yes     Partners: Male   Lifestyle    Physical activity:     Days per week: 1 day     Minutes per session: 20 min    Stress: Not at all   Relationships    Social connections:     Talks on phone: More than three times a week     Gets together: Once a week     Attends Mu-ism service: Never     Active member of club or organization: No     Attends meetings of clubs or organizations: Never     Relationship status:    Other Topics Concern    Not on file   Social History Narrative    Not on file     Family History   Problem Relation Age of Onset    Heart disease Mother     Hyperlipidemia Mother     Hypertension Mother     Diabetes Mother     Heart disease Father     Hyperlipidemia Father     Cancer Father     Diabetes Maternal Aunt     Prostate cancer Neg Hx     Kidney  disease Neg Hx        Review of Systems  Review of Systems   Constitutional: Negative for activity change, appetite change, chills, fatigue and fever.   HENT: Negative for congestion, ear pain, rhinorrhea and sore throat.    Eyes: Negative for pain, discharge and visual disturbance.   Respiratory: Negative for cough, chest tightness and shortness of breath.    Cardiovascular: Negative for chest pain and palpitations.   Gastrointestinal: Negative for abdominal distention, abdominal pain, blood in stool, constipation, diarrhea, nausea and vomiting.   Genitourinary: Negative for difficulty urinating.   Musculoskeletal: Negative for back pain and neck pain.   Skin: Negative for color change and rash.   Neurological: Negative for dizziness, numbness and headaches.   Hematological: Negative for adenopathy.   Psychiatric/Behavioral: Negative.         Objective:        BP (!) 144/86 (BP Location: Right arm, Patient Position: Sitting, BP Method: Medium (Automatic))   Pulse 71   Ht 5' (1.524 m)   Wt 90.9 kg (200 lb 8.1 oz)   LMP 07/29/1990 (Within Years) Comment: Partical Hysterectomy in 1990, Gutierrez Ooopherectomy in 2000  BMI 39.16 kg/m²   General appearance: alert, appears stated age and cooperative  Head: Normocephalic, without obvious abnormality, atraumatic  Neck: no adenopathy and supple, symmetrical, trachea midline  Lungs: normal effort, nonlabored breathing  Breasts: No nipple retraction or dimpling, No nipple discharge or bleeding, No axillary or supraclavicular adenopathy, positive findings: small palpable 1cm mass just medial to the left nipple, some mild nipple irritation to the L nipple; no right sided breast changes  Heart: regular rate and rhythm  Abdomen: soft, non-tender; bowel sounds normal; no masses,  no organomegaly  Extremities: extremities normal, atraumatic, no cyanosis or edema  Skin: normal, no edema and no lesions noted  Lymph nodes: Cervical, supraclavicular, and axillary nodes  normal.  Neurologic: Grossly normal    Radiology review: Images personally reviewed by me in the clinic.  Mammogram & Ultrasound 1/17/2020:   Films Compared:  Prior images (if available) were compared.     Findings:  Computer-aided detection was utilized in the interpretation of this examination. This procedure was performed using tomosynthesis.       The breasts have scattered areas of fibroglandular density. Limited breast ultrasound was performed. There is no evidence of suspicious masses, microcalcifications or architectural distortion. Ultrasound evaluation demonstrates no suspicious abnormality.      Impression:  There is no mammographic or sonographic evidence of malignancy.     BI-RADS Category 1: Negative     Recommendation:  Surgical Consult is recommended for the left breast.      Patient has a palpable area just medial to the left nipple and recent history of bloody nipple discharge. Todays mammogram and US were negative. The findings and recommendations were discussed with the patient by me at time of exam.    Assessment:      Loi Cordova is a 54 y.o. postmenopausal female with new L breast discharge     Plan:     Palpation guided excisional breast biopsy on 2-  Risks and benefits discussed with patient, consent signed in clinic    Rosamaria De La Cruz MD  PGY-3 General Surgery   [phone]    I have personally taken the history and examined this patient and agree with the resident's note as stated above.  The patient presents with approximately 2 week history of left pain and pruritus to the left breast.  She noted a thick yellow and subsequently bloody discharge on the 1st day but no discharge since.  She had imaging which was negative BI-RADS 1 with diagnostic mammogram and ultrasound.  She still has some residual pain and tenderness and has a 1 cm palpable density in the anterior subareolar region along the medial subareolar region which the patient can reproduce herself.  We cannot elicit any  discharge with attempted trigger point palpation of the left nipple-areolar complex.  There is a palpable 1 cm density anterior medially in the left subareolar region again with no discharge expressed today  There is a maternal history of ovarian cancer.  The patient's mother  of ovarian cancer 2 years ago at age 72 secondary to a CVA and not from metastatic ovarian cancer.  She does not believe that her mother had genetic testing performed.    The patient desires an excisional breast biopsy for both diagnostic and therapeutic purposes with possible terminal nipple duct excision if the discharge can be reproduced under anesthesia at the time of surgery.  She is consented and scheduled for 7:00 a.m. start on 2020 for a left breast excisional breast biopsy by palpation guidance without seed localization through a medial curvilinear circumareolar incision with possible nipple duct excision if the nipple discharges reproduced at the time of surgery; otherwise will proceed with palpation guided excisional breast biopsy only that day.

## 2020-01-28 NOTE — TELEPHONE ENCOUNTER
----- Message from Sarah Gamez MD sent at 1/17/2020 11:11 AM CST -----  HP negative.  The lesion in the duodenal bulb is just extra gastric tissue, which is not uncommon and is also not of concern.  Celiac (-).  Cont PPI, and please get the abdominal xray at her convenience.  This will help me know how much stool she is retaining.

## 2020-01-28 NOTE — PROGRESS NOTES
History and Physical  [facility]  Department of Surgery    CHIEF COMPLAINT: nipple discharge, breast pain    REFERRING:  Delicia Fierro MD  [address] [facility]  [address] [place] [id number]  Delicia Fierro MD      Subjective:      Loi Cordova is a 54 y.o. postmenopausal female referred for evaluation of left breast discharge and pain. She has had 2 weeks of breast pain and thick, yellow discharge which began on . She subsequently saw her PCP who was unable to express discharge. She reports continued pain, no further discharge and no associated mass from the area.      Patient denies to previous breast biopsy. Patient denies a personal history of breast cancer.    Current smoker (12ppd)    GYN History:  Age of menarche was 15. Age of menopause was 26 or so 2/2 to hysterectomy. Patient reports hormonal therapy use for a few months following her hysterectomy. Patient is . Age of first live birth was 20.  Patient did not breast feed.    FAMILY history:  Mom with ovarian cancer  Dad with lung cancer     Past Medical History:   Diagnosis Date    Allergy     Asthma     Coronary artery disease     GERD (gastroesophageal reflux disease)     History of back surgery 2018    Hyperlipidemia     Hypertension     Type 2 diabetes mellitus with diabetic polyneuropathy, without long-term current use of insulin 2019     Past Surgical History:   Procedure Laterality Date    BILATERAL OOPHORECTOMY Bilateral 2000    CARDIAC CATHETERIZATION      7 stents    CHOLECYSTECTOMY      COLONOSCOPY N/A 2019    Procedure: COLONOSCOPY;  Surgeon: Sarah Gamez MD;  [facility];  Service: Endoscopy;  Laterality: N/A;    ESOPHAGOGASTRODUODENOSCOPY N/A 2020    Procedure: EGD (ESOPHAGOGASTRODUODENOSCOPY);  Surgeon: Sarah Gamez MD;  [facility];  Service: Endoscopy;  Laterality: N/A;    HYSTERECTOMY      PARTIAL HYSTERECTOMY N/A     Uterus taken out    SHOULDER  SURGERY      TOTAL KNEE ARTHROPLASTY       Current Outpatient Medications on File Prior to Visit   Medication Sig Dispense Refill    amLODIPine (NORVASC) 5 MG tablet Take 1 tablet (5 mg total) by mouth once daily. 30 tablet 3    aspirin 81 MG Chew Take 81 mg by mouth once daily.      atorvastatin (LIPITOR) 80 MG tablet TAKE 1 TABLET BY MOUTH EVERY DAY 30 tablet 0    dicyclomine (BENTYL) 20 mg tablet Take 1 tablet (20 mg total) by mouth 3 (three) times daily as needed. 60 tablet 2    fenofibrate 160 MG Tab       fluticasone (FLONASE) 50 mcg/actuation nasal spray 1 spray by Each Nare route once daily. 1 Bottle 1    furosemide (LASIX) 40 MG tablet Take 1 tablet (40 mg total) by mouth 2 (two) times daily.      ibuprofen (ADVIL,MOTRIN) 800 MG tablet Take 1 tablet (800 mg total) by mouth 3 (three) times daily. 50 tablet 1    linaCLOtide (LINZESS) 72 mcg Cap capsule Take 1 capsule (72 mcg total) by mouth once daily. 90 capsule 3    lisinopril (PRINIVIL,ZESTRIL) 40 MG tablet TAKE 1 TABLET BY MOUTH EVERY DAY (Patient taking differently: Take 40 mg by mouth daily as needed. ) 90 tablet 1    metFORMIN (GLUCOPHAGE) 500 MG tablet Take 1 tablet (500 mg total) by mouth daily with breakfast. 90 tablet 1    metoprolol succinate (TOPROL-XL) 100 MG 24 hr tablet Take 1 tablet (100 mg total) by mouth once daily. 90 tablet 1    nitroGLYCERIN (NITROSTAT) 0.4 MG SL tablet       omeprazole (PRILOSEC) 40 MG capsule TAKE 1 CAPSULE BY MOUTH EVERY DAY 90 capsule 1    potassium chloride (MICRO-K) 10 MEQ CpSR Take 20 mEq by mouth 2 (two) times daily.       traZODone (DESYREL) 100 MG tablet Take 1 tablet (100 mg total) by mouth every evening. 30 tablet 3    VENTOLIN HFA 90 mcg/actuation inhaler Inhale 2 puffs by mouth every 4-6 hours as needed.  3     Current Facility-Administered Medications on File Prior to Visit   Medication Dose Route Frequency Provider Last Rate Last Dose    0.9%  NaCl infusion   Intravenous Continuous  Sarah Gamez MD   Stopped at 01/09/20 0925    0.9%  NaCl infusion   Intravenous Continuous Sarah Gamez MD   Stopped at 01/09/20 1026    sodium chloride 0.9% flush 10 mL  10 mL Intravenous PRN Sarah Gamez MD        sodium chloride 0.9% flush 10 mL  10 mL Intravenous PRN Sarah Gamez MD         Social History     Socioeconomic History    Marital status:      Spouse name: Not on file    Number of children: Not on file    Years of education: Not on file    Highest education level: Not on file   Occupational History    Not on file   Social Needs    Financial resource strain: Not hard at all    Food insecurity:     Worry: Never true     Inability: Never true    Transportation needs:     Medical: No     Non-medical: No   Tobacco Use    Smoking status: Current Every Day Smoker     Packs/day: 0.50     Years: 34.00     Pack years: 17.00     Types: Cigarettes    Smokeless tobacco: Never Used   Substance and Sexual Activity    Alcohol use: Yes     Frequency: Monthly or less     Drinks per session: 5 or 6     Binge frequency: Never     Comment: occassionly    Drug use: No    Sexual activity: Yes     Partners: Male   Lifestyle    Physical activity:     Days per week: 1 day     Minutes per session: 20 min    Stress: Not at all   Relationships    Social connections:     Talks on phone: More than three times a week     Gets together: Once a week     Attends Jew service: Never     Active member of club or organization: No     Attends meetings of clubs or organizations: Never     Relationship status:    Other Topics Concern    Not on file   Social History Narrative    Not on file     Family History   Problem Relation Age of Onset    Heart disease Mother     Hyperlipidemia Mother     Hypertension Mother     Diabetes Mother     Heart disease Father     Hyperlipidemia Father     Cancer Father     Diabetes Maternal Aunt     Prostate cancer Neg Hx     Kidney  disease Neg Hx        Review of Systems  Review of Systems   Constitutional: Negative for activity change, appetite change, chills, fatigue and fever.   HENT: Negative for congestion, ear pain, rhinorrhea and sore throat.    Eyes: Negative for pain, discharge and visual disturbance.   Respiratory: Negative for cough, chest tightness and shortness of breath.    Cardiovascular: Negative for chest pain and palpitations.   Gastrointestinal: Negative for abdominal distention, abdominal pain, blood in stool, constipation, diarrhea, nausea and vomiting.   Genitourinary: Negative for difficulty urinating.   Musculoskeletal: Negative for back pain and neck pain.   Skin: Negative for color change and rash.   Neurological: Negative for dizziness, numbness and headaches.   Hematological: Negative for adenopathy.   Psychiatric/Behavioral: Negative.         Objective:        BP (!) 144/86 (BP Location: Right arm, Patient Position: Sitting, BP Method: Medium (Automatic))   Pulse 71   Ht 5' (1.524 m)   Wt 90.9 kg (200 lb 8.1 oz)   LMP 07/29/1990 (Within Years) Comment: Partical Hysterectomy in 1990, Gutierrez Ooopherectomy in 2000  BMI 39.16 kg/m²   General appearance: alert, appears stated age and cooperative  Head: Normocephalic, without obvious abnormality, atraumatic  Neck: no adenopathy and supple, symmetrical, trachea midline  Lungs: normal effort, nonlabored breathing  Breasts: No nipple retraction or dimpling, No nipple discharge or bleeding, No axillary or supraclavicular adenopathy, positive findings: small palpable 1cm mass just medial to the left nipple, some mild nipple irritation to the L nipple; no right sided breast changes  Heart: regular rate and rhythm  Abdomen: soft, non-tender; bowel sounds normal; no masses,  no organomegaly  Extremities: extremities normal, atraumatic, no cyanosis or edema  Skin: normal, no edema and no lesions noted  Lymph nodes: Cervical, supraclavicular, and axillary nodes  normal.  Neurologic: Grossly normal    Radiology review: Images personally reviewed by me in the clinic.  Mammogram & Ultrasound 1/17/2020:   Films Compared:  Prior images (if available) were compared.     Findings:  Computer-aided detection was utilized in the interpretation of this examination. This procedure was performed using tomosynthesis.       The breasts have scattered areas of fibroglandular density. Limited breast ultrasound was performed. There is no evidence of suspicious masses, microcalcifications or architectural distortion. Ultrasound evaluation demonstrates no suspicious abnormality.      Impression:  There is no mammographic or sonographic evidence of malignancy.     BI-RADS Category 1: Negative     Recommendation:  Surgical Consult is recommended for the left breast.      Patient has a palpable area just medial to the left nipple and recent history of bloody nipple discharge. Todays mammogram and US were negative. The findings and recommendations were discussed with the patient by me at time of exam.    Assessment:      Loi Cordova is a 54 y.o. postmenopausal female with new L breast discharge     Plan:     Palpation guided excisional breast biopsy on 2-  Risks and benefits discussed with patient, consent signed in clinic    Rosamaria De La Cruz MD  PGY-3 General Surgery   [phone]    I have personally taken the history and examined this patient and agree with the resident's note as stated above.  The patient presents with approximately 2 week history of left pain and pruritus to the left breast.  She noted a thick yellow and subsequently bloody discharge on the 1st day but no discharge since.  She had imaging which was negative BI-RADS 1 with diagnostic mammogram and ultrasound.  She still has some residual pain and tenderness and has a 1 cm palpable density in the anterior subareolar region along the medial subareolar region which the patient can reproduce herself.  We cannot elicit any  discharge with attempted trigger point palpation of the left nipple-areolar complex.  There is a palpable 1 cm density anterior medially in the left subareolar region again with no discharge expressed today  There is a maternal history of ovarian cancer.  The patient's mother  of ovarian cancer 2 years ago at age 72 secondary to a CVA and not from metastatic ovarian cancer.  She does not believe that her mother had genetic testing performed.    The patient desires an excisional breast biopsy for both diagnostic and therapeutic purposes with possible terminal nipple duct excision if the discharge can be reproduced under anesthesia at the time of surgery.  She is consented and scheduled for 7:00 a.m. start on 2020 for a left breast excisional breast biopsy by palpation guidance without seed localization through a medial curvilinear circumareolar incision with possible nipple duct excision if the nipple discharges reproduced at the time of surgery; otherwise will proceed with palpation guided excisional breast biopsy only that day.

## 2020-01-29 DIAGNOSIS — N63.20 BREAST MASS, LEFT: Primary | ICD-10-CM

## 2020-01-30 ENCOUNTER — TELEPHONE (OUTPATIENT)
Dept: GASTROENTEROLOGY | Facility: CLINIC | Age: 55
End: 2020-01-30

## 2020-01-30 NOTE — TELEPHONE ENCOUNTER
----- Message from Sarah Gamez MD sent at 1/24/2020  3:36 PM CST -----  There is a moderate to large amount of stool in the colon, especially in the right colon.  Linzess 72 mcg sent to [facility].  Chart states she stopped the Bentyl, why?  Do not stop the Linzess if she gets diarrhea as it usually goes away within one week.  The goal with the Linzess is to have MORE stool out every day, but not to have accidents.  It is ok if the stool is loose as long as she is not having accidents.

## 2020-02-05 ENCOUNTER — CLINICAL SUPPORT (OUTPATIENT)
Dept: SMOKING CESSATION | Facility: CLINIC | Age: 55
End: 2020-02-05
Payer: COMMERCIAL

## 2020-02-05 DIAGNOSIS — F17.210 MODERATE SMOKER (20 OR LESS PER DAY): Primary | ICD-10-CM

## 2020-02-05 PROCEDURE — 99403 PREV MED CNSL INDIV APPRX 45: CPT | Mod: S$GLB,,,

## 2020-02-05 PROCEDURE — 99403 PR PREVENT COUNSEL,INDIV,45 MIN: ICD-10-PCS | Mod: S$GLB,,,

## 2020-02-05 NOTE — PROGRESS NOTES
Individual Follow-Up Form    2/5/2020    Quit Date: TBD    Clinical Status of Patient: Outpatient    Length of Service: 45 minutes    Continuing Medication: no    Other Medications: none     Target Symptoms: Withdrawal and medication side effects. The following were  rated moderate (3) to severe (4) on TCRS:  · Moderate (3): desire/crave -- Nicotine replacement therapy, withdrawal symptoms, habit  ·   · Severe (4): none    Comments: The patient is smoking 10-12cigarettes/day  from 20 cigarettes/day. The CO measurement = 12  ppm which indicates possibly a heavy smoker . The patient is scheduled to have surgery 2/14/2h and has been instructed to cut back on smoking as much as she can. The nicotine patches do not stay on. She can chew gum in limited amount due to partial and does not like the lozenges. Discussed trying the 150 mg Wellbutrin Sr BID again to help with cravings and to help with post op period. She did agree this might be a good idea. She does well when on medication, but quits taking it after a couple of weeks. Set limit schedule to 5 cigarettes by 2/10 then down to 2-3 by 2/13 or less 24 hrs prior to surgery. The patient denies any abnormal behavioral or mental changes at this time.The patient will continue individual  and/or group sessions and medication monitoring by CTTS. Prescribed medication management will be by [facility] Medical Practitioner.    .   Diagnosis: F17.210    Next Visit: 2 weeks

## 2020-02-05 NOTE — Clinical Note
The patient is smoking 10-12cigarettes/day  from 20cigarettes/day. The CO measurement = 12  ppm which indicates possibly a heavy smoker . The patient is scheduled to have surgery 2/14/2h and has been instructed to cut back on smoking as much as she can. The nicotine patches do not stay on. She can chew gum in limited amount due to partial and does not like the lozenges. Discussed trying the 150 mg Wellbutrin Sr BID again to help with cravings and to help with post op period. She did agree this might be a good idea. She does well when on medication, but quits taking

## 2020-02-06 ENCOUNTER — PATIENT OUTREACH (OUTPATIENT)
Dept: ADMINISTRATIVE | Facility: HOSPITAL | Age: 55
End: 2020-02-06

## 2020-02-12 ENCOUNTER — TELEPHONE (OUTPATIENT)
Dept: DIABETES | Facility: CLINIC | Age: 55
End: 2020-02-12

## 2020-02-12 DIAGNOSIS — E11.42 TYPE 2 DIABETES MELLITUS WITH DIABETIC POLYNEUROPATHY, WITHOUT LONG-TERM CURRENT USE OF INSULIN: Primary | ICD-10-CM

## 2020-02-13 ENCOUNTER — ANESTHESIA EVENT (OUTPATIENT)
Dept: SURGERY | Facility: HOSPITAL | Age: 55
End: 2020-02-13
Payer: COMMERCIAL

## 2020-02-13 ENCOUNTER — TELEPHONE (OUTPATIENT)
Dept: SURGERY | Facility: CLINIC | Age: 55
End: 2020-02-13

## 2020-02-13 NOTE — PROGRESS NOTES
Spoke to Patient.  Arrival time of 0530 given to check in at the Surgery Center on the 2nd Floor of the Hospital on [facility].  Instructed to remain NPO after 0000, to wash up tonight and in the morning with an antibacterial soap, not to wear anything metal or to apply any lotions, powders, or deodorant, and to wear something easy to remove.  Patient verbalized understanding of instructions.

## 2020-02-13 NOTE — ANESTHESIA PREPROCEDURE EVALUATION
[facility]  Anesthesia Pre-Operative Evaluation         Patient Name: Loi Cordova  YOB: 1965  MRN: [id number]    SUBJECTIVE:     Pre-operative evaluation for Procedure(s) (LRB):  BIOPSY, BREAST-Left medial breast palpation guided (Left)  EXCISION-DUCT-Left nipple duct (Left)     02/13/2020    Loi Cordova is a 54 y.o. female w/ a significant PMHx of CAD, HLD, HTN, DMII and breast mass who presents for the above procedure.    Patient last had heart cath 08/2018 with PCI at multiple sites and confirmation of patent existing stents     Prev airway: None documented.       Patient Active Problem List   Diagnosis    CAD S/P percutaneous coronary angioplasty    PAD (peripheral artery disease)    Essential hypertension    GERD (gastroesophageal reflux disease)    Tobacco abuse    Coronary artery disease    Chronic diastolic CHF (congestive heart failure)    Class 2 severe obesity due to excess calories with serious comorbidity and body mass index (BMI) of 38.0 to 38.9 in adult    Pure hypercholesterolemia    Hyperglycemia    Hypokalemia    Restless leg syndrome    Seasonal allergic rhinitis due to pollen    Hematuria    Type 2 diabetes mellitus with diabetic polyneuropathy, without long-term current use of insulin    Alternating constipation and diarrhea    Abdominal pain, generalized    History of esophageal ulcer    Abnormal finding on GI tract imaging       Review of patient's allergies indicates:   Allergen Reactions    Crayfish Anaphylaxis     (crawfish only)       Current Inpatient Medications:      Current Facility-Administered Medications on File Prior to Encounter   Medication Dose Route Frequency Provider Last Rate Last Dose    0.9%  NaCl infusion   Intravenous Continuous Sarah Gamez MD   Stopped at 01/09/20 0925    0.9%  NaCl infusion   Intravenous Continuous Sarah Gamez MD   Stopped at 01/09/20 1026    sodium chloride 0.9% flush 10 mL  10 mL  Intravenous PRN Sarah Gamez MD        sodium chloride 0.9% flush 10 mL  10 mL Intravenous PRN Sarah Gamez MD         Current Outpatient Medications on File Prior to Encounter   Medication Sig Dispense Refill    amLODIPine (NORVASC) 5 MG tablet Take 1 tablet (5 mg total) by mouth once daily. 30 tablet 3    aspirin 81 MG Chew Take 81 mg by mouth once daily.      atorvastatin (LIPITOR) 80 MG tablet TAKE 1 TABLET BY MOUTH EVERY DAY 30 tablet 0    fenofibrate 160 MG Tab       fluticasone (FLONASE) 50 mcg/actuation nasal spray 1 spray by Each Nare route once daily. 1 Bottle 1    furosemide (LASIX) 40 MG tablet Take 1 tablet (40 mg total) by mouth 2 (two) times daily. (Patient taking differently: Take 40 mg by mouth 2 (two) times daily as needed. )      ibuprofen (ADVIL,MOTRIN) 800 MG tablet Take 1 tablet (800 mg total) by mouth 3 (three) times daily. 50 tablet 1    lisinopril (PRINIVIL,ZESTRIL) 40 MG tablet TAKE 1 TABLET BY MOUTH EVERY DAY 90 tablet 1    metFORMIN (GLUCOPHAGE) 500 MG tablet Take 1 tablet (500 mg total) by mouth daily with breakfast. (Patient taking differently: Take 500 mg by mouth every evening. ) 90 tablet 1    metoprolol succinate (TOPROL-XL) 100 MG 24 hr tablet Take 1 tablet (100 mg total) by mouth once daily. 90 tablet 1    omeprazole (PRILOSEC) 40 MG capsule TAKE 1 CAPSULE BY MOUTH EVERY DAY 90 capsule 1    potassium chloride (MICRO-K) 10 MEQ CpSR Take 20 mEq by mouth 2 (two) times daily.       traZODone (DESYREL) 100 MG tablet Take 1 tablet (100 mg total) by mouth every evening. 30 tablet 3    dicyclomine (BENTYL) 20 mg tablet Take 1 tablet (20 mg total) by mouth 3 (three) times daily as needed. 60 tablet 2    linaCLOtide (LINZESS) 72 mcg Cap capsule Take 1 capsule (72 mcg total) by mouth once daily. 90 capsule 3    nitroGLYCERIN (NITROSTAT) 0.4 MG SL tablet       VENTOLIN HFA 90 mcg/actuation inhaler Inhale 2 puffs by mouth every 4-6 hours as needed.  3       Past  Surgical History:   Procedure Laterality Date    BILATERAL OOPHORECTOMY Bilateral 2000    CARDIAC CATHETERIZATION      7 stents    CHOLECYSTECTOMY      COLONOSCOPY N/A 7/16/2019    Procedure: COLONOSCOPY;  Surgeon: Sarah Gamez MD;  Location: [facility];  Service: Endoscopy;  Laterality: N/A;    ESOPHAGOGASTRODUODENOSCOPY N/A 1/9/2020    Procedure: EGD (ESOPHAGOGASTRODUODENOSCOPY);  Surgeon: Sarah Gamez MD;  Location: [facility];  Service: Endoscopy;  Laterality: N/A;    HYSTERECTOMY      PARTIAL HYSTERECTOMY N/A 1990    Uterus taken out    SHOULDER SURGERY      TOTAL KNEE ARTHROPLASTY         Social History     Socioeconomic History    Marital status:      Spouse name: Not on file    Number of children: Not on file    Years of education: Not on file    Highest education level: Not on file   Occupational History    Not on file   Social Needs    Financial resource strain: Not hard at all    Food insecurity:     Worry: Never true     Inability: Never true    Transportation needs:     Medical: No     Non-medical: No   Tobacco Use    Smoking status: Current Every Day Smoker     Packs/day: 0.50     Years: 34.00     Pack years: 17.00     Types: Cigarettes    Smokeless tobacco: Never Used   Substance and Sexual Activity    Alcohol use: Yes     Frequency: Monthly or less     Drinks per session: 5 or 6     Binge frequency: Never     Comment: occassionly    Drug use: No    Sexual activity: Yes     Partners: Male   Lifestyle    Physical activity:     Days per week: 1 day     Minutes per session: 20 min    Stress: Not at all   Relationships    Social connections:     Talks on phone: More than three times a week     Gets together: Once a week     Attends Mandaen service: Never     Active member of club or organization: No     Attends meetings of clubs or organizations: Never     Relationship status:    Other Topics Concern    Not on file   Social History Narrative    Not on  file       OBJECTIVE:     Vital Signs Range (Last 24H):         CBC:   Lab Results   Component Value Date    WBC 9.99 02/06/2020    HGB 14.4 02/06/2020    HCT 45.4 02/06/2020    MCV 94 02/06/2020     (H) 02/06/2020         CMP:   CMP  Sodium   Date Value Ref Range Status   02/06/2020 146 (H) 136 - 145 mmol/L Final     Potassium   Date Value Ref Range Status   02/06/2020 4.7 3.5 - 5.1 mmol/L Final     Chloride   Date Value Ref Range Status   02/06/2020 103 95 - 110 mmol/L Final     CO2   Date Value Ref Range Status   02/06/2020 28 23 - 29 mmol/L Final     Glucose   Date Value Ref Range Status   02/06/2020 179 (H) 70 - 110 mg/dL Final     BUN, Bld   Date Value Ref Range Status   02/06/2020 14 7 - 17 mg/dL Final     Creatinine   Date Value Ref Range Status   02/06/2020 0.71 0.50 - 1.40 mg/dL Final     Calcium   Date Value Ref Range Status   02/06/2020 10.0 8.7 - 10.5 mg/dL Final     Total Protein   Date Value Ref Range Status   02/06/2020 8.0 6.0 - 8.4 g/dL Final     Albumin   Date Value Ref Range Status   02/06/2020 4.8 3.5 - 5.2 g/dL Final     Total Bilirubin   Date Value Ref Range Status   02/06/2020 0.5 0.1 - 1.0 mg/dL Final     Comment:     For infants and newborns, interpretation of results should be based  on gestational age, weight and in agreement with clinical  observations.  Premature Infant recommended reference ranges:  Up to 24 hours.............<8.0 mg/dL  Up to 48 hours............<12.0 mg/dL  3-5 days..................<15.0 mg/dL  6-29 days.................<15.0 mg/dL       Alkaline Phosphatase   Date Value Ref Range Status   02/06/2020 149 (H) 38 - 126 U/L Final     AST   Date Value Ref Range Status   02/06/2020 45 15 - 46 U/L Final     ALT   Date Value Ref Range Status   02/06/2020 55 (H) 10 - 44 U/L Final     Anion Gap   Date Value Ref Range Status   02/06/2020 15 8 - 16 mmol/L Final     eGFR if    Date Value Ref Range Status   02/06/2020 >60.0 >60 mL/min/1.73 m^2 Final      eGFR if non    Date Value Ref Range Status   2020 >60.0 >60 mL/min/1.73 m^2 Final     Comment:     Calculation used to obtain the estimated glomerular filtration  rate (eGFR) is the CKD-EPI equation.          INR:  No results for input(s): PT, INR, PROTIME, APTT in the last 72 hours.    Diagnostic Studies: No relevant studies.    EK/6/2020  Normal sinus rhythm  Normal ECG  When compared with ECG of 23-AUG-2018 17:43,  Vent. rate has decreased BY  37 BPM  Nonspecific T wave abnormality no longer evident in Anterior leads    2D ECHO:  Results for orders placed or performed during the hospital encounter of 18   2D echo with color flow doppler   Result Value Ref Range    QEF 55 55 - 65    Mitral Valve Regurgitation MILD     Diastolic Dysfunction Yes (A)     Est. PA Systolic Pressure 25.64     Tricuspid Valve Regurgitation TRIVIAL          ASSESSMENT/PLAN:         Anesthesia Evaluation    I have reviewed the Patient Summary Reports.    I have reviewed the Nursing Notes.   I have reviewed the Medications.     Review of Systems  Anesthesia Hx:  No problems with previous Anesthesia  History of prior surgery of interest to airway management or planning:   Hematology/Oncology:        Current/Recent Cancer.   EENT/Dental:EENT/Dental Normal   Cardiovascular:   Hypertension CAD   CHF  Functional Capacity low / < 4 METS    Renal/:  Renal/ Normal     Hepatic/GI:   GERD    Neurological:  Neurology Normal    Endocrine:   Diabetes        Physical Exam  General:  Obesity    Airway/Jaw/Neck:  Airway Findings: Mouth Opening: Normal Tongue: Normal  Mallampati: III  TM Distance: Normal, at least 6 cm      Dental:  Dental Findings: In tact   Chest/Lungs:  Chest/Lungs Findings: Clear to auscultation, Normal Respiratory Rate     Heart/Vascular:  Heart Findings: Rate: Normal  Rhythm: Regular Rhythm     Abdomen:  Abdomen Findings:  Normal, Soft, Nontender     Musculoskeletal:  Musculoskeletal  Findings: Normal    Mental Status:  Mental Status Findings:  Cooperative, Alert and Oriented         Anesthesia Plan  Type of Anesthesia, risks & benefits discussed:  Anesthesia Type:  general  Patient's Preference:   Intra-op Monitoring Plan: standard ASA monitors  Intra-op Monitoring Plan Comments:   Post Op Pain Control Plan: per primary service following discharge from PACU, IV/PO Opioids PRN and multimodal analgesia  Post Op Pain Control Plan Comments:   Induction:   IV  Beta Blocker:  Patient is on a Beta-Blocker and has received one dose within the past 24 hours (No further documentation required).       Informed Consent: Patient understands risks and agrees with Anesthesia plan.  Questions answered. Anesthesia consent signed with patient.  ASA Score: 3     Day of Surgery Review of History & Physical:    H&P update referred to the surgeon.         Ready For Surgery From Anesthesia Perspective.

## 2020-02-14 ENCOUNTER — HOSPITAL ENCOUNTER (OUTPATIENT)
Facility: HOSPITAL | Age: 55
Discharge: HOME | End: 2020-02-14
Attending: SURGERY | Admitting: SURGERY
Payer: COMMERCIAL

## 2020-02-14 ENCOUNTER — ANESTHESIA (OUTPATIENT)
Dept: SURGERY | Facility: HOSPITAL | Age: 55
End: 2020-02-14
Payer: COMMERCIAL

## 2020-02-14 VITALS
SYSTOLIC BLOOD PRESSURE: 105 MMHG | OXYGEN SATURATION: 95 % | HEIGHT: 60 IN | TEMPERATURE: 98 F | WEIGHT: 194 LBS | BODY MASS INDEX: 38.09 KG/M2 | DIASTOLIC BLOOD PRESSURE: 66 MMHG | RESPIRATION RATE: 16 BRPM | HEART RATE: 65 BPM

## 2020-02-14 DIAGNOSIS — N64.52 BREAST DISCHARGE: ICD-10-CM

## 2020-02-14 LAB
POCT GLUCOSE: 145 MG/DL (ref 70–110)
POCT GLUCOSE: 176 MG/DL (ref 70–110)

## 2020-02-14 PROCEDURE — 19120 REMOVAL OF BREAST LESION: CPT | Mod: LT,,, | Performed by: SURGERY

## 2020-02-14 PROCEDURE — 25000003 PHARM REV CODE 250: Performed by: SURGERY

## 2020-02-14 PROCEDURE — D9220A PRA ANESTHESIA: Mod: ,,, | Performed by: ANESTHESIOLOGY

## 2020-02-14 PROCEDURE — 19120 PR EXCISE BREAST CYST: ICD-10-PCS | Mod: LT,,, | Performed by: SURGERY

## 2020-02-14 PROCEDURE — 71000015 HC POSTOP RECOV 1ST HR: Performed by: SURGERY

## 2020-02-14 PROCEDURE — 82962 GLUCOSE BLOOD TEST: CPT | Performed by: SURGERY

## 2020-02-14 PROCEDURE — 37000009 HC ANESTHESIA EA ADD 15 MINS: Performed by: SURGERY

## 2020-02-14 PROCEDURE — 36000707: Performed by: SURGERY

## 2020-02-14 PROCEDURE — 88307 TISSUE EXAM BY PATHOLOGIST: CPT | Performed by: PATHOLOGY

## 2020-02-14 PROCEDURE — 71000044 HC DOSC ROUTINE RECOVERY FIRST HOUR: Performed by: SURGERY

## 2020-02-14 PROCEDURE — D9220A PRA ANESTHESIA: ICD-10-PCS | Mod: ,,, | Performed by: ANESTHESIOLOGY

## 2020-02-14 PROCEDURE — 88307 TISSUE EXAM BY PATHOLOGIST: CPT | Mod: 26,,, | Performed by: PATHOLOGY

## 2020-02-14 PROCEDURE — 36000706: Performed by: SURGERY

## 2020-02-14 PROCEDURE — S0020 INJECTION, BUPIVICAINE HYDRO: HCPCS | Performed by: SURGERY

## 2020-02-14 PROCEDURE — 71000016 HC POSTOP RECOV ADDL HR: Performed by: SURGERY

## 2020-02-14 PROCEDURE — 63600175 PHARM REV CODE 636 W HCPCS: Performed by: ANESTHESIOLOGY

## 2020-02-14 PROCEDURE — 37000008 HC ANESTHESIA 1ST 15 MINUTES: Performed by: SURGERY

## 2020-02-14 PROCEDURE — 88307 PR  SURG PATH,LEVEL V: ICD-10-PCS | Mod: 26,,, | Performed by: PATHOLOGY

## 2020-02-14 PROCEDURE — 25000003 PHARM REV CODE 250: Performed by: STUDENT IN AN ORGANIZED HEALTH CARE EDUCATION/TRAINING PROGRAM

## 2020-02-14 RX ORDER — ONDANSETRON 2 MG/ML
INJECTION INTRAMUSCULAR; INTRAVENOUS
Status: DISCONTINUED | OUTPATIENT
Start: 2020-02-14 | End: 2020-02-14

## 2020-02-14 RX ORDER — OXYCODONE HYDROCHLORIDE 5 MG/1
5 TABLET ORAL EVERY 4 HOURS PRN
Qty: 15 TABLET | Refills: 0 | Status: SHIPPED | OUTPATIENT
Start: 2020-02-14 | End: 2020-03-03

## 2020-02-14 RX ORDER — LIDOCAINE HCL/PF 100 MG/5ML
SYRINGE (ML) INTRAVENOUS
Status: DISCONTINUED | OUTPATIENT
Start: 2020-02-14 | End: 2020-02-14

## 2020-02-14 RX ORDER — PHENYLEPHRINE HYDROCHLORIDE 10 MG/ML
INJECTION INTRAVENOUS
Status: DISCONTINUED | OUTPATIENT
Start: 2020-02-14 | End: 2020-02-14

## 2020-02-14 RX ORDER — CEFAZOLIN SODIUM 1 G/3ML
2 INJECTION, POWDER, FOR SOLUTION INTRAMUSCULAR; INTRAVENOUS
Status: ACTIVE | OUTPATIENT
Start: 2020-02-14

## 2020-02-14 RX ORDER — SODIUM CHLORIDE 9 MG/ML
INJECTION, SOLUTION INTRAVENOUS CONTINUOUS
Status: ACTIVE | OUTPATIENT
Start: 2020-02-14

## 2020-02-14 RX ORDER — ONDANSETRON 2 MG/ML
4 INJECTION INTRAMUSCULAR; INTRAVENOUS EVERY 12 HOURS PRN
Status: DISCONTINUED | OUTPATIENT
Start: 2020-02-14 | End: 2023-07-23

## 2020-02-14 RX ORDER — LIDOCAINE HYDROCHLORIDE 10 MG/ML
1 INJECTION, SOLUTION EPIDURAL; INFILTRATION; INTRACAUDAL; PERINEURAL ONCE
Status: ACTIVE | OUTPATIENT
Start: 2020-02-14

## 2020-02-14 RX ORDER — FENTANYL CITRATE 50 UG/ML
25 INJECTION, SOLUTION INTRAMUSCULAR; INTRAVENOUS EVERY 5 MIN PRN
Status: DISCONTINUED | OUTPATIENT
Start: 2020-02-14 | End: 2020-02-14 | Stop reason: HOSPADM

## 2020-02-14 RX ORDER — DEXAMETHASONE SODIUM PHOSPHATE 4 MG/ML
INJECTION, SOLUTION INTRA-ARTICULAR; INTRALESIONAL; INTRAMUSCULAR; INTRAVENOUS; SOFT TISSUE
Status: DISCONTINUED | OUTPATIENT
Start: 2020-02-14 | End: 2020-02-14

## 2020-02-14 RX ORDER — MIDAZOLAM HYDROCHLORIDE 1 MG/ML
INJECTION, SOLUTION INTRAMUSCULAR; INTRAVENOUS
Status: DISCONTINUED | OUTPATIENT
Start: 2020-02-14 | End: 2020-02-14

## 2020-02-14 RX ORDER — MIDAZOLAM HYDROCHLORIDE 1 MG/ML
0.5 INJECTION INTRAMUSCULAR; INTRAVENOUS
Status: DISCONTINUED | OUTPATIENT
Start: 2020-02-14 | End: 2020-02-14 | Stop reason: HOSPADM

## 2020-02-14 RX ORDER — OXYCODONE HYDROCHLORIDE 5 MG/1
5 TABLET ORAL EVERY 4 HOURS PRN
Status: DISCONTINUED | OUTPATIENT
Start: 2020-02-14 | End: 2020-02-14 | Stop reason: HOSPADM

## 2020-02-14 RX ORDER — ONDANSETRON 4 MG/1
4 TABLET, ORALLY DISINTEGRATING ORAL EVERY 6 HOURS PRN
Qty: 20 TABLET | Refills: 0 | Status: SHIPPED | OUTPATIENT
Start: 2020-02-14 | End: 2020-12-29

## 2020-02-14 RX ORDER — HALOPERIDOL 5 MG/ML
0.5 INJECTION INTRAMUSCULAR EVERY 4 HOURS PRN
Status: DISCONTINUED | OUTPATIENT
Start: 2020-02-14 | End: 2020-02-14 | Stop reason: HOSPADM

## 2020-02-14 RX ORDER — BUPIVACAINE HYDROCHLORIDE 5 MG/ML
INJECTION, SOLUTION EPIDURAL; INTRACAUDAL
Status: DISCONTINUED | OUTPATIENT
Start: 2020-02-14 | End: 2020-02-14 | Stop reason: HOSPADM

## 2020-02-14 RX ORDER — PROPOFOL 10 MG/ML
VIAL (ML) INTRAVENOUS
Status: DISCONTINUED | OUTPATIENT
Start: 2020-02-14 | End: 2020-02-14

## 2020-02-14 RX ORDER — ACETAMINOPHEN 10 MG/ML
INJECTION, SOLUTION INTRAVENOUS
Status: DISCONTINUED | OUTPATIENT
Start: 2020-02-14 | End: 2020-02-14

## 2020-02-14 RX ORDER — SODIUM CHLORIDE 9 MG/ML
INJECTION, SOLUTION INTRAVENOUS CONTINUOUS PRN
Status: DISCONTINUED | OUTPATIENT
Start: 2020-02-14 | End: 2020-02-14

## 2020-02-14 RX ORDER — FENTANYL CITRATE 50 UG/ML
INJECTION, SOLUTION INTRAMUSCULAR; INTRAVENOUS
Status: DISCONTINUED | OUTPATIENT
Start: 2020-02-14 | End: 2020-02-14

## 2020-02-14 RX ORDER — SODIUM CHLORIDE 0.9 % (FLUSH) 0.9 %
10 SYRINGE (ML) INJECTION
Status: DISCONTINUED | OUTPATIENT
Start: 2020-02-14 | End: 2020-02-14 | Stop reason: HOSPADM

## 2020-02-14 RX ADMIN — ONDANSETRON 4 MG: 2 INJECTION INTRAMUSCULAR; INTRAVENOUS at 08:02

## 2020-02-14 RX ADMIN — ACETAMINOPHEN 1000 MG: 10 INJECTION, SOLUTION INTRAVENOUS at 07:02

## 2020-02-14 RX ADMIN — FENTANYL CITRATE 100 MCG: 50 INJECTION, SOLUTION INTRAMUSCULAR; INTRAVENOUS at 07:02

## 2020-02-14 RX ADMIN — SODIUM CHLORIDE: 0.9 INJECTION, SOLUTION INTRAVENOUS at 07:02

## 2020-02-14 RX ADMIN — FENTANYL CITRATE 25 MCG: 50 INJECTION, SOLUTION INTRAMUSCULAR; INTRAVENOUS at 07:02

## 2020-02-14 RX ADMIN — PROPOFOL 150 MG: 10 INJECTION, EMULSION INTRAVENOUS at 07:02

## 2020-02-14 RX ADMIN — DEXAMETHASONE SODIUM PHOSPHATE 8 MG: 4 INJECTION, SOLUTION INTRAMUSCULAR; INTRAVENOUS at 07:02

## 2020-02-14 RX ADMIN — DEXTROSE 2 G: 50 INJECTION, SOLUTION INTRAVENOUS at 07:02

## 2020-02-14 RX ADMIN — FENTANYL CITRATE: 50 INJECTION INTRAMUSCULAR; INTRAVENOUS at 08:02

## 2020-02-14 RX ADMIN — PHENYLEPHRINE HYDROCHLORIDE 100 MCG: 10 INJECTION INTRAVENOUS at 07:02

## 2020-02-14 RX ADMIN — LIDOCAINE HYDROCHLORIDE 75 MG: 20 INJECTION, SOLUTION INTRAVENOUS at 07:02

## 2020-02-14 RX ADMIN — MIDAZOLAM HYDROCHLORIDE 2 MG: 1 INJECTION, SOLUTION INTRAMUSCULAR; INTRAVENOUS at 06:02

## 2020-02-14 RX ADMIN — FENTANYL CITRATE 50 MCG: 50 INJECTION, SOLUTION INTRAMUSCULAR; INTRAVENOUS at 07:02

## 2020-02-14 RX ADMIN — OXYCODONE HYDROCHLORIDE 5 MG: 5 TABLET ORAL at 09:02

## 2020-02-14 NOTE — PLAN OF CARE
Patient states they are ready to be discharged. Instructions and prescription given to patient and family. Both verbalize understanding. Patient tolerating po liquids with no difficulty. Patient states pain is at a tolerable level for them. Anesthesia consent and surgical consent in chart upon patient's discharge from [facility].

## 2020-02-14 NOTE — ANESTHESIA POSTPROCEDURE EVALUATION
Anesthesia Post Evaluation    Patient: Loi Cordova    Procedure(s) Performed: Procedure(s) (LRB):  BIOPSY, BREAST-Left medial breast palpation guided (Left)    Final Anesthesia Type: general    Patient location during evaluation: PACU  Patient participation: Yes- Able to Participate  Level of consciousness: awake and alert  Post-procedure vital signs: reviewed and stable  Pain management: adequate  Airway patency: patent    PONV status at discharge: No PONV  Anesthetic complications: no      Cardiovascular status: blood pressure returned to baseline  Respiratory status: spontaneous ventilation and room air  Hydration status: euvolemic  Follow-up not needed.          Vitals Value Taken Time   /66 2/14/2020 11:02 AM   Temp 36.8 °C (98.2 °F) 2/14/2020 11:00 AM   Pulse 77 2/14/2020 11:13 AM   Resp 16 2/14/2020 11:00 AM   SpO2 92 % 2/14/2020 11:13 AM   Vitals shown include unvalidated device data.      No case tracking events are documented in the log.      Pain/Anuradha Score: Pain Rating Prior to Med Admin: 6 (2/14/2020  9:10 AM)  Anuradha Score: 10 (2/14/2020  9:10 AM)

## 2020-02-14 NOTE — BRIEF OP NOTE
[facility]  Brief Operative Note    Surgery Date: 2/14/2020     Surgeon(s) and Role:     * Paulino Espinoza MD - Primary     * Angélica De La Cruz MD - Resident - Assisting      Pre-op Diagnosis:  Breast mass, left [N63.20]    Post-op Diagnosis:  Post-Op Diagnosis Codes:     * Breast mass, left [N63.20]    Procedure(s) (LRB):  BIOPSY, BREAST-Left medial breast palpation guided (Left)    Anesthesia: General    Description of the findings of the procedure(s): Left breast excisional biopsy    Estimated Blood Loss: * No values recorded between 2/14/2020  7:45 AM and 2/14/2020  8:11 AM *         Specimens:   Specimen (12h ago, onward)    None            Discharge Note    OUTCOME: Patient tolerated treatment/procedure well without complication and is now ready for discharge.    DISPOSITION: Home or Self Care    FINAL DIAGNOSIS:  Breast discharge    FOLLOWUP: In clinic    DISCHARGE INSTRUCTIONS:    Discharge Procedure Orders   Other restrictions (specify):   Order Comments: POSTOPERATIVE INSTRUCTIONS FOLLOWING BREAST BIOPSY OR LUMPECTOMY    The following are post-operative instructions that will help you to recover from your surgery.  Please read over these instructions carefully and contact us if we can answer any of your questions or concerns.    Dressing/breast binder (surgi-bra)  A surgical bra may be placed around your chest after your surgery.  If you are given the bra, please wear it for the first 48 hours after surgery. After 48 hours you can remove your surgical bra and dressing to shower/cleanse the breast with antibacterial soap and warm water. Do not take a tub bath and do not soak the surgical site for at least 2 weeks. Please do not remove the white strips of tape (steri-strips) that cover your incision- they will be removed at your clinic visit.    The final pathology report will be available approximately 7-10 days after your surgery.  Our office will call you with your pathology report  when it becomes available.     Activity   You should be able to return to your regular activities 2 days after your surgery.  However, do not engage in strenuous activities in which you use your upper body such as:  golf, tennis, aerobics, washing windows, raking the yard, mopping, vacuuming, heavy lifting (e.g children) until you are seen for your follow-up appointment in clinic. Do not lift anything heavier than a gallon of milk.    Medication for pain  You may find that over the counter pain medications may be sufficient for your pain.  You will be given a prescription for pain medication for more severe pain.  You should not drive or operate machinery while taking these.  Please take prescription pain medicine (narcotics) with food.  Narcotics can cause, or worsen, constipation.  You will need to increase your fluid intake, eat high fiber foods (such as fruits and bran) and make sure that you are up and walking. You may need to take an over the counter stool softener for constipation.    Please report the following:  Temperature greater than 101 degrees  Discharge or bad odor from the wound  Excessive bleeding, such as saturated bloody dressing or extreme bruising  Redness at incision and/or drain sites  Swelling or buildup of fluid around incision  Persistent fevers, chills, nausea, vomiting, or diarrhea    Additional information  Your surgeon will see you approximately 2 weeks following your surgery.  If this follow-up appointment has not been made, please call the office.    If you have any questions or problems, please call my office or my nurse.    After hours and on weekends, you may call the main [facility] line at [phone] and ask to have the general surgery resident paged or have me paged.     Notify your health care provider if you experience any of the following:  increased confusion or weakness     Notify your health care provider if you experience any of the following:  persistent dizziness,  light-headedness, or visual disturbances     Notify your health care provider if you experience any of the following:  worsening rash     Notify your health care provider if you experience any of the following:  severe persistent headache     Notify your health care provider if you experience any of the following:  difficulty breathing or increased cough     Notify your health care provider if you experience any of the following:  redness, tenderness, or signs of infection (pain, swelling, redness, odor or green/yellow discharge around incision site)     Notify your health care provider if you experience any of the following:  severe uncontrolled pain     Notify your health care provider if you experience any of the following:  persistent nausea and vomiting or diarrhea     Notify your health care provider if you experience any of the following:  temperature >100.4     Remove dressing in 48 hours

## 2020-02-14 NOTE — OP NOTE
Operative Note     2/14/2020    PRE-OP DIAGNOSIS: Breast mass, left [N63.20]      POST-OP DIAGNOSIS: Post-Op Diagnosis Codes:     * Breast mass, left [N63.20]    Procedure(s):  BIOPSY, BREAST-Left medial breast palpation guided     SURGEON: Surgeon(s) and Role:     * Paulino Espinoza MD - Primary     * Angélica De La Cruz MD - Resident - Assisting    ANESTHESIA: General     OPERATIVE FINDINGS:  Small approximately 1 cm medial subareolar density grossly consistent with lipoma.  No significantly dilated ducts found during the procedure.  No pathologic discharge expressed prior to the procedure uterine    INDICATION FOR PROCEDURE:  The palpable symptomatic painful tender mass in the medial subareolar region of the left breast with normal diagnostic imaging    PROCEDURE IN DETAIL:  Loi Cordova is a 54 y.o. female brought to the operating room for definitive surgery.  The patient was informed of the possible risks and complications of the procedure, including but not limited to anesthetic risks, bleeding, infection, and need for additional surgery.  The patient concurred with the proposed plan, and has given informed consent.  The site of surgery was properly noted/marked in the preoperative holding area.       The patient was brought to the operating room and was placed in supine position.  The left breast was marked.  Under a general anesthesia the left breast was prepped and draped in a sterile fashion.  A scant amount of inspissated secretions could be expressed from the ducts of the left nipple but no significant bloody or pathologic discharge was noted.  The 1 cm density in the medial subareolar region was palpable.  We marked a medial curvilinear circumareolar incision and local anesthesia was injected.  The skin was incised sharply.  The subcutaneous tissue was dissected around the density excising what appeared to be a 1 cm dense lipoma from the anterior subareolar region.  This was completely excised and  once it was removed there was no further induration or palpable densities located in the area.  Hemostasis was achieved with cautery.  The wound was irrigated.  There were no dilated ducts encounter during the dissection.  Once the dissection was completed the a deep dermal and subcutaneous layers were closed with 3 0 Vicryl suture and the skin closed with a running 4 Monocryl subcuticular skin closure.  Sterile skin Dermabond was applied.  Sterile fluff gauze dressing and postprocedure bra were placed.  She tolerated the procedure well without complication and the specimen was sent to pathology for permanent sectioning.    ESTIMATED BLOOD LOSS: Minimal    COMPLICATIONS: none    DISPOSITION: PACU - hemodynamically stable.    ATTESTATION:   I was present and scrubbed for the entire procedure.

## 2020-02-14 NOTE — TRANSFER OF CARE
Anesthesia Transfer of Care Note    Patient: Loi Cordova    Procedure(s) Performed: Procedure(s) (LRB):  BIOPSY, BREAST-Left medial breast palpation guided (Left)    Patient location: PACU    Anesthesia Type: general    Transport from OR: Transported from OR on 2-3 L/min O2 by NC with adequate spontaneous ventilation    Post pain: adequate analgesia    Post assessment: no apparent anesthetic complications    Post vital signs: stable    Level of consciousness: awake    Nausea/Vomiting: no nausea/vomiting    Complications: none    Transfer of care protocol was followed      Last vitals:   Visit Vitals  /79 (BP Location: Left arm, Patient Position: Lying)   Pulse 72   Temp 36.7 °C (98.1 °F) (Oral)   Resp 18   Ht 5' (1.524 m)   Wt 88 kg (194 lb)   LMP 07/29/1990 (Within Years)   SpO2 100%   Breastfeeding? No   BMI 37.89 kg/m²

## 2020-02-14 NOTE — PROGRESS NOTES
This writer called bedside pharmacy to have prescription filled. I was informed it will be $3.41 as soon as  comes to the bedside I will have him call pharmacy with payment method.  This writer attempted to find  in the waiting room with no answer. Text sent via Epic secure.

## 2020-02-14 NOTE — DISCHARGE INSTRUCTIONS
Anesthesia: General Anesthesia     You are watched continuously during your procedure by your anesthesia provider.     Youre due to have surgery. During surgery, youll be given medicine called anesthesia or anesthetic. This will keep you comfortable and pain-free. Your anesthesia provider will use general anesthesia.  What is general anesthesia?  General anesthesia puts you into a state like deep sleep. It goes into the bloodstream (IV anesthetics), into the lungs (gas anesthetics), or both. You feel nothing during the procedure. You will not remember it. During the procedure, the anesthesia provider monitors you continuously. He or she checks your heart rate and rhythm, blood pressure, breathing, and blood oxygen.  · IV anesthetics. IV anesthetics are given through an IV line in your arm. Theyre often given first. This is so you are asleep before a gas anesthetic is started. Some kinds of IV anesthetics relieve pain. Others relax you. Your doctor will decide which kind is best in your case.  · Gas anesthetics. Gas anesthetics are breathed into the lungs. They are often used to keep you asleep. They can be given through a facemask or a tube placed in your larynx or trachea (breathing tube).  ¨ If you have a facemask, your anesthesia provider will most likely place it over your nose and mouth while youre still awake. Youll breathe oxygen through the mask as your IV anesthetic is started. Gas anesthetic may be added through the mask.  ¨ If you have a tube in the larynx or trachea, it will be inserted into your throat after youre asleep.  Anesthesia tools and medicines  You will likely have:  · IV anesthetics. These are put into an IV line into your bloodstream.  · Gas anesthetics. You breathe these anesthetics into your lungs, where they pass into your bloodstream.  · Pulse oximeter. This is a small clip that is attached to the end of your finger. This measures your blood oxygen level.  · Electrocardiography  leads (electrodes). These are small sticky pads that are placed on your chest. They record your heart rate and rhythm.  · Blood pressure cuff. This reads your blood pressure.  Risks and possible complications  General anesthesia has some risks. These include:  · Breathing problems  · Nausea and vomiting  · Sore throat or hoarseness (usually temporary)  · Allergic reaction to the anesthetic  · Irregular heartbeat (rare)  · Cardiac arrest (rare)   Anesthesia safety  · Follow all instructions you are given for how long not to eat or drink before your procedure.  · Be sure your doctor knows what medicines and drugs you take. This includes over-the-counter medicines, herbs, supplements, alcohol or other drugs. You will be asked when those were last taken.  · Have an adult family member or friend drive you home after the procedure.  · For the first 24 hours after your surgery:  ¨ Do not drive or use heavy equipment.  ¨ Do not make important decisions or sign legal documents. If important decisions or signing legal documents is necessary during the first 24 hours after surgery, have a trusted family member or spouse act on your behalf.  ¨ Avoid alcohol.  ¨ Have a responsible adult stay with you. He or she can watch for problems and help keep you safe.  Date Last Reviewed: 12/1/2016  © 3931-1299 [employer]. [address] [place] [address], [place] [address]. All rights reserved. This information is not intended as a substitute for professional medical care. Always follow your healthcare professional's instructions.

## 2020-02-14 NOTE — INTERVAL H&P NOTE
The patient has been examined and the H&P has been reviewed:    I concur with the findings and no changes have occurred since H&P was written.    Anesthesia/Surgery risks, benefits and alternative options discussed and understood by patient/family.          Active Hospital Problems    Diagnosis  POA    Breast discharge [N64.52]  Yes      Resolved Hospital Problems   No resolved problems to display.

## 2020-02-14 NOTE — PROGRESS NOTES
This writer spoke to bedside pharmacy. The  paid for his wife's prescription via [employer], awaiting bedside delivery.

## 2020-02-20 ENCOUNTER — CLINICAL SUPPORT (OUTPATIENT)
Dept: SMOKING CESSATION | Facility: CLINIC | Age: 55
End: 2020-02-20
Payer: COMMERCIAL

## 2020-02-20 DIAGNOSIS — F17.210 MODERATE SMOKER (20 OR LESS PER DAY): Primary | ICD-10-CM

## 2020-02-20 LAB
FINAL PATHOLOGIC DIAGNOSIS: NORMAL
GROSS: NORMAL

## 2020-02-20 PROCEDURE — 90853 PR GROUP PSYCHOTHERAPY: ICD-10-PCS | Mod: S$GLB,,,

## 2020-02-20 PROCEDURE — 90853 GROUP PSYCHOTHERAPY: CPT | Mod: S$GLB,,,

## 2020-02-20 NOTE — PROGRESS NOTES
Smoking Cessation Group Session #6    Site: [facility]  Date:  2/20/2020  Clinical Status of Patient: Outpatient   Length of Service and Code: 60 minutes - 91842   Number in Attendance: 2  Group Activities/Focus of Group:  Sharing last weeks challenges, triggers, and coping activities to remain quit and/ or keep making progress toward cessation, completion of TCRS (Tobacco Cessation Rating Scale) learned addiction model, personal reasons for quitting, medications, goals, quit date.    Specific session focus: completion of TCRS (Tobacco Cessation Rating Scale) reviewed strategies, habitual behavior, stress, and high risk situations. Introduced stress with addition interventions, SOLVE, relaxation with interventions, nutrition, exercise, weight gain, and the importance of rewarding oneself for accomplishments toward becoming tobacco free. Open discussion of all items with interventions.       Target symptoms:  withdrawal and medication side effects             The following were rated moderate (3) to severe (4) on TCRS:       Moderate 3: anxiety, - Nicotine replacement therapy, withdrawal symptoms, habit     Severe 4:   none  Patient's Response to Intervention: The patient is smoking 6-10 cigarettes/day  from 20 cigarettes/day. The CO measurement = 23  ppm which indicates possibly a moderate smoker exposed to second hand smoke.Dicussed stress, coping styles. She states she is doing better when she wears her patches. She has a stressful situation at home with a sick family member which will progressively may get worse.   Progress Toward Goals and Other Mental Status Changes: The patient denies any abnormal behavioral or mental changes at this time.    Interval History:     Diagnosis: Z72.0  Plan: The patient will continue with group therapy sessions and medication regimen prescribed with management by physician or by the Cessation Clinic Provider. Patient will inform Smoking Cessation Counselor of symptoms as rated high on  TCRS.    Return to Clinic: 2 weeks    Quit Date: TBD   Planned Quit Date: TBD

## 2020-02-27 ENCOUNTER — CLINICAL SUPPORT (OUTPATIENT)
Dept: SMOKING CESSATION | Facility: CLINIC | Age: 55
End: 2020-02-27
Payer: COMMERCIAL

## 2020-02-27 DIAGNOSIS — F17.200 NICOTINE DEPENDENCE: Primary | ICD-10-CM

## 2020-02-27 PROCEDURE — 99407 PR TOBACCO USE CESSATION INTENSIVE >10 MINUTES: ICD-10-PCS | Mod: S$GLB,,,

## 2020-02-27 PROCEDURE — 99407 BEHAV CHNG SMOKING > 10 MIN: CPT | Mod: S$GLB,,,

## 2020-02-27 RX ORDER — TRAZODONE HYDROCHLORIDE 100 MG/1
100 TABLET ORAL NIGHTLY
Qty: 30 TABLET | Refills: 4 | Status: SHIPPED | OUTPATIENT
Start: 2020-02-27 | End: 2020-08-10 | Stop reason: SDUPTHER

## 2020-02-27 NOTE — PROGRESS NOTES
Spoke with patient today in regard to smoking cessation progress for 3,6 month telephone follow up on quit 2. Patient states that she is not tobacco free at this time. Patient currently enrolled in program working on quit 2 attempt. Informed patient of benefit period, future follow up, and contact information if any further help or support is needed. Will complete smart form for 3,6 month follow up on Quit attempt #2.

## 2020-03-03 ENCOUNTER — OFFICE VISIT (OUTPATIENT)
Dept: SURGERY | Facility: CLINIC | Age: 55
End: 2020-03-03
Payer: COMMERCIAL

## 2020-03-03 VITALS
HEIGHT: 60 IN | SYSTOLIC BLOOD PRESSURE: 127 MMHG | BODY MASS INDEX: 38.09 KG/M2 | WEIGHT: 194 LBS | HEART RATE: 61 BPM | DIASTOLIC BLOOD PRESSURE: 80 MMHG

## 2020-03-03 DIAGNOSIS — N64.4 PAINFUL LUMPY LEFT BREAST: Primary | ICD-10-CM

## 2020-03-03 DIAGNOSIS — N63.20 PAINFUL LUMPY LEFT BREAST: Primary | ICD-10-CM

## 2020-03-03 PROCEDURE — 99024 PR POST-OP FOLLOW-UP VISIT: ICD-10-PCS | Mod: S$GLB,,, | Performed by: SURGERY

## 2020-03-03 PROCEDURE — 99024 POSTOP FOLLOW-UP VISIT: CPT | Mod: S$GLB,,, | Performed by: SURGERY

## 2020-03-03 PROCEDURE — 99999 PR PBB SHADOW E&M-EST. PATIENT-LVL III: ICD-10-PCS | Mod: PBBFAC,,, | Performed by: SURGERY

## 2020-03-03 PROCEDURE — 99999 PR PBB SHADOW E&M-EST. PATIENT-LVL III: CPT | Mod: PBBFAC,,, | Performed by: SURGERY

## 2020-03-05 ENCOUNTER — CLINICAL SUPPORT (OUTPATIENT)
Dept: SMOKING CESSATION | Facility: CLINIC | Age: 55
End: 2020-03-05
Payer: COMMERCIAL

## 2020-03-05 DIAGNOSIS — F17.210 MODERATE SMOKER (20 OR LESS PER DAY): Primary | ICD-10-CM

## 2020-03-05 PROBLEM — N63.20 PAINFUL LUMPY LEFT BREAST: Status: ACTIVE | Noted: 2020-03-05

## 2020-03-05 PROBLEM — N64.4 PAINFUL LUMPY LEFT BREAST: Status: ACTIVE | Noted: 2020-03-05

## 2020-03-05 PROCEDURE — 90853 PR GROUP PSYCHOTHERAPY: ICD-10-PCS | Mod: S$GLB,,,

## 2020-03-05 PROCEDURE — 90853 GROUP PSYCHOTHERAPY: CPT | Mod: S$GLB,,,

## 2020-03-05 NOTE — Clinical Note
The patient is smoking 15 cigarettes/day  from 20 cigarettes/day. The CO measurement =  18  ppm which indicates possibly a heavy smoker . She is smoking more due to recent bad news with family member that will require a lot of care. Discussed the need to quit for second hand smoke and is willing to try and does well when has patches.

## 2020-03-05 NOTE — PROGRESS NOTES
The patient is doing quite well status post a left breast excisional breast biopsy of a palpable 1 cm density in the medial subareolar region of the left breast on 02/14/2020.  Final pathology was completely benign breast tissue with breast parenchyma and fibrofatty tissue.  She denies any persistent or recurrent breast pain and denies any further nipple discharge.  She has an excellent symmetrical cosmetic result and the incision site is clean, dry, and intact with no evidence of inversion or retraction of the nipple-areolar complex.    A copy of the benign pathology report was discussed with the patient today in clinic and had been relayed to her by phone voicemail message prior to [place] weekend.    Again, she offers no subjective complaints and with benign pathology she will follow up with me p.r.n..  Will obtain a new baseline left digital diagnostic mammogram with tomosynthesis in 6 months and then she may resume her annual bilateral digital screening mammograms with tomosynthesis as well.  She may resume all normal activities of daily living and may resume wearing her regular bra as well.  She will follow up with me p.r.n.

## 2020-03-05 NOTE — PROGRESS NOTES
Smoking Cessation Group Session #4    Site: [facility]  Date:  3/5/2020  Clinical Status of Patient: Outpatient   Length of Service and Code: 60 minutes - 52190   Number in Attendance: 4  Group Activities/Focus of Group:  Sharing last weeks challenges, triggers, and coping activities to remain quit and/ or keep making progress toward cessation, completion of TCRS (Tobacco Cessation Rating Scale) learned addiction model, personal reasons for quitting, medications, goals, quit date.    Specific session focus: completion of TCRS (Tobacco Cessation Rating Scale) reviewed strategies, habitual behavior, stress, and high risk situations. Introduced stress with addition interventions, SOLVE, relaxation with interventions, nutrition, exercise, weight gain, and the importance of rewarding oneself for accomplishments toward becoming tobacco free. Open discussion of all items with interventions.       Target symptoms:  withdrawal and medication side effects             The following were rated moderate (3) to severe (4) on TCRS:       Moderate 3: desire/crave, anxious, frustration. - Nicotine replacement therapy, withdrawal symptoms, habit       Severe 4:   none  Patient's Response to Intervention: The patient is smoking 15 cigarettes/day  from 20 cigarettes/day. The CO measurement =  18  ppm which indicates possibly a heavy smoker . She is smoking more due to recent bad news with family member that will require a lot of care. Discussed the need to quit for second hand smoke and is willing to try and does well when has patches.   Progress Toward Goals and Other Mental Status Changes: The patient denies any abnormal behavioral or mental changes at this time.    Interval History:     Diagnosis: Z72.0  Plan: The patient will continue with group therapy sessions and medication regimen prescribed with management by physician or by the Cessation Clinic Provider. Patient will inform Smoking Cessation Counselor of symptoms as rated high on  TCRS.    Return to Clinic: 2 weeks    Quit Date: TBD   Planned Quit Date: TBD

## 2020-03-18 ENCOUNTER — CLINICAL SUPPORT (OUTPATIENT)
Dept: SMOKING CESSATION | Facility: CLINIC | Age: 55
End: 2020-03-18
Payer: COMMERCIAL

## 2020-03-18 DIAGNOSIS — F17.210 MODERATE SMOKER (20 OR LESS PER DAY): Primary | ICD-10-CM

## 2020-03-18 PROCEDURE — 99403 PR PREVENT COUNSEL,INDIV,45 MIN: ICD-10-PCS | Mod: S$GLB,,,

## 2020-03-18 PROCEDURE — 99403 PREV MED CNSL INDIV APPRX 45: CPT | Mod: S$GLB,,,

## 2020-03-18 NOTE — PROGRESS NOTES
Individual Follow-Up Form    3/18/2020    Quit Date: TBD    Clinical Status of Patient: Outpatient    Length of Service: 45 minutes    Continuing Medication: no    Other Medications: none     Target Symptoms: Withdrawal and medication side effects. The following were  rated moderate (3) to severe (4) on TCRS:  · Moderate (3): angry, frustrated - Nicotine replacement therapy, withdrawal symptoms, habit  ·   · Severe (4): none    Comments: The patient is smoking 5-12 cigarettes/day  from 20 cigarettes/day. The smoking cessation is closed and the patient agreed to phone session. She is only smoking 5 cigarettes when she is busy and working, but is smoking more when at home. She is working through newly diagnosed illness with family member that will require a lot of attention and time. She states she still forgets the medication and really has not focussed on not smoking. Suggested to possibly use the 150 mg Wellbutrin again or the patches which do seem to work. Explained the need smoke as little as she can with her heart problems. She states she has Wellbutrin and patches at home. The patient will continue individual  and/or group sessions and medication monitoring by CTTS. The patient denies any abnormal behavioral or mental changes at this time.      Diagnosis: F17.210    Next Visit: 2 weeks

## 2020-03-18 NOTE — Clinical Note
The patient is smoking 5-12 cigarettes/day  from 20 cigarettes/day. The smoking cessation is closed and the patient agreed to phone session. She is only smoking 5 cigarettes when she is busy and working, but is smoking more when at home. She is working through newly diagnosed illness with family member that will require a lot of attention and time. She states she still forgets the medication and really has not focussed on not smoking. Suggested to possibly use the 150 mg Wellbutrin again or the patches which do seem to work. Explained the need smoke as little as she can with her heart problems. She states she has Wellbutrin and patches at home.

## 2020-03-19 RX ORDER — OMEPRAZOLE 40 MG/1
CAPSULE, DELAYED RELEASE ORAL
Qty: 90 CAPSULE | Refills: 1 | Status: SHIPPED | OUTPATIENT
Start: 2020-03-19 | End: 2020-06-10 | Stop reason: SDUPTHER

## 2020-03-20 ENCOUNTER — TELEPHONE (OUTPATIENT)
Dept: GASTROENTEROLOGY | Facility: CLINIC | Age: 55
End: 2020-03-20

## 2020-03-30 RX ORDER — AMLODIPINE BESYLATE 5 MG/1
5 TABLET ORAL DAILY
Qty: 30 TABLET | Refills: 3 | Status: SHIPPED | OUTPATIENT
Start: 2020-03-30 | End: 2020-12-29

## 2020-04-01 ENCOUNTER — CLINICAL SUPPORT (OUTPATIENT)
Dept: SMOKING CESSATION | Facility: CLINIC | Age: 55
End: 2020-04-01
Payer: COMMERCIAL

## 2020-04-01 ENCOUNTER — TELEPHONE (OUTPATIENT)
Dept: SMOKING CESSATION | Facility: CLINIC | Age: 55
End: 2020-04-01

## 2020-04-01 DIAGNOSIS — F17.210 MODERATE SMOKER (20 OR LESS PER DAY): ICD-10-CM

## 2020-04-01 PROCEDURE — 99402 PREV MED CNSL INDIV APPRX 30: CPT | Mod: S$GLB,,,

## 2020-04-01 PROCEDURE — 99402 PR PREVENT COUNSEL,INDIV,30 MIN: ICD-10-PCS | Mod: S$GLB,,,

## 2020-04-01 NOTE — TELEPHONE ENCOUNTER
1st  attempt to follow up for progress and support.  Left [phone] number to call for update and needs. Left my name Marcia Monaco

## 2020-04-15 ENCOUNTER — TELEPHONE (OUTPATIENT)
Dept: SMOKING CESSATION | Facility: CLINIC | Age: 55
End: 2020-04-15

## 2020-04-15 NOTE — TELEPHONE ENCOUNTER
1st  attempt to follow up for progress and support.  Left [phone] number to call for update and needs. Left my name Marcia Monaco

## 2020-04-21 DIAGNOSIS — Z01.84 ANTIBODY RESPONSE EXAMINATION: ICD-10-CM

## 2020-04-29 ENCOUNTER — CLINICAL SUPPORT (OUTPATIENT)
Dept: SMOKING CESSATION | Facility: CLINIC | Age: 55
End: 2020-04-29
Payer: COMMERCIAL

## 2020-04-29 ENCOUNTER — TELEPHONE (OUTPATIENT)
Dept: SMOKING CESSATION | Facility: CLINIC | Age: 55
End: 2020-04-29

## 2020-04-29 DIAGNOSIS — F17.210 MODERATE SMOKER (20 OR LESS PER DAY): Primary | ICD-10-CM

## 2020-04-29 PROCEDURE — 99402 PREV MED CNSL INDIV APPRX 30: CPT | Mod: S$GLB,,,

## 2020-04-29 PROCEDURE — 99402 PR PREVENT COUNSEL,INDIV,30 MIN: ICD-10-PCS | Mod: S$GLB,,,

## 2020-04-29 NOTE — PROGRESS NOTES
Individual Follow-Up Form # 6   4/29/2020    Quit Date: TBD    Clinical Status of Patient: Outpatient    Length of Service: 30 minutes    Continuing Medication: no    Other Medications: none     Target Symptoms: Withdrawal and medication side effects. The following were  rated moderate (3) to severe (4) on TCRS:  · Moderate (3): none  · Severe (4): none    Comments: The patient is smoking about 8-10 cigarettes/day sometimes less. She has an URI and has not smoked as much in the last week. She is not using any medication at this time. Her trust benefits will be out 5/1/20 and cannot renew till 8/19/20. She has several ongoing, time consuming, medical issues to deal with in the next few months and would probably not focus on quitting at this point. Feel she  May do better when she can renew, Discussed the hazard of smoking with this virus and  Trying to cut back as much as she can. She does well with nicotine patches. The patient will continue individual  and/or group sessions and medication monitoring by CTTS. Prescribed medication management will be by [facility] Medical Practitioner.The patient denies any abnormal behavioral or mental changes at this time.      Diagnosis: F17.210    Next Visit: 2 weeks

## 2020-04-29 NOTE — Clinical Note
he patient is smoking about 8-10 cigarettes/day sometimes less. She has an URI and has not smoked as much in the last week. She is not using any medication at this time. Her trust benefits will be out 5/1/20 and cannot renew till 8/19/20. She has several ongoing, time consuming, medical issues to deal with in the next few months and would probably not focus on quitting at this point. Feel she  May do better when she can renew, Discussed the hazard of smoking with this virus and  Trying to cut back as much as she can. She does well with nicotine patches. The patient will continue individual  and/or group sessions and medication monitoring by CTTS

## 2020-05-07 RX ORDER — LISINOPRIL 40 MG/1
40 TABLET ORAL DAILY
Qty: 90 TABLET | Refills: 1 | Status: SHIPPED | OUTPATIENT
Start: 2020-05-07 | End: 2020-12-29 | Stop reason: SDUPTHER

## 2020-05-19 RX ORDER — METOPROLOL SUCCINATE 100 MG/1
100 TABLET, EXTENDED RELEASE ORAL DAILY
Qty: 90 TABLET | Refills: 0 | Status: SHIPPED | OUTPATIENT
Start: 2020-05-19 | End: 2020-08-25 | Stop reason: SDUPTHER

## 2020-07-10 ENCOUNTER — PATIENT OUTREACH (OUTPATIENT)
Dept: ADMINISTRATIVE | Facility: OTHER | Age: 55
End: 2020-07-10

## 2020-07-10 DIAGNOSIS — M25.561 RIGHT MEDIAL KNEE PAIN: Primary | ICD-10-CM

## 2020-07-10 NOTE — PROGRESS NOTES
Requested updates within Care Everywhere.  Patient's chart was reviewed for overdue FLAQUITO topics.  Immunizations reconciled.    Orders placed:  Tasked appts:  Labs Linked:

## 2020-07-12 NOTE — PROGRESS NOTES
Subjective:      Patient ID: Loi Cordova is a 54 y.o. female.    Chief Complaint: No chief complaint on file.      HPI  (Neida)    History of GERD with esophageal ulcer, CAD with stents, CHF, DM, HTN.     Last seen by Cindy BARTH on 1/11/18 when she had right knee euflexxa injections. History of left TKA.     She had acute onset of right knee pain on Friday after stepping down and turning. Initially, she could not weight bear on right knee. She is using crutches today. Pain is better but still severe. She has constant right knee pain and spasms. No alleviating factors. Pain is worse with walking and any turning of the knee. She rates her pain as a 5 on a scale of 1-10. Pain is sharp in nature. She has giving way and locking of right knee.     She did well with steroid and euflexxa injections in the past. No recent PT for th knee. No surgery on right knee. She has been taking prn motrin with minimal relief.       Past Medical History:   Diagnosis Date    Allergy     Asthma     Coronary artery disease     GERD (gastroesophageal reflux disease)     History of back surgery 2/20/2018    Hyperlipidemia     Hypertension     Type 2 diabetes mellitus with diabetic polyneuropathy, without long-term current use of insulin 2/8/2019         Current Outpatient Medications:     amLODIPine (NORVASC) 5 MG tablet, Take 1 tablet (5 mg total) by mouth once daily., Disp: 30 tablet, Rfl: 3    aspirin 81 MG Chew, Take 81 mg by mouth once daily., Disp: , Rfl:     atorvastatin (LIPITOR) 80 MG tablet, TAKE 1 TABLET BY MOUTH EVERY DAY, Disp: 30 tablet, Rfl: 0    atorvastatin (LIPITOR) 80 MG tablet, Take 1 tablet by mouth once a day., Disp: 90 tablet, Rfl: 3    benzonatate (TESSALON) 200 MG capsule, Take 200 mg by mouth 3 (three) times daily as needed for Cough., Disp: , Rfl:     fenofibrate 160 MG Tab, , Disp: , Rfl:     fluticasone (FLONASE) 50 mcg/actuation nasal spray, 1 spray by Each Nare route once daily., Disp: 1  Bottle, Rfl: 1    fluticasone-salmeterol diskus inhaler 250-50 mcg, Inhale 1 puff into the lungs 2 (two) times daily. Controller, Disp: , Rfl:     furosemide (LASIX) 40 MG tablet, Take 1 tablet (40 mg total) by mouth 2 (two) times daily. (Patient taking differently: Take 40 mg by mouth 2 (two) times daily as needed. ), Disp: , Rfl:     HYDROcodone-acetaminophen (NORCO)  mg per tablet, Take 1 tablet by mouth every 6 (six) hours as needed for pain., Disp: 12 tablet, Rfl: 0    linaCLOtide (LINZESS) 72 mcg Cap capsule, Take 1 capsule (72 mcg total) by mouth once daily., Disp: 90 capsule, Rfl: 3    lisinopriL (PRINIVIL,ZESTRIL) 40 MG tablet, TAKE 1 TABLET BY MOUTH EVERY DAY, Disp: 90 tablet, Rfl: 1    metFORMIN (GLUCOPHAGE) 500 MG tablet, Take 1 tablet (500 mg total) by mouth daily with breakfast., Disp: 30 tablet, Rfl: 0    metoprolol succinate (TOPROL-XL) 100 MG 24 hr tablet, Take 1 tablet (100 mg total) by mouth once daily., Disp: 90 tablet, Rfl: 0    nitroGLYCERIN (NITROSTAT) 0.4 MG SL tablet, , Disp: , Rfl:     omeprazole (PRILOSEC) 40 MG capsule, Take 1 capsule (40 mg total) by mouth once daily., Disp: 90 capsule, Rfl: 1    ondansetron (ZOFRAN-ODT) 4 MG TbDL, Dissolve 1 tablet (4 mg total) by mouth every 6 (six) hours as needed., Disp: 20 tablet, Rfl: 0    potassium chloride (K-TAB) 20 mEq, Take 1 tablet by mouth once a day., Disp: 90 tablet, Rfl: 3    potassium chloride (MICRO-K) 10 MEQ CpSR, Take 20 mEq by mouth 2 (two) times daily. , Disp: , Rfl:     rOPINIRole (REQUIP) 0.5 MG tablet, Take 1 tablet by mouth once in the evening., Disp: 30 tablet, Rfl: 6    traZODone (DESYREL) 100 MG tablet, Take 1 tablet (100 mg total) by mouth every evening., Disp: 30 tablet, Rfl: 4    VENTOLIN HFA 90 mcg/actuation inhaler, Inhale 2 puffs by mouth every 4-6 hours as needed., Disp: , Rfl: 3    cyclobenzaprine (FLEXERIL) 10 MG tablet, Take 1 tablet (10 mg total) by mouth 3 (three) times daily as needed for  Muscle spasms., Disp: 30 tablet, Rfl: 0    furosemide (LASIX) 40 MG tablet, Take 1 tablet by mouth once a day. (Patient not taking: Reported on 7/13/2020), Disp: 90 tablet, Rfl: 3    furosemide (LASIX) 40 MG tablet, Take 1 tablet by mouth once a day. (Patient not taking: Reported on 7/13/2020), Disp: 90 tablet, Rfl: 3    penicillin v potassium (VEETID) 500 MG tablet, Take 2 tablets by mouth now, then 1 tablet by mouth four times daily until all taken. (Patient not taking: Reported on 7/13/2020), Disp: 30 tablet, Rfl: 1    potassium chloride (K-TAB) 20 mEq, Take 1 tablet by mouth once a day. (Patient not taking: Reported on 7/13/2020), Disp: 90 tablet, Rfl: 3    rOPINIRole (REQUIP) 0.5 MG tablet, Take 1 tablet orally once in the evening. (Patient not taking: Reported on 7/13/2020), Disp: 30 tablet, Rfl: 6    traMADoL (ULTRAM) 50 mg tablet, Take 1 tablet (50 mg total) by mouth every 8 (eight) hours as needed for Pain., Disp: 18 tablet, Rfl: 0  No current facility-administered medications for this visit.     Facility-Administered Medications Ordered in Other Visits:     0.9%  NaCl infusion, , Intravenous, Continuous, Sarah Gamez MD, Stopped at 01/09/20 0925    0.9%  NaCl infusion, , Intravenous, Continuous, Sarah Gamez MD, Stopped at 01/09/20 1026    0.9%  NaCl infusion, , Intravenous, Continuous, Angélica De La Cruz MD    ceFAZolin injection 2 g, 2 g, Intravenous, On Call Procedure, Angélica De La Cruz MD    lidocaine (PF) 10 mg/ml (1%) injection 10 mg, 1 mL, Intradermal, Once, Angélica De La Cruz MD    ondansetron injection 4 mg, 4 mg, Intravenous, Q12H PRN, Angélica De La Cruz MD    promethazine (PHENERGAN) 6.25 mg in dextrose 5 % 50 mL IVPB, 6.25 mg, Intravenous, Q6H PRN, Angélica De La Cruz MD    sodium chloride 0.9% flush 10 mL, 10 mL, Intravenous, PRN, Sarah Gamez MD    sodium chloride 0.9% flush 10 mL, 10 mL, Intravenous, PRN, Sarah Gamez MD    Review of patient's allergies  indicates:   Allergen Reactions    Crayfish Anaphylaxis     (crawfish only)       Review of Systems   Constitution: Negative for chills, fever, night sweats and weight gain.   Gastrointestinal: Negative for bowel incontinence, nausea and vomiting.   Genitourinary: Negative for bladder incontinence.   Neurological: Negative for disturbances in coordination and loss of balance.         Objective:        /80   Pulse 78   Resp 18   Wt 89.4 kg (197 lb)   LMP 07/29/1990 (Within Years) Comment: Partical Hysterectomy in 1990, Gutierrez Ooopherectomy in 2000  BMI 38.47 kg/m²     Ortho/SPM Exam    Body habitus is obese.   The patient walks with a limp and crutches.       RIGHT KNEE EXAM:    Resisted SLR negative.   The skin over the knee is intact.  Knee effusion none   Tendernes is located medial and lateral  Range of motion- Flexion 90 deg with pain, Extension -5 deg with pain,     Ligament exam:   MCL 1+ laxity   Lachman intact              Post sag intact    LCL intact    Knee feels grossly stable, but exam is difficult due to severe pain.     Popliteal cyst negative  Patellar crepitation present.    Pulses DP present, PT present.  Motor normal 5/5 strength in all tested muscle groups.   Sensory normal.      LEFT KNEE EXAM:    Resisted SLR negative.   The skin over the knee is has a healed scar.  Knee effusion none   No tenderness noted.   Range of motion- Flexion 100 deg, Extension 0 deg,     No gross instability noted.    Pulses DP present, PT present.  Motor normal 5/5 strength in all tested muscle groups.   Sensory normal.      XRAY INTERPRETATION:  X-rays of bilateral knees dated 7/10/20 are personally reviewed and show left total knee prosthesis. Moderate medial joint space narrowing right knee with osteophytes.        Assessment:       Encounter Diagnoses   Name Primary?    Acute pain of right knee Yes    Knee locking, right     Primary osteoarthritis of right knee           Plan:       Diagnoses and all  orders for this visit:    Acute pain of right knee  -     MRI Knee Without Contrast Right; Future  -     cyclobenzaprine (FLEXERIL) 10 MG tablet; Take 1 tablet (10 mg total) by mouth 3 (three) times daily as needed for Muscle spasms.  -     traMADoL (ULTRAM) 50 mg tablet; Take 1 tablet (50 mg total) by mouth every 8 (eight) hours as needed for Pain.    Knee locking, right  -     MRI Knee Without Contrast Right; Future  -     cyclobenzaprine (FLEXERIL) 10 MG tablet; Take 1 tablet (10 mg total) by mouth 3 (three) times daily as needed for Muscle spasms.  -     traMADoL (ULTRAM) 50 mg tablet; Take 1 tablet (50 mg total) by mouth every 8 (eight) hours as needed for Pain.    Primary osteoarthritis of right knee  -     MRI Knee Without Contrast Right; Future  -     cyclobenzaprine (FLEXERIL) 10 MG tablet; Take 1 tablet (10 mg total) by mouth 3 (three) times daily as needed for Muscle spasms.  -     traMADoL (ULTRAM) 50 mg tablet; Take 1 tablet (50 mg total) by mouth every 8 (eight) hours as needed for Pain.      Acute onset of right knee pain on Friday after stepping down and turning. Initially, she could not weight bear on right knee. Pain is better but still severe. She has constant right knee pain and spasms. Known moderate medial joint space narrowing right knee with osteophytes. Exam limited due to pain. She cannot fully extend the knee and only has flexion to 90 degrees. Treatment options reviewed with patient along with above bilateral knee xrays. Following plan made:     - MRI of right knee to evaluate severe pain, locking, giving way, and inability to fully extend knee.   - New prescription for ultram for severe pain. One time prescription only. Reviewed dosing and side effects.  reviewed and is appropriate.   - New prescription for flexeril to use prn muscle spasms. Reviewed dosing and side effects.   - Given hinged knee brace. Will wear this prn comfort/support with prolonged walking. Get rid of crutches  when able.   - Will put MRI results and further recommendations on [facility]. If no acute injury and pain is from underlying OA, will do steroid injection and work on getting euflexxa injections approved.     Follow up if symptoms worsen or fail to improve.         ADDENDUM 7/15/20:   MRI of right knee dated 7/15/20 is personally reviewed and shows chondromalacia patella.     Patient sent message with above results. If pain is stills severe, recommend steroid injection and PT.

## 2020-07-13 ENCOUNTER — OFFICE VISIT (OUTPATIENT)
Dept: ORTHOPEDICS | Facility: CLINIC | Age: 55
End: 2020-07-13
Payer: COMMERCIAL

## 2020-07-13 VITALS
DIASTOLIC BLOOD PRESSURE: 80 MMHG | WEIGHT: 197 LBS | SYSTOLIC BLOOD PRESSURE: 138 MMHG | BODY MASS INDEX: 38.47 KG/M2 | RESPIRATION RATE: 18 BRPM | HEART RATE: 78 BPM

## 2020-07-13 DIAGNOSIS — M25.561 ACUTE PAIN OF RIGHT KNEE: Primary | ICD-10-CM

## 2020-07-13 DIAGNOSIS — M17.11 PRIMARY OSTEOARTHRITIS OF RIGHT KNEE: ICD-10-CM

## 2020-07-13 DIAGNOSIS — M23.91 KNEE LOCKING, RIGHT: ICD-10-CM

## 2020-07-13 PROCEDURE — 3075F SYST BP GE 130 - 139MM HG: CPT | Mod: CPTII,S$GLB,, | Performed by: PHYSICIAN ASSISTANT

## 2020-07-13 PROCEDURE — 3079F PR MOST RECENT DIASTOLIC BLOOD PRESSURE 80-89 MM HG: ICD-10-PCS | Mod: CPTII,S$GLB,, | Performed by: PHYSICIAN ASSISTANT

## 2020-07-13 PROCEDURE — 99999 PR PBB SHADOW E&M-EST. PATIENT-LVL V: CPT | Mod: PBBFAC,,, | Performed by: PHYSICIAN ASSISTANT

## 2020-07-13 PROCEDURE — 3079F DIAST BP 80-89 MM HG: CPT | Mod: CPTII,S$GLB,, | Performed by: PHYSICIAN ASSISTANT

## 2020-07-13 PROCEDURE — 3075F PR MOST RECENT SYSTOLIC BLOOD PRESS GE 130-139MM HG: ICD-10-PCS | Mod: CPTII,S$GLB,, | Performed by: PHYSICIAN ASSISTANT

## 2020-07-13 PROCEDURE — 99999 PR PBB SHADOW E&M-EST. PATIENT-LVL V: ICD-10-PCS | Mod: PBBFAC,,, | Performed by: PHYSICIAN ASSISTANT

## 2020-07-13 PROCEDURE — 99213 OFFICE O/P EST LOW 20 MIN: CPT | Mod: S$GLB,,, | Performed by: PHYSICIAN ASSISTANT

## 2020-07-13 PROCEDURE — 3008F BODY MASS INDEX DOCD: CPT | Mod: CPTII,S$GLB,, | Performed by: PHYSICIAN ASSISTANT

## 2020-07-13 PROCEDURE — 3008F PR BODY MASS INDEX (BMI) DOCUMENTED: ICD-10-PCS | Mod: CPTII,S$GLB,, | Performed by: PHYSICIAN ASSISTANT

## 2020-07-13 PROCEDURE — 99213 PR OFFICE/OUTPT VISIT, EST, LEVL III, 20-29 MIN: ICD-10-PCS | Mod: S$GLB,,, | Performed by: PHYSICIAN ASSISTANT

## 2020-07-13 RX ORDER — TRAMADOL HYDROCHLORIDE 50 MG/1
50 TABLET ORAL EVERY 8 HOURS PRN
Qty: 18 TABLET | Refills: 0 | Status: SHIPPED | OUTPATIENT
Start: 2020-07-13 | End: 2020-07-19

## 2020-07-13 RX ORDER — CYCLOBENZAPRINE HCL 10 MG
10 TABLET ORAL 3 TIMES DAILY PRN
Qty: 30 TABLET | Refills: 0 | Status: SHIPPED | OUTPATIENT
Start: 2020-07-13 | End: 2020-07-23

## 2020-07-13 NOTE — PATIENT INSTRUCTIONS
It was nice to meet you today! I am sorry that you are hurting so much.     You have some wear and tear in your right knee, but I am worried about the amount of pain you are still having. I want to get an MRI to look into this further.     Wear the knee brace as needed for prolonged walking. This should give you added support and help with pain.     I sent tramadol to your pharmacy to help with pain. Take only as needed and be careful, it can make you sleepy. I also sent cyclobenzaprine to take as needed for muscle spasms. Be careful, this can also make you sleepy.     I will put your MRI results and my recommendations on [facility] for you once it is done.     Please stay in touch and call me if you need anything. You can also send me a message in [facility].     Julissa   [phone]

## 2020-07-17 ENCOUNTER — OFFICE VISIT (OUTPATIENT)
Dept: ORTHOPEDICS | Facility: CLINIC | Age: 55
End: 2020-07-17
Payer: COMMERCIAL

## 2020-07-17 VITALS
BODY MASS INDEX: 38.47 KG/M2 | RESPIRATION RATE: 18 BRPM | WEIGHT: 197 LBS | DIASTOLIC BLOOD PRESSURE: 62 MMHG | HEART RATE: 78 BPM | SYSTOLIC BLOOD PRESSURE: 132 MMHG

## 2020-07-17 DIAGNOSIS — M22.41 CHONDROMALACIA PATELLAE OF RIGHT KNEE: ICD-10-CM

## 2020-07-17 DIAGNOSIS — M17.11 PRIMARY OSTEOARTHRITIS OF RIGHT KNEE: Primary | ICD-10-CM

## 2020-07-17 PROCEDURE — 99213 OFFICE O/P EST LOW 20 MIN: CPT | Mod: 25,S$GLB,, | Performed by: PHYSICIAN ASSISTANT

## 2020-07-17 PROCEDURE — 20610 DRAIN/INJ JOINT/BURSA W/O US: CPT | Mod: RT,S$GLB,, | Performed by: PHYSICIAN ASSISTANT

## 2020-07-17 PROCEDURE — 99999 PR PBB SHADOW E&M-EST. PATIENT-LVL III: ICD-10-PCS | Mod: PBBFAC,,, | Performed by: PHYSICIAN ASSISTANT

## 2020-07-17 PROCEDURE — 99999 PR PBB SHADOW E&M-EST. PATIENT-LVL III: CPT | Mod: PBBFAC,,, | Performed by: PHYSICIAN ASSISTANT

## 2020-07-17 PROCEDURE — 3008F PR BODY MASS INDEX (BMI) DOCUMENTED: ICD-10-PCS | Mod: CPTII,S$GLB,, | Performed by: PHYSICIAN ASSISTANT

## 2020-07-17 PROCEDURE — 99213 PR OFFICE/OUTPT VISIT, EST, LEVL III, 20-29 MIN: ICD-10-PCS | Mod: 25,S$GLB,, | Performed by: PHYSICIAN ASSISTANT

## 2020-07-17 PROCEDURE — 20610 PR DRAIN/INJECT LARGE JOINT/BURSA: ICD-10-PCS | Mod: RT,S$GLB,, | Performed by: PHYSICIAN ASSISTANT

## 2020-07-17 PROCEDURE — 3008F BODY MASS INDEX DOCD: CPT | Mod: CPTII,S$GLB,, | Performed by: PHYSICIAN ASSISTANT

## 2020-07-17 RX ORDER — TRIAMCINOLONE ACETONIDE 40 MG/ML
40 INJECTION, SUSPENSION INTRA-ARTICULAR; INTRAMUSCULAR
Status: DISCONTINUED | OUTPATIENT
Start: 2020-07-17 | End: 2020-07-17 | Stop reason: HOSPADM

## 2020-07-17 RX ADMIN — TRIAMCINOLONE ACETONIDE 40 MG: 40 INJECTION, SUSPENSION INTRA-ARTICULAR; INTRAMUSCULAR at 02:07

## 2020-07-17 NOTE — PROCEDURES
Large Joint Aspiration/Injection    Date/Time: 7/17/2020 2:00 PM  Performed by: Julissa Ko PA-C  Authorized by: Julissa Ko PA-C     Consent Done?:  Yes (Verbal)  Timeout: prior to procedure the correct patient, procedure, and site was verified    Medications:  40 mg triamcinolone acetonide 40 mg/mL     PROCEDURE NOTE:  RIGHT KNEE INJECTION    I have explained the risks, benefits, and alternatives of the procedure in detail.  The patient voices understanding and all questions have been answered.  The patient agrees to proceed as planned.    After a sterile prep of the skin using chloraprep one step, the area was sprayed with local topical anesthetic and then cleaned with alcohol. The RIGHT knee was injected through an inferior lateral approach with a combination of 2 cc 1% plain xylocaine and 40mg triamcinolone.  The patient is cautioned that immediate relief of pain is secondary to the local anesthetic and will be temporary. After the anesthetic wears off there may be a increase in pain that may last for a few hours or a few days and they should use ice to help alleviate this this pain.     If patient is diabetic, post injection elevation of blood sugar was discussed. Patient is to check blood sugar regularly and call PCP with any issues.     Patient tolerated the procedure well.

## 2020-07-17 NOTE — PROGRESS NOTES
Subjective:      Patient ID: Loi Cordova is a 54 y.o. female.    Chief Complaint: No chief complaint on file.      HPI  (Neida)    History of GERD with esophageal ulcer, CAD with stents, CHF, DM, HTN. History of left TKA.     Seen by me on 7/13/20 with acute right knee pain. Had MRI of right knee showing chondromalacia patella. Given flexeril, ultram, and knee brace at her last visit.     Here to discuss possible right knee injection. She continues with constant right knee pain that is worse when she gets up after prolonged sitting. Some improvement with knee brace and medications. She rates her pain as an 8 on a scale of 1-10. Pain is sharp in nature. No swelling. She is still using a crutch to walk.     She did well with steroid and euflexxa injections in the past. No recent PT for th knee. No surgery on right knee.       Past Medical History:   Diagnosis Date    Allergy     Asthma     Coronary artery disease     GERD (gastroesophageal reflux disease)     History of back surgery 2/20/2018    Hyperlipidemia     Hypertension     Type 2 diabetes mellitus with diabetic polyneuropathy, without long-term current use of insulin 2/8/2019         Current Outpatient Medications:     amLODIPine (NORVASC) 5 MG tablet, Take 1 tablet (5 mg total) by mouth once daily., Disp: 30 tablet, Rfl: 3    aspirin 81 MG Chew, Take 81 mg by mouth once daily., Disp: , Rfl:     atorvastatin (LIPITOR) 80 MG tablet, TAKE 1 TABLET BY MOUTH EVERY DAY, Disp: 30 tablet, Rfl: 0    atorvastatin (LIPITOR) 80 MG tablet, Take 1 tablet by mouth once a day., Disp: 90 tablet, Rfl: 3    benzonatate (TESSALON) 200 MG capsule, Take 200 mg by mouth 3 (three) times daily as needed for Cough., Disp: , Rfl:     cyclobenzaprine (FLEXERIL) 10 MG tablet, Take 1 tablet (10 mg total) by mouth 3 (three) times daily as needed for Muscle spasms., Disp: 30 tablet, Rfl: 0    fenofibrate 160 MG Tab, , Disp: , Rfl:     fluticasone (FLONASE) 50 mcg/actuation  nasal spray, 1 spray by Each Nare route once daily., Disp: 1 Bottle, Rfl: 1    fluticasone-salmeterol diskus inhaler 250-50 mcg, Inhale 1 puff into the lungs 2 (two) times daily. Controller, Disp: , Rfl:     furosemide (LASIX) 40 MG tablet, Take 1 tablet (40 mg total) by mouth 2 (two) times daily. (Patient taking differently: Take 40 mg by mouth 2 (two) times daily as needed. ), Disp: , Rfl:     furosemide (LASIX) 40 MG tablet, Take 1 tablet by mouth once a day. (Patient not taking: Reported on 7/13/2020), Disp: 90 tablet, Rfl: 3    furosemide (LASIX) 40 MG tablet, Take 1 tablet by mouth once a day. (Patient not taking: Reported on 7/13/2020), Disp: 90 tablet, Rfl: 3    HYDROcodone-acetaminophen (NORCO)  mg per tablet, Take 1 tablet by mouth every 6 (six) hours as needed for pain., Disp: 12 tablet, Rfl: 0    linaCLOtide (LINZESS) 72 mcg Cap capsule, Take 1 capsule (72 mcg total) by mouth once daily., Disp: 90 capsule, Rfl: 3    lisinopriL (PRINIVIL,ZESTRIL) 40 MG tablet, TAKE 1 TABLET BY MOUTH EVERY DAY, Disp: 90 tablet, Rfl: 1    metFORMIN (GLUCOPHAGE) 500 MG tablet, Take 1 tablet (500 mg total) by mouth daily with breakfast., Disp: 30 tablet, Rfl: 0    metoprolol succinate (TOPROL-XL) 100 MG 24 hr tablet, Take 1 tablet (100 mg total) by mouth once daily., Disp: 90 tablet, Rfl: 0    nitroGLYCERIN (NITROSTAT) 0.4 MG SL tablet, , Disp: , Rfl:     omeprazole (PRILOSEC) 40 MG capsule, Take 1 capsule (40 mg total) by mouth once daily., Disp: 90 capsule, Rfl: 1    ondansetron (ZOFRAN-ODT) 4 MG TbDL, Dissolve 1 tablet (4 mg total) by mouth every 6 (six) hours as needed., Disp: 20 tablet, Rfl: 0    penicillin v potassium (VEETID) 500 MG tablet, Take 2 tablets by mouth now, then 1 tablet by mouth four times daily until all taken. (Patient not taking: Reported on 7/13/2020), Disp: 30 tablet, Rfl: 1    potassium chloride (K-TAB) 20 mEq, Take 1 tablet by mouth once a day., Disp: 90 tablet, Rfl: 3     potassium chloride (K-TAB) 20 mEq, Take 1 tablet by mouth once a day. (Patient not taking: Reported on 7/13/2020), Disp: 90 tablet, Rfl: 3    potassium chloride (MICRO-K) 10 MEQ CpSR, Take 20 mEq by mouth 2 (two) times daily. , Disp: , Rfl:     rOPINIRole (REQUIP) 0.5 MG tablet, Take 1 tablet by mouth once in the evening., Disp: 30 tablet, Rfl: 6    rOPINIRole (REQUIP) 0.5 MG tablet, Take 1 tablet orally once in the evening. (Patient not taking: Reported on 7/13/2020), Disp: 30 tablet, Rfl: 6    traMADoL (ULTRAM) 50 mg tablet, Take 1 tablet (50 mg total) by mouth every 8 (eight) hours as needed for Pain., Disp: 18 tablet, Rfl: 0    traZODone (DESYREL) 100 MG tablet, Take 1 tablet (100 mg total) by mouth every evening., Disp: 30 tablet, Rfl: 4    VENTOLIN HFA 90 mcg/actuation inhaler, Inhale 2 puffs by mouth every 4-6 hours as needed., Disp: , Rfl: 3  No current facility-administered medications for this visit.     Facility-Administered Medications Ordered in Other Visits:     0.9%  NaCl infusion, , Intravenous, Continuous, Sarah Gamez MD, Stopped at 01/09/20 0925    0.9%  NaCl infusion, , Intravenous, Continuous, Sarah Gamez MD, Stopped at 01/09/20 1026    0.9%  NaCl infusion, , Intravenous, Continuous, Angélica De La Cruz MD    ceFAZolin injection 2 g, 2 g, Intravenous, On Call Procedure, Angélica De La Cruz MD    lidocaine (PF) 10 mg/ml (1%) injection 10 mg, 1 mL, Intradermal, Once, Angélica eD La Cruz MD    ondansetron injection 4 mg, 4 mg, Intravenous, Q12H PRN, Angélica De La Cruz MD    promethazine (PHENERGAN) 6.25 mg in dextrose 5 % 50 mL IVPB, 6.25 mg, Intravenous, Q6H PRN, Angélica De La Cruz MD    sodium chloride 0.9% flush 10 mL, 10 mL, Intravenous, PRN, Sarah Gamez MD    sodium chloride 0.9% flush 10 mL, 10 mL, Intravenous, PRNSarah MD    Review of patient's allergies indicates:   Allergen Reactions    Crayfish Anaphylaxis     (crawfish only)       Review of  Systems   Constitution: Negative for chills, fever, night sweats and weight gain.   Gastrointestinal: Negative for bowel incontinence, nausea and vomiting.   Genitourinary: Negative for bladder incontinence.   Neurological: Negative for disturbances in coordination and loss of balance.         Objective:        LMP 07/29/1990 (Within Years) Comment: Partical Hysterectomy in 1990, Gutierrez Ooopherectomy in 2000    Ortho/SPM Exam    Body habitus is obese.   The patient walks with a limp and one crutch       RIGHT KNEE EXAM:    Resisted SLR negative.   The skin over the knee is intact.  Knee effusion none   Tendernes is located medial and lateral  Range of motion- Flexion 100 deg with pain, Extension 0 deg with pain,     Ligament exam:   MCL 1+ laxity   Lachman intact              Post sag intact    LCL intact    Popliteal cyst negative  Patellar crepitation present.    Pulses DP present, PT present.  Motor normal 5/5 strength in all tested muscle groups.   Sensory normal.        Assessment:       Encounter Diagnoses   Name Primary?    Primary osteoarthritis of right knee Yes    Chondromalacia patellae of right knee           Plan:       Diagnoses and all orders for this visit:    Primary osteoarthritis of right knee  -     Large Joint Aspiration/Injection    Chondromalacia patellae of right knee  -     Large Joint Aspiration/Injection      Persistent right knee pain/spasms. Known moderate medial joint space narrowing right knee with osteophytes. Recent MRI with chondromalacia patella. Previous improvement with steroid and euflexxa injections. Treatment options reviewed with patient along with MRI of her right knee.  Following plan made:     - RIGHT knee injection done without complications. See procedure note.   - Recommend PT for right knee- she is worried about copay. Instructed on short arc quad exercises.   - Will work on getting euflexxa preapproved. Can do first injection in 3 weeks at the earliest. Will call her  when we get approved.   - Continue with knee brace as needed.     Follow up if symptoms worsen or fail to improve.

## 2020-07-20 DIAGNOSIS — M22.41 CHONDROMALACIA PATELLAE OF RIGHT KNEE: ICD-10-CM

## 2020-07-20 DIAGNOSIS — M17.11 PRIMARY OSTEOARTHRITIS OF RIGHT KNEE: Primary | ICD-10-CM

## 2020-07-23 DIAGNOSIS — M17.11 PRIMARY OSTEOARTHRITIS OF RIGHT KNEE: Primary | ICD-10-CM

## 2020-08-03 NOTE — PROGRESS NOTES
OFFICE VISIT FOR EUFLEXXA INJECTIONS:    (Neida)      Loi Cordova presents to clinic today for the first right knee Euflexxa injection. She had at least 50% improvement in right knee pain with injection at last visit.     Exam demonstrates no effusion in the  right knee, and the skin is intact. No evidence of infection noted. Calf is supple and non tender.     Diagnosis: Osteoarthritis right knee    Time out was done verifying allergies and location of injection.     After a sterile prep of the skin using chloraprep one step, the area was sprayed with local topical anesthetic and then cleaned with alcohol. The RIGHT knee was injected through an inferior lateral approach with 2 ml of Euflexxa.  Sterile dressing was applied. The patient was instructed to rest apply ice and use OTC analgesics as needed. Patient should resume activities as tolerated and to call with any problems.     Lot number: [id number]  Expiration date: 11/2/2020    We will see Loi Cordova back next week for the second injection.    Patient will follow up with Dr. Carrasquillo as scheduled.

## 2020-08-05 ENCOUNTER — PATIENT OUTREACH (OUTPATIENT)
Dept: ADMINISTRATIVE | Facility: OTHER | Age: 55
End: 2020-08-05

## 2020-08-07 ENCOUNTER — OFFICE VISIT (OUTPATIENT)
Dept: ORTHOPEDICS | Facility: CLINIC | Age: 55
End: 2020-08-07
Payer: COMMERCIAL

## 2020-08-07 VITALS
HEART RATE: 80 BPM | WEIGHT: 197 LBS | RESPIRATION RATE: 20 BRPM | SYSTOLIC BLOOD PRESSURE: 116 MMHG | BODY MASS INDEX: 38.47 KG/M2 | DIASTOLIC BLOOD PRESSURE: 80 MMHG

## 2020-08-07 DIAGNOSIS — M17.11 PRIMARY OSTEOARTHRITIS OF RIGHT KNEE: Primary | ICD-10-CM

## 2020-08-07 PROCEDURE — 20610 PR DRAIN/INJECT LARGE JOINT/BURSA: ICD-10-PCS | Mod: RT,S$GLB,, | Performed by: PHYSICIAN ASSISTANT

## 2020-08-07 PROCEDURE — 99999 PR PBB SHADOW E&M-EST. PATIENT-LVL III: CPT | Mod: PBBFAC,,, | Performed by: PHYSICIAN ASSISTANT

## 2020-08-07 PROCEDURE — 20610 DRAIN/INJ JOINT/BURSA W/O US: CPT | Mod: RT,S$GLB,, | Performed by: PHYSICIAN ASSISTANT

## 2020-08-07 PROCEDURE — 99499 UNLISTED E&M SERVICE: CPT | Mod: S$GLB,,, | Performed by: PHYSICIAN ASSISTANT

## 2020-08-07 PROCEDURE — 99999 PR PBB SHADOW E&M-EST. PATIENT-LVL III: ICD-10-PCS | Mod: PBBFAC,,, | Performed by: PHYSICIAN ASSISTANT

## 2020-08-07 PROCEDURE — 99499 NO LOS: ICD-10-PCS | Mod: S$GLB,,, | Performed by: PHYSICIAN ASSISTANT

## 2020-08-10 RX ORDER — TRAZODONE HYDROCHLORIDE 100 MG/1
100 TABLET ORAL NIGHTLY
Qty: 30 TABLET | Refills: 0 | Status: SHIPPED | OUTPATIENT
Start: 2020-08-10 | End: 2020-09-18 | Stop reason: SDUPTHER

## 2020-08-13 NOTE — PROGRESS NOTES
OFFICE VISIT FOR EUFLEXXA INJECTIONS:    (Neida)      Loi Cordova presents to clinic today for the second right knee Euflexxa injection. She has noted some relief in right knee pain since last week.     Exam demonstrates no effusion in the  right knee, and the skin is intact. No evidence of infection noted. Calf is supple and non tender.     Diagnosis: Osteoarthritis RIGHT knee    Time out was done verifying correct patient, location, and medication. Allergies were reviewed.     After a sterile prep of the skin using chloraprep one step, the area was sprayed with local topical anesthetic and then cleaned with alcohol. The RIGHT knee was injected through an inferior lateral approach with 2 ml of Euflexxa.  Sterile dressing was applied.  The patient was instructed to rest apply ice and use OTC analgesics as needed. Patient should resume activities as tolerated and to call with any problems.     Lot number: [id number]  Expiration date: 11/2/20    We will see Loi Cordova back next week for the third injection.    Patient will follow up with Dr. Carrasquillo as scheduled.

## 2020-08-14 ENCOUNTER — OFFICE VISIT (OUTPATIENT)
Dept: ORTHOPEDICS | Facility: CLINIC | Age: 55
End: 2020-08-14
Payer: COMMERCIAL

## 2020-08-14 VITALS
BODY MASS INDEX: 37.93 KG/M2 | DIASTOLIC BLOOD PRESSURE: 70 MMHG | HEIGHT: 60 IN | HEART RATE: 75 BPM | WEIGHT: 193.19 LBS | OXYGEN SATURATION: 98 % | RESPIRATION RATE: 16 BRPM | TEMPERATURE: 98 F | SYSTOLIC BLOOD PRESSURE: 110 MMHG

## 2020-08-14 DIAGNOSIS — M22.41 CHONDROMALACIA PATELLAE OF RIGHT KNEE: ICD-10-CM

## 2020-08-14 DIAGNOSIS — M17.11 PRIMARY OSTEOARTHRITIS OF RIGHT KNEE: Primary | ICD-10-CM

## 2020-08-14 PROCEDURE — 99499 NO LOS: ICD-10-PCS | Mod: S$GLB,,, | Performed by: PHYSICIAN ASSISTANT

## 2020-08-14 PROCEDURE — 99999 PR PBB SHADOW E&M-EST. PATIENT-LVL III: CPT | Mod: PBBFAC,,, | Performed by: PHYSICIAN ASSISTANT

## 2020-08-14 PROCEDURE — 99499 UNLISTED E&M SERVICE: CPT | Mod: S$GLB,,, | Performed by: PHYSICIAN ASSISTANT

## 2020-08-14 PROCEDURE — 20610 DRAIN/INJ JOINT/BURSA W/O US: CPT | Mod: RT,S$GLB,, | Performed by: PHYSICIAN ASSISTANT

## 2020-08-14 PROCEDURE — 99999 PR PBB SHADOW E&M-EST. PATIENT-LVL III: ICD-10-PCS | Mod: PBBFAC,,, | Performed by: PHYSICIAN ASSISTANT

## 2020-08-14 PROCEDURE — 20610 PR DRAIN/INJECT LARGE JOINT/BURSA: ICD-10-PCS | Mod: RT,S$GLB,, | Performed by: PHYSICIAN ASSISTANT

## 2020-08-17 NOTE — PROGRESS NOTES
OFFICE VISIT FOR EUFLEXXA INJECTIONS:    (Neida)      Loi Cordova presents to clinic today for the third right knee Euflexxa injection. She has noted some relief in right knee pain since last week.     Exam demonstrates no effusion in the  right knee, and the skin is intact. No evidence of infection noted. Calf is supple and non tender.     Diagnosis: Osteoarthritis RIGHT knee    Time out was done verifying correct patient, location, and medication. Allergies were reviewed.     After a sterile prep of the skin using chloraprep one step, the area was sprayed with local topical anesthetic and then cleaned with alcohol. The RIGHT knee was injected through an inferior lateral approach with 2 ml of Euflexxa.  Sterile dressing was applied.  The patient was instructed to rest apply ice and use OTC analgesics as needed. Patient should resume activities as tolerated and to call with any problems.     Lot number: [id number]  Expiration date: 11/2/20    We will see Loi Cordova back in 3 months for recheck.     Patient will follow up with Dr. Carrasquillo as scheduled.

## 2020-08-21 ENCOUNTER — OFFICE VISIT (OUTPATIENT)
Dept: ORTHOPEDICS | Facility: CLINIC | Age: 55
End: 2020-08-21
Payer: COMMERCIAL

## 2020-08-21 VITALS
SYSTOLIC BLOOD PRESSURE: 132 MMHG | BODY MASS INDEX: 37.69 KG/M2 | DIASTOLIC BLOOD PRESSURE: 80 MMHG | RESPIRATION RATE: 20 BRPM | HEART RATE: 70 BPM | WEIGHT: 193 LBS

## 2020-08-21 DIAGNOSIS — M17.11 PRIMARY OSTEOARTHRITIS OF RIGHT KNEE: Primary | ICD-10-CM

## 2020-08-21 PROCEDURE — 99999 PR PBB SHADOW E&M-EST. PATIENT-LVL III: ICD-10-PCS | Mod: PBBFAC,,, | Performed by: PHYSICIAN ASSISTANT

## 2020-08-21 PROCEDURE — 20610 PR DRAIN/INJECT LARGE JOINT/BURSA: ICD-10-PCS | Mod: RT,S$GLB,, | Performed by: PHYSICIAN ASSISTANT

## 2020-08-21 PROCEDURE — 99499 NO LOS: ICD-10-PCS | Mod: S$GLB,,, | Performed by: PHYSICIAN ASSISTANT

## 2020-08-21 PROCEDURE — 99999 PR PBB SHADOW E&M-EST. PATIENT-LVL III: CPT | Mod: PBBFAC,,, | Performed by: PHYSICIAN ASSISTANT

## 2020-08-21 PROCEDURE — 99499 UNLISTED E&M SERVICE: CPT | Mod: S$GLB,,, | Performed by: PHYSICIAN ASSISTANT

## 2020-08-21 PROCEDURE — 20610 DRAIN/INJ JOINT/BURSA W/O US: CPT | Mod: RT,S$GLB,, | Performed by: PHYSICIAN ASSISTANT

## 2020-08-25 RX ORDER — METOPROLOL SUCCINATE 100 MG/1
100 TABLET, EXTENDED RELEASE ORAL DAILY
Qty: 90 TABLET | Refills: 0 | Status: SHIPPED | OUTPATIENT
Start: 2020-08-25 | End: 2020-12-11

## 2020-08-26 ENCOUNTER — TELEPHONE (OUTPATIENT)
Dept: FAMILY MEDICINE | Facility: CLINIC | Age: 55
End: 2020-08-26

## 2020-08-31 ENCOUNTER — TELEPHONE (OUTPATIENT)
Dept: SMOKING CESSATION | Facility: CLINIC | Age: 55
End: 2020-08-31

## 2020-09-17 ENCOUNTER — TELEPHONE (OUTPATIENT)
Dept: ORTHOPEDICS | Facility: CLINIC | Age: 55
End: 2020-09-17

## 2020-09-17 NOTE — TELEPHONE ENCOUNTER
If steroid injections only help for 2 weeks then I don't know that I would recommend trying another one.     I would recommend she revisit PT (I know she was concerned about copays, but doing a few visits may help) or have her see Dr. Carrasquillo to discuss possible surgery options (knee replacement).

## 2020-09-17 NOTE — TELEPHONE ENCOUNTER
----- Message from Jennifer Stallings LPN sent at 9/16/2020  2:38 PM CDT -----  Patient is c/o knee pain, the steroid shot helped for only 2 weeks and she doesn't notice a difference since her Euflexxa injections.

## 2020-09-29 ENCOUNTER — PATIENT MESSAGE (OUTPATIENT)
Dept: OTHER | Facility: OTHER | Age: 55
End: 2020-09-29

## 2020-09-29 ENCOUNTER — LAB VISIT (OUTPATIENT)
Dept: FAMILY MEDICINE | Facility: CLINIC | Age: 55
End: 2020-09-29
Payer: COMMERCIAL

## 2020-09-29 DIAGNOSIS — Z01.818 PRE-OP EVALUATION: ICD-10-CM

## 2020-09-29 PROCEDURE — U0003 INFECTIOUS AGENT DETECTION BY NUCLEIC ACID (DNA OR RNA); SEVERE ACUTE RESPIRATORY SYNDROME CORONAVIRUS 2 (SARS-COV-2) (CORONAVIRUS DISEASE [COVID-19]), AMPLIFIED PROBE TECHNIQUE, MAKING USE OF HIGH THROUGHPUT TECHNOLOGIES AS DESCRIBED BY CMS-2020-01-R: HCPCS

## 2020-09-30 LAB — SARS-COV-2 RNA RESP QL NAA+PROBE: NOT DETECTED

## 2020-10-27 ENCOUNTER — OFFICE VISIT (OUTPATIENT)
Dept: FAMILY MEDICINE | Facility: CLINIC | Age: 55
End: 2020-10-27
Payer: COMMERCIAL

## 2020-10-27 VITALS
TEMPERATURE: 97 F | OXYGEN SATURATION: 98 % | SYSTOLIC BLOOD PRESSURE: 138 MMHG | HEART RATE: 86 BPM | WEIGHT: 196 LBS | HEIGHT: 60 IN | BODY MASS INDEX: 38.48 KG/M2 | RESPIRATION RATE: 16 BRPM | DIASTOLIC BLOOD PRESSURE: 80 MMHG

## 2020-10-27 DIAGNOSIS — L03.818 CELLULITIS OF OTHER SPECIFIED SITE: Primary | ICD-10-CM

## 2020-10-27 PROCEDURE — 3079F PR MOST RECENT DIASTOLIC BLOOD PRESSURE 80-89 MM HG: ICD-10-PCS | Mod: CPTII,S$GLB,, | Performed by: FAMILY MEDICINE

## 2020-10-27 PROCEDURE — 99214 OFFICE O/P EST MOD 30 MIN: CPT | Mod: S$GLB,,, | Performed by: FAMILY MEDICINE

## 2020-10-27 PROCEDURE — 3079F DIAST BP 80-89 MM HG: CPT | Mod: CPTII,S$GLB,, | Performed by: FAMILY MEDICINE

## 2020-10-27 PROCEDURE — 3075F PR MOST RECENT SYSTOLIC BLOOD PRESS GE 130-139MM HG: ICD-10-PCS | Mod: CPTII,S$GLB,, | Performed by: FAMILY MEDICINE

## 2020-10-27 PROCEDURE — 99999 PR PBB SHADOW E&M-EST. PATIENT-LVL V: CPT | Mod: PBBFAC,,, | Performed by: FAMILY MEDICINE

## 2020-10-27 PROCEDURE — 99214 PR OFFICE/OUTPT VISIT, EST, LEVL IV, 30-39 MIN: ICD-10-PCS | Mod: S$GLB,,, | Performed by: FAMILY MEDICINE

## 2020-10-27 PROCEDURE — 3008F PR BODY MASS INDEX (BMI) DOCUMENTED: ICD-10-PCS | Mod: CPTII,S$GLB,, | Performed by: FAMILY MEDICINE

## 2020-10-27 PROCEDURE — 99999 PR PBB SHADOW E&M-EST. PATIENT-LVL V: ICD-10-PCS | Mod: PBBFAC,,, | Performed by: FAMILY MEDICINE

## 2020-10-27 PROCEDURE — 3075F SYST BP GE 130 - 139MM HG: CPT | Mod: CPTII,S$GLB,, | Performed by: FAMILY MEDICINE

## 2020-10-27 PROCEDURE — 3008F BODY MASS INDEX DOCD: CPT | Mod: CPTII,S$GLB,, | Performed by: FAMILY MEDICINE

## 2020-10-27 RX ORDER — SULFAMETHOXAZOLE AND TRIMETHOPRIM 800; 160 MG/1; MG/1
1 TABLET ORAL 2 TIMES DAILY
Qty: 14 TABLET | Refills: 0 | Status: SHIPPED | OUTPATIENT
Start: 2020-10-27 | End: 2020-11-03

## 2020-10-27 RX ORDER — OXYCODONE AND ACETAMINOPHEN 5; 325 MG/1; MG/1
1 TABLET ORAL EVERY 6 HOURS PRN
Qty: 15 TABLET | Refills: 0 | Status: SHIPPED | OUTPATIENT
Start: 2020-10-27 | End: 2020-12-29

## 2020-10-27 NOTE — PROGRESS NOTES
EST PATIENT VISIT FAMILY MEDICINE    CC:   Chief Complaint   Patient presents with    Establish Care    Lump     left side groin area swollen with lots of pain x 1 day       HPI: Loi Cordova  is a 55 y.o. female:    3 weeks ago had angiogram. Had small nodule in area after that was not painful. On Sunday, started having more swelling/pain. No fever, n/v. No recent antibiotics. Very painful today. No drainage noted. Has not had this before. No hx of STI/HSV. It is not itchy.     ROS: Review of Systems   Constitutional: Negative for fever.   Respiratory: Negative for shortness of breath.    Cardiovascular: Negative for chest pain.   Skin: Positive for rash. Negative for itching.       PMHX:   Past Medical History:   Diagnosis Date    Allergy     Asthma     Coronary artery disease     GERD (gastroesophageal reflux disease)     History of back surgery 2/20/2018    Hyperlipidemia     Hypertension     Type 2 diabetes mellitus with diabetic polyneuropathy, without long-term current use of insulin 2/8/2019       PSHX:   Past Surgical History:   Procedure Laterality Date    BILATERAL OOPHORECTOMY Bilateral 2000    BREAST BIOPSY Left 2/14/2020    Procedure: BIOPSY, BREAST-Left medial breast palpation guided;  Surgeon: Paulino Espinoza MD;  Location: [address];  Service: General;  Laterality: Left;    CARDIAC CATHETERIZATION      7 stents    CHOLECYSTECTOMY      COLONOSCOPY N/A 7/16/2019    Procedure: COLONOSCOPY;  Surgeon: Sarah Gamez MD;  Location: [facility];  Service: Endoscopy;  Laterality: N/A;    ESOPHAGOGASTRODUODENOSCOPY N/A 1/9/2020    Procedure: EGD (ESOPHAGOGASTRODUODENOSCOPY);  Surgeon: Sarah Gamez MD;  Location: [facility];  Service: Endoscopy;  Laterality: N/A;    HYSTERECTOMY      PARTIAL HYSTERECTOMY N/A 1990    Uterus taken out    SHOULDER SURGERY      TOTAL KNEE ARTHROPLASTY         FAMHX:   Family History   Problem Relation Age of Onset    Heart disease Mother      Hyperlipidemia Mother     Hypertension Mother     Diabetes Mother     Heart disease Father     Hyperlipidemia Father     Cancer Father     Diabetes Maternal Aunt     Prostate cancer Neg Hx     Kidney disease Neg Hx        SOCHX:   Social History     Socioeconomic History    Marital status:      Spouse name: Not on file    Number of children: Not on file    Years of education: Not on file    Highest education level: Not on file   Occupational History    Not on file   Social Needs    Financial resource strain: Not hard at all    Food insecurity     Worry: Never true     Inability: Never true    Transportation needs     Medical: No     Non-medical: No   Tobacco Use    Smoking status: Current Every Day Smoker     Packs/day: 0.50     Years: 34.00     Pack years: 17.00     Types: Cigarettes    Smokeless tobacco: Never Used   Substance and Sexual Activity    Alcohol use: Not Currently     Frequency: Monthly or less     Drinks per session: 5 or 6     Binge frequency: Never     Comment: 6 months    Drug use: No    Sexual activity: Yes     Partners: Male   Lifestyle    Physical activity     Days per week: 1 day     Minutes per session: 20 min    Stress: Not at all   Relationships    Social connections     Talks on phone: More than three times a week     Gets together: Once a week     Attends Christianity service: Never     Active member of club or organization: No     Attends meetings of clubs or organizations: Never     Relationship status:    Other Topics Concern    Not on file   Social History Narrative    Not on file       ALLERGIES:   Review of patient's allergies indicates:   Allergen Reactions    Crayfish Anaphylaxis     (crawfish only)       MEDS:   Current Outpatient Medications:     ALPRAZolam (XANAX) 1 MG tablet, TAKE 1 TABLET BY MOUTH TWICE DAILY AS NEEDED., Disp: 60 tablet, Rfl: 2    amLODIPine (NORVASC) 5 MG tablet, Take 1 tablet (5 mg total) by mouth once daily.,  Disp: 30 tablet, Rfl: 3    aspirin 81 MG Chew, Take 81 mg by mouth once daily., Disp: , Rfl:     atorvastatin (LIPITOR) 80 MG tablet, TAKE 1 TABLET BY MOUTH EVERY DAY, Disp: 30 tablet, Rfl: 0    ezetimibe (ZETIA) 10 mg tablet, Take 1 tablet by mouth once a day., Disp: 30 tablet, Rfl: 12    fenofibrate 160 MG Tab, , Disp: , Rfl:     fluticasone (FLONASE) 50 mcg/actuation nasal spray, 1 spray by Each Nare route once daily., Disp: 1 Bottle, Rfl: 1    furosemide (LASIX) 40 MG tablet, Take 1 tablet (40 mg total) by mouth 2 (two) times daily. (Patient taking differently: Take 40 mg by mouth 2 (two) times daily as needed. ), Disp: , Rfl:     lisinopriL (PRINIVIL,ZESTRIL) 40 MG tablet, TAKE 1 TABLET BY MOUTH EVERY DAY, Disp: 90 tablet, Rfl: 1    metoprolol succinate (TOPROL-XL) 100 MG 24 hr tablet, Take 1 tablet (100 mg total) by mouth once daily., Disp: 90 tablet, Rfl: 0    nitroGLYCERIN (NITROSTAT) 0.4 MG SL tablet, Place 1 tablet under the tongue once every 5 minutes for 3 doses for chest pain, if pain continues call 911, Disp: 25 tablet, Rfl: 3    omeprazole (PRILOSEC) 40 MG capsule, Take 1 capsule (40 mg total) by mouth once daily., Disp: 90 capsule, Rfl: 1    potassium chloride (K-TAB) 20 mEq, Take 1 tablet by mouth once a day., Disp: 90 tablet, Rfl: 3    rOPINIRole (REQUIP) 0.5 MG tablet, Take 1 tablet by mouth once in the evening., Disp: 30 tablet, Rfl: 6    traZODone (DESYREL) 100 MG tablet, Take 1 tablet by mouth once in the evening., Disp: 30 tablet, Rfl: 12    VENTOLIN HFA 90 mcg/actuation inhaler, Inhale 2 puffs by mouth every 4-6 hours as needed., Disp: , Rfl: 3    amLODIPine (NORVASC) 5 MG tablet, Take 1 tablet by mouth once a day., Disp: 30 tablet, Rfl: 12    atorvastatin (LIPITOR) 80 MG tablet, Take 1 tablet by mouth once a day., Disp: 90 tablet, Rfl: 3    benzonatate (TESSALON) 200 MG capsule, Take 200 mg by mouth 3 (three) times daily as needed for Cough., Disp: , Rfl:      fluticasone-salmeterol diskus inhaler 250-50 mcg, Inhale 1 puff into the lungs 2 (two) times daily. Controller, Disp: , Rfl:     furosemide (LASIX) 40 MG tablet, Take 1 tablet by mouth once a day., Disp: 90 tablet, Rfl: 3    furosemide (LASIX) 40 MG tablet, Take 1 tablet by mouth once a day., Disp: 90 tablet, Rfl: 3    HYDROcodone-acetaminophen (NORCO)  mg per tablet, Take 1 tablet by mouth every 6 (six) hours as needed for pain. (Patient not taking: Reported on 10/27/2020), Disp: 12 tablet, Rfl: 0    linaCLOtide (LINZESS) 72 mcg Cap capsule, Take 1 capsule (72 mcg total) by mouth once daily. (Patient not taking: Reported on 10/27/2020), Disp: 90 capsule, Rfl: 3    metFORMIN (GLUCOPHAGE) 500 MG tablet, Take 1 tablet (500 mg total) by mouth daily with breakfast. (Patient not taking: Reported on 10/27/2020), Disp: 30 tablet, Rfl: 0    nitroGLYCERIN (NITROSTAT) 0.4 MG SL tablet, , Disp: , Rfl:     ondansetron (ZOFRAN-ODT) 4 MG TbDL, Dissolve 1 tablet (4 mg total) by mouth every 6 (six) hours as needed. (Patient not taking: Reported on 10/27/2020), Disp: 20 tablet, Rfl: 0    oxyCODONE-acetaminophen (PROLATE) 5-300 mg per tablet, Take 1 tablet by mouth every 6 (six) hours as needed for Pain (only for severe pain)., Disp: 15 tablet, Rfl: 0    penicillin v potassium (VEETID) 500 MG tablet, Take 2 tablets by mouth now, then 1 tablet by mouth four times daily until all taken. (Patient not taking: Reported on 10/27/2020), Disp: 30 tablet, Rfl: 1    potassium chloride (K-TAB) 20 mEq, Take 1 tablet by mouth once a day., Disp: 90 tablet, Rfl: 3    potassium chloride (MICRO-K) 10 MEQ CpSR, Take 20 mEq by mouth 2 (two) times daily. , Disp: , Rfl:     ranolazine (RANEXA) 500 MG Tb12, Take 1 tablet by mouth 2 times a day. (Patient not taking: Reported on 10/27/2020), Disp: 60 tablet, Rfl: 12    rOPINIRole (REQUIP) 0.5 MG tablet, Take 1 tablet orally once in the evening., Disp: 30 tablet, Rfl: 6    spironolactone  (ALDACTONE) 25 MG tablet, Take 1 tablet by mouth once a day. (Patient not taking: Reported on 10/27/2020), Disp: 30 tablet, Rfl: 3    sulfamethoxazole-trimethoprim 800-160mg (BACTRIM DS) 800-160 mg Tab, Take 1 tablet by mouth 2 (two) times daily. for 7 days, Disp: 14 tablet, Rfl: 0  No current facility-administered medications for this visit.     Facility-Administered Medications Ordered in Other Visits:     0.9%  NaCl infusion, , Intravenous, Continuous, Sarah Gamez MD, Stopped at 01/09/20 0925    0.9%  NaCl infusion, , Intravenous, Continuous, Sarah Gamez MD, Stopped at 01/09/20 1026    0.9%  NaCl infusion, , Intravenous, Continuous, Angélica De La Cruz MD    ceFAZolin injection 2 g, 2 g, Intravenous, On Call Procedure, Angélica De La Cruz MD    lidocaine (PF) 10 mg/ml (1%) injection 10 mg, 1 mL, Intradermal, Once, Angélica De La Cruz MD    ondansetron injection 4 mg, 4 mg, Intravenous, Q12H PRN, Angélica De La Cruz MD    promethazine (PHENERGAN) 6.25 mg in dextrose 5 % 50 mL IVPB, 6.25 mg, Intravenous, Q6H PRN, Angélica De La Cruz MD    sodium chloride 0.9% flush 10 mL, 10 mL, Intravenous, PRN, Sarah Gamez MD    sodium chloride 0.9% flush 10 mL, 10 mL, Intravenous, PRN, Sarah Gamez MD    OBJECTIVE:   Vitals:    10/27/20 1542   BP: 138/80   BP Location: Left arm   Patient Position: Sitting   BP Method: X-Large (Manual)   Pulse: 86   Resp: 16   Temp: 97.3 °F (36.3 °C)   TempSrc: Oral   SpO2: 98%   Weight: 88.9 kg (196 lb)   Height: 5' (1.524 m)     Body mass index is 38.28 kg/m².      Physical Exam  Vitals signs and nursing note reviewed.   Constitutional:       Appearance: Normal appearance.   HENT:      Head: Normocephalic and atraumatic.   Eyes:      General: No scleral icterus.     Extraocular Movements: Extraocular movements intact.   Pulmonary:      Effort: Pulmonary effort is normal.   Skin:     Comments: Left inguinal fold; 2-3 cm area of erythema and edema; no induration, no  fluctuance, no drainage or bleeding. Area covers multiple follicles. Tender to touch   Neurological:      Mental Status: She is alert.           LABS:   A1C:  Recent Labs   Lab 09/17/20  0708   Hemoglobin A1C 7.6 H     CBC:  Recent Labs   Lab 04/25/20  2209   WBC 10.74   RBC 4.55   Hemoglobin 13.6   Hematocrit 40.9   Platelets 442 H   MCV 90   MCH 29.9   MCHC 33.3     CMP:  Recent Labs   Lab 09/17/20  0708   Glucose 156 H   Calcium 9.7   Albumin 4.3   Total Protein 7.4   Sodium 148 H   Potassium 4.6   CO2 31 H   Chloride 108   BUN 16   Creatinine 0.74   Alkaline Phosphatase 125   ALT 36   AST 28   Total Bilirubin 0.5     LIPIDS:  Recent Labs   Lab 04/02/20  0849 09/17/20  0708   TSH 1.510  --    HDL 46 55   Cholesterol 193 204 H   Triglycerides 203 H 255 H   LDL Cholesterol 106.4 98.0   HDL/Cholesterol Ratio 23.8 27.0   Non-HDL Cholesterol 147 149   Total Cholesterol/HDL Ratio 4.2 3.7     TSH:  Recent Labs   Lab 04/02/20  0849   TSH 1.510         ASSESSMENT & PLAN:  Encounter Diagnosis   Name Primary?    Cellulitis of other specified site Yes     -you have a skin infection in your groin area  -start taking the antibiotic as prescribed  -get immediate medical care if you feel this is worsening while on the antibiotic  -follow up with us in 1 week or sooner as needed  -take Tylenol and/or ibuprofen as needed for pain. For severe pain you can take the Percocet. Take a stool softener while you are on percocet  -try a cool compress to the area as needed  -strict RTC/ED precautions discussed    Orders Placed This Encounter    sulfamethoxazole-trimethoprim 800-160mg (BACTRIM DS) 800-160 mg Tab    oxyCODONE-acetaminophen (PROLATE) 5-300 mg per tablet       Follow up in about 1 week (around 11/3/2020) for cellulitis.    RTC/ED precautions discussed where applicable.   Encouraged patient to let me know if there are any further questions/concerns.     Advise patient/caretaker to check with insurance regarding orders to  avoid unexpected fees/costs.     The patient/caretaker indicates understanding of these issues and agrees with the plan.    Dr. Hari Francis MD  Family Medicine

## 2020-10-27 NOTE — PATIENT INSTRUCTIONS
It was nice to see you, Manah    -you have a skin infection in your groin area  -start taking the antibiotic as prescribed  -get immediate medical care if you feel this is worsening while on the antibiotic  -follow up with us in 1 week or sooner as needed  -take Tylenol and/or ibuprofen as needed for pain. For severe pain you can take the Percocet. Take a stool softener while you are on percocet  -try a cool compress to the area as needed    Let me know if you have any questions/concerns.     Sincerely,     Dr Francis

## 2020-10-29 ENCOUNTER — PATIENT MESSAGE (OUTPATIENT)
Dept: FAMILY MEDICINE | Facility: CLINIC | Age: 55
End: 2020-10-29

## 2020-11-12 ENCOUNTER — CLINICAL SUPPORT (OUTPATIENT)
Dept: URGENT CARE | Facility: CLINIC | Age: 55
End: 2020-11-12
Payer: COMMERCIAL

## 2020-11-12 ENCOUNTER — PATIENT MESSAGE (OUTPATIENT)
Dept: FAMILY MEDICINE | Facility: CLINIC | Age: 55
End: 2020-11-12

## 2020-11-12 ENCOUNTER — TELEPHONE (OUTPATIENT)
Dept: PRIMARY CARE CLINIC | Facility: OTHER | Age: 55
End: 2020-11-12

## 2020-11-12 VITALS — TEMPERATURE: 97 F

## 2020-11-12 DIAGNOSIS — L74.9 SWEATING ABNORMALITY: ICD-10-CM

## 2020-11-12 DIAGNOSIS — R19.7 DIARRHEA, UNSPECIFIED TYPE: ICD-10-CM

## 2020-11-12 DIAGNOSIS — R53.83 FATIGUE, UNSPECIFIED TYPE: ICD-10-CM

## 2020-11-12 DIAGNOSIS — R11.0 NAUSEA: Primary | ICD-10-CM

## 2020-11-12 DIAGNOSIS — R11.10 VOMITING, INTRACTABILITY OF VOMITING NOT SPECIFIED, PRESENCE OF NAUSEA NOT SPECIFIED, UNSPECIFIED VOMITING TYPE: ICD-10-CM

## 2020-11-12 DIAGNOSIS — R11.0 NAUSEA: ICD-10-CM

## 2020-11-12 LAB
CTP QC/QA: YES
SARS-COV-2 RDRP RESP QL NAA+PROBE: NEGATIVE

## 2020-11-12 PROCEDURE — U0002: ICD-10-PCS | Mod: QW,S$GLB,, | Performed by: INTERNAL MEDICINE

## 2020-11-12 PROCEDURE — U0002 COVID-19 LAB TEST NON-CDC: HCPCS | Mod: QW,S$GLB,, | Performed by: INTERNAL MEDICINE

## 2020-11-12 NOTE — PROGRESS NOTES
[facility] Employee Covid Test    Patient encouraged to see a provider today for further assessment or treatment options. Patient declined.

## 2020-11-20 RX ORDER — OMEPRAZOLE 40 MG/1
40 CAPSULE, DELAYED RELEASE ORAL DAILY
Qty: 90 CAPSULE | Refills: 1 | Status: SHIPPED | OUTPATIENT
Start: 2020-11-20 | End: 2021-07-27 | Stop reason: SDUPTHER

## 2020-12-18 ENCOUNTER — IMMUNIZATION (OUTPATIENT)
Dept: FAMILY MEDICINE | Facility: CLINIC | Age: 55
End: 2020-12-18
Payer: COMMERCIAL

## 2020-12-18 DIAGNOSIS — Z23 NEED FOR VACCINATION: ICD-10-CM

## 2020-12-18 PROCEDURE — 0001A COVID-19, MRNA, LNP-S, PF, 30 MCG/0.3 ML DOSE VACCINE: ICD-10-PCS | Mod: CV19,,, | Performed by: FAMILY MEDICINE

## 2020-12-18 PROCEDURE — 91300 COVID-19, MRNA, LNP-S, PF, 30 MCG/0.3 ML DOSE VACCINE: ICD-10-PCS | Mod: ,,, | Performed by: FAMILY MEDICINE

## 2020-12-18 PROCEDURE — 0001A COVID-19, MRNA, LNP-S, PF, 30 MCG/0.3 ML DOSE VACCINE: CPT | Mod: CV19,,, | Performed by: FAMILY MEDICINE

## 2020-12-18 PROCEDURE — 91300 COVID-19, MRNA, LNP-S, PF, 30 MCG/0.3 ML DOSE VACCINE: CPT | Mod: ,,, | Performed by: FAMILY MEDICINE

## 2020-12-23 DIAGNOSIS — E11.9 TYPE 2 DIABETES MELLITUS WITHOUT COMPLICATION, UNSPECIFIED WHETHER LONG TERM INSULIN USE: ICD-10-CM

## 2020-12-29 ENCOUNTER — OFFICE VISIT (OUTPATIENT)
Dept: FAMILY MEDICINE | Facility: CLINIC | Age: 55
End: 2020-12-29
Payer: COMMERCIAL

## 2020-12-29 ENCOUNTER — CLINICAL SUPPORT (OUTPATIENT)
Dept: URGENT CARE | Facility: CLINIC | Age: 55
End: 2020-12-29
Payer: COMMERCIAL

## 2020-12-29 VITALS
BODY MASS INDEX: 38.48 KG/M2 | TEMPERATURE: 98 F | HEIGHT: 60 IN | SYSTOLIC BLOOD PRESSURE: 118 MMHG | OXYGEN SATURATION: 98 % | DIASTOLIC BLOOD PRESSURE: 72 MMHG | WEIGHT: 196 LBS | HEART RATE: 68 BPM

## 2020-12-29 VITALS — TEMPERATURE: 97 F

## 2020-12-29 DIAGNOSIS — R05.9 COUGH: ICD-10-CM

## 2020-12-29 DIAGNOSIS — R06.02 SHORTNESS OF BREATH: ICD-10-CM

## 2020-12-29 DIAGNOSIS — R06.02 SOB (SHORTNESS OF BREATH): ICD-10-CM

## 2020-12-29 DIAGNOSIS — Z20.828 EXPOSURE TO SARS-ASSOCIATED CORONAVIRUS: ICD-10-CM

## 2020-12-29 DIAGNOSIS — E11.42 TYPE 2 DIABETES MELLITUS WITH DIABETIC POLYNEUROPATHY, WITHOUT LONG-TERM CURRENT USE OF INSULIN: Primary | ICD-10-CM

## 2020-12-29 DIAGNOSIS — R05.9 COUGH: Primary | ICD-10-CM

## 2020-12-29 DIAGNOSIS — Z87.09 HX OF INTRINSIC ASTHMA: ICD-10-CM

## 2020-12-29 LAB
CTP QC/QA: YES
SARS-COV-2 RDRP RESP QL NAA+PROBE: NEGATIVE

## 2020-12-29 PROCEDURE — 99999 PR PBB SHADOW E&M-EST. PATIENT-LVL V: ICD-10-PCS | Mod: PBBFAC,,, | Performed by: FAMILY MEDICINE

## 2020-12-29 PROCEDURE — 3051F HG A1C>EQUAL 7.0%<8.0%: CPT | Mod: CPTII,S$GLB,, | Performed by: FAMILY MEDICINE

## 2020-12-29 PROCEDURE — 99214 OFFICE O/P EST MOD 30 MIN: CPT | Mod: S$GLB,,, | Performed by: FAMILY MEDICINE

## 2020-12-29 PROCEDURE — 3008F BODY MASS INDEX DOCD: CPT | Mod: CPTII,S$GLB,, | Performed by: FAMILY MEDICINE

## 2020-12-29 PROCEDURE — 99999 PR PBB SHADOW E&M-EST. PATIENT-LVL V: CPT | Mod: PBBFAC,,, | Performed by: FAMILY MEDICINE

## 2020-12-29 PROCEDURE — U0003 INFECTIOUS AGENT DETECTION BY NUCLEIC ACID (DNA OR RNA); SEVERE ACUTE RESPIRATORY SYNDROME CORONAVIRUS 2 (SARS-COV-2) (CORONAVIRUS DISEASE [COVID-19]), AMPLIFIED PROBE TECHNIQUE, MAKING USE OF HIGH THROUGHPUT TECHNOLOGIES AS DESCRIBED BY CMS-2020-01-R: HCPCS

## 2020-12-29 PROCEDURE — U0002: ICD-10-PCS | Mod: QW,S$GLB,, | Performed by: INTERNAL MEDICINE

## 2020-12-29 PROCEDURE — 3051F PR MOST RECENT HEMOGLOBIN A1C LEVEL 7.0 - < 8.0%: ICD-10-PCS | Mod: CPTII,S$GLB,, | Performed by: FAMILY MEDICINE

## 2020-12-29 PROCEDURE — 3008F PR BODY MASS INDEX (BMI) DOCUMENTED: ICD-10-PCS | Mod: CPTII,S$GLB,, | Performed by: FAMILY MEDICINE

## 2020-12-29 PROCEDURE — 1126F PR PAIN SEVERITY QUANTIFIED, NO PAIN PRESENT: ICD-10-PCS | Mod: S$GLB,,, | Performed by: FAMILY MEDICINE

## 2020-12-29 PROCEDURE — 3078F DIAST BP <80 MM HG: CPT | Mod: CPTII,S$GLB,, | Performed by: FAMILY MEDICINE

## 2020-12-29 PROCEDURE — 3074F PR MOST RECENT SYSTOLIC BLOOD PRESSURE < 130 MM HG: ICD-10-PCS | Mod: CPTII,S$GLB,, | Performed by: FAMILY MEDICINE

## 2020-12-29 PROCEDURE — 3078F PR MOST RECENT DIASTOLIC BLOOD PRESSURE < 80 MM HG: ICD-10-PCS | Mod: CPTII,S$GLB,, | Performed by: FAMILY MEDICINE

## 2020-12-29 PROCEDURE — 3074F SYST BP LT 130 MM HG: CPT | Mod: CPTII,S$GLB,, | Performed by: FAMILY MEDICINE

## 2020-12-29 PROCEDURE — 99214 PR OFFICE/OUTPT VISIT, EST, LEVL IV, 30-39 MIN: ICD-10-PCS | Mod: S$GLB,,, | Performed by: FAMILY MEDICINE

## 2020-12-29 PROCEDURE — U0002 COVID-19 LAB TEST NON-CDC: HCPCS | Mod: QW,S$GLB,, | Performed by: INTERNAL MEDICINE

## 2020-12-29 PROCEDURE — 1126F AMNT PAIN NOTED NONE PRSNT: CPT | Mod: S$GLB,,, | Performed by: FAMILY MEDICINE

## 2020-12-29 RX ORDER — ALBUTEROL SULFATE 0.63 MG/3ML
0.63 SOLUTION RESPIRATORY (INHALATION) EVERY 6 HOURS PRN
Qty: 90 ML | Refills: 0 | Status: SHIPPED | OUTPATIENT
Start: 2020-12-29 | End: 2021-12-29

## 2020-12-29 RX ORDER — ALBUTEROL SULFATE 90 UG/1
2 AEROSOL, METERED RESPIRATORY (INHALATION)
Qty: 18 G | Refills: 3 | Status: SHIPPED | OUTPATIENT
Start: 2020-12-29 | End: 2022-06-23 | Stop reason: SDUPTHER

## 2020-12-29 RX ORDER — PREDNISONE 50 MG/1
50 TABLET ORAL DAILY
Qty: 5 TABLET | Refills: 0 | Status: SHIPPED | OUTPATIENT
Start: 2020-12-29 | End: 2021-01-03

## 2020-12-29 NOTE — PROGRESS NOTES
EST PATIENT VISIT FAMILY MEDICINE    CC:   Chief Complaint   Patient presents with    Cough       HPI: Loi Cordova  is a 55 y.o. 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   Respiratory: Positive for cough, sputum production and shortness of breath.    Cardiovascular: Negative for chest pain.       PMHX:   Past Medical History:   Diagnosis Date    Allergy     Asthma     Coronary artery disease     GERD (gastroesophageal reflux disease)     History of back surgery 2/20/2018    Hyperlipidemia     Hypertension     Type 2 diabetes mellitus with diabetic polyneuropathy, without long-term current use of insulin 2/8/2019       PSHX:   Past Surgical History:   Procedure Laterality Date    BILATERAL OOPHORECTOMY Bilateral 2000    BREAST BIOPSY Left 2/14/2020    Procedure: BIOPSY, BREAST-Left medial breast palpation guided;  Surgeon: Paulino Espinoza MD;  Location: [address];  Service: General;  Laterality: Left;    CARDIAC CATHETERIZATION      7 stents    CHOLECYSTECTOMY      COLONOSCOPY N/A 7/16/2019    Procedure: COLONOSCOPY;  Surgeon: Sarah Gamez MD;  Location: [facility];  Service: Endoscopy;  Laterality: N/A;    ESOPHAGOGASTRODUODENOSCOPY N/A 1/9/2020    Procedure: EGD (ESOPHAGOGASTRODUODENOSCOPY);  Surgeon: Sarah Gamez MD;  Location: [facility];  Service: Endoscopy;  Laterality: N/A;    HYSTERECTOMY      PARTIAL HYSTERECTOMY N/A 1990    Uterus taken out    SHOULDER SURGERY      TOTAL KNEE ARTHROPLASTY         FAMHX:   Family History   Problem Relation Age of Onset    Heart disease Mother      Hyperlipidemia Mother     Hypertension Mother     Diabetes Mother     Heart disease Father     Hyperlipidemia Father     Cancer Father     Diabetes Maternal Aunt     Prostate cancer Neg Hx     Kidney disease Neg Hx        SOCHX:   Social History     Socioeconomic History    Marital status:      Spouse name: Not on file    Number of children: Not on file    Years of education: Not on file    Highest education level: Not on file   Occupational History    Not on file   Social Needs    Financial resource strain: Not hard at all    Food insecurity     Worry: Never true     Inability: Never true    Transportation needs     Medical: No     Non-medical: No   Tobacco Use    Smoking status: Current Every Day Smoker     Packs/day: 0.50     Years: 34.00     Pack years: 17.00     Types: Cigarettes    Smokeless tobacco: Never Used   Substance and Sexual Activity    Alcohol use: Not Currently     Frequency: Monthly or less     Drinks per session: 5 or 6     Binge frequency: Never     Comment: 6 months    Drug use: No    Sexual activity: Yes     Partners: Male   Lifestyle    Physical activity     Days per week: 1 day     Minutes per session: 20 min    Stress: Not at all   Relationships    Social connections     Talks on phone: More than three times a week     Gets together: Once a week     Attends Orthodoxy service: Never     Active member of club or organization: No     Attends meetings of clubs or organizations: Never     Relationship status:    Other Topics Concern    Not on file   Social History Narrative    Not on file       ALLERGIES:   Review of patient's allergies indicates:   Allergen Reactions    Crayfish Anaphylaxis     (crawfish only)       MEDS:   Current Outpatient Medications:     albuterol (PROVENTIL/VENTOLIN HFA) 90 mcg/actuation inhaler, Inhale 2 puffs into the lungs every 4 to 6 hours as needed., Disp: 18 g, Rfl: 3    ALPRAZolam (XANAX) 1 MG tablet, TAKE 1 TABLET BY  MOUTH TWICE DAILY AS NEEDED., Disp: 60 tablet, Rfl: 2    amLODIPine (NORVASC) 5 MG tablet, Take 1 tablet by mouth once a day., Disp: 30 tablet, Rfl: 12    aspirin 81 MG Chew, Take 81 mg by mouth once daily., Disp: , Rfl:     dextromethorphan-guaifenesin  mg (MUCINEX DM)  mg per 12 hr tablet, Take 1 tablet by mouth every 12 (twelve) hours., Disp: , Rfl:     ezetimibe (ZETIA) 10 mg tablet, Take 1 tablet by mouth once a day., Disp: 30 tablet, Rfl: 12    fluticasone (FLONASE) 50 mcg/actuation nasal spray, 1 spray by Each Nare route once daily., Disp: 1 Bottle, Rfl: 1    fluticasone-salmeterol diskus inhaler 250-50 mcg, Inhale 1 puff into the lungs 2 (two) times daily. Controller, Disp: , Rfl:     lisinopriL (PRINIVIL,ZESTRIL) 40 MG tablet, Take 1 tablet by mouth once a day., Disp: 90 tablet, Rfl: 4    metoprolol succinate (TOPROL-XL) 100 MG 24 hr tablet, Take 1 tablet (100 mg total) by mouth once daily., Disp: 90 tablet, Rfl: 3    nitroGLYCERIN (NITROSTAT) 0.4 MG SL tablet, Place 1 tablet under the tongue once every 5 minutes for 3 doses for chest pain, if pain continues call 911, Disp: 25 tablet, Rfl: 3    omeprazole (PRILOSEC) 40 MG capsule, Take 1 capsule (40 mg total) by mouth once daily., Disp: 90 capsule, Rfl: 1    traZODone (DESYREL) 100 MG tablet, Take 1 tablet by mouth once in the evening., Disp: 30 tablet, Rfl: 12    albuterol (ACCUNEB) 0.63 mg/3 mL Nebu, Take 3 mLs (0.63 mg total) by nebulization every 6 (six) hours as needed. Rescue, Disp: 90 mL, Rfl: 0    atorvastatin (LIPITOR) 80 MG tablet, Take 1 tablet by mouth once a day., Disp: 90 tablet, Rfl: 3    fenofibrate 160 MG Tab, , Disp: , Rfl:     furosemide (LASIX) 40 MG tablet, Take 1 tablet (40 mg total) by mouth 2 (two) times daily. (Patient taking differently: Take 40 mg by mouth 2 (two) times daily as needed. ), Disp: , Rfl:     nitroGLYCERIN (NITROSTAT) 0.4 MG SL tablet, , Disp: , Rfl:     potassium chloride (K-TAB) 20 mEq,  Take 1 tablet by mouth once a day., Disp: 90 tablet, Rfl: 3    predniSONE (DELTASONE) 50 MG Tab, Take 1 tablet (50 mg total) by mouth once daily. for 5 days, Disp: 5 tablet, Rfl: 0    rOPINIRole (REQUIP) 0.5 MG tablet, Take 1 tablet by mouth once in the evening., Disp: 30 tablet, Rfl: 6  No current facility-administered medications for this visit.     Facility-Administered Medications Ordered in Other Visits:     0.9%  NaCl infusion, , Intravenous, Continuous, Sarah Gamez MD, Stopped at 01/09/20 0925    0.9%  NaCl infusion, , Intravenous, Continuous, Sarah Gamez MD, Stopped at 01/09/20 1026    0.9%  NaCl infusion, , Intravenous, Continuous, Angélica De La Cruz MD    ceFAZolin injection 2 g, 2 g, Intravenous, On Call Procedure, Angélica De La Cruz MD    lidocaine (PF) 10 mg/ml (1%) injection 10 mg, 1 mL, Intradermal, Once, Angélica De La Cruz MD    ondansetron injection 4 mg, 4 mg, Intravenous, Q12H PRN, Angélica De La Cruz MD    promethazine (PHENERGAN) 6.25 mg in dextrose 5 % 50 mL IVPB, 6.25 mg, Intravenous, Q6H PRN, Angélica De La Cruz MD    sodium chloride 0.9% flush 10 mL, 10 mL, Intravenous, PRN, Sarah Gamez MD    sodium chloride 0.9% flush 10 mL, 10 mL, Intravenous, PRN, Sarah Gamez MD    OBJECTIVE:   Vitals:    12/29/20 1409   BP: 118/72   BP Location: Left arm   Patient Position: Sitting   BP Method: X-Large (Manual)   Pulse: 68   Temp: 97.6 °F (36.4 °C)   SpO2: 98%   Weight: 88.9 kg (196 lb)   Height: 5' (1.524 m)     Body mass index is 38.28 kg/m².      Physical Exam  Vitals signs and nursing note reviewed.   Constitutional:       Appearance: Normal appearance.   HENT:      Head: Normocephalic and atraumatic.   Eyes:      General: No scleral icterus.     Extraocular Movements: Extraocular movements intact.   Pulmonary:      Effort: Pulmonary effort is normal. No respiratory distress.      Breath sounds: No stridor. Rhonchi present. No wheezing or rales.   Neurological:       Mental Status: She is alert.           LABS:   A1C:  Recent Labs   Lab 09/17/20  0708   Hemoglobin A1C 7.6 H     CBC:  Recent Labs   Lab 04/25/20  2209   WBC 10.74   RBC 4.55   Hemoglobin 13.6   Hematocrit 40.9   Platelets 442 H   MCV 90   MCH 29.9   MCHC 33.3     CMP:  Recent Labs   Lab 09/17/20  0708   Glucose 156 H   Calcium 9.7   Albumin 4.3   Total Protein 7.4   Sodium 148 H   Potassium 4.6   CO2 31 H   Chloride 108   BUN 16   Creatinine 0.74   Alkaline Phosphatase 125   ALT 36   AST 28   Total Bilirubin 0.5     LIPIDS:  Recent Labs   Lab 04/02/20  0849 09/17/20  0708   TSH 1.510  --    HDL 46 55   Cholesterol 193 204 H   Triglycerides 203 H 255 H   LDL Cholesterol 106.4 98.0   HDL/Cholesterol Ratio 23.8 27.0   Non-HDL Cholesterol 147 149   Total Cholesterol/HDL Ratio 4.2 3.7     TSH:  Recent Labs   Lab 04/02/20  0849   TSH 1.510         ASSESSMENT & PLAN:  Encounter Diagnoses   Name Primary?    Type 2 diabetes mellitus with diabetic polyneuropathy, without long-term current use of insulin Yes    Cough     Shortness of breath     Hx of intrinsic asthma      -stay home until your COVID test results  -we will treat this like an asthma exacerbation: take prednisone for 5 days, take your albuterol every 4 hours for the 1-2 days  -we will check an X ray to make sure you do not have a bacterial pneumonia    Orders Placed This Encounter    X-Ray Chest PA And Lateral    COVID-19 Routine Screening    albuterol (PROVENTIL/VENTOLIN HFA) 90 mcg/actuation inhaler    albuterol (ACCUNEB) 0.63 mg/3 mL Nebu    predniSONE (DELTASONE) 50 MG Tab       Follow up if symptoms worsen or fail to improve.    RTC/ED precautions discussed where applicable.   Encouraged patient to let me know if there are any further questions/concerns.     Advise patient/caretaker to check with insurance regarding orders to avoid unexpected fees/costs.     The patient/caretaker indicates understanding of these issues and agrees with the  plan.    Dr. Hari Francis MD  Family Medicine

## 2020-12-29 NOTE — PATIENT INSTRUCTIONS
-stay home until your COVID test results  -we will treat this like an asthma exacerbation: take prednisone for 5 days, take your albuterol every 4 hours for the 1-2 days  -we will check an X ray to make sure you do not have a bacterial pneumonia    Let me know if you have any questions/concerns.     Sincerely,     Dr Francis

## 2020-12-30 ENCOUNTER — TELEPHONE (OUTPATIENT)
Dept: PRIMARY CARE CLINIC | Facility: OTHER | Age: 55
End: 2020-12-30

## 2020-12-30 LAB — SARS-COV-2 RNA RESP QL NAA+PROBE: NOT DETECTED

## 2020-12-30 NOTE — TELEPHONE ENCOUNTER
Contacted patient to advise of negative Covid test result. Cleared to return to work 12/30/2020. Provided callback number for employee health for any additional questions.

## 2021-01-04 ENCOUNTER — PATIENT MESSAGE (OUTPATIENT)
Dept: ADMINISTRATIVE | Facility: HOSPITAL | Age: 56
End: 2021-01-04

## 2021-01-08 ENCOUNTER — IMMUNIZATION (OUTPATIENT)
Dept: FAMILY MEDICINE | Facility: CLINIC | Age: 56
End: 2021-01-08
Payer: COMMERCIAL

## 2021-01-08 DIAGNOSIS — Z23 NEED FOR VACCINATION: ICD-10-CM

## 2021-01-08 PROCEDURE — 0002A COVID-19, MRNA, LNP-S, PF, 30 MCG/0.3 ML DOSE VACCINE: CPT | Mod: PBBFAC | Performed by: FAMILY MEDICINE

## 2021-01-08 PROCEDURE — 91300 COVID-19, MRNA, LNP-S, PF, 30 MCG/0.3 ML DOSE VACCINE: CPT | Mod: PBBFAC | Performed by: FAMILY MEDICINE

## 2021-01-25 ENCOUNTER — TELEPHONE (OUTPATIENT)
Dept: SMOKING CESSATION | Facility: CLINIC | Age: 56
End: 2021-01-25

## 2021-01-28 ENCOUNTER — CLINICAL SUPPORT (OUTPATIENT)
Dept: SMOKING CESSATION | Facility: CLINIC | Age: 56
End: 2021-01-28
Payer: COMMERCIAL

## 2021-01-28 DIAGNOSIS — F17.200 NICOTINE DEPENDENCE: Primary | ICD-10-CM

## 2021-01-28 PROCEDURE — 99404 PREV MED CNSL INDIV APPRX 60: CPT | Mod: S$GLB,,,

## 2021-01-28 PROCEDURE — 99404 PR PREVENT COUNSEL,INDIV,60 MIN: ICD-10-PCS | Mod: S$GLB,,,

## 2021-01-28 RX ORDER — IBUPROFEN 200 MG
1 TABLET ORAL DAILY
Qty: 28 PATCH | Refills: 0 | Status: ON HOLD | OUTPATIENT
Start: 2021-01-28 | End: 2021-03-11

## 2021-02-04 ENCOUNTER — CLINICAL SUPPORT (OUTPATIENT)
Dept: SMOKING CESSATION | Facility: CLINIC | Age: 56
End: 2021-02-04
Payer: COMMERCIAL

## 2021-02-04 DIAGNOSIS — F17.200 NICOTINE DEPENDENCE: Primary | ICD-10-CM

## 2021-02-04 PROCEDURE — 99403 PR PREVENT COUNSEL,INDIV,45 MIN: ICD-10-PCS | Mod: S$GLB,,,

## 2021-02-04 PROCEDURE — 99403 PREV MED CNSL INDIV APPRX 45: CPT | Mod: S$GLB,,,

## 2021-02-11 ENCOUNTER — TELEPHONE (OUTPATIENT)
Dept: SMOKING CESSATION | Facility: CLINIC | Age: 56
End: 2021-02-11

## 2021-02-19 NOTE — LETTER
January 28, 2020      Delicia Fierro MD  [address] [facility]  [address] [address]           [facility]  [address], [address] [address]  Phone: [phone]  Fax: [phone]          Patient: Loi Cordova   MR Number: [id number]   YOB: 1965   Date of Visit: 1/28/2020       Dear Dr. Delicia Fierro:    Thank you for referring Loi Cordova to me for evaluation. Attached you will find relevant portions of my assessment and plan of care.    If you have questions, please do not hesitate to call me. I look forward to following Loi Cordova along with you.    Sincerely,    Paulino Espinoza MD    Enclosure  CC:  No Recipients    If you would like to receive this communication electronically, please contact [email or web address] or [phone] to request more information on [employer] Link access.    For providers and/or their staff who would like to refer a patient to [facility], please contact us through our one-stop-shop provider referral line, [facility] [place], operated [facility], at [phone].    If you feel you have received this communication in error or would no longer like to receive these types of communications, please e-mail [email or web address]          no back pain, no gout, no musculoskeletal pain, no neck pain, and no weakness.

## 2021-02-24 ENCOUNTER — CLINICAL SUPPORT (OUTPATIENT)
Dept: SMOKING CESSATION | Facility: CLINIC | Age: 56
End: 2021-02-24
Payer: COMMERCIAL

## 2021-02-24 DIAGNOSIS — F17.200 NICOTINE DEPENDENCE: Primary | ICD-10-CM

## 2021-02-24 PROCEDURE — 99402 PREV MED CNSL INDIV APPRX 30: CPT | Mod: S$GLB,,,

## 2021-02-24 PROCEDURE — 99402 PR PREVENT COUNSEL,INDIV,30 MIN: ICD-10-PCS | Mod: S$GLB,,,

## 2021-03-03 ENCOUNTER — CLINICAL SUPPORT (OUTPATIENT)
Dept: SMOKING CESSATION | Facility: CLINIC | Age: 56
End: 2021-03-03
Payer: COMMERCIAL

## 2021-03-03 DIAGNOSIS — F17.200 NICOTINE DEPENDENCE: Primary | ICD-10-CM

## 2021-03-03 PROCEDURE — 99402 PREV MED CNSL INDIV APPRX 30: CPT | Mod: S$GLB,,,

## 2021-03-03 PROCEDURE — 99402 PR PREVENT COUNSEL,INDIV,30 MIN: ICD-10-PCS | Mod: S$GLB,,,

## 2021-03-09 ENCOUNTER — HOSPITAL ENCOUNTER (INPATIENT)
Facility: HOSPITAL | Age: 56
LOS: 3 days | Discharge: HOME | DRG: 603 | End: 2021-03-12
Attending: INTERNAL MEDICINE | Admitting: INTERNAL MEDICINE
Payer: COMMERCIAL

## 2021-03-09 DIAGNOSIS — R07.9 CHEST PAIN: ICD-10-CM

## 2021-03-09 DIAGNOSIS — R21 RASH: Primary | ICD-10-CM

## 2021-03-09 DIAGNOSIS — L03.213 PERIORBITAL CELLULITIS: ICD-10-CM

## 2021-03-09 PROBLEM — R05.3 CHRONIC COUGH: Status: ACTIVE | Noted: 2021-03-09

## 2021-03-09 LAB
INR PPP: 0.9 (ref 0.8–1.2)
PROTHROMBIN TIME: 10.4 SEC (ref 9–12.5)

## 2021-03-09 PROCEDURE — 93005 ELECTROCARDIOGRAM TRACING: CPT

## 2021-03-09 PROCEDURE — 87040 BLOOD CULTURE FOR BACTERIA: CPT | Mod: 59 | Performed by: STUDENT IN AN ORGANIZED HEALTH CARE EDUCATION/TRAINING PROGRAM

## 2021-03-09 PROCEDURE — 36415 COLL VENOUS BLD VENIPUNCTURE: CPT | Performed by: STUDENT IN AN ORGANIZED HEALTH CARE EDUCATION/TRAINING PROGRAM

## 2021-03-09 PROCEDURE — 25000003 PHARM REV CODE 250: Performed by: STUDENT IN AN ORGANIZED HEALTH CARE EDUCATION/TRAINING PROGRAM

## 2021-03-09 PROCEDURE — 93010 ELECTROCARDIOGRAM REPORT: CPT | Mod: ,,, | Performed by: INTERNAL MEDICINE

## 2021-03-09 PROCEDURE — 11000001 HC ACUTE MED/SURG PRIVATE ROOM

## 2021-03-09 PROCEDURE — 85610 PROTHROMBIN TIME: CPT | Performed by: STUDENT IN AN ORGANIZED HEALTH CARE EDUCATION/TRAINING PROGRAM

## 2021-03-09 PROCEDURE — 63600175 PHARM REV CODE 636 W HCPCS: Performed by: STUDENT IN AN ORGANIZED HEALTH CARE EDUCATION/TRAINING PROGRAM

## 2021-03-09 PROCEDURE — 93010 EKG 12-LEAD: ICD-10-PCS | Mod: ,,, | Performed by: INTERNAL MEDICINE

## 2021-03-09 RX ORDER — FENOFIBRATE 160 MG/1
160 TABLET ORAL DAILY
Status: DISCONTINUED | OUTPATIENT
Start: 2021-03-10 | End: 2021-03-12 | Stop reason: HOSPADM

## 2021-03-09 RX ORDER — IBUPROFEN 200 MG
16 TABLET ORAL
Status: DISCONTINUED | OUTPATIENT
Start: 2021-03-09 | End: 2021-03-12 | Stop reason: HOSPADM

## 2021-03-09 RX ORDER — GLUCAGON 1 MG
1 KIT INJECTION
Status: DISCONTINUED | OUTPATIENT
Start: 2021-03-09 | End: 2021-03-12 | Stop reason: HOSPADM

## 2021-03-09 RX ORDER — IBUPROFEN 200 MG
1 TABLET ORAL DAILY
Status: DISCONTINUED | OUTPATIENT
Start: 2021-03-10 | End: 2021-03-12 | Stop reason: HOSPADM

## 2021-03-09 RX ORDER — ALBUTEROL SULFATE 90 UG/1
2 AEROSOL, METERED RESPIRATORY (INHALATION) EVERY 6 HOURS PRN
Status: DISCONTINUED | OUTPATIENT
Start: 2021-03-09 | End: 2021-03-09

## 2021-03-09 RX ORDER — MUPIROCIN 20 MG/G
OINTMENT TOPICAL 2 TIMES DAILY
Status: DISCONTINUED | OUTPATIENT
Start: 2021-03-09 | End: 2021-03-12 | Stop reason: HOSPADM

## 2021-03-09 RX ORDER — PANTOPRAZOLE SODIUM 40 MG/1
40 TABLET, DELAYED RELEASE ORAL DAILY
Refills: 1 | Status: DISCONTINUED | OUTPATIENT
Start: 2021-03-10 | End: 2021-03-12 | Stop reason: HOSPADM

## 2021-03-09 RX ORDER — TRAZODONE HYDROCHLORIDE 100 MG/1
100 TABLET ORAL NIGHTLY PRN
Status: DISCONTINUED | OUTPATIENT
Start: 2021-03-09 | End: 2021-03-12 | Stop reason: HOSPADM

## 2021-03-09 RX ORDER — ENOXAPARIN SODIUM 100 MG/ML
40 INJECTION SUBCUTANEOUS EVERY 24 HOURS
Status: DISCONTINUED | OUTPATIENT
Start: 2021-03-09 | End: 2021-03-12 | Stop reason: HOSPADM

## 2021-03-09 RX ORDER — NAPROXEN SODIUM 220 MG/1
81 TABLET, FILM COATED ORAL DAILY
Status: DISCONTINUED | OUTPATIENT
Start: 2021-03-10 | End: 2021-03-12 | Stop reason: HOSPADM

## 2021-03-09 RX ORDER — OXYCODONE HYDROCHLORIDE 5 MG/1
5 TABLET ORAL EVERY 6 HOURS PRN
Status: DISCONTINUED | OUTPATIENT
Start: 2021-03-09 | End: 2021-03-12 | Stop reason: HOSPADM

## 2021-03-09 RX ORDER — ACETAMINOPHEN 325 MG/1
650 TABLET ORAL EVERY 4 HOURS PRN
Status: DISCONTINUED | OUTPATIENT
Start: 2021-03-09 | End: 2021-03-12 | Stop reason: HOSPADM

## 2021-03-09 RX ORDER — OXYCODONE HYDROCHLORIDE 10 MG/1
10 TABLET ORAL EVERY 6 HOURS PRN
Status: DISCONTINUED | OUTPATIENT
Start: 2021-03-09 | End: 2021-03-12 | Stop reason: HOSPADM

## 2021-03-09 RX ORDER — ATORVASTATIN CALCIUM 20 MG/1
80 TABLET, FILM COATED ORAL DAILY
Status: DISCONTINUED | OUTPATIENT
Start: 2021-03-10 | End: 2021-03-12 | Stop reason: HOSPADM

## 2021-03-09 RX ORDER — IPRATROPIUM BROMIDE AND ALBUTEROL SULFATE 2.5; .5 MG/3ML; MG/3ML
3 SOLUTION RESPIRATORY (INHALATION) EVERY 6 HOURS PRN
Status: DISCONTINUED | OUTPATIENT
Start: 2021-03-09 | End: 2021-03-12 | Stop reason: HOSPADM

## 2021-03-09 RX ORDER — SODIUM CHLORIDE 0.9 % (FLUSH) 0.9 %
10 SYRINGE (ML) INJECTION
Status: DISCONTINUED | OUTPATIENT
Start: 2021-03-09 | End: 2021-03-12 | Stop reason: HOSPADM

## 2021-03-09 RX ORDER — IBUPROFEN 200 MG
24 TABLET ORAL
Status: DISCONTINUED | OUTPATIENT
Start: 2021-03-09 | End: 2021-03-12 | Stop reason: HOSPADM

## 2021-03-09 RX ORDER — ONDANSETRON 2 MG/ML
4 INJECTION INTRAMUSCULAR; INTRAVENOUS EVERY 8 HOURS PRN
Status: DISCONTINUED | OUTPATIENT
Start: 2021-03-09 | End: 2021-03-12 | Stop reason: HOSPADM

## 2021-03-09 RX ORDER — LISINOPRIL 20 MG/1
40 TABLET ORAL DAILY
Status: DISCONTINUED | OUTPATIENT
Start: 2021-03-10 | End: 2021-03-09

## 2021-03-09 RX ORDER — EZETIMIBE 10 MG/1
10 TABLET ORAL DAILY
Status: DISCONTINUED | OUTPATIENT
Start: 2021-03-10 | End: 2021-03-12 | Stop reason: HOSPADM

## 2021-03-09 RX ORDER — AMLODIPINE BESYLATE 5 MG/1
5 TABLET ORAL DAILY
Status: DISCONTINUED | OUTPATIENT
Start: 2021-03-10 | End: 2021-03-09

## 2021-03-09 RX ORDER — METOPROLOL SUCCINATE 100 MG/1
100 TABLET, EXTENDED RELEASE ORAL DAILY
Status: DISCONTINUED | OUTPATIENT
Start: 2021-03-10 | End: 2021-03-12 | Stop reason: HOSPADM

## 2021-03-09 RX ORDER — INSULIN ASPART 100 [IU]/ML
0-5 INJECTION, SOLUTION INTRAVENOUS; SUBCUTANEOUS
Status: DISCONTINUED | OUTPATIENT
Start: 2021-03-09 | End: 2021-03-12 | Stop reason: HOSPADM

## 2021-03-09 RX ORDER — ALPRAZOLAM 1 MG/1
1 TABLET ORAL 2 TIMES DAILY PRN
Status: DISCONTINUED | OUTPATIENT
Start: 2021-03-09 | End: 2021-03-12 | Stop reason: HOSPADM

## 2021-03-09 RX ORDER — FLUTICASONE FUROATE AND VILANTEROL 100; 25 UG/1; UG/1
1 POWDER RESPIRATORY (INHALATION) DAILY
Status: DISCONTINUED | OUTPATIENT
Start: 2021-03-10 | End: 2021-03-12 | Stop reason: HOSPADM

## 2021-03-09 RX ADMIN — PIPERACILLIN AND TAZOBACTAM 4.5 G: 4; .5 INJECTION, POWDER, LYOPHILIZED, FOR SOLUTION INTRAVENOUS; PARENTERAL at 09:03

## 2021-03-09 RX ADMIN — ENOXAPARIN SODIUM 40 MG: 40 INJECTION SUBCUTANEOUS at 09:03

## 2021-03-09 RX ADMIN — OXYCODONE HYDROCHLORIDE 10 MG: 10 TABLET ORAL at 09:03

## 2021-03-09 RX ADMIN — ONDANSETRON 4 MG: 2 INJECTION INTRAMUSCULAR; INTRAVENOUS at 09:03

## 2021-03-10 ENCOUNTER — CLINICAL SUPPORT (OUTPATIENT)
Dept: SMOKING CESSATION | Facility: CLINIC | Age: 56
End: 2021-03-10
Payer: COMMERCIAL

## 2021-03-10 DIAGNOSIS — E11.9 TYPE 2 DIABETES MELLITUS WITHOUT COMPLICATION: ICD-10-CM

## 2021-03-10 DIAGNOSIS — F17.200 NICOTINE DEPENDENCE: Primary | ICD-10-CM

## 2021-03-10 PROBLEM — L98.9 SKIN EROSION: Status: ACTIVE | Noted: 2021-03-10

## 2021-03-10 LAB
ALBUMIN SERPL BCP-MCNC: 3.4 G/DL (ref 3.5–5.2)
ALP SERPL-CCNC: 104 U/L (ref 55–135)
ALT SERPL W/O P-5'-P-CCNC: 32 U/L (ref 10–44)
ANION GAP SERPL CALC-SCNC: 9 MMOL/L (ref 8–16)
AST SERPL-CCNC: 31 U/L (ref 10–40)
BASOPHILS # BLD AUTO: 0.04 K/UL (ref 0–0.2)
BASOPHILS NFR BLD: 0.6 % (ref 0–1.9)
BILIRUB SERPL-MCNC: 0.5 MG/DL (ref 0.1–1)
BUN SERPL-MCNC: 8 MG/DL (ref 6–20)
CALCIUM SERPL-MCNC: 8.4 MG/DL (ref 8.7–10.5)
CHLORIDE SERPL-SCNC: 103 MMOL/L (ref 95–110)
CO2 SERPL-SCNC: 22 MMOL/L (ref 23–29)
CREAT SERPL-MCNC: 0.9 MG/DL (ref 0.5–1.4)
DIFFERENTIAL METHOD: ABNORMAL
EOSINOPHIL # BLD AUTO: 0.2 K/UL (ref 0–0.5)
EOSINOPHIL NFR BLD: 2.2 % (ref 0–8)
ERYTHROCYTE [DISTWIDTH] IN BLOOD BY AUTOMATED COUNT: 13.9 % (ref 11.5–14.5)
EST. GFR  (AFRICAN AMERICAN): >60 ML/MIN/1.73 M^2
EST. GFR  (NON AFRICAN AMERICAN): >60 ML/MIN/1.73 M^2
ESTIMATED AVG GLUCOSE: 192 MG/DL (ref 68–131)
GLUCOSE SERPL-MCNC: 159 MG/DL (ref 70–110)
HBA1C MFR BLD: 8.3 % (ref 4–5.6)
HCT VFR BLD AUTO: 36.2 % (ref 37–48.5)
HGB BLD-MCNC: 11.9 G/DL (ref 12–16)
HIV 1+2 AB+HIV1 P24 AG SERPL QL IA: NEGATIVE
IMM GRANULOCYTES # BLD AUTO: 0.03 K/UL (ref 0–0.04)
IMM GRANULOCYTES NFR BLD AUTO: 0.4 % (ref 0–0.5)
LYMPHOCYTES # BLD AUTO: 2.4 K/UL (ref 1–4.8)
LYMPHOCYTES NFR BLD: 33.5 % (ref 18–48)
MAGNESIUM SERPL-MCNC: 2 MG/DL (ref 1.6–2.6)
MCH RBC QN AUTO: 29.2 PG (ref 27–31)
MCHC RBC AUTO-ENTMCNC: 32.9 G/DL (ref 32–36)
MCV RBC AUTO: 89 FL (ref 82–98)
MONOCYTES # BLD AUTO: 0.5 K/UL (ref 0.3–1)
MONOCYTES NFR BLD: 7.2 % (ref 4–15)
NEUTROPHILS # BLD AUTO: 4.1 K/UL (ref 1.8–7.7)
NEUTROPHILS NFR BLD: 56.1 % (ref 38–73)
NRBC BLD-RTO: 0 /100 WBC
PHOSPHATE SERPL-MCNC: 3.4 MG/DL (ref 2.7–4.5)
PLATELET # BLD AUTO: 330 K/UL (ref 150–350)
PMV BLD AUTO: 8.7 FL (ref 9.2–12.9)
POCT GLUCOSE: 136 MG/DL (ref 70–110)
POCT GLUCOSE: 155 MG/DL (ref 70–110)
POCT GLUCOSE: 166 MG/DL (ref 70–110)
POTASSIUM SERPL-SCNC: 4.2 MMOL/L (ref 3.5–5.1)
PROT SERPL-MCNC: 6.4 G/DL (ref 6–8.4)
RBC # BLD AUTO: 4.07 M/UL (ref 4–5.4)
SODIUM SERPL-SCNC: 134 MMOL/L (ref 136–145)
WBC # BLD AUTO: 7.22 K/UL (ref 3.9–12.7)

## 2021-03-10 PROCEDURE — S4991 NICOTINE PATCH NONLEGEND: HCPCS | Performed by: STUDENT IN AN ORGANIZED HEALTH CARE EDUCATION/TRAINING PROGRAM

## 2021-03-10 PROCEDURE — 36415 COLL VENOUS BLD VENIPUNCTURE: CPT | Performed by: STUDENT IN AN ORGANIZED HEALTH CARE EDUCATION/TRAINING PROGRAM

## 2021-03-10 PROCEDURE — 83036 HEMOGLOBIN GLYCOSYLATED A1C: CPT | Performed by: STUDENT IN AN ORGANIZED HEALTH CARE EDUCATION/TRAINING PROGRAM

## 2021-03-10 PROCEDURE — 25000003 PHARM REV CODE 250: Performed by: HOSPITALIST

## 2021-03-10 PROCEDURE — 99255 PR INITIAL INPATIENT CONSULT,LEVL V: ICD-10-PCS | Mod: ,,, | Performed by: STUDENT IN AN ORGANIZED HEALTH CARE EDUCATION/TRAINING PROGRAM

## 2021-03-10 PROCEDURE — 25000003 PHARM REV CODE 250: Performed by: STUDENT IN AN ORGANIZED HEALTH CARE EDUCATION/TRAINING PROGRAM

## 2021-03-10 PROCEDURE — 99255 IP/OBS CONSLTJ NEW/EST HI 80: CPT | Mod: ,,, | Performed by: STUDENT IN AN ORGANIZED HEALTH CARE EDUCATION/TRAINING PROGRAM

## 2021-03-10 PROCEDURE — 99403 PR PREVENT COUNSEL,INDIV,45 MIN: ICD-10-PCS | Mod: S$GLB,,,

## 2021-03-10 PROCEDURE — 94640 AIRWAY INHALATION TREATMENT: CPT

## 2021-03-10 PROCEDURE — 27000221 HC OXYGEN, UP TO 24 HOURS

## 2021-03-10 PROCEDURE — 63600175 PHARM REV CODE 636 W HCPCS: Performed by: STUDENT IN AN ORGANIZED HEALTH CARE EDUCATION/TRAINING PROGRAM

## 2021-03-10 PROCEDURE — 99223 PR INITIAL HOSPITAL CARE,LEVL III: ICD-10-PCS | Mod: ,,, | Performed by: HOSPITALIST

## 2021-03-10 PROCEDURE — 99900035 HC TECH TIME PER 15 MIN (STAT)

## 2021-03-10 PROCEDURE — 86703 HIV-1/HIV-2 1 RESULT ANTBDY: CPT | Performed by: STUDENT IN AN ORGANIZED HEALTH CARE EDUCATION/TRAINING PROGRAM

## 2021-03-10 PROCEDURE — 85025 COMPLETE CBC W/AUTO DIFF WBC: CPT | Performed by: STUDENT IN AN ORGANIZED HEALTH CARE EDUCATION/TRAINING PROGRAM

## 2021-03-10 PROCEDURE — 97535 SELF CARE MNGMENT TRAINING: CPT

## 2021-03-10 PROCEDURE — 11000001 HC ACUTE MED/SURG PRIVATE ROOM

## 2021-03-10 PROCEDURE — 99223 1ST HOSP IP/OBS HIGH 75: CPT | Mod: ,,, | Performed by: HOSPITALIST

## 2021-03-10 PROCEDURE — 97165 OT EVAL LOW COMPLEX 30 MIN: CPT

## 2021-03-10 PROCEDURE — 83735 ASSAY OF MAGNESIUM: CPT | Performed by: STUDENT IN AN ORGANIZED HEALTH CARE EDUCATION/TRAINING PROGRAM

## 2021-03-10 PROCEDURE — 25000242 PHARM REV CODE 250 ALT 637 W/ HCPCS: Performed by: STUDENT IN AN ORGANIZED HEALTH CARE EDUCATION/TRAINING PROGRAM

## 2021-03-10 PROCEDURE — 87529 HSV DNA AMP PROBE: CPT | Performed by: STUDENT IN AN ORGANIZED HEALTH CARE EDUCATION/TRAINING PROGRAM

## 2021-03-10 PROCEDURE — 87798 DETECT AGENT NOS DNA AMP: CPT | Performed by: STUDENT IN AN ORGANIZED HEALTH CARE EDUCATION/TRAINING PROGRAM

## 2021-03-10 PROCEDURE — 94761 N-INVAS EAR/PLS OXIMETRY MLT: CPT

## 2021-03-10 PROCEDURE — 99403 PREV MED CNSL INDIV APPRX 45: CPT | Mod: S$GLB,,,

## 2021-03-10 PROCEDURE — 63600175 PHARM REV CODE 636 W HCPCS: Performed by: HOSPITALIST

## 2021-03-10 PROCEDURE — 80053 COMPREHEN METABOLIC PANEL: CPT | Performed by: STUDENT IN AN ORGANIZED HEALTH CARE EDUCATION/TRAINING PROGRAM

## 2021-03-10 PROCEDURE — 97161 PT EVAL LOW COMPLEX 20 MIN: CPT

## 2021-03-10 PROCEDURE — 84100 ASSAY OF PHOSPHORUS: CPT | Performed by: STUDENT IN AN ORGANIZED HEALTH CARE EDUCATION/TRAINING PROGRAM

## 2021-03-10 RX ORDER — CEFEPIME HYDROCHLORIDE 2 G/1
2 INJECTION, POWDER, FOR SOLUTION INTRAVENOUS
Status: DISCONTINUED | OUTPATIENT
Start: 2021-03-10 | End: 2021-03-12

## 2021-03-10 RX ORDER — CALCIUM CARBONATE 200(500)MG
500 TABLET,CHEWABLE ORAL DAILY PRN
Status: DISCONTINUED | OUTPATIENT
Start: 2021-03-10 | End: 2021-03-12 | Stop reason: HOSPADM

## 2021-03-10 RX ORDER — NYSTATIN 100000 [USP'U]/ML
500000 SUSPENSION ORAL 4 TIMES DAILY
Status: DISPENSED | OUTPATIENT
Start: 2021-03-10 | End: 2021-03-12

## 2021-03-10 RX ORDER — VALACYCLOVIR HYDROCHLORIDE 500 MG/1
1000 TABLET, FILM COATED ORAL 3 TIMES DAILY
Status: DISCONTINUED | OUTPATIENT
Start: 2021-03-10 | End: 2021-03-12

## 2021-03-10 RX ADMIN — PIPERACILLIN AND TAZOBACTAM 4.5 G: 4; .5 INJECTION, POWDER, LYOPHILIZED, FOR SOLUTION INTRAVENOUS; PARENTERAL at 04:03

## 2021-03-10 RX ADMIN — METOPROLOL SUCCINATE 100 MG: 100 TABLET, EXTENDED RELEASE ORAL at 08:03

## 2021-03-10 RX ADMIN — CEFEPIME 2 G: 2 INJECTION, POWDER, FOR SOLUTION INTRAVENOUS at 08:03

## 2021-03-10 RX ADMIN — PANTOPRAZOLE SODIUM 40 MG: 40 TABLET, DELAYED RELEASE ORAL at 08:03

## 2021-03-10 RX ADMIN — NYSTATIN 500000 UNITS: 100000 SUSPENSION ORAL at 04:03

## 2021-03-10 RX ADMIN — MUPIROCIN: 20 OINTMENT TOPICAL at 08:03

## 2021-03-10 RX ADMIN — VALACYCLOVIR HYDROCHLORIDE 1000 MG: 500 TABLET, FILM COATED ORAL at 08:03

## 2021-03-10 RX ADMIN — OXYCODONE HYDROCHLORIDE 10 MG: 10 TABLET ORAL at 11:03

## 2021-03-10 RX ADMIN — OXYCODONE HYDROCHLORIDE 10 MG: 10 TABLET ORAL at 08:03

## 2021-03-10 RX ADMIN — FLUTICASONE FUROATE AND VILANTEROL TRIFENATATE 1 PUFF: 100; 25 POWDER RESPIRATORY (INHALATION) at 08:03

## 2021-03-10 RX ADMIN — ASPIRIN 81 MG: 81 TABLET, CHEWABLE ORAL at 08:03

## 2021-03-10 RX ADMIN — NYSTATIN 500000 UNITS: 100000 SUSPENSION ORAL at 08:03

## 2021-03-10 RX ADMIN — CALCIUM CARBONATE (ANTACID) CHEW TAB 500 MG 500 MG: 500 CHEW TAB at 01:03

## 2021-03-10 RX ADMIN — ONDANSETRON 4 MG: 2 INJECTION INTRAMUSCULAR; INTRAVENOUS at 06:03

## 2021-03-10 RX ADMIN — ENOXAPARIN SODIUM 40 MG: 40 INJECTION SUBCUTANEOUS at 04:03

## 2021-03-10 RX ADMIN — OXYCODONE HYDROCHLORIDE 5 MG: 5 TABLET ORAL at 02:03

## 2021-03-10 RX ADMIN — VALACYCLOVIR HYDROCHLORIDE 1000 MG: 500 TABLET, FILM COATED ORAL at 04:03

## 2021-03-10 RX ADMIN — VANCOMYCIN HYDROCHLORIDE 1250 MG: 1.25 INJECTION, POWDER, LYOPHILIZED, FOR SOLUTION INTRAVENOUS at 01:03

## 2021-03-10 RX ADMIN — OXYCODONE HYDROCHLORIDE 10 MG: 10 TABLET ORAL at 04:03

## 2021-03-10 RX ADMIN — OXYCODONE HYDROCHLORIDE 5 MG: 5 TABLET ORAL at 08:03

## 2021-03-10 RX ADMIN — ACETAMINOPHEN 650 MG: 325 TABLET ORAL at 07:03

## 2021-03-10 RX ADMIN — CEFEPIME 2 G: 2 INJECTION, POWDER, FOR SOLUTION INTRAVENOUS at 01:03

## 2021-03-10 RX ADMIN — ATORVASTATIN CALCIUM 80 MG: 20 TABLET, FILM COATED ORAL at 08:03

## 2021-03-10 RX ADMIN — OXYCODONE HYDROCHLORIDE 5 MG: 5 TABLET ORAL at 04:03

## 2021-03-10 RX ADMIN — ONDANSETRON 4 MG: 2 INJECTION INTRAMUSCULAR; INTRAVENOUS at 01:03

## 2021-03-10 RX ADMIN — FENOFIBRATE 160 MG: 160 TABLET ORAL at 08:03

## 2021-03-10 RX ADMIN — NICOTINE 1 PATCH: 21 PATCH, EXTENDED RELEASE TRANSDERMAL at 08:03

## 2021-03-10 RX ADMIN — EZETIMIBE 10 MG: 10 TABLET ORAL at 08:03

## 2021-03-11 LAB
ALBUMIN SERPL BCP-MCNC: 3.5 G/DL (ref 3.5–5.2)
ALP SERPL-CCNC: 104 U/L (ref 55–135)
ALT SERPL W/O P-5'-P-CCNC: 36 U/L (ref 10–44)
ANION GAP SERPL CALC-SCNC: 11 MMOL/L (ref 8–16)
AST SERPL-CCNC: 36 U/L (ref 10–40)
BASOPHILS # BLD AUTO: 0.04 K/UL (ref 0–0.2)
BASOPHILS NFR BLD: 0.6 % (ref 0–1.9)
BILIRUB SERPL-MCNC: 0.6 MG/DL (ref 0.1–1)
BUN SERPL-MCNC: 7 MG/DL (ref 6–20)
CALCIUM SERPL-MCNC: 8.9 MG/DL (ref 8.7–10.5)
CHLORIDE SERPL-SCNC: 103 MMOL/L (ref 95–110)
CO2 SERPL-SCNC: 21 MMOL/L (ref 23–29)
CREAT SERPL-MCNC: 0.8 MG/DL (ref 0.5–1.4)
DIFFERENTIAL METHOD: ABNORMAL
EOSINOPHIL # BLD AUTO: 0.2 K/UL (ref 0–0.5)
EOSINOPHIL NFR BLD: 2.3 % (ref 0–8)
ERYTHROCYTE [DISTWIDTH] IN BLOOD BY AUTOMATED COUNT: 13.4 % (ref 11.5–14.5)
EST. GFR  (AFRICAN AMERICAN): >60 ML/MIN/1.73 M^2
EST. GFR  (NON AFRICAN AMERICAN): >60 ML/MIN/1.73 M^2
GLUCOSE SERPL-MCNC: 132 MG/DL (ref 70–110)
HCT VFR BLD AUTO: 35.6 % (ref 37–48.5)
HGB BLD-MCNC: 11.9 G/DL (ref 12–16)
HSV1 DNA SPEC QL NAA+PROBE: NEGATIVE
HSV2 DNA SPEC QL NAA+PROBE: NEGATIVE
IMM GRANULOCYTES # BLD AUTO: 0.02 K/UL (ref 0–0.04)
IMM GRANULOCYTES NFR BLD AUTO: 0.3 % (ref 0–0.5)
LYMPHOCYTES # BLD AUTO: 2.5 K/UL (ref 1–4.8)
LYMPHOCYTES NFR BLD: 35.8 % (ref 18–48)
MAGNESIUM SERPL-MCNC: 2.2 MG/DL (ref 1.6–2.6)
MCH RBC QN AUTO: 29.7 PG (ref 27–31)
MCHC RBC AUTO-ENTMCNC: 33.4 G/DL (ref 32–36)
MCV RBC AUTO: 89 FL (ref 82–98)
MONOCYTES # BLD AUTO: 0.5 K/UL (ref 0.3–1)
MONOCYTES NFR BLD: 7.6 % (ref 4–15)
NEUTROPHILS # BLD AUTO: 3.7 K/UL (ref 1.8–7.7)
NEUTROPHILS NFR BLD: 53.4 % (ref 38–73)
NRBC BLD-RTO: 0 /100 WBC
PHOSPHATE SERPL-MCNC: 3.6 MG/DL (ref 2.7–4.5)
PLATELET # BLD AUTO: 363 K/UL (ref 150–350)
PLATELET BLD QL SMEAR: ABNORMAL
PMV BLD AUTO: 8.5 FL (ref 9.2–12.9)
POCT GLUCOSE: 150 MG/DL (ref 70–110)
POCT GLUCOSE: 150 MG/DL (ref 70–110)
POCT GLUCOSE: 156 MG/DL (ref 70–110)
POCT GLUCOSE: 166 MG/DL (ref 70–110)
POCT GLUCOSE: 173 MG/DL (ref 70–110)
POTASSIUM SERPL-SCNC: 3.8 MMOL/L (ref 3.5–5.1)
PROT SERPL-MCNC: 6.7 G/DL (ref 6–8.4)
RBC # BLD AUTO: 4.01 M/UL (ref 4–5.4)
SODIUM SERPL-SCNC: 135 MMOL/L (ref 136–145)
SPECIMEN SOURCE: ABNORMAL
SPECIMEN SOURCE: NORMAL
VANCOMYCIN TROUGH SERPL-MCNC: 13.5 UG/ML (ref 10–22)
VARICELLA ZOSTER BY PCR RESULT: POSITIVE
WBC # BLD AUTO: 6.98 K/UL (ref 3.9–12.7)

## 2021-03-11 PROCEDURE — 94761 N-INVAS EAR/PLS OXIMETRY MLT: CPT

## 2021-03-11 PROCEDURE — 25000003 PHARM REV CODE 250: Performed by: STUDENT IN AN ORGANIZED HEALTH CARE EDUCATION/TRAINING PROGRAM

## 2021-03-11 PROCEDURE — 99233 PR SUBSEQUENT HOSPITAL CARE,LEVL III: ICD-10-PCS | Mod: ,,, | Performed by: STUDENT IN AN ORGANIZED HEALTH CARE EDUCATION/TRAINING PROGRAM

## 2021-03-11 PROCEDURE — 80202 ASSAY OF VANCOMYCIN: CPT | Performed by: HOSPITALIST

## 2021-03-11 PROCEDURE — 25000242 PHARM REV CODE 250 ALT 637 W/ HCPCS: Performed by: STUDENT IN AN ORGANIZED HEALTH CARE EDUCATION/TRAINING PROGRAM

## 2021-03-11 PROCEDURE — 80053 COMPREHEN METABOLIC PANEL: CPT | Performed by: STUDENT IN AN ORGANIZED HEALTH CARE EDUCATION/TRAINING PROGRAM

## 2021-03-11 PROCEDURE — 94640 AIRWAY INHALATION TREATMENT: CPT

## 2021-03-11 PROCEDURE — 11000001 HC ACUTE MED/SURG PRIVATE ROOM

## 2021-03-11 PROCEDURE — 83735 ASSAY OF MAGNESIUM: CPT | Performed by: STUDENT IN AN ORGANIZED HEALTH CARE EDUCATION/TRAINING PROGRAM

## 2021-03-11 PROCEDURE — 99233 SBSQ HOSP IP/OBS HIGH 50: CPT | Mod: ,,, | Performed by: HOSPITALIST

## 2021-03-11 PROCEDURE — 99233 PR SUBSEQUENT HOSPITAL CARE,LEVL III: ICD-10-PCS | Mod: ,,, | Performed by: HOSPITALIST

## 2021-03-11 PROCEDURE — 85025 COMPLETE CBC W/AUTO DIFF WBC: CPT | Performed by: STUDENT IN AN ORGANIZED HEALTH CARE EDUCATION/TRAINING PROGRAM

## 2021-03-11 PROCEDURE — 36415 COLL VENOUS BLD VENIPUNCTURE: CPT | Performed by: STUDENT IN AN ORGANIZED HEALTH CARE EDUCATION/TRAINING PROGRAM

## 2021-03-11 PROCEDURE — 63600175 PHARM REV CODE 636 W HCPCS: Performed by: STUDENT IN AN ORGANIZED HEALTH CARE EDUCATION/TRAINING PROGRAM

## 2021-03-11 PROCEDURE — 63600175 PHARM REV CODE 636 W HCPCS: Performed by: HOSPITALIST

## 2021-03-11 PROCEDURE — S4991 NICOTINE PATCH NONLEGEND: HCPCS | Performed by: STUDENT IN AN ORGANIZED HEALTH CARE EDUCATION/TRAINING PROGRAM

## 2021-03-11 PROCEDURE — 25000003 PHARM REV CODE 250: Performed by: HOSPITALIST

## 2021-03-11 PROCEDURE — 84100 ASSAY OF PHOSPHORUS: CPT | Performed by: STUDENT IN AN ORGANIZED HEALTH CARE EDUCATION/TRAINING PROGRAM

## 2021-03-11 PROCEDURE — 99233 SBSQ HOSP IP/OBS HIGH 50: CPT | Mod: ,,, | Performed by: STUDENT IN AN ORGANIZED HEALTH CARE EDUCATION/TRAINING PROGRAM

## 2021-03-11 PROCEDURE — 36415 COLL VENOUS BLD VENIPUNCTURE: CPT | Performed by: HOSPITALIST

## 2021-03-11 RX ADMIN — PROMETHAZINE HYDROCHLORIDE 12.5 MG: 25 INJECTION INTRAMUSCULAR; INTRAVENOUS at 07:03

## 2021-03-11 RX ADMIN — ASPIRIN 81 MG: 81 TABLET, CHEWABLE ORAL at 09:03

## 2021-03-11 RX ADMIN — EZETIMIBE 10 MG: 10 TABLET ORAL at 09:03

## 2021-03-11 RX ADMIN — ATORVASTATIN CALCIUM 80 MG: 20 TABLET, FILM COATED ORAL at 09:03

## 2021-03-11 RX ADMIN — MUPIROCIN: 20 OINTMENT TOPICAL at 09:03

## 2021-03-11 RX ADMIN — NYSTATIN 500000 UNITS: 100000 SUSPENSION ORAL at 09:03

## 2021-03-11 RX ADMIN — ONDANSETRON 4 MG: 2 INJECTION INTRAMUSCULAR; INTRAVENOUS at 09:03

## 2021-03-11 RX ADMIN — CEFEPIME 2 G: 2 INJECTION, POWDER, FOR SOLUTION INTRAVENOUS at 04:03

## 2021-03-11 RX ADMIN — NYSTATIN 500000 UNITS: 100000 SUSPENSION ORAL at 12:03

## 2021-03-11 RX ADMIN — OXYCODONE HYDROCHLORIDE 10 MG: 10 TABLET ORAL at 02:03

## 2021-03-11 RX ADMIN — VANCOMYCIN HYDROCHLORIDE 1250 MG: 1.25 INJECTION, POWDER, LYOPHILIZED, FOR SOLUTION INTRAVENOUS at 12:03

## 2021-03-11 RX ADMIN — CEFEPIME 2 G: 2 INJECTION, POWDER, FOR SOLUTION INTRAVENOUS at 12:03

## 2021-03-11 RX ADMIN — FLUTICASONE FUROATE AND VILANTEROL TRIFENATATE 1 PUFF: 100; 25 POWDER RESPIRATORY (INHALATION) at 08:03

## 2021-03-11 RX ADMIN — VALACYCLOVIR HYDROCHLORIDE 1000 MG: 500 TABLET, FILM COATED ORAL at 02:03

## 2021-03-11 RX ADMIN — CEFEPIME 2 G: 2 INJECTION, POWDER, FOR SOLUTION INTRAVENOUS at 09:03

## 2021-03-11 RX ADMIN — NYSTATIN 500000 UNITS: 100000 SUSPENSION ORAL at 05:03

## 2021-03-11 RX ADMIN — PANTOPRAZOLE SODIUM 40 MG: 40 TABLET, DELAYED RELEASE ORAL at 09:03

## 2021-03-11 RX ADMIN — VANCOMYCIN HYDROCHLORIDE 1250 MG: 1.25 INJECTION, POWDER, LYOPHILIZED, FOR SOLUTION INTRAVENOUS at 01:03

## 2021-03-11 RX ADMIN — VALACYCLOVIR HYDROCHLORIDE 1000 MG: 500 TABLET, FILM COATED ORAL at 09:03

## 2021-03-11 RX ADMIN — NICOTINE 1 PATCH: 21 PATCH, EXTENDED RELEASE TRANSDERMAL at 09:03

## 2021-03-11 RX ADMIN — METOPROLOL SUCCINATE 100 MG: 100 TABLET, EXTENDED RELEASE ORAL at 09:03

## 2021-03-11 RX ADMIN — OXYCODONE HYDROCHLORIDE 10 MG: 10 TABLET ORAL at 04:03

## 2021-03-11 RX ADMIN — FENOFIBRATE 160 MG: 160 TABLET ORAL at 09:03

## 2021-03-12 VITALS
TEMPERATURE: 98 F | HEIGHT: 60 IN | BODY MASS INDEX: 37.66 KG/M2 | HEART RATE: 67 BPM | RESPIRATION RATE: 16 BRPM | DIASTOLIC BLOOD PRESSURE: 60 MMHG | OXYGEN SATURATION: 92 % | SYSTOLIC BLOOD PRESSURE: 114 MMHG | WEIGHT: 191.81 LBS

## 2021-03-12 PROBLEM — B02.9 VARICELLA-ZOSTER INFECTION: Status: ACTIVE | Noted: 2021-03-10

## 2021-03-12 LAB
ALBUMIN SERPL BCP-MCNC: 3.8 G/DL (ref 3.5–5.2)
ALP SERPL-CCNC: 108 U/L (ref 55–135)
ALT SERPL W/O P-5'-P-CCNC: 63 U/L (ref 10–44)
ANION GAP SERPL CALC-SCNC: 10 MMOL/L (ref 8–16)
AST SERPL-CCNC: 67 U/L (ref 10–40)
BASOPHILS # BLD AUTO: 0.04 K/UL (ref 0–0.2)
BASOPHILS NFR BLD: 0.4 % (ref 0–1.9)
BILIRUB SERPL-MCNC: 0.6 MG/DL (ref 0.1–1)
BUN SERPL-MCNC: 9 MG/DL (ref 6–20)
CALCIUM SERPL-MCNC: 9.5 MG/DL (ref 8.7–10.5)
CHLORIDE SERPL-SCNC: 104 MMOL/L (ref 95–110)
CO2 SERPL-SCNC: 24 MMOL/L (ref 23–29)
CREAT SERPL-MCNC: 0.9 MG/DL (ref 0.5–1.4)
DIFFERENTIAL METHOD: ABNORMAL
EOSINOPHIL # BLD AUTO: 0.1 K/UL (ref 0–0.5)
EOSINOPHIL NFR BLD: 1.3 % (ref 0–8)
ERYTHROCYTE [DISTWIDTH] IN BLOOD BY AUTOMATED COUNT: 13.3 % (ref 11.5–14.5)
EST. GFR  (AFRICAN AMERICAN): >60 ML/MIN/1.73 M^2
EST. GFR  (NON AFRICAN AMERICAN): >60 ML/MIN/1.73 M^2
GLUCOSE SERPL-MCNC: 151 MG/DL (ref 70–110)
HCT VFR BLD AUTO: 39.3 % (ref 37–48.5)
HGB BLD-MCNC: 13.2 G/DL (ref 12–16)
IMM GRANULOCYTES # BLD AUTO: 0.03 K/UL (ref 0–0.04)
IMM GRANULOCYTES NFR BLD AUTO: 0.3 % (ref 0–0.5)
LYMPHOCYTES # BLD AUTO: 2.4 K/UL (ref 1–4.8)
LYMPHOCYTES NFR BLD: 24.4 % (ref 18–48)
MCH RBC QN AUTO: 29.8 PG (ref 27–31)
MCHC RBC AUTO-ENTMCNC: 33.6 G/DL (ref 32–36)
MCV RBC AUTO: 89 FL (ref 82–98)
MONOCYTES # BLD AUTO: 0.4 K/UL (ref 0.3–1)
MONOCYTES NFR BLD: 4.2 % (ref 4–15)
NEUTROPHILS # BLD AUTO: 6.8 K/UL (ref 1.8–7.7)
NEUTROPHILS NFR BLD: 69.4 % (ref 38–73)
NRBC BLD-RTO: 0 /100 WBC
PLATELET # BLD AUTO: 439 K/UL (ref 150–350)
PMV BLD AUTO: 8.6 FL (ref 9.2–12.9)
POCT GLUCOSE: 141 MG/DL (ref 70–110)
POCT GLUCOSE: 152 MG/DL (ref 70–110)
POCT GLUCOSE: 173 MG/DL (ref 70–110)
POTASSIUM SERPL-SCNC: 3.7 MMOL/L (ref 3.5–5.1)
PROT SERPL-MCNC: 7.5 G/DL (ref 6–8.4)
RBC # BLD AUTO: 4.43 M/UL (ref 4–5.4)
SODIUM SERPL-SCNC: 138 MMOL/L (ref 136–145)
VANCOMYCIN TROUGH SERPL-MCNC: 15.8 UG/ML (ref 10–22)
WBC # BLD AUTO: 9.81 K/UL (ref 3.9–12.7)

## 2021-03-12 PROCEDURE — 25000003 PHARM REV CODE 250: Performed by: STUDENT IN AN ORGANIZED HEALTH CARE EDUCATION/TRAINING PROGRAM

## 2021-03-12 PROCEDURE — 80053 COMPREHEN METABOLIC PANEL: CPT | Performed by: STUDENT IN AN ORGANIZED HEALTH CARE EDUCATION/TRAINING PROGRAM

## 2021-03-12 PROCEDURE — 99233 SBSQ HOSP IP/OBS HIGH 50: CPT | Mod: ,,, | Performed by: STUDENT IN AN ORGANIZED HEALTH CARE EDUCATION/TRAINING PROGRAM

## 2021-03-12 PROCEDURE — A9585 GADOBUTROL INJECTION: HCPCS | Performed by: HOSPITALIST

## 2021-03-12 PROCEDURE — 25000242 PHARM REV CODE 250 ALT 637 W/ HCPCS: Performed by: STUDENT IN AN ORGANIZED HEALTH CARE EDUCATION/TRAINING PROGRAM

## 2021-03-12 PROCEDURE — 25500020 PHARM REV CODE 255: Performed by: HOSPITALIST

## 2021-03-12 PROCEDURE — 36415 COLL VENOUS BLD VENIPUNCTURE: CPT | Performed by: STUDENT IN AN ORGANIZED HEALTH CARE EDUCATION/TRAINING PROGRAM

## 2021-03-12 PROCEDURE — 63600175 PHARM REV CODE 636 W HCPCS: Performed by: HOSPITALIST

## 2021-03-12 PROCEDURE — 99233 PR SUBSEQUENT HOSPITAL CARE,LEVL III: ICD-10-PCS | Mod: ,,, | Performed by: STUDENT IN AN ORGANIZED HEALTH CARE EDUCATION/TRAINING PROGRAM

## 2021-03-12 PROCEDURE — 25000003 PHARM REV CODE 250: Performed by: HOSPITALIST

## 2021-03-12 PROCEDURE — 36415 COLL VENOUS BLD VENIPUNCTURE: CPT | Performed by: HOSPITALIST

## 2021-03-12 PROCEDURE — S4991 NICOTINE PATCH NONLEGEND: HCPCS | Performed by: STUDENT IN AN ORGANIZED HEALTH CARE EDUCATION/TRAINING PROGRAM

## 2021-03-12 PROCEDURE — 85025 COMPLETE CBC W/AUTO DIFF WBC: CPT | Performed by: STUDENT IN AN ORGANIZED HEALTH CARE EDUCATION/TRAINING PROGRAM

## 2021-03-12 PROCEDURE — 99239 PR HOSPITAL DISCHARGE DAY,>30 MIN: ICD-10-PCS | Mod: ,,, | Performed by: HOSPITALIST

## 2021-03-12 PROCEDURE — 99239 HOSP IP/OBS DSCHRG MGMT >30: CPT | Mod: ,,, | Performed by: HOSPITALIST

## 2021-03-12 PROCEDURE — 80202 ASSAY OF VANCOMYCIN: CPT | Performed by: HOSPITALIST

## 2021-03-12 RX ORDER — DOXYCYCLINE HYCLATE 100 MG
100 TABLET ORAL EVERY 12 HOURS
Status: DISCONTINUED | OUTPATIENT
Start: 2021-03-12 | End: 2021-03-12 | Stop reason: HOSPADM

## 2021-03-12 RX ORDER — GADOBUTROL 604.72 MG/ML
10 INJECTION INTRAVENOUS
Status: COMPLETED | OUTPATIENT
Start: 2021-03-12 | End: 2021-03-12

## 2021-03-12 RX ORDER — DOXYCYCLINE HYCLATE 100 MG
100 TABLET ORAL EVERY 12 HOURS
Qty: 22 TABLET | Refills: 0 | Status: SHIPPED | OUTPATIENT
Start: 2021-03-12 | End: 2021-03-23

## 2021-03-12 RX ORDER — VALACYCLOVIR HYDROCHLORIDE 1 G/1
1000 TABLET, FILM COATED ORAL 3 TIMES DAILY
Qty: 33 TABLET | Refills: 0 | Status: SHIPPED | OUTPATIENT
Start: 2021-03-12 | End: 2022-04-13

## 2021-03-12 RX ORDER — FENOFIBRATE 160 MG/1
160 TABLET ORAL DAILY
Qty: 90 TABLET | Refills: 3 | Status: SHIPPED | OUTPATIENT
Start: 2021-03-13 | End: 2022-03-24 | Stop reason: SDUPTHER

## 2021-03-12 RX ORDER — CIPROFLOXACIN 500 MG/1
500 TABLET ORAL EVERY 12 HOURS
Status: DISCONTINUED | OUTPATIENT
Start: 2021-03-12 | End: 2021-03-12 | Stop reason: HOSPADM

## 2021-03-12 RX ORDER — FLUTICASONE FUROATE AND VILANTEROL 100; 25 UG/1; UG/1
1 POWDER RESPIRATORY (INHALATION) DAILY
Qty: 60 EACH | Refills: 0 | Status: SHIPPED | OUTPATIENT
Start: 2021-03-13 | End: 2022-06-23 | Stop reason: SDUPTHER

## 2021-03-12 RX ORDER — FLUCONAZOLE 50 MG/1
150 TABLET ORAL ONCE
Qty: 3 TABLET | Refills: 0 | Status: SHIPPED | OUTPATIENT
Start: 2021-03-12 | End: 2021-03-13

## 2021-03-12 RX ORDER — ATORVASTATIN CALCIUM 80 MG/1
TABLET, FILM COATED ORAL
Qty: 90 TABLET | Refills: 3 | Status: SHIPPED | OUTPATIENT
Start: 2021-03-12 | End: 2022-03-24 | Stop reason: SDUPTHER

## 2021-03-12 RX ORDER — CIPROFLOXACIN 500 MG/1
500 TABLET ORAL EVERY 12 HOURS
Qty: 22 TABLET | Refills: 0 | Status: SHIPPED | OUTPATIENT
Start: 2021-03-12 | End: 2021-03-23

## 2021-03-12 RX ORDER — VALACYCLOVIR HYDROCHLORIDE 500 MG/1
1000 TABLET, FILM COATED ORAL 3 TIMES DAILY
Status: DISCONTINUED | OUTPATIENT
Start: 2021-03-12 | End: 2021-03-12 | Stop reason: HOSPADM

## 2021-03-12 RX ORDER — DOXYCYCLINE HYCLATE 100 MG
100 TABLET ORAL EVERY 12 HOURS
Status: DISCONTINUED | OUTPATIENT
Start: 2021-03-12 | End: 2021-03-12

## 2021-03-12 RX ORDER — CIPROFLOXACIN 500 MG/1
500 TABLET ORAL EVERY 12 HOURS
Status: DISCONTINUED | OUTPATIENT
Start: 2021-03-12 | End: 2021-03-12

## 2021-03-12 RX ADMIN — VANCOMYCIN HYDROCHLORIDE 1250 MG: 1.25 INJECTION, POWDER, LYOPHILIZED, FOR SOLUTION INTRAVENOUS at 12:03

## 2021-03-12 RX ADMIN — VALACYCLOVIR HYDROCHLORIDE 1000 MG: 500 TABLET, FILM COATED ORAL at 03:03

## 2021-03-12 RX ADMIN — FENOFIBRATE 160 MG: 160 TABLET ORAL at 09:03

## 2021-03-12 RX ADMIN — MUPIROCIN: 20 OINTMENT TOPICAL at 09:03

## 2021-03-12 RX ADMIN — NYSTATIN 500000 UNITS: 100000 SUSPENSION ORAL at 09:03

## 2021-03-12 RX ADMIN — PANTOPRAZOLE SODIUM 40 MG: 40 TABLET, DELAYED RELEASE ORAL at 09:03

## 2021-03-12 RX ADMIN — GADOBUTROL 10 ML: 604.72 INJECTION INTRAVENOUS at 10:03

## 2021-03-12 RX ADMIN — NICOTINE 1 PATCH: 21 PATCH, EXTENDED RELEASE TRANSDERMAL at 09:03

## 2021-03-12 RX ADMIN — FLUTICASONE FUROATE AND VILANTEROL TRIFENATATE 1 PUFF: 100; 25 POWDER RESPIRATORY (INHALATION) at 09:03

## 2021-03-12 RX ADMIN — EZETIMIBE 10 MG: 10 TABLET ORAL at 09:03

## 2021-03-12 RX ADMIN — CIPROFLOXACIN 500 MG: 500 TABLET, FILM COATED ORAL at 11:03

## 2021-03-12 RX ADMIN — ACETAMINOPHEN 650 MG: 325 TABLET ORAL at 11:03

## 2021-03-12 RX ADMIN — DOXYCYCLINE HYCLATE 100 MG: 100 TABLET, COATED ORAL at 11:03

## 2021-03-12 RX ADMIN — METOPROLOL SUCCINATE 100 MG: 100 TABLET, EXTENDED RELEASE ORAL at 09:03

## 2021-03-12 RX ADMIN — ATORVASTATIN CALCIUM 80 MG: 20 TABLET, FILM COATED ORAL at 09:03

## 2021-03-12 RX ADMIN — VALACYCLOVIR HYDROCHLORIDE 1000 MG: 500 TABLET, FILM COATED ORAL at 09:03

## 2021-03-12 RX ADMIN — ASPIRIN 81 MG: 81 TABLET, CHEWABLE ORAL at 09:03

## 2021-03-14 LAB
BACTERIA BLD CULT: NORMAL
BACTERIA BLD CULT: NORMAL

## 2021-03-18 ENCOUNTER — CLINICAL SUPPORT (OUTPATIENT)
Dept: SMOKING CESSATION | Facility: CLINIC | Age: 56
End: 2021-03-18
Payer: COMMERCIAL

## 2021-03-18 DIAGNOSIS — F17.200 NICOTINE DEPENDENCE: Primary | ICD-10-CM

## 2021-03-18 PROCEDURE — 90853 PR GROUP PSYCHOTHERAPY: ICD-10-PCS | Mod: S$GLB,,,

## 2021-03-18 PROCEDURE — 90853 GROUP PSYCHOTHERAPY: CPT | Mod: S$GLB,,,

## 2021-03-18 RX ORDER — IBUPROFEN 200 MG
1 TABLET ORAL DAILY
Qty: 28 PATCH | Refills: 0 | Status: SHIPPED | OUTPATIENT
Start: 2021-03-18 | End: 2022-04-01 | Stop reason: SDUPTHER

## 2021-03-22 ENCOUNTER — OFFICE VISIT (OUTPATIENT)
Dept: INFECTIOUS DISEASES | Facility: CLINIC | Age: 56
End: 2021-03-22
Payer: COMMERCIAL

## 2021-03-22 VITALS
TEMPERATURE: 99 F | BODY MASS INDEX: 38.34 KG/M2 | DIASTOLIC BLOOD PRESSURE: 77 MMHG | SYSTOLIC BLOOD PRESSURE: 120 MMHG | WEIGHT: 195.31 LBS | HEART RATE: 77 BPM | HEIGHT: 60 IN

## 2021-03-22 DIAGNOSIS — L03.213 PRESEPTAL CELLULITIS OF RIGHT EYE: ICD-10-CM

## 2021-03-22 DIAGNOSIS — B02.9 VARICELLA-ZOSTER INFECTION: Primary | ICD-10-CM

## 2021-03-22 PROCEDURE — 99214 OFFICE O/P EST MOD 30 MIN: CPT | Mod: S$GLB,,, | Performed by: STUDENT IN AN ORGANIZED HEALTH CARE EDUCATION/TRAINING PROGRAM

## 2021-03-22 PROCEDURE — 3078F PR MOST RECENT DIASTOLIC BLOOD PRESSURE < 80 MM HG: ICD-10-PCS | Mod: CPTII,S$GLB,, | Performed by: STUDENT IN AN ORGANIZED HEALTH CARE EDUCATION/TRAINING PROGRAM

## 2021-03-22 PROCEDURE — 3078F DIAST BP <80 MM HG: CPT | Mod: CPTII,S$GLB,, | Performed by: STUDENT IN AN ORGANIZED HEALTH CARE EDUCATION/TRAINING PROGRAM

## 2021-03-22 PROCEDURE — 3008F PR BODY MASS INDEX (BMI) DOCUMENTED: ICD-10-PCS | Mod: CPTII,S$GLB,, | Performed by: STUDENT IN AN ORGANIZED HEALTH CARE EDUCATION/TRAINING PROGRAM

## 2021-03-22 PROCEDURE — 3008F BODY MASS INDEX DOCD: CPT | Mod: CPTII,S$GLB,, | Performed by: STUDENT IN AN ORGANIZED HEALTH CARE EDUCATION/TRAINING PROGRAM

## 2021-03-22 PROCEDURE — 1126F AMNT PAIN NOTED NONE PRSNT: CPT | Mod: S$GLB,,, | Performed by: STUDENT IN AN ORGANIZED HEALTH CARE EDUCATION/TRAINING PROGRAM

## 2021-03-22 PROCEDURE — 3074F SYST BP LT 130 MM HG: CPT | Mod: CPTII,S$GLB,, | Performed by: STUDENT IN AN ORGANIZED HEALTH CARE EDUCATION/TRAINING PROGRAM

## 2021-03-22 PROCEDURE — 1126F PR PAIN SEVERITY QUANTIFIED, NO PAIN PRESENT: ICD-10-PCS | Mod: S$GLB,,, | Performed by: STUDENT IN AN ORGANIZED HEALTH CARE EDUCATION/TRAINING PROGRAM

## 2021-03-22 PROCEDURE — 99214 PR OFFICE/OUTPT VISIT, EST, LEVL IV, 30-39 MIN: ICD-10-PCS | Mod: S$GLB,,, | Performed by: STUDENT IN AN ORGANIZED HEALTH CARE EDUCATION/TRAINING PROGRAM

## 2021-03-22 PROCEDURE — 3074F PR MOST RECENT SYSTOLIC BLOOD PRESSURE < 130 MM HG: ICD-10-PCS | Mod: CPTII,S$GLB,, | Performed by: STUDENT IN AN ORGANIZED HEALTH CARE EDUCATION/TRAINING PROGRAM

## 2021-03-22 PROCEDURE — 99999 PR PBB SHADOW E&M-EST. PATIENT-LVL IV: ICD-10-PCS | Mod: PBBFAC,,, | Performed by: STUDENT IN AN ORGANIZED HEALTH CARE EDUCATION/TRAINING PROGRAM

## 2021-03-22 PROCEDURE — 99999 PR PBB SHADOW E&M-EST. PATIENT-LVL IV: CPT | Mod: PBBFAC,,, | Performed by: STUDENT IN AN ORGANIZED HEALTH CARE EDUCATION/TRAINING PROGRAM

## 2021-03-24 ENCOUNTER — CLINICAL SUPPORT (OUTPATIENT)
Dept: SMOKING CESSATION | Facility: CLINIC | Age: 56
End: 2021-03-24
Payer: COMMERCIAL

## 2021-03-24 DIAGNOSIS — F17.200 NICOTINE DEPENDENCE: Primary | ICD-10-CM

## 2021-03-24 PROCEDURE — 90853 GROUP PSYCHOTHERAPY: CPT | Mod: S$GLB,,,

## 2021-03-24 PROCEDURE — 90853 PR GROUP PSYCHOTHERAPY: ICD-10-PCS | Mod: S$GLB,,,

## 2021-03-26 ENCOUNTER — PATIENT MESSAGE (OUTPATIENT)
Dept: FAMILY MEDICINE | Facility: CLINIC | Age: 56
End: 2021-03-26

## 2021-03-29 ENCOUNTER — TELEPHONE (OUTPATIENT)
Dept: FAMILY MEDICINE | Facility: CLINIC | Age: 56
End: 2021-03-29

## 2021-03-31 ENCOUNTER — CLINICAL SUPPORT (OUTPATIENT)
Dept: SMOKING CESSATION | Facility: CLINIC | Age: 56
End: 2021-03-31
Payer: COMMERCIAL

## 2021-03-31 DIAGNOSIS — F17.200 NICOTINE DEPENDENCE: Primary | ICD-10-CM

## 2021-03-31 PROCEDURE — 99402 PR PREVENT COUNSEL,INDIV,30 MIN: ICD-10-PCS | Mod: S$GLB,,,

## 2021-03-31 PROCEDURE — 99402 PREV MED CNSL INDIV APPRX 30: CPT | Mod: S$GLB,,,

## 2021-04-01 ENCOUNTER — OFFICE VISIT (OUTPATIENT)
Dept: FAMILY MEDICINE | Facility: CLINIC | Age: 56
End: 2021-04-01
Payer: COMMERCIAL

## 2021-04-01 VITALS
BODY MASS INDEX: 38.21 KG/M2 | HEIGHT: 60 IN | WEIGHT: 194.63 LBS | TEMPERATURE: 98 F | OXYGEN SATURATION: 98 % | HEART RATE: 78 BPM | DIASTOLIC BLOOD PRESSURE: 70 MMHG | SYSTOLIC BLOOD PRESSURE: 100 MMHG

## 2021-04-01 DIAGNOSIS — B02.9 VARICELLA-ZOSTER INFECTION: ICD-10-CM

## 2021-04-01 DIAGNOSIS — L03.213 PRESEPTAL CELLULITIS OF RIGHT EYE: Primary | ICD-10-CM

## 2021-04-01 DIAGNOSIS — E11.42 TYPE 2 DIABETES MELLITUS WITH DIABETIC POLYNEUROPATHY, WITHOUT LONG-TERM CURRENT USE OF INSULIN: ICD-10-CM

## 2021-04-01 PROCEDURE — 3052F PR MOST RECENT HEMOGLOBIN A1C LEVEL 8.0 - < 9.0%: ICD-10-PCS | Mod: CPTII,S$GLB,, | Performed by: FAMILY MEDICINE

## 2021-04-01 PROCEDURE — 99999 PR PBB SHADOW E&M-EST. PATIENT-LVL IV: ICD-10-PCS | Mod: PBBFAC,,, | Performed by: FAMILY MEDICINE

## 2021-04-01 PROCEDURE — 99214 PR OFFICE/OUTPT VISIT, EST, LEVL IV, 30-39 MIN: ICD-10-PCS | Mod: S$GLB,,, | Performed by: FAMILY MEDICINE

## 2021-04-01 PROCEDURE — 99214 OFFICE O/P EST MOD 30 MIN: CPT | Mod: S$GLB,,, | Performed by: FAMILY MEDICINE

## 2021-04-01 PROCEDURE — 99999 PR PBB SHADOW E&M-EST. PATIENT-LVL IV: CPT | Mod: PBBFAC,,, | Performed by: FAMILY MEDICINE

## 2021-04-01 PROCEDURE — 3078F DIAST BP <80 MM HG: CPT | Mod: CPTII,S$GLB,, | Performed by: FAMILY MEDICINE

## 2021-04-01 PROCEDURE — 3052F HG A1C>EQUAL 8.0%<EQUAL 9.0%: CPT | Mod: CPTII,S$GLB,, | Performed by: FAMILY MEDICINE

## 2021-04-01 PROCEDURE — 3074F PR MOST RECENT SYSTOLIC BLOOD PRESSURE < 130 MM HG: ICD-10-PCS | Mod: CPTII,S$GLB,, | Performed by: FAMILY MEDICINE

## 2021-04-01 PROCEDURE — 1126F PR PAIN SEVERITY QUANTIFIED, NO PAIN PRESENT: ICD-10-PCS | Mod: S$GLB,,, | Performed by: FAMILY MEDICINE

## 2021-04-01 PROCEDURE — 3008F BODY MASS INDEX DOCD: CPT | Mod: CPTII,S$GLB,, | Performed by: FAMILY MEDICINE

## 2021-04-01 PROCEDURE — 3008F PR BODY MASS INDEX (BMI) DOCUMENTED: ICD-10-PCS | Mod: CPTII,S$GLB,, | Performed by: FAMILY MEDICINE

## 2021-04-01 PROCEDURE — 3078F PR MOST RECENT DIASTOLIC BLOOD PRESSURE < 80 MM HG: ICD-10-PCS | Mod: CPTII,S$GLB,, | Performed by: FAMILY MEDICINE

## 2021-04-01 PROCEDURE — 1126F AMNT PAIN NOTED NONE PRSNT: CPT | Mod: S$GLB,,, | Performed by: FAMILY MEDICINE

## 2021-04-01 PROCEDURE — 3074F SYST BP LT 130 MM HG: CPT | Mod: CPTII,S$GLB,, | Performed by: FAMILY MEDICINE

## 2021-04-22 ENCOUNTER — CLINICAL SUPPORT (OUTPATIENT)
Dept: SMOKING CESSATION | Facility: CLINIC | Age: 56
End: 2021-04-22
Payer: COMMERCIAL

## 2021-04-22 DIAGNOSIS — F17.200 NICOTINE DEPENDENCE: Primary | ICD-10-CM

## 2021-04-22 PROCEDURE — 99402 PREV MED CNSL INDIV APPRX 30: CPT | Mod: S$GLB,,,

## 2021-04-22 PROCEDURE — 99402 PR PREVENT COUNSEL,INDIV,30 MIN: ICD-10-PCS | Mod: S$GLB,,,

## 2021-05-06 ENCOUNTER — CLINICAL SUPPORT (OUTPATIENT)
Dept: SMOKING CESSATION | Facility: CLINIC | Age: 56
End: 2021-05-06
Payer: COMMERCIAL

## 2021-05-06 DIAGNOSIS — F17.200 NICOTINE DEPENDENCE: Primary | ICD-10-CM

## 2021-05-06 PROCEDURE — 90853 PR GROUP PSYCHOTHERAPY: ICD-10-PCS | Mod: S$GLB,,,

## 2021-05-06 PROCEDURE — 90853 GROUP PSYCHOTHERAPY: CPT | Mod: S$GLB,,,

## 2021-05-13 ENCOUNTER — CLINICAL SUPPORT (OUTPATIENT)
Dept: SMOKING CESSATION | Facility: CLINIC | Age: 56
End: 2021-05-13
Payer: COMMERCIAL

## 2021-05-13 DIAGNOSIS — F17.200 NICOTINE DEPENDENCE: Primary | ICD-10-CM

## 2021-05-13 PROCEDURE — 90853 PR GROUP PSYCHOTHERAPY: ICD-10-PCS | Mod: S$GLB,,,

## 2021-05-13 PROCEDURE — 90853 GROUP PSYCHOTHERAPY: CPT | Mod: S$GLB,,,

## 2021-05-19 ENCOUNTER — CLINICAL SUPPORT (OUTPATIENT)
Dept: SMOKING CESSATION | Facility: CLINIC | Age: 56
End: 2021-05-19
Payer: COMMERCIAL

## 2021-05-19 DIAGNOSIS — F17.200 NICOTINE DEPENDENCE: Primary | ICD-10-CM

## 2021-05-19 PROCEDURE — 90853 GROUP PSYCHOTHERAPY: CPT | Mod: S$GLB,,,

## 2021-05-19 PROCEDURE — 90853 PR GROUP PSYCHOTHERAPY: ICD-10-PCS | Mod: S$GLB,,,

## 2021-06-02 ENCOUNTER — CLINICAL SUPPORT (OUTPATIENT)
Dept: SMOKING CESSATION | Facility: CLINIC | Age: 56
End: 2021-06-02
Payer: COMMERCIAL

## 2021-06-02 DIAGNOSIS — F17.200 NICOTINE DEPENDENCE: Primary | ICD-10-CM

## 2021-06-02 PROCEDURE — 90853 GROUP PSYCHOTHERAPY: CPT | Mod: S$GLB,,,

## 2021-06-02 PROCEDURE — 90853 PR GROUP PSYCHOTHERAPY: ICD-10-PCS | Mod: S$GLB,,,

## 2021-06-16 ENCOUNTER — CLINICAL SUPPORT (OUTPATIENT)
Dept: SMOKING CESSATION | Facility: CLINIC | Age: 56
End: 2021-06-16
Payer: COMMERCIAL

## 2021-06-16 DIAGNOSIS — F17.200 NICOTINE DEPENDENCE: Primary | ICD-10-CM

## 2021-06-16 PROCEDURE — 99402 PREV MED CNSL INDIV APPRX 30: CPT | Mod: S$GLB,,,

## 2021-06-16 PROCEDURE — 99402 PR PREVENT COUNSEL,INDIV,30 MIN: ICD-10-PCS | Mod: S$GLB,,,

## 2021-06-23 ENCOUNTER — CLINICAL SUPPORT (OUTPATIENT)
Dept: SMOKING CESSATION | Facility: CLINIC | Age: 56
End: 2021-06-23
Payer: COMMERCIAL

## 2021-06-23 DIAGNOSIS — F17.200 NICOTINE DEPENDENCE: Primary | ICD-10-CM

## 2021-06-23 DIAGNOSIS — E11.9 TYPE 2 DIABETES MELLITUS WITHOUT COMPLICATION: ICD-10-CM

## 2021-06-23 PROCEDURE — 99403 PREV MED CNSL INDIV APPRX 45: CPT | Mod: S$GLB,,,

## 2021-06-23 PROCEDURE — 99403 PR PREVENT COUNSEL,INDIV,45 MIN: ICD-10-PCS | Mod: S$GLB,,,

## 2021-07-01 ENCOUNTER — CLINICAL SUPPORT (OUTPATIENT)
Dept: SMOKING CESSATION | Facility: CLINIC | Age: 56
End: 2021-07-01
Payer: COMMERCIAL

## 2021-07-01 DIAGNOSIS — F17.200 NICOTINE DEPENDENCE: Primary | ICD-10-CM

## 2021-07-01 PROCEDURE — 99403 PREV MED CNSL INDIV APPRX 45: CPT | Mod: S$GLB,,,

## 2021-07-01 PROCEDURE — 99403 PR PREVENT COUNSEL,INDIV,45 MIN: ICD-10-PCS | Mod: S$GLB,,,

## 2021-07-07 ENCOUNTER — PATIENT MESSAGE (OUTPATIENT)
Dept: ADMINISTRATIVE | Facility: HOSPITAL | Age: 56
End: 2021-07-07

## 2021-07-08 ENCOUNTER — TELEPHONE (OUTPATIENT)
Dept: SMOKING CESSATION | Facility: CLINIC | Age: 56
End: 2021-07-08

## 2021-07-15 ENCOUNTER — CLINICAL SUPPORT (OUTPATIENT)
Dept: SMOKING CESSATION | Facility: CLINIC | Age: 56
End: 2021-07-15
Payer: COMMERCIAL

## 2021-07-15 DIAGNOSIS — F17.200 NICOTINE DEPENDENCE: Primary | ICD-10-CM

## 2021-07-15 PROCEDURE — 99402 PR PREVENT COUNSEL,INDIV,30 MIN: ICD-10-PCS | Mod: S$GLB,,,

## 2021-07-15 PROCEDURE — 99402 PREV MED CNSL INDIV APPRX 30: CPT | Mod: S$GLB,,,

## 2021-07-22 ENCOUNTER — CLINICAL SUPPORT (OUTPATIENT)
Dept: SMOKING CESSATION | Facility: CLINIC | Age: 56
End: 2021-07-22
Payer: COMMERCIAL

## 2021-07-22 DIAGNOSIS — F17.200 NICOTINE DEPENDENCE: Primary | ICD-10-CM

## 2021-07-22 PROCEDURE — 99403 PREV MED CNSL INDIV APPRX 45: CPT | Mod: S$GLB,,,

## 2021-07-22 PROCEDURE — 99403 PR PREVENT COUNSEL,INDIV,45 MIN: ICD-10-PCS | Mod: S$GLB,,,

## 2021-07-27 RX ORDER — OMEPRAZOLE 40 MG/1
CAPSULE, DELAYED RELEASE ORAL
Qty: 90 CAPSULE | Refills: 1 | OUTPATIENT
Start: 2021-07-27

## 2021-07-27 RX ORDER — OMEPRAZOLE 40 MG/1
40 CAPSULE, DELAYED RELEASE ORAL DAILY
Qty: 90 CAPSULE | Refills: 1 | Status: SHIPPED | OUTPATIENT
Start: 2021-07-27 | End: 2022-01-28 | Stop reason: SDUPTHER

## 2021-07-29 ENCOUNTER — CLINICAL SUPPORT (OUTPATIENT)
Dept: SMOKING CESSATION | Facility: CLINIC | Age: 56
End: 2021-07-29
Payer: COMMERCIAL

## 2021-07-29 DIAGNOSIS — F17.200 NICOTINE DEPENDENCE: Primary | ICD-10-CM

## 2021-07-29 PROCEDURE — 99403 PR PREVENT COUNSEL,INDIV,45 MIN: ICD-10-PCS | Mod: S$GLB,,,

## 2021-07-29 PROCEDURE — 99403 PREV MED CNSL INDIV APPRX 45: CPT | Mod: S$GLB,,,

## 2021-08-04 ENCOUNTER — PATIENT MESSAGE (OUTPATIENT)
Dept: ADMINISTRATIVE | Facility: HOSPITAL | Age: 56
End: 2021-08-04

## 2021-08-04 ENCOUNTER — CLINICAL SUPPORT (OUTPATIENT)
Dept: SMOKING CESSATION | Facility: CLINIC | Age: 56
End: 2021-08-04
Payer: COMMERCIAL

## 2021-08-04 DIAGNOSIS — F17.210 LIGHT SMOKER: Primary | ICD-10-CM

## 2021-08-04 PROCEDURE — 99403 PR PREVENT COUNSEL,INDIV,45 MIN: ICD-10-PCS | Mod: S$GLB,,,

## 2021-08-04 PROCEDURE — 99403 PREV MED CNSL INDIV APPRX 45: CPT | Mod: S$GLB,,,

## 2021-08-09 ENCOUNTER — OFFICE VISIT (OUTPATIENT)
Dept: SURGERY | Facility: CLINIC | Age: 56
End: 2021-08-09
Payer: COMMERCIAL

## 2021-08-09 VITALS
HEIGHT: 60 IN | DIASTOLIC BLOOD PRESSURE: 60 MMHG | HEART RATE: 88 BPM | SYSTOLIC BLOOD PRESSURE: 124 MMHG | BODY MASS INDEX: 38.09 KG/M2 | WEIGHT: 194 LBS

## 2021-08-09 DIAGNOSIS — N61.1 ABSCESS OF FEMALE BREAST: Primary | ICD-10-CM

## 2021-08-09 PROCEDURE — 99999 PR PBB SHADOW E&M-EST. PATIENT-LVL IV: CPT | Mod: PBBFAC,,, | Performed by: PHYSICIAN ASSISTANT

## 2021-08-09 PROCEDURE — 3008F PR BODY MASS INDEX (BMI) DOCUMENTED: ICD-10-PCS | Mod: CPTII,S$GLB,, | Performed by: PHYSICIAN ASSISTANT

## 2021-08-09 PROCEDURE — 1125F AMNT PAIN NOTED PAIN PRSNT: CPT | Mod: CPTII,S$GLB,, | Performed by: PHYSICIAN ASSISTANT

## 2021-08-09 PROCEDURE — 3078F PR MOST RECENT DIASTOLIC BLOOD PRESSURE < 80 MM HG: ICD-10-PCS | Mod: CPTII,S$GLB,, | Performed by: PHYSICIAN ASSISTANT

## 2021-08-09 PROCEDURE — 3008F BODY MASS INDEX DOCD: CPT | Mod: CPTII,S$GLB,, | Performed by: PHYSICIAN ASSISTANT

## 2021-08-09 PROCEDURE — 1159F MED LIST DOCD IN RCRD: CPT | Mod: CPTII,S$GLB,, | Performed by: PHYSICIAN ASSISTANT

## 2021-08-09 PROCEDURE — 1125F PR PAIN SEVERITY QUANTIFIED, PAIN PRESENT: ICD-10-PCS | Mod: CPTII,S$GLB,, | Performed by: PHYSICIAN ASSISTANT

## 2021-08-09 PROCEDURE — 3074F SYST BP LT 130 MM HG: CPT | Mod: CPTII,S$GLB,, | Performed by: PHYSICIAN ASSISTANT

## 2021-08-09 PROCEDURE — 1159F PR MEDICATION LIST DOCUMENTED IN MEDICAL RECORD: ICD-10-PCS | Mod: CPTII,S$GLB,, | Performed by: PHYSICIAN ASSISTANT

## 2021-08-09 PROCEDURE — 3078F DIAST BP <80 MM HG: CPT | Mod: CPTII,S$GLB,, | Performed by: PHYSICIAN ASSISTANT

## 2021-08-09 PROCEDURE — 99999 PR PBB SHADOW E&M-EST. PATIENT-LVL IV: ICD-10-PCS | Mod: PBBFAC,,, | Performed by: PHYSICIAN ASSISTANT

## 2021-08-09 PROCEDURE — 3052F PR MOST RECENT HEMOGLOBIN A1C LEVEL 8.0 - < 9.0%: ICD-10-PCS | Mod: CPTII,S$GLB,, | Performed by: PHYSICIAN ASSISTANT

## 2021-08-09 PROCEDURE — 3074F PR MOST RECENT SYSTOLIC BLOOD PRESSURE < 130 MM HG: ICD-10-PCS | Mod: CPTII,S$GLB,, | Performed by: PHYSICIAN ASSISTANT

## 2021-08-09 PROCEDURE — 99214 OFFICE O/P EST MOD 30 MIN: CPT | Mod: S$GLB,,, | Performed by: PHYSICIAN ASSISTANT

## 2021-08-09 PROCEDURE — 3052F HG A1C>EQUAL 8.0%<EQUAL 9.0%: CPT | Mod: CPTII,S$GLB,, | Performed by: PHYSICIAN ASSISTANT

## 2021-08-09 PROCEDURE — 99214 PR OFFICE/OUTPT VISIT, EST, LEVL IV, 30-39 MIN: ICD-10-PCS | Mod: S$GLB,,, | Performed by: PHYSICIAN ASSISTANT

## 2021-08-10 RX ORDER — SULFAMETHOXAZOLE AND TRIMETHOPRIM 800; 160 MG/1; MG/1
1 TABLET ORAL 2 TIMES DAILY
Qty: 20 TABLET | Refills: 0 | Status: SHIPPED | OUTPATIENT
Start: 2021-08-10 | End: 2021-08-20

## 2021-08-11 ENCOUNTER — PATIENT OUTREACH (OUTPATIENT)
Dept: ADMINISTRATIVE | Facility: OTHER | Age: 56
End: 2021-08-11

## 2021-08-12 ENCOUNTER — CLINICAL SUPPORT (OUTPATIENT)
Dept: SMOKING CESSATION | Facility: CLINIC | Age: 56
End: 2021-08-12
Payer: COMMERCIAL

## 2021-08-12 DIAGNOSIS — F17.210 LIGHT CIGARETTE SMOKER (1-9 CIGS/DAY): ICD-10-CM

## 2021-08-12 PROCEDURE — 90853 PR GROUP PSYCHOTHERAPY: ICD-10-PCS | Mod: S$GLB,,,

## 2021-08-12 PROCEDURE — 90853 GROUP PSYCHOTHERAPY: CPT | Mod: S$GLB,,,

## 2021-08-16 ENCOUNTER — OFFICE VISIT (OUTPATIENT)
Dept: SURGERY | Facility: CLINIC | Age: 56
End: 2021-08-16
Payer: COMMERCIAL

## 2021-08-16 VITALS
DIASTOLIC BLOOD PRESSURE: 64 MMHG | HEART RATE: 80 BPM | HEIGHT: 60 IN | SYSTOLIC BLOOD PRESSURE: 134 MMHG | WEIGHT: 194 LBS | BODY MASS INDEX: 38.09 KG/M2

## 2021-08-16 DIAGNOSIS — N61.1 ABSCESS OF FEMALE BREAST: Primary | ICD-10-CM

## 2021-08-16 PROCEDURE — 3052F HG A1C>EQUAL 8.0%<EQUAL 9.0%: CPT | Mod: CPTII,S$GLB,, | Performed by: PHYSICIAN ASSISTANT

## 2021-08-16 PROCEDURE — 1159F MED LIST DOCD IN RCRD: CPT | Mod: CPTII,S$GLB,, | Performed by: PHYSICIAN ASSISTANT

## 2021-08-16 PROCEDURE — 1159F PR MEDICATION LIST DOCUMENTED IN MEDICAL RECORD: ICD-10-PCS | Mod: CPTII,S$GLB,, | Performed by: PHYSICIAN ASSISTANT

## 2021-08-16 PROCEDURE — 3008F BODY MASS INDEX DOCD: CPT | Mod: CPTII,S$GLB,, | Performed by: PHYSICIAN ASSISTANT

## 2021-08-16 PROCEDURE — 99212 PR OFFICE/OUTPT VISIT, EST, LEVL II, 10-19 MIN: ICD-10-PCS | Mod: S$GLB,,, | Performed by: PHYSICIAN ASSISTANT

## 2021-08-16 PROCEDURE — 99999 PR PBB SHADOW E&M-EST. PATIENT-LVL III: CPT | Mod: PBBFAC,,, | Performed by: PHYSICIAN ASSISTANT

## 2021-08-16 PROCEDURE — 1126F AMNT PAIN NOTED NONE PRSNT: CPT | Mod: CPTII,S$GLB,, | Performed by: PHYSICIAN ASSISTANT

## 2021-08-16 PROCEDURE — 1126F PR PAIN SEVERITY QUANTIFIED, NO PAIN PRESENT: ICD-10-PCS | Mod: CPTII,S$GLB,, | Performed by: PHYSICIAN ASSISTANT

## 2021-08-16 PROCEDURE — 99212 OFFICE O/P EST SF 10 MIN: CPT | Mod: S$GLB,,, | Performed by: PHYSICIAN ASSISTANT

## 2021-08-16 PROCEDURE — 99999 PR PBB SHADOW E&M-EST. PATIENT-LVL III: ICD-10-PCS | Mod: PBBFAC,,, | Performed by: PHYSICIAN ASSISTANT

## 2021-08-16 PROCEDURE — 1160F RVW MEDS BY RX/DR IN RCRD: CPT | Mod: CPTII,S$GLB,, | Performed by: PHYSICIAN ASSISTANT

## 2021-08-16 PROCEDURE — 1160F PR REVIEW ALL MEDS BY PRESCRIBER/CLIN PHARMACIST DOCUMENTED: ICD-10-PCS | Mod: CPTII,S$GLB,, | Performed by: PHYSICIAN ASSISTANT

## 2021-08-16 PROCEDURE — 3078F PR MOST RECENT DIASTOLIC BLOOD PRESSURE < 80 MM HG: ICD-10-PCS | Mod: CPTII,S$GLB,, | Performed by: PHYSICIAN ASSISTANT

## 2021-08-16 PROCEDURE — 3075F SYST BP GE 130 - 139MM HG: CPT | Mod: CPTII,S$GLB,, | Performed by: PHYSICIAN ASSISTANT

## 2021-08-16 PROCEDURE — 3008F PR BODY MASS INDEX (BMI) DOCUMENTED: ICD-10-PCS | Mod: CPTII,S$GLB,, | Performed by: PHYSICIAN ASSISTANT

## 2021-08-16 PROCEDURE — 3052F PR MOST RECENT HEMOGLOBIN A1C LEVEL 8.0 - < 9.0%: ICD-10-PCS | Mod: CPTII,S$GLB,, | Performed by: PHYSICIAN ASSISTANT

## 2021-08-16 PROCEDURE — 3075F PR MOST RECENT SYSTOLIC BLOOD PRESS GE 130-139MM HG: ICD-10-PCS | Mod: CPTII,S$GLB,, | Performed by: PHYSICIAN ASSISTANT

## 2021-08-16 PROCEDURE — 3078F DIAST BP <80 MM HG: CPT | Mod: CPTII,S$GLB,, | Performed by: PHYSICIAN ASSISTANT

## 2021-08-19 ENCOUNTER — CLINICAL SUPPORT (OUTPATIENT)
Dept: SMOKING CESSATION | Facility: CLINIC | Age: 56
End: 2021-08-19
Payer: COMMERCIAL

## 2021-08-19 DIAGNOSIS — F17.210 LIGHT CIGARETTE SMOKER (1-9 CIGS/DAY): Primary | ICD-10-CM

## 2021-08-19 PROCEDURE — 90853 GROUP PSYCHOTHERAPY: CPT | Mod: S$GLB,,,

## 2021-08-19 PROCEDURE — 90853 PR GROUP PSYCHOTHERAPY: ICD-10-PCS | Mod: S$GLB,,,

## 2021-08-26 ENCOUNTER — CLINICAL SUPPORT (OUTPATIENT)
Dept: SMOKING CESSATION | Facility: CLINIC | Age: 56
End: 2021-08-26
Payer: COMMERCIAL

## 2021-08-26 DIAGNOSIS — F17.200 NICOTINE DEPENDENCE: Primary | ICD-10-CM

## 2021-08-26 PROCEDURE — 90853 GROUP PSYCHOTHERAPY: CPT | Mod: S$GLB,,,

## 2021-08-26 PROCEDURE — 90853 PR GROUP PSYCHOTHERAPY: ICD-10-PCS | Mod: S$GLB,,,

## 2021-09-01 ENCOUNTER — TELEPHONE (OUTPATIENT)
Dept: SMOKING CESSATION | Facility: CLINIC | Age: 56
End: 2021-09-01

## 2021-09-07 ENCOUNTER — CLINICAL SUPPORT (OUTPATIENT)
Dept: SMOKING CESSATION | Facility: CLINIC | Age: 56
End: 2021-09-07
Payer: COMMERCIAL

## 2021-09-07 DIAGNOSIS — F17.210 MODERATE SMOKER (20 OR LESS PER DAY): Primary | ICD-10-CM

## 2021-09-07 PROCEDURE — 90853 GROUP PSYCHOTHERAPY: CPT | Mod: S$GLB,,,

## 2021-09-07 PROCEDURE — 90853 PR GROUP PSYCHOTHERAPY: ICD-10-PCS | Mod: S$GLB,,,

## 2021-09-07 PROCEDURE — 99999 PR PBB SHADOW E&M-EST. PATIENT-LVL I: CPT | Mod: PBBFAC,,,

## 2021-09-07 PROCEDURE — 99999 PR PBB SHADOW E&M-EST. PATIENT-LVL I: ICD-10-PCS | Mod: PBBFAC,,,

## 2021-09-09 ENCOUNTER — OFFICE VISIT (OUTPATIENT)
Dept: PODIATRY | Facility: CLINIC | Age: 56
End: 2021-09-09
Payer: COMMERCIAL

## 2021-09-09 VITALS
SYSTOLIC BLOOD PRESSURE: 128 MMHG | HEART RATE: 74 BPM | BODY MASS INDEX: 38.09 KG/M2 | WEIGHT: 194 LBS | HEIGHT: 60 IN | DIASTOLIC BLOOD PRESSURE: 84 MMHG

## 2021-09-09 DIAGNOSIS — Z72.0 TOBACCO ABUSE: ICD-10-CM

## 2021-09-09 DIAGNOSIS — M25.473 ANKLE SWELLING, UNSPECIFIED LATERALITY: ICD-10-CM

## 2021-09-09 DIAGNOSIS — S82.65XA CLOSED NONDISPLACED FRACTURE OF LATERAL MALLEOLUS OF LEFT FIBULA, INITIAL ENCOUNTER: ICD-10-CM

## 2021-09-09 DIAGNOSIS — M25.572 ACUTE LEFT ANKLE PAIN: Primary | ICD-10-CM

## 2021-09-09 DIAGNOSIS — E11.42 TYPE 2 DIABETES MELLITUS WITH DIABETIC POLYNEUROPATHY, WITHOUT LONG-TERM CURRENT USE OF INSULIN: ICD-10-CM

## 2021-09-09 PROCEDURE — 3052F HG A1C>EQUAL 8.0%<EQUAL 9.0%: CPT | Mod: CPTII,S$GLB,, | Performed by: PODIATRIST

## 2021-09-09 PROCEDURE — 1159F MED LIST DOCD IN RCRD: CPT | Mod: CPTII,S$GLB,, | Performed by: PODIATRIST

## 2021-09-09 PROCEDURE — 3052F PR MOST RECENT HEMOGLOBIN A1C LEVEL 8.0 - < 9.0%: ICD-10-PCS | Mod: CPTII,S$GLB,, | Performed by: PODIATRIST

## 2021-09-09 PROCEDURE — 1159F PR MEDICATION LIST DOCUMENTED IN MEDICAL RECORD: ICD-10-PCS | Mod: CPTII,S$GLB,, | Performed by: PODIATRIST

## 2021-09-09 PROCEDURE — 3008F BODY MASS INDEX DOCD: CPT | Mod: CPTII,S$GLB,, | Performed by: PODIATRIST

## 2021-09-09 PROCEDURE — 4010F ACE/ARB THERAPY RXD/TAKEN: CPT | Mod: CPTII,S$GLB,, | Performed by: PODIATRIST

## 2021-09-09 PROCEDURE — 1160F RVW MEDS BY RX/DR IN RCRD: CPT | Mod: CPTII,S$GLB,, | Performed by: PODIATRIST

## 2021-09-09 PROCEDURE — 1160F PR REVIEW ALL MEDS BY PRESCRIBER/CLIN PHARMACIST DOCUMENTED: ICD-10-PCS | Mod: CPTII,S$GLB,, | Performed by: PODIATRIST

## 2021-09-09 PROCEDURE — 99204 PR OFFICE/OUTPT VISIT, NEW, LEVL IV, 45-59 MIN: ICD-10-PCS | Mod: S$GLB,,, | Performed by: PODIATRIST

## 2021-09-09 PROCEDURE — 4010F PR ACE/ARB THEARPY RXD/TAKEN: ICD-10-PCS | Mod: CPTII,S$GLB,, | Performed by: PODIATRIST

## 2021-09-09 PROCEDURE — 3074F SYST BP LT 130 MM HG: CPT | Mod: CPTII,S$GLB,, | Performed by: PODIATRIST

## 2021-09-09 PROCEDURE — 99204 OFFICE O/P NEW MOD 45 MIN: CPT | Mod: S$GLB,,, | Performed by: PODIATRIST

## 2021-09-09 PROCEDURE — 3008F PR BODY MASS INDEX (BMI) DOCUMENTED: ICD-10-PCS | Mod: CPTII,S$GLB,, | Performed by: PODIATRIST

## 2021-09-09 PROCEDURE — 3074F PR MOST RECENT SYSTOLIC BLOOD PRESSURE < 130 MM HG: ICD-10-PCS | Mod: CPTII,S$GLB,, | Performed by: PODIATRIST

## 2021-09-09 PROCEDURE — 99999 PR PBB SHADOW E&M-EST. PATIENT-LVL V: CPT | Mod: PBBFAC,,, | Performed by: PODIATRIST

## 2021-09-09 PROCEDURE — 3079F DIAST BP 80-89 MM HG: CPT | Mod: CPTII,S$GLB,, | Performed by: PODIATRIST

## 2021-09-09 PROCEDURE — 3079F PR MOST RECENT DIASTOLIC BLOOD PRESSURE 80-89 MM HG: ICD-10-PCS | Mod: CPTII,S$GLB,, | Performed by: PODIATRIST

## 2021-09-09 PROCEDURE — 99999 PR PBB SHADOW E&M-EST. PATIENT-LVL V: ICD-10-PCS | Mod: PBBFAC,,, | Performed by: PODIATRIST

## 2021-09-16 ENCOUNTER — CLINICAL SUPPORT (OUTPATIENT)
Dept: SMOKING CESSATION | Facility: CLINIC | Age: 56
End: 2021-09-16
Payer: COMMERCIAL

## 2021-09-16 DIAGNOSIS — F17.210 MODERATE SMOKER (20 OR LESS PER DAY): Primary | ICD-10-CM

## 2021-09-16 PROCEDURE — 90853 GROUP PSYCHOTHERAPY: CPT | Mod: S$GLB,,,

## 2021-09-16 PROCEDURE — 90853 PR GROUP PSYCHOTHERAPY: ICD-10-PCS | Mod: S$GLB,,,

## 2021-09-20 ENCOUNTER — CLINICAL SUPPORT (OUTPATIENT)
Dept: SMOKING CESSATION | Facility: CLINIC | Age: 56
End: 2021-09-20
Payer: COMMERCIAL

## 2021-09-20 DIAGNOSIS — F17.200 NICOTINE DEPENDENCE: Primary | ICD-10-CM

## 2021-09-20 PROCEDURE — 99407 BEHAV CHNG SMOKING > 10 MIN: CPT | Mod: S$GLB,,,

## 2021-09-20 PROCEDURE — 99407 PR TOBACCO USE CESSATION INTENSIVE >10 MINUTES: ICD-10-PCS | Mod: S$GLB,,,

## 2021-09-21 ENCOUNTER — PATIENT MESSAGE (OUTPATIENT)
Dept: PODIATRY | Facility: CLINIC | Age: 56
End: 2021-09-21

## 2021-09-23 ENCOUNTER — PATIENT OUTREACH (OUTPATIENT)
Dept: ADMINISTRATIVE | Facility: OTHER | Age: 56
End: 2021-09-23

## 2021-10-04 ENCOUNTER — PATIENT MESSAGE (OUTPATIENT)
Dept: ADMINISTRATIVE | Facility: HOSPITAL | Age: 56
End: 2021-10-04

## 2021-10-04 ENCOUNTER — OFFICE VISIT (OUTPATIENT)
Dept: PODIATRY | Facility: CLINIC | Age: 56
End: 2021-10-04
Payer: COMMERCIAL

## 2021-10-04 VITALS
WEIGHT: 194 LBS | SYSTOLIC BLOOD PRESSURE: 128 MMHG | HEART RATE: 89 BPM | HEIGHT: 60 IN | BODY MASS INDEX: 38.09 KG/M2 | DIASTOLIC BLOOD PRESSURE: 74 MMHG | OXYGEN SATURATION: 98 %

## 2021-10-04 DIAGNOSIS — Z72.0 TOBACCO ABUSE: ICD-10-CM

## 2021-10-04 DIAGNOSIS — S82.65XD CLOSED NONDISPLACED FRACTURE OF LATERAL MALLEOLUS OF LEFT FIBULA WITH ROUTINE HEALING, SUBSEQUENT ENCOUNTER: Primary | ICD-10-CM

## 2021-10-04 DIAGNOSIS — E11.42 TYPE 2 DIABETES MELLITUS WITH DIABETIC POLYNEUROPATHY, WITHOUT LONG-TERM CURRENT USE OF INSULIN: ICD-10-CM

## 2021-10-04 PROCEDURE — 3008F PR BODY MASS INDEX (BMI) DOCUMENTED: ICD-10-PCS | Mod: CPTII,S$GLB,, | Performed by: PODIATRIST

## 2021-10-04 PROCEDURE — 3074F PR MOST RECENT SYSTOLIC BLOOD PRESSURE < 130 MM HG: ICD-10-PCS | Mod: CPTII,S$GLB,, | Performed by: PODIATRIST

## 2021-10-04 PROCEDURE — 4010F PR ACE/ARB THEARPY RXD/TAKEN: ICD-10-PCS | Mod: CPTII,S$GLB,, | Performed by: PODIATRIST

## 2021-10-04 PROCEDURE — 4010F ACE/ARB THERAPY RXD/TAKEN: CPT | Mod: CPTII,S$GLB,, | Performed by: PODIATRIST

## 2021-10-04 PROCEDURE — 99213 PR OFFICE/OUTPT VISIT, EST, LEVL III, 20-29 MIN: ICD-10-PCS | Mod: S$GLB,,, | Performed by: PODIATRIST

## 2021-10-04 PROCEDURE — 1159F PR MEDICATION LIST DOCUMENTED IN MEDICAL RECORD: ICD-10-PCS | Mod: CPTII,S$GLB,, | Performed by: PODIATRIST

## 2021-10-04 PROCEDURE — 3078F PR MOST RECENT DIASTOLIC BLOOD PRESSURE < 80 MM HG: ICD-10-PCS | Mod: CPTII,S$GLB,, | Performed by: PODIATRIST

## 2021-10-04 PROCEDURE — 99999 PR PBB SHADOW E&M-EST. PATIENT-LVL V: ICD-10-PCS | Mod: PBBFAC,,, | Performed by: PODIATRIST

## 2021-10-04 PROCEDURE — 1159F MED LIST DOCD IN RCRD: CPT | Mod: CPTII,S$GLB,, | Performed by: PODIATRIST

## 2021-10-04 PROCEDURE — 99213 OFFICE O/P EST LOW 20 MIN: CPT | Mod: S$GLB,,, | Performed by: PODIATRIST

## 2021-10-04 PROCEDURE — 3052F HG A1C>EQUAL 8.0%<EQUAL 9.0%: CPT | Mod: CPTII,S$GLB,, | Performed by: PODIATRIST

## 2021-10-04 PROCEDURE — 3074F SYST BP LT 130 MM HG: CPT | Mod: CPTII,S$GLB,, | Performed by: PODIATRIST

## 2021-10-04 PROCEDURE — 1160F RVW MEDS BY RX/DR IN RCRD: CPT | Mod: CPTII,S$GLB,, | Performed by: PODIATRIST

## 2021-10-04 PROCEDURE — 3008F BODY MASS INDEX DOCD: CPT | Mod: CPTII,S$GLB,, | Performed by: PODIATRIST

## 2021-10-04 PROCEDURE — 3078F DIAST BP <80 MM HG: CPT | Mod: CPTII,S$GLB,, | Performed by: PODIATRIST

## 2021-10-04 PROCEDURE — 3052F PR MOST RECENT HEMOGLOBIN A1C LEVEL 8.0 - < 9.0%: ICD-10-PCS | Mod: CPTII,S$GLB,, | Performed by: PODIATRIST

## 2021-10-04 PROCEDURE — 99999 PR PBB SHADOW E&M-EST. PATIENT-LVL V: CPT | Mod: PBBFAC,,, | Performed by: PODIATRIST

## 2021-10-04 PROCEDURE — 1160F PR REVIEW ALL MEDS BY PRESCRIBER/CLIN PHARMACIST DOCUMENTED: ICD-10-PCS | Mod: CPTII,S$GLB,, | Performed by: PODIATRIST

## 2021-10-05 ENCOUNTER — PATIENT MESSAGE (OUTPATIENT)
Dept: PODIATRY | Facility: CLINIC | Age: 56
End: 2021-10-05

## 2021-10-18 ENCOUNTER — PATIENT MESSAGE (OUTPATIENT)
Dept: PODIATRY | Facility: CLINIC | Age: 56
End: 2021-10-18
Payer: MEDICAID

## 2021-10-22 ENCOUNTER — PATIENT MESSAGE (OUTPATIENT)
Dept: PODIATRY | Facility: CLINIC | Age: 56
End: 2021-10-22

## 2021-10-22 ENCOUNTER — TELEPHONE (OUTPATIENT)
Dept: PODIATRY | Facility: CLINIC | Age: 56
End: 2021-10-22

## 2021-10-22 ENCOUNTER — OFFICE VISIT (OUTPATIENT)
Dept: PODIATRY | Facility: CLINIC | Age: 56
End: 2021-10-22
Payer: COMMERCIAL

## 2021-10-22 VITALS
WEIGHT: 194 LBS | SYSTOLIC BLOOD PRESSURE: 122 MMHG | HEART RATE: 84 BPM | BODY MASS INDEX: 38.09 KG/M2 | DIASTOLIC BLOOD PRESSURE: 74 MMHG | OXYGEN SATURATION: 97 % | HEIGHT: 60 IN

## 2021-10-22 DIAGNOSIS — S82.65XD CLOSED NONDISPLACED FRACTURE OF LATERAL MALLEOLUS OF LEFT FIBULA WITH ROUTINE HEALING, SUBSEQUENT ENCOUNTER: Primary | ICD-10-CM

## 2021-10-22 DIAGNOSIS — R29.898 DECREASED STRENGTH OF LOWER EXTREMITY: ICD-10-CM

## 2021-10-22 DIAGNOSIS — E11.42 TYPE 2 DIABETES MELLITUS WITH DIABETIC POLYNEUROPATHY, WITHOUT LONG-TERM CURRENT USE OF INSULIN: ICD-10-CM

## 2021-10-22 DIAGNOSIS — M25.572 LEFT ANKLE PAIN, UNSPECIFIED CHRONICITY: ICD-10-CM

## 2021-10-22 DIAGNOSIS — Z72.0 TOBACCO ABUSE: ICD-10-CM

## 2021-10-22 PROCEDURE — 99213 OFFICE O/P EST LOW 20 MIN: CPT | Mod: S$GLB,,, | Performed by: PODIATRIST

## 2021-10-22 PROCEDURE — 3074F SYST BP LT 130 MM HG: CPT | Mod: CPTII,S$GLB,, | Performed by: PODIATRIST

## 2021-10-22 PROCEDURE — 3078F DIAST BP <80 MM HG: CPT | Mod: CPTII,S$GLB,, | Performed by: PODIATRIST

## 2021-10-22 PROCEDURE — 1159F MED LIST DOCD IN RCRD: CPT | Mod: CPTII,S$GLB,, | Performed by: PODIATRIST

## 2021-10-22 PROCEDURE — 1160F RVW MEDS BY RX/DR IN RCRD: CPT | Mod: CPTII,S$GLB,, | Performed by: PODIATRIST

## 2021-10-22 PROCEDURE — 99999 PR PBB SHADOW E&M-EST. PATIENT-LVL V: ICD-10-PCS | Mod: PBBFAC,,, | Performed by: PODIATRIST

## 2021-10-22 PROCEDURE — 3008F BODY MASS INDEX DOCD: CPT | Mod: CPTII,S$GLB,, | Performed by: PODIATRIST

## 2021-10-22 PROCEDURE — 3052F PR MOST RECENT HEMOGLOBIN A1C LEVEL 8.0 - < 9.0%: ICD-10-PCS | Mod: CPTII,S$GLB,, | Performed by: PODIATRIST

## 2021-10-22 PROCEDURE — 3078F PR MOST RECENT DIASTOLIC BLOOD PRESSURE < 80 MM HG: ICD-10-PCS | Mod: CPTII,S$GLB,, | Performed by: PODIATRIST

## 2021-10-22 PROCEDURE — 99999 PR PBB SHADOW E&M-EST. PATIENT-LVL V: CPT | Mod: PBBFAC,,, | Performed by: PODIATRIST

## 2021-10-22 PROCEDURE — 3008F PR BODY MASS INDEX (BMI) DOCUMENTED: ICD-10-PCS | Mod: CPTII,S$GLB,, | Performed by: PODIATRIST

## 2021-10-22 PROCEDURE — 99213 PR OFFICE/OUTPT VISIT, EST, LEVL III, 20-29 MIN: ICD-10-PCS | Mod: S$GLB,,, | Performed by: PODIATRIST

## 2021-10-22 PROCEDURE — 1160F PR REVIEW ALL MEDS BY PRESCRIBER/CLIN PHARMACIST DOCUMENTED: ICD-10-PCS | Mod: CPTII,S$GLB,, | Performed by: PODIATRIST

## 2021-10-22 PROCEDURE — 3052F HG A1C>EQUAL 8.0%<EQUAL 9.0%: CPT | Mod: CPTII,S$GLB,, | Performed by: PODIATRIST

## 2021-10-22 PROCEDURE — 4010F PR ACE/ARB THEARPY RXD/TAKEN: ICD-10-PCS | Mod: CPTII,S$GLB,, | Performed by: PODIATRIST

## 2021-10-22 PROCEDURE — 3074F PR MOST RECENT SYSTOLIC BLOOD PRESSURE < 130 MM HG: ICD-10-PCS | Mod: CPTII,S$GLB,, | Performed by: PODIATRIST

## 2021-10-22 PROCEDURE — 4010F ACE/ARB THERAPY RXD/TAKEN: CPT | Mod: CPTII,S$GLB,, | Performed by: PODIATRIST

## 2021-10-22 PROCEDURE — 1159F PR MEDICATION LIST DOCUMENTED IN MEDICAL RECORD: ICD-10-PCS | Mod: CPTII,S$GLB,, | Performed by: PODIATRIST

## 2021-10-29 PROBLEM — M25.672 DECREASED RANGE OF MOTION OF LEFT ANKLE: Status: ACTIVE | Noted: 2021-10-29

## 2021-10-29 PROBLEM — R29.898 WEAKNESS OF LEFT LOWER EXTREMITY: Status: ACTIVE | Noted: 2021-10-29

## 2021-10-29 PROBLEM — Z78.9 IMPAIRED MOBILITY AND ACTIVITIES OF DAILY LIVING: Status: ACTIVE | Noted: 2021-10-29

## 2021-10-29 PROBLEM — Z74.09 IMPAIRED MOBILITY AND ACTIVITIES OF DAILY LIVING: Status: ACTIVE | Noted: 2021-10-29

## 2021-11-03 ENCOUNTER — PATIENT MESSAGE (OUTPATIENT)
Dept: ADMINISTRATIVE | Facility: HOSPITAL | Age: 56
End: 2021-11-03
Payer: MEDICAID

## 2021-11-11 ENCOUNTER — PATIENT MESSAGE (OUTPATIENT)
Dept: PODIATRY | Facility: CLINIC | Age: 56
End: 2021-11-11
Payer: MEDICAID

## 2021-11-17 ENCOUNTER — PATIENT OUTREACH (OUTPATIENT)
Dept: ADMINISTRATIVE | Facility: OTHER | Age: 56
End: 2021-11-17
Payer: MEDICAID

## 2021-11-18 ENCOUNTER — OFFICE VISIT (OUTPATIENT)
Dept: PODIATRY | Facility: CLINIC | Age: 56
End: 2021-11-18
Payer: COMMERCIAL

## 2021-11-18 VITALS
BODY MASS INDEX: 36.61 KG/M2 | DIASTOLIC BLOOD PRESSURE: 78 MMHG | WEIGHT: 186.5 LBS | HEART RATE: 78 BPM | HEIGHT: 60 IN | SYSTOLIC BLOOD PRESSURE: 122 MMHG

## 2021-11-18 DIAGNOSIS — M25.572 LEFT ANKLE PAIN, UNSPECIFIED CHRONICITY: ICD-10-CM

## 2021-11-18 DIAGNOSIS — S82.65XD CLOSED NONDISPLACED FRACTURE OF LATERAL MALLEOLUS OF LEFT FIBULA WITH ROUTINE HEALING, SUBSEQUENT ENCOUNTER: Primary | ICD-10-CM

## 2021-11-18 DIAGNOSIS — R29.898 DECREASED STRENGTH OF LOWER EXTREMITY: ICD-10-CM

## 2021-11-18 DIAGNOSIS — Z72.0 TOBACCO ABUSE: ICD-10-CM

## 2021-11-18 PROCEDURE — 99999 PR PBB SHADOW E&M-EST. PATIENT-LVL IV: CPT | Mod: PBBFAC,,, | Performed by: PODIATRIST

## 2021-11-18 PROCEDURE — 3052F PR MOST RECENT HEMOGLOBIN A1C LEVEL 8.0 - < 9.0%: ICD-10-PCS | Mod: CPTII,S$GLB,, | Performed by: PODIATRIST

## 2021-11-18 PROCEDURE — 3052F HG A1C>EQUAL 8.0%<EQUAL 9.0%: CPT | Mod: CPTII,S$GLB,, | Performed by: PODIATRIST

## 2021-11-18 PROCEDURE — 1160F RVW MEDS BY RX/DR IN RCRD: CPT | Mod: CPTII,S$GLB,, | Performed by: PODIATRIST

## 2021-11-18 PROCEDURE — 99213 PR OFFICE/OUTPT VISIT, EST, LEVL III, 20-29 MIN: ICD-10-PCS | Mod: S$GLB,,, | Performed by: PODIATRIST

## 2021-11-18 PROCEDURE — 3008F PR BODY MASS INDEX (BMI) DOCUMENTED: ICD-10-PCS | Mod: CPTII,S$GLB,, | Performed by: PODIATRIST

## 2021-11-18 PROCEDURE — 1160F PR REVIEW ALL MEDS BY PRESCRIBER/CLIN PHARMACIST DOCUMENTED: ICD-10-PCS | Mod: CPTII,S$GLB,, | Performed by: PODIATRIST

## 2021-11-18 PROCEDURE — 3074F SYST BP LT 130 MM HG: CPT | Mod: CPTII,S$GLB,, | Performed by: PODIATRIST

## 2021-11-18 PROCEDURE — 4010F PR ACE/ARB THEARPY RXD/TAKEN: ICD-10-PCS | Mod: CPTII,S$GLB,, | Performed by: PODIATRIST

## 2021-11-18 PROCEDURE — 3078F DIAST BP <80 MM HG: CPT | Mod: CPTII,S$GLB,, | Performed by: PODIATRIST

## 2021-11-18 PROCEDURE — 3008F BODY MASS INDEX DOCD: CPT | Mod: CPTII,S$GLB,, | Performed by: PODIATRIST

## 2021-11-18 PROCEDURE — 99213 OFFICE O/P EST LOW 20 MIN: CPT | Mod: S$GLB,,, | Performed by: PODIATRIST

## 2021-11-18 PROCEDURE — 3078F PR MOST RECENT DIASTOLIC BLOOD PRESSURE < 80 MM HG: ICD-10-PCS | Mod: CPTII,S$GLB,, | Performed by: PODIATRIST

## 2021-11-18 PROCEDURE — 1159F PR MEDICATION LIST DOCUMENTED IN MEDICAL RECORD: ICD-10-PCS | Mod: CPTII,S$GLB,, | Performed by: PODIATRIST

## 2021-11-18 PROCEDURE — 99999 PR PBB SHADOW E&M-EST. PATIENT-LVL IV: ICD-10-PCS | Mod: PBBFAC,,, | Performed by: PODIATRIST

## 2021-11-18 PROCEDURE — 4010F ACE/ARB THERAPY RXD/TAKEN: CPT | Mod: CPTII,S$GLB,, | Performed by: PODIATRIST

## 2021-11-18 PROCEDURE — 1159F MED LIST DOCD IN RCRD: CPT | Mod: CPTII,S$GLB,, | Performed by: PODIATRIST

## 2021-11-18 PROCEDURE — 3074F PR MOST RECENT SYSTOLIC BLOOD PRESSURE < 130 MM HG: ICD-10-PCS | Mod: CPTII,S$GLB,, | Performed by: PODIATRIST

## 2021-12-16 ENCOUNTER — OFFICE VISIT (OUTPATIENT)
Dept: PODIATRY | Facility: CLINIC | Age: 56
End: 2021-12-16
Payer: OTHER MISCELLANEOUS

## 2021-12-16 VITALS
SYSTOLIC BLOOD PRESSURE: 128 MMHG | HEART RATE: 70 BPM | DIASTOLIC BLOOD PRESSURE: 80 MMHG | BODY MASS INDEX: 36.52 KG/M2 | HEIGHT: 60 IN | WEIGHT: 186 LBS

## 2021-12-16 DIAGNOSIS — S82.65XD CLOSED NONDISPLACED FRACTURE OF LATERAL MALLEOLUS OF LEFT FIBULA WITH ROUTINE HEALING, SUBSEQUENT ENCOUNTER: Primary | ICD-10-CM

## 2021-12-16 DIAGNOSIS — Z72.0 TOBACCO ABUSE: ICD-10-CM

## 2021-12-16 DIAGNOSIS — E11.42 TYPE 2 DIABETES MELLITUS WITH DIABETIC POLYNEUROPATHY, WITHOUT LONG-TERM CURRENT USE OF INSULIN: ICD-10-CM

## 2021-12-16 PROCEDURE — 99999 PR PBB SHADOW E&M-EST. PATIENT-LVL IV: ICD-10-PCS | Mod: PBBFAC,,, | Performed by: PODIATRIST

## 2021-12-16 PROCEDURE — 99213 OFFICE O/P EST LOW 20 MIN: CPT | Mod: S$GLB,,, | Performed by: PODIATRIST

## 2021-12-16 PROCEDURE — 99999 PR PBB SHADOW E&M-EST. PATIENT-LVL IV: CPT | Mod: PBBFAC,,, | Performed by: PODIATRIST

## 2021-12-16 PROCEDURE — 99213 PR OFFICE/OUTPT VISIT, EST, LEVL III, 20-29 MIN: ICD-10-PCS | Mod: S$GLB,,, | Performed by: PODIATRIST

## 2021-12-28 ENCOUNTER — PATIENT MESSAGE (OUTPATIENT)
Dept: PODIATRY | Facility: CLINIC | Age: 56
End: 2021-12-28
Payer: MEDICAID

## 2021-12-28 ENCOUNTER — IMMUNIZATION (OUTPATIENT)
Dept: FAMILY MEDICINE | Facility: CLINIC | Age: 56
End: 2021-12-28
Payer: MEDICAID

## 2021-12-28 DIAGNOSIS — Z23 NEED FOR VACCINATION: Primary | ICD-10-CM

## 2021-12-28 PROCEDURE — 0004A COVID-19, MRNA, LNP-S, PF, 30 MCG/0.3 ML DOSE VACCINE: CPT | Mod: PBBFAC | Performed by: FAMILY MEDICINE

## 2022-01-03 DIAGNOSIS — R29.898 DECREASED STRENGTH OF LOWER EXTREMITY: Primary | ICD-10-CM

## 2022-01-03 DIAGNOSIS — S82.65XD CLOSED NONDISPLACED FRACTURE OF LATERAL MALLEOLUS OF LEFT FIBULA WITH ROUTINE HEALING, SUBSEQUENT ENCOUNTER: ICD-10-CM

## 2022-01-05 ENCOUNTER — PATIENT OUTREACH (OUTPATIENT)
Dept: ADMINISTRATIVE | Facility: OTHER | Age: 57
End: 2022-01-05
Payer: COMMERCIAL

## 2022-01-05 NOTE — PROGRESS NOTES
Health Maintenance Due   Topic Date Due    Hepatitis C Screening  Never done    Shingles Vaccine (1 of 2) Never done    Foot Exam  02/08/2020    Diabetes Urine Screening  09/12/2020    Mammogram  01/17/2021    Hemoglobin A1c  06/10/2021    Lipid Panel  09/17/2021     Updates were requested from care everywhere.  Chart was reviewed for overdue Proactive [facility] Encounters ([facility]) topics (CRS, Breast Cancer Screening, Eye exam)  Health Maintenance has been updated.  LINKS immunization registry triggered.  Immunizations were reconciled.

## 2022-01-07 ENCOUNTER — OFFICE VISIT (OUTPATIENT)
Dept: CARDIOLOGY | Facility: CLINIC | Age: 57
End: 2022-01-07
Payer: COMMERCIAL

## 2022-01-07 VITALS
DIASTOLIC BLOOD PRESSURE: 74 MMHG | BODY MASS INDEX: 36.34 KG/M2 | HEART RATE: 83 BPM | SYSTOLIC BLOOD PRESSURE: 110 MMHG | WEIGHT: 186.06 LBS

## 2022-01-07 DIAGNOSIS — I50.32 CHRONIC DIASTOLIC CHF (CONGESTIVE HEART FAILURE): ICD-10-CM

## 2022-01-07 DIAGNOSIS — E78.00 PURE HYPERCHOLESTEROLEMIA: ICD-10-CM

## 2022-01-07 DIAGNOSIS — R09.89 LEFT CAROTID BRUIT: ICD-10-CM

## 2022-01-07 DIAGNOSIS — I10 ESSENTIAL HYPERTENSION: Primary | ICD-10-CM

## 2022-01-07 DIAGNOSIS — I20.89 STABLE ANGINA PECTORIS: ICD-10-CM

## 2022-01-07 DIAGNOSIS — Z72.0 TOBACCO ABUSE: ICD-10-CM

## 2022-01-07 DIAGNOSIS — E11.42 TYPE 2 DIABETES MELLITUS WITH DIABETIC POLYNEUROPATHY, WITHOUT LONG-TERM CURRENT USE OF INSULIN: ICD-10-CM

## 2022-01-07 DIAGNOSIS — E66.01 CLASS 2 SEVERE OBESITY DUE TO EXCESS CALORIES WITH SERIOUS COMORBIDITY AND BODY MASS INDEX (BMI) OF 36.0 TO 36.9 IN ADULT: ICD-10-CM

## 2022-01-07 DIAGNOSIS — I25.118 CORONARY ARTERY DISEASE OF NATIVE ARTERY OF NATIVE HEART WITH STABLE ANGINA PECTORIS: ICD-10-CM

## 2022-01-07 PROCEDURE — 3008F BODY MASS INDEX DOCD: CPT | Mod: CPTII,,, | Performed by: INTERNAL MEDICINE

## 2022-01-07 PROCEDURE — 99204 OFFICE O/P NEW MOD 45 MIN: CPT | Mod: 25,S$GLB,, | Performed by: INTERNAL MEDICINE

## 2022-01-07 PROCEDURE — 93000 EKG 12-LEAD: ICD-10-PCS | Mod: S$GLB,,, | Performed by: INTERNAL MEDICINE

## 2022-01-07 PROCEDURE — 3052F PR MOST RECENT HEMOGLOBIN A1C LEVEL 8.0 - < 9.0%: ICD-10-PCS | Mod: CPTII,,, | Performed by: INTERNAL MEDICINE

## 2022-01-07 PROCEDURE — 99999 PR PBB SHADOW E&M-EST. PATIENT-LVL IV: CPT | Mod: PBBFAC,,, | Performed by: INTERNAL MEDICINE

## 2022-01-07 PROCEDURE — 3078F DIAST BP <80 MM HG: CPT | Mod: CPTII,,, | Performed by: INTERNAL MEDICINE

## 2022-01-07 PROCEDURE — 3078F PR MOST RECENT DIASTOLIC BLOOD PRESSURE < 80 MM HG: ICD-10-PCS | Mod: CPTII,,, | Performed by: INTERNAL MEDICINE

## 2022-01-07 PROCEDURE — 3074F SYST BP LT 130 MM HG: CPT | Mod: CPTII,,, | Performed by: INTERNAL MEDICINE

## 2022-01-07 PROCEDURE — 3052F HG A1C>EQUAL 8.0%<EQUAL 9.0%: CPT | Mod: CPTII,,, | Performed by: INTERNAL MEDICINE

## 2022-01-07 PROCEDURE — 99204 PR OFFICE/OUTPT VISIT, NEW, LEVL IV, 45-59 MIN: ICD-10-PCS | Mod: 25,S$GLB,, | Performed by: INTERNAL MEDICINE

## 2022-01-07 PROCEDURE — 3074F PR MOST RECENT SYSTOLIC BLOOD PRESSURE < 130 MM HG: ICD-10-PCS | Mod: CPTII,,, | Performed by: INTERNAL MEDICINE

## 2022-01-07 PROCEDURE — 1159F MED LIST DOCD IN RCRD: CPT | Mod: CPTII,,, | Performed by: INTERNAL MEDICINE

## 2022-01-07 PROCEDURE — 99999 PR PBB SHADOW E&M-EST. PATIENT-LVL IV: ICD-10-PCS | Mod: PBBFAC,,, | Performed by: INTERNAL MEDICINE

## 2022-01-07 PROCEDURE — 93000 ELECTROCARDIOGRAM COMPLETE: CPT | Mod: S$GLB,,, | Performed by: INTERNAL MEDICINE

## 2022-01-07 PROCEDURE — 3008F PR BODY MASS INDEX (BMI) DOCUMENTED: ICD-10-PCS | Mod: CPTII,,, | Performed by: INTERNAL MEDICINE

## 2022-01-07 PROCEDURE — 1159F PR MEDICATION LIST DOCUMENTED IN MEDICAL RECORD: ICD-10-PCS | Mod: CPTII,,, | Performed by: INTERNAL MEDICINE

## 2022-01-07 RX ORDER — POTASSIUM CHLORIDE 1500 MG/1
20 TABLET, EXTENDED RELEASE ORAL DAILY PRN
Qty: 90 TABLET | Refills: 1 | Status: SHIPPED | OUTPATIENT
Start: 2022-01-07

## 2022-01-07 RX ORDER — ISOSORBIDE MONONITRATE 30 MG/1
30 TABLET, EXTENDED RELEASE ORAL DAILY
Qty: 30 TABLET | Refills: 11 | Status: SHIPPED | OUTPATIENT
Start: 2022-01-07 | End: 2022-06-23 | Stop reason: SDUPTHER

## 2022-01-07 RX ORDER — FUROSEMIDE 40 MG/1
40 TABLET ORAL DAILY PRN
Qty: 90 TABLET | Refills: 1 | Status: SHIPPED | OUTPATIENT
Start: 2022-01-07 | End: 2022-06-23 | Stop reason: SDUPTHER

## 2022-01-07 NOTE — PATIENT INSTRUCTIONS
Patient Education       Isosorbide Mononitrate (eye jessee SOR bide mon oh GINA trate)   Brand Names: Sole APO-ISMN; Imdur; PMS-ISMN; PRO-ISMN-60   What is this drug used for?   · It is used to prevent chest pain.  · It may be given to you for other reasons. Talk with the doctor.    What do I need to tell my doctor BEFORE I take this drug?   · If you have an allergy to isosorbide mononitrate or any other part of this drug.  · If you are allergic to this drug; any part of this drug; or any other drugs, foods, or substances. Tell your doctor about the allergy and what signs you had.  · If you have heart failure (weak heart).  · If you have had a recent heart attack.  · If you are taking any of these drugs: Avanafil, riociguat, sildenafil, tadalafil, or vardenafil.  This is not a list of all drugs or health problems that interact with this drug.  Tell your doctor and pharmacist about all of your drugs (prescription or OTC, natural products, vitamins) and health problems. You must check to make sure that it is safe for you to take this drug with all of your drugs and health problems. Do not start, stop, or change the dose of any drug without checking with your doctor.  What are some things I need to know or do while I take this drug?   All products:   · Tell all of your health care providers that you take this drug. This includes your doctors, nurses, pharmacists, and dentists.  · Avoid driving and doing other tasks or actions that call for you to be alert until you see how this drug affects you.  · To lower the chance of feeling dizzy or passing out, rise slowly if you have been sitting or lying down. Be careful going up and down stairs.  · Check your blood pressure as you have been told.  · This drug may affect certain lab tests. Tell all of your health care providers and lab workers that you take this drug.  · Talk with your doctor before you drink alcohol.  · If you are taking this drug for chest pain, it will not  "treat chest pain as it happens. This drug is only used to prevent or lower the number of chest pain attacks.  · If you have chest pain or pressure that is new or worse, get emergency medical care right away.  · If you have been taking this drug for a long time without a break, it may not work as well. This is known as tolerance. Be sure to have a "nitrate-free" period of time each day. Talk with your doctor if this drug stops working well. Do not take more than ordered.  · If you are 65 or older, use this drug with care. You could have more side effects.  · Tell your doctor if you are pregnant, plan on getting pregnant, or are breast-feeding. You will need to talk about the benefits and risks to you and the baby.  Extended-release tablets:   · You may see something that looks like the tablet in your stool. This is normal and not a cause for concern. If you have questions, talk with your doctor.  What are some side effects that I need to call my doctor about right away?   WARNING/CAUTION: Even though it may be rare, some people may have very bad and sometimes deadly side effects when taking a drug. Tell your doctor or get medical help right away if you have any of the following signs or symptoms that may be related to a very bad side effect:  · Signs of an allergic reaction, like rash; hives; itching; red, swollen, blistered, or peeling skin with or without fever; wheezing; tightness in the chest or throat; trouble breathing, swallowing, or talking; unusual hoarseness; or swelling of the mouth, face, lips, tongue, or throat.  · Very bad dizziness or passing out.  · Fast or slow heartbeat.  · Chest pain that is new or worse.  What are some other side effects of this drug?   All drugs may cause side effects. However, many people have no side effects or only have minor side effects. Call your doctor or get medical help if any of these side effects or any other side effects bother you or do not go " away:  · Dizziness.  · You may have headaches when you start taking this drug. Most of the time it gets better with time. Do not change how you use this drug to avoid these headaches. Talk with your doctor for ways to lessen this side effect.  These are not all of the side effects that may occur. If you have questions about side effects, call your doctor. Call your doctor for medical advice about side effects.  You may report side effects to your national health agency.  You may report side effects to the FDA at 1-687.987.3918. You may also report side effects at https://www.fda.gov/medwatch.  How is this drug best taken?   Use this drug as ordered by your doctor. Read all information given to you. Follow all instructions closely.  All products:   · Do not stop taking this drug all of a sudden without calling your doctor. You may have a greater risk of side effects. If you need to stop this drug, you will want to slowly stop it as ordered by your doctor.  Extended-release tablets:   · If you are taking this drug once a day, take it in the morning when you get up unless your doctor tells you otherwise.  · Swallow whole. Do not chew, break, or crush.  · Some products may be broken in half. Talk with the doctor.  What do I do if I miss a dose?   · Take a missed dose as soon as you think about it.  · If it is close to the time for your next dose, skip the missed dose and go back to your normal time.  · Do not take 2 doses at the same time or extra doses.    How do I store and/or throw out this drug?   · Store at room temperature.  · Store in a dry place. Do not store in a bathroom.  · Keep all drugs in a safe place. Keep all drugs out of the reach of children and pets.  · Throw away unused or  drugs. Do not flush down a toilet or pour down a drain unless you are told to do so. Check with your pharmacist if you have questions about the best way to throw out drugs. There may be drug take-back programs in your  area.    General drug facts   · If your symptoms or health problems do not get better or if they become worse, call your doctor.  · Do not share your drugs with others and do not take anyone else's drugs.  · Some drugs may have another patient information leaflet. If you have any questions about this drug, please talk with your doctor, nurse, pharmacist, or other health care provider.  · Some drugs may have another patient information leaflet. Check with your pharmacist. If you have any questions about this drug, please talk with your doctor, nurse, pharmacist, or other health care provider.  · If you think there has been an overdose, call your poison control center or get medical care right away. Be ready to tell or show what was taken, how much, and when it happened.    Consumer Information Use and Disclaimer   This generalized information is a limited summary of diagnosis, treatment, and/or medication information. It is not meant to be comprehensive and should be used as a tool to help the user understand and/or assess potential diagnostic and treatment options. It does NOT include all information about conditions, treatments, medications, side effects, or risks that may apply to a specific patient. It is not intended to be medical advice or a substitute for the medical advice, diagnosis, or treatment of a health care provider based on the health care provider's examination and assessment of a patient's specific and unique circumstances. Patients must speak with a health care provider for complete information about their health, medical questions, and treatment options, including any risks or benefits regarding use of medications. This information does not endorse any treatments or medications as safe, effective, or approved for treating a specific patient. UpToDate, Inc. and its affiliates disclaim any warranty or liability relating to this information or the use thereof. The use of this information is governed  by the Terms of Use, available at https://www.[employer].com/en/solutions/lexicomp/about/ellen.  Last Reviewed Date   2019-10-14  Copyright   © 2021 UpToDate, Inc. and its affiliates and/or licensors. All rights reserved.  Patient Education       Isosorbide Mononitrate (eye jessee SOR bide mon oh GINA trate)   Brand Names: Sole APO-ISMN; Imdur; PMS-ISMN; PRO-ISMN-60   What is this drug used for?   · It is used to prevent chest pain.  · It may be given to you for other reasons. Talk with the doctor.    What do I need to tell my doctor BEFORE I take this drug?   · If you have an allergy to isosorbide mononitrate or any other part of this drug.  · If you are allergic to this drug; any part of this drug; or any other drugs, foods, or substances. Tell your doctor about the allergy and what signs you had.  · If you have heart failure (weak heart).  · If you have had a recent heart attack.  · If you are taking any of these drugs: Avanafil, riociguat, sildenafil, tadalafil, or vardenafil.  This is not a list of all drugs or health problems that interact with this drug.  Tell your doctor and pharmacist about all of your drugs (prescription or OTC, natural products, vitamins) and health problems. You must check to make sure that it is safe for you to take this drug with all of your drugs and health problems. Do not start, stop, or change the dose of any drug without checking with your doctor.  What are some things I need to know or do while I take this drug?   All products:   · Tell all of your health care providers that you take this drug. This includes your doctors, nurses, pharmacists, and dentists.  · Avoid driving and doing other tasks or actions that call for you to be alert until you see how this drug affects you.  · To lower the chance of feeling dizzy or passing out, rise slowly if you have been sitting or lying down. Be careful going up and down stairs.  · Check your blood pressure as you have been told.  · This  "drug may affect certain lab tests. Tell all of your health care providers and lab workers that you take this drug.  · Talk with your doctor before you drink alcohol.  · If you are taking this drug for chest pain, it will not treat chest pain as it happens. This drug is only used to prevent or lower the number of chest pain attacks.  · If you have chest pain or pressure that is new or worse, get emergency medical care right away.  · If you have been taking this drug for a long time without a break, it may not work as well. This is known as tolerance. Be sure to have a "nitrate-free" period of time each day. Talk with your doctor if this drug stops working well. Do not take more than ordered.  · If you are 65 or older, use this drug with care. You could have more side effects.  · Tell your doctor if you are pregnant, plan on getting pregnant, or are breast-feeding. You will need to talk about the benefits and risks to you and the baby.  Extended-release tablets:   · You may see something that looks like the tablet in your stool. This is normal and not a cause for concern. If you have questions, talk with your doctor.  What are some side effects that I need to call my doctor about right away?   WARNING/CAUTION: Even though it may be rare, some people may have very bad and sometimes deadly side effects when taking a drug. Tell your doctor or get medical help right away if you have any of the following signs or symptoms that may be related to a very bad side effect:  · Signs of an allergic reaction, like rash; hives; itching; red, swollen, blistered, or peeling skin with or without fever; wheezing; tightness in the chest or throat; trouble breathing, swallowing, or talking; unusual hoarseness; or swelling of the mouth, face, lips, tongue, or throat.  · Very bad dizziness or passing out.  · Fast or slow heartbeat.  · Chest pain that is new or worse.  What are some other side effects of this drug?   All drugs may cause " side effects. However, many people have no side effects or only have minor side effects. Call your doctor or get medical help if any of these side effects or any other side effects bother you or do not go away:  · Dizziness.  · You may have headaches when you start taking this drug. Most of the time it gets better with time. Do not change how you use this drug to avoid these headaches. Talk with your doctor for ways to lessen this side effect.  These are not all of the side effects that may occur. If you have questions about side effects, call your doctor. Call your doctor for medical advice about side effects.  You may report side effects to your national health agency.  You may report side effects to the FDA at 1-362.372.6972. You may also report side effects at https://www.fda.gov/medwatch.  How is this drug best taken?   Use this drug as ordered by your doctor. Read all information given to you. Follow all instructions closely.  All products:   · Do not stop taking this drug all of a sudden without calling your doctor. You may have a greater risk of side effects. If you need to stop this drug, you will want to slowly stop it as ordered by your doctor.  Extended-release tablets:   · If you are taking this drug once a day, take it in the morning when you get up unless your doctor tells you otherwise.  · Swallow whole. Do not chew, break, or crush.  · Some products may be broken in half. Talk with the doctor.  What do I do if I miss a dose?   · Take a missed dose as soon as you think about it.  · If it is close to the time for your next dose, skip the missed dose and go back to your normal time.  · Do not take 2 doses at the same time or extra doses.    How do I store and/or throw out this drug?   · Store at room temperature.  · Store in a dry place. Do not store in a bathroom.  · Keep all drugs in a safe place. Keep all drugs out of the reach of children and pets.  · Throw away unused or  drugs. Do not  flush down a toilet or pour down a drain unless you are told to do so. Check with your pharmacist if you have questions about the best way to throw out drugs. There may be drug take-back programs in your area.    General drug facts   · If your symptoms or health problems do not get better or if they become worse, call your doctor.  · Do not share your drugs with others and do not take anyone else's drugs.  · Some drugs may have another patient information leaflet. If you have any questions about this drug, please talk with your doctor, nurse, pharmacist, or other health care provider.  · Some drugs may have another patient information leaflet. Check with your pharmacist. If you have any questions about this drug, please talk with your doctor, nurse, pharmacist, or other health care provider.  · If you think there has been an overdose, call your poison control center or get medical care right away. Be ready to tell or show what was taken, how much, and when it happened.    Consumer Information Use and Disclaimer   This generalized information is a limited summary of diagnosis, treatment, and/or medication information. It is not meant to be comprehensive and should be used as a tool to help the user understand and/or assess potential diagnostic and treatment options. It does NOT include all information about conditions, treatments, medications, side effects, or risks that may apply to a specific patient. It is not intended to be medical advice or a substitute for the medical advice, diagnosis, or treatment of a health care provider based on the health care provider's examination and assessment of a patient's specific and unique circumstances. Patients must speak with a health care provider for complete information about their health, medical questions, and treatment options, including any risks or benefits regarding use of medications. This information does not endorse any treatments or medications as safe, effective,  or approved for treating a specific patient. UpToDate, Inc. and its affiliates disclaim any warranty or liability relating to this information or the use thereof. The use of this information is governed by the Terms of Use, available at https://www.[employer]/en/solutions/lexicomp/about/ellen.  Last Reviewed Date   2019-10-14  Copyright   © 2021 UpToDate, Inc. and its affiliates and/or licensors. All rights reserved.

## 2022-01-07 NOTE — ASSESSMENT & PLAN NOTE
Pt aware of health complications a/w smoking and desires to quit.    - cessation counseling provided   - cessation course declined per pt

## 2022-01-07 NOTE — ASSESSMENT & PLAN NOTE
Goal LDL < 70.   - check FLP   - continue medical therapy  - risk factor and lifestyle modifications

## 2022-01-07 NOTE — ASSESSMENT & PLAN NOTE
Goal BP < 130/80.  Compliant w/ meds.    - continue medical therapy  - enroll in digital medicine prgm  - risk factor and lifestyle modifications

## 2022-01-07 NOTE — ASSESSMENT & PLAN NOTE
Pt w/ typical and atypical features of cp.  Compliant w/ meds.  H/o multiple PCIs in past.    - continue medical therapy   - risk factor and lifestyle modifications  - plan for Lexiscan and to eval cardiac function w/ echo

## 2022-01-07 NOTE — ASSESSMENT & PLAN NOTE
Patient is identified as having Diastolic (HFpEF) heart failure that is Acute on chronic. CHF is currently uncontrolled due to Continued edema of extremities and Dyspnea not returned to baseline after 0 doses of IV diuretic. Latest ECHO performed and demonstrates- No results found for this or any previous visit.  . Continue Beta Blocker, ACE/ARB and Furosemide and monitor clinical status closely. Monitor on telemetry. Patient is off CHF pathway.  Monitor strict Is&Os and daily weights.  Place on fluid restriction of 2 L. Continue to stress to patient importance of self efficacy and  on diet for CHF. Last BNP reviewed- and noted below @LABRCNTIP(BNP,BNPTRIAGEBLO)@.    Restart furosemide.

## 2022-01-07 NOTE — PROGRESS NOTES
Subjective:    Patient ID:  Loi Cordova is a 56 y.o. female who presents for evaluation of No chief complaint on file.      PCP: Hari Francis MD     Prior Cardiologist: Dr. Hicks of [facility]     HPI  Pt is a 57 yo F w/ PMH of tobacco abuse (1/2 PPD, 38 pk/yrs), DM2, Class 2 Obesity w/ BMI 36.3, CAD s/p PCI ( LAD, D2,Om2 PTCA OM3 2016) ( PTCA mLAD 2018, mRCA ISR 2017) (ostial RCA w/ 3.0x15 mm THEE 8/24/2019), HTN, and HLD who presents to [facility] care.  She was previously followed by Dr. Hicks of [facility] and desires to change due to location.  She notes that she has been having neck pain and chest pain which is similar to her prior angina and is pressure like.  These symptoms have been going on x2-3 weeks and is not a/w exertion.  She also notes postural presyncope x1 day and fatigue which is chronic.  She notes hall at times however is not severe.  She also has BLE edema and orthopnea.  She denies palpitations, LOC, claudication, and PND.  She notes compliance w/ meds and denies side effects.  She does not monitor her BP regularly.  She does not exercise and is sedentary.              Past Medical History:   Diagnosis Date    Allergy     Asthma     Coronary artery disease     GERD (gastroesophageal reflux disease)     History of back surgery 2/20/2018    Hyperlipidemia     Hypertension     Type 2 diabetes mellitus with diabetic polyneuropathy, without long-term current use of insulin 2/8/2019     Past Surgical History:   Procedure Laterality Date    BILATERAL OOPHORECTOMY Bilateral 2000    BREAST BIOPSY Left 2/14/2020    Procedure: BIOPSY, BREAST-Left medial breast palpation guided;  Surgeon: Paulino Espinoza MD;  Location: [facility] OR [address] [place] [address];  Service: General;  Laterality: Left;    CARDIAC CATHETERIZATION      7 stents    CHOLECYSTECTOMY      COLONOSCOPY N/A 7/16/2019    Procedure: COLONOSCOPY;  Surgeon: Sarah Gamez MD;  Location: [facility];  Service: Endoscopy;  Laterality: N/A;     ESOPHAGOGASTRODUODENOSCOPY N/A 1/9/2020    Procedure: EGD (ESOPHAGOGASTRODUODENOSCOPY);  Surgeon: Sarah Gamez MD;  Location: [facility];  Service: Endoscopy;  Laterality: N/A;    HYSTERECTOMY      PARTIAL HYSTERECTOMY N/A 1990    Uterus taken out    SHOULDER SURGERY      TOTAL KNEE ARTHROPLASTY       Social History     Socioeconomic History    Marital status:    Tobacco Use    Smoking status: Current Every Day Smoker     Packs/day: 0.50     Years: 34.00     Pack years: 17.00     Types: Cigarettes    Smokeless tobacco: Never Used    Tobacco comment: in smoking program 1/25/21   Substance and Sexual Activity    Alcohol use: Not Currently     Comment: 6 months    Drug use: No    Sexual activity: Yes     Partners: Male     Family History   Problem Relation Age of Onset    Heart disease Mother     Hyperlipidemia Mother     Hypertension Mother     Diabetes Mother     Heart disease Father     Hyperlipidemia Father     Cancer Father     Diabetes Maternal Aunt     Prostate cancer Neg Hx     Kidney disease Neg Hx        Review of patient's allergies indicates:   Allergen Reactions    Crayfish Anaphylaxis     (crawfish only)       Medication List with Changes/Refills   New Medications    ISOSORBIDE MONONITRATE (IMDUR) 30 MG 24 HR TABLET    Take 1 tablet (30 mg total) by mouth once daily.   Current Medications    ALBUTEROL (PROVENTIL/VENTOLIN HFA) 90 MCG/ACTUATION INHALER    Inhale 2 puffs into the lungs every 4 to 6 hours as needed.    ALPRAZOLAM (XANAX) 1 MG TABLET    TAKE 1 TABLET BY MOUTH TWICE DAILY AS NEEDED.    AMLODIPINE (NORVASC) 5 MG TABLET    Take 1 tablet by mouth once a day.    ASPIRIN 81 MG CHEW    Take 81 mg by mouth once daily.    ATORVASTATIN (LIPITOR) 80 MG TABLET    Take 1 tablet by mouth once a day.    EZETIMIBE (ZETIA) 10 MG TABLET    Take 1 tablet by mouth once a day.    FENOFIBRATE 160 MG TAB    Take 1 tablet (160 mg total) by mouth once daily.    FLUTICASONE  FUROATE-VILANTEROL (BREO) 100-25 MCG/DOSE DISKUS INHALER    Inhale 1 puff into the lungs once daily. Controller    HYDROCODONE-ACETAMINOPHEN (NORCO) 5-325 MG PER TABLET    Take 1 tablet by mouth every 4 (four) hours as needed for Pain.    IBUPROFEN (ADVIL,MOTRIN) 600 MG TABLET    Take 1 tablet (600 mg total) by mouth every 6 (six) hours as needed for Pain.    LISINOPRIL (PRINIVIL,ZESTRIL) 40 MG TABLET    Take 1 tablet by mouth once a day.    METOPROLOL SUCCINATE (TOPROL-XL) 100 MG 24 HR TABLET    Take 1 tablet (100 mg total) by mouth once daily.    NICOTINE (NICODERM CQ) 21 MG/24 HR    Place 1 patch onto the skin once daily.    NITROGLYCERIN (NITROSTAT) 0.4 MG SL TABLET    Place 1 tablet under the tongue once every 5 minutes for 3 doses for chest pain, if pain continues call 911    OMEPRAZOLE (PRILOSEC) 40 MG CAPSULE    Take 1 capsule (40 mg total) by mouth once daily.    ROPINIROLE (REQUIP) 0.5 MG TABLET    Take 1 tablet by mouth once in the evening.    TRAZODONE (DESYREL) 100 MG TABLET    Take 1 tablet orally once in the evening.    VALACYCLOVIR (VALTREX) 1000 MG TABLET    Take 1 tablet (1,000 mg total) by mouth 3 (three) times daily. for 11 days   Changed and/or Refilled Medications    Modified Medication Previous Medication    FUROSEMIDE (LASIX) 40 MG TABLET furosemide (LASIX) 40 MG tablet       Take 1 tablet (40 mg total) by mouth daily as needed (edema).    Take 1 tablet (40 mg total) by mouth 2 (two) times daily.    POTASSIUM CHLORIDE (K-TAB) 20 MEQ potassium chloride (K-TAB) 20 mEq       Take 1 tablet (20 mEq total) by mouth daily as needed (edema). Take with lasix if needed for edema    Take 1 tablet by mouth once a day.       Review of Systems   Constitutional: Negative for diaphoresis and fever.   HENT: Negative for congestion and hearing loss.    Eyes: Negative for blurred vision and pain.   Cardiovascular: Negative for chest pain, claudication, dyspnea on exertion, leg swelling, near-syncope,  palpitations and syncope.   Respiratory: Negative for shortness of breath and sleep disturbances due to breathing.    Hematologic/Lymphatic: Negative for bleeding problem. Does not bruise/bleed easily.   Skin: Negative for color change and poor wound healing.   Gastrointestinal: Negative for abdominal pain and nausea.   Genitourinary: Negative for bladder incontinence and flank pain.   Neurological: Negative for focal weakness and light-headedness.        Objective:   /74   Pulse 83   Wt 84.4 kg (186 lb 1.1 oz)   LMP 07/29/1990 (Within Years) Comment: Partical Hysterectomy in 1990, Gutierrez Ooopherectomy in 2000  BMI 36.34 kg/m²    Physical Exam  Constitutional:       Appearance: She is well-developed and well-nourished. She is not diaphoretic.   HENT:      Head: Normocephalic and atraumatic.      Mouth/Throat:      Mouth: Oropharynx is clear and moist.   Eyes:      General: No scleral icterus.     Extraocular Movements: EOM normal.      Pupils: Pupils are equal, round, and reactive to light.   Neck:      Vascular: No JVD.   Cardiovascular:      Rate and Rhythm: Normal rate and regular rhythm.      Pulses: Intact distal pulses.           Carotid pulses are on the left side with bruit.     Heart sounds: S1 normal and S2 normal. No murmur heard.  No friction rub. No gallop.    Pulmonary:      Effort: Pulmonary effort is normal. No respiratory distress.      Breath sounds: Normal breath sounds. No wheezing or rales.   Chest:      Chest wall: No tenderness.   Abdominal:      General: Bowel sounds are normal. There is no distension.      Palpations: Abdomen is soft. There is no mass.      Tenderness: There is no abdominal tenderness. There is no rebound.   Musculoskeletal:         General: No tenderness. Normal range of motion.      Cervical back: Normal range of motion and neck supple.      Right lower leg: Edema present.      Left lower leg: Edema present.      Comments: 1+ pitting edema of BLE   Skin:      General: Skin is warm and dry.      Coloration: Skin is not pale.   Neurological:      Mental Status: She is alert and oriented to person, place, and time.      Coordination: Coordination normal.      Deep Tendon Reflexes: Reflexes normal.   Psychiatric:         Mood and Affect: Mood and affect normal.         Behavior: Behavior normal.         Judgment: Judgment normal.           ECG: 3/9/2021- reviewed.  NSR, NL ECG.  Unchanged from prior.      Assessment:       1. Essential hypertension    2. Type 2 diabetes mellitus with diabetic polyneuropathy, without long-term current use of insulin    3. Stable angina pectoris    4. Coronary artery disease of native artery of native heart with stable angina pectoris    5. Chronic diastolic CHF (congestive heart failure)    6. Pure hypercholesterolemia    7. Class 2 severe obesity due to excess calories with serious comorbidity and body mass index (BMI) of 36.0 to 36.9 in adult    8. Tobacco abuse    9. Left carotid bruit         Plan:         Coronary artery disease of native artery of native heart with stable angina pectoris  Pt w/ typical and atypical features of cp.  Compliant w/ meds.  H/o multiple PCIs in past.    - continue medical therapy   - risk factor and lifestyle modifications  - plan for Lexiscan and to eval cardiac function w/ echo     Essential hypertension  Goal BP < 130/80.  Compliant w/ meds.    - continue medical therapy  - enroll in digital medicine prgm  - risk factor and lifestyle modifications     Chronic diastolic CHF (congestive heart failure)  Patient is identified as having Diastolic (HFpEF) heart failure that is Acute on chronic. CHF is currently uncontrolled due to Continued edema of extremities and Dyspnea not returned to baseline after 0 doses of IV diuretic. Latest ECHO performed and demonstrates- No results found for this or any previous visit.  . Continue Beta Blocker, ACE/ARB and Furosemide and monitor clinical status closely. Monitor on  telemetry. Patient is off CHF pathway.  Monitor strict Is&Os and daily weights.  Place on fluid restriction of 2 L. Continue to stress to patient importance of self efficacy and  on diet for CHF. Last BNP reviewed- and noted below @LABRCNTIP(BNP,BNPTRIAGEBLO)@.    Restart furosemide.      Pure hypercholesterolemia  Goal LDL < 70.   - check FLP   - continue medical therapy  - risk factor and lifestyle modifications     Type 2 diabetes mellitus with diabetic polyneuropathy, without long-term current use of insulin  Followed by PCP.      Class 2 severe obesity due to excess calories with serious comorbidity and body mass index (BMI) of 36.0 to 36.9 in adult  Encouraged lifestyle modifications (diet, exercise, and weight loss).      Tobacco abuse  Pt aware of health complications a/w smoking and desires to quit.    - cessation counseling provided   - cessation course declined per pt    Left carotid bruit  Check carotid doppler.        Total duration of face to face visit time 30 minutes.  Total time spent counseling greater than fifty percent of total visit time.  Counseling included discussion regarding imaging findings, diagnosis, possibilities, treatment options, risks and benefits.  The patient had many questions regarding the options and long-term effects      Venu Corey M.D.  Interventional Cardiology

## 2022-01-10 ENCOUNTER — PATIENT MESSAGE (OUTPATIENT)
Dept: ADMINISTRATIVE | Facility: HOSPITAL | Age: 57
End: 2022-01-10
Payer: COMMERCIAL

## 2022-01-10 RX ORDER — LISINOPRIL 40 MG/1
TABLET ORAL
Qty: 90 TABLET | Refills: 4 | Status: SHIPPED | OUTPATIENT
Start: 2022-01-10 | End: 2022-04-01 | Stop reason: SDUPTHER

## 2022-01-10 RX ORDER — METOPROLOL SUCCINATE 100 MG/1
100 TABLET, EXTENDED RELEASE ORAL DAILY
Qty: 90 TABLET | Refills: 3 | Status: SHIPPED | OUTPATIENT
Start: 2022-01-10 | End: 2022-06-23 | Stop reason: SDUPTHER

## 2022-01-12 ENCOUNTER — PATIENT MESSAGE (OUTPATIENT)
Dept: FAMILY MEDICINE | Facility: CLINIC | Age: 57
End: 2022-01-12
Payer: COMMERCIAL

## 2022-01-24 RX ORDER — OMEPRAZOLE 40 MG/1
40 CAPSULE, DELAYED RELEASE ORAL DAILY
Qty: 90 CAPSULE | Refills: 1 | OUTPATIENT
Start: 2022-01-24

## 2022-01-28 RX ORDER — OMEPRAZOLE 40 MG/1
40 CAPSULE, DELAYED RELEASE ORAL DAILY
Qty: 90 CAPSULE | Refills: 1 | Status: SHIPPED | OUTPATIENT
Start: 2022-01-28 | End: 2022-06-23 | Stop reason: SDUPTHER

## 2022-01-31 ENCOUNTER — TELEPHONE (OUTPATIENT)
Dept: CARDIOLOGY | Facility: CLINIC | Age: 57
End: 2022-01-31
Payer: COMMERCIAL

## 2022-02-02 ENCOUNTER — PATIENT MESSAGE (OUTPATIENT)
Dept: ADMINISTRATIVE | Facility: HOSPITAL | Age: 57
End: 2022-02-02
Payer: COMMERCIAL

## 2022-02-03 ENCOUNTER — PATIENT OUTREACH (OUTPATIENT)
Dept: ADMINISTRATIVE | Facility: HOSPITAL | Age: 57
End: 2022-02-03
Payer: COMMERCIAL

## 2022-02-17 ENCOUNTER — TELEPHONE (OUTPATIENT)
Dept: PODIATRY | Facility: CLINIC | Age: 57
End: 2022-02-17
Payer: COMMERCIAL

## 2022-02-17 DIAGNOSIS — M25.572 PAIN OF JOINT OF LEFT ANKLE AND FOOT: ICD-10-CM

## 2022-02-17 NOTE — TELEPHONE ENCOUNTER
----- Message from Noris Barboza DPM sent at 2/17/2022  1:38 PM CST -----  I put MRI order for her.    Can you please schedule this?  Thanks

## 2022-03-09 ENCOUNTER — PATIENT OUTREACH (OUTPATIENT)
Dept: ADMINISTRATIVE | Facility: HOSPITAL | Age: 57
End: 2022-03-09
Payer: COMMERCIAL

## 2022-03-14 ENCOUNTER — PATIENT OUTREACH (OUTPATIENT)
Dept: ADMINISTRATIVE | Facility: OTHER | Age: 57
End: 2022-03-14
Payer: COMMERCIAL

## 2022-03-14 NOTE — PROGRESS NOTES
Health Maintenance Due   Topic Date Due    Hepatitis C Screening  Never done    Shingles Vaccine (1 of 2) Never done    Foot Exam  02/08/2020    Diabetes Urine Screening  09/12/2020    Hemoglobin A1c  06/10/2021    Eye Exam  03/09/2022     Updates were requested from care everywhere.  Chart was reviewed for overdue Proactive [facility] Encounters ([facility]) topics (CRS, Breast Cancer Screening, Eye exam)  Health Maintenance has been updated.  [employer] immunization registry triggered.  Immunizations were reconciled.

## 2022-03-17 ENCOUNTER — OFFICE VISIT (OUTPATIENT)
Dept: PODIATRY | Facility: CLINIC | Age: 57
End: 2022-03-17
Payer: COMMERCIAL

## 2022-03-17 VITALS — WEIGHT: 186.19 LBS | HEIGHT: 60 IN | BODY MASS INDEX: 36.55 KG/M2

## 2022-03-17 DIAGNOSIS — Z72.0 TOBACCO ABUSE: ICD-10-CM

## 2022-03-17 DIAGNOSIS — E11.42 TYPE 2 DIABETES MELLITUS WITH DIABETIC POLYNEUROPATHY, WITHOUT LONG-TERM CURRENT USE OF INSULIN: ICD-10-CM

## 2022-03-17 DIAGNOSIS — R29.898 DECREASED STRENGTH OF LOWER EXTREMITY: ICD-10-CM

## 2022-03-17 DIAGNOSIS — S93.402A RUPTURE OF LIGAMENT OF LEFT ANKLE: ICD-10-CM

## 2022-03-17 DIAGNOSIS — M25.572 PAIN OF JOINT OF LEFT ANKLE AND FOOT: ICD-10-CM

## 2022-03-17 DIAGNOSIS — S82.65XD CLOSED NONDISPLACED FRACTURE OF LATERAL MALLEOLUS OF LEFT FIBULA WITH ROUTINE HEALING, SUBSEQUENT ENCOUNTER: Primary | ICD-10-CM

## 2022-03-17 PROCEDURE — 99999 PR PBB SHADOW E&M-EST. PATIENT-LVL IV: ICD-10-PCS | Mod: PBBFAC,,, | Performed by: PODIATRIST

## 2022-03-17 PROCEDURE — 99999 PR PBB SHADOW E&M-EST. PATIENT-LVL IV: CPT | Mod: PBBFAC,,, | Performed by: PODIATRIST

## 2022-03-17 PROCEDURE — 1159F PR MEDICATION LIST DOCUMENTED IN MEDICAL RECORD: ICD-10-PCS | Mod: CPTII,S$GLB,, | Performed by: PODIATRIST

## 2022-03-17 PROCEDURE — 4010F PR ACE/ARB THEARPY RXD/TAKEN: ICD-10-PCS | Mod: CPTII,S$GLB,, | Performed by: PODIATRIST

## 2022-03-17 PROCEDURE — 99213 PR OFFICE/OUTPT VISIT, EST, LEVL III, 20-29 MIN: ICD-10-PCS | Mod: S$GLB,,, | Performed by: PODIATRIST

## 2022-03-17 PROCEDURE — 4010F ACE/ARB THERAPY RXD/TAKEN: CPT | Mod: CPTII,S$GLB,, | Performed by: PODIATRIST

## 2022-03-17 PROCEDURE — 1159F MED LIST DOCD IN RCRD: CPT | Mod: CPTII,S$GLB,, | Performed by: PODIATRIST

## 2022-03-17 PROCEDURE — 3008F BODY MASS INDEX DOCD: CPT | Mod: CPTII,S$GLB,, | Performed by: PODIATRIST

## 2022-03-17 PROCEDURE — 99213 OFFICE O/P EST LOW 20 MIN: CPT | Mod: S$GLB,,, | Performed by: PODIATRIST

## 2022-03-17 PROCEDURE — 1160F PR REVIEW ALL MEDS BY PRESCRIBER/CLIN PHARMACIST DOCUMENTED: ICD-10-PCS | Mod: CPTII,S$GLB,, | Performed by: PODIATRIST

## 2022-03-17 PROCEDURE — 3008F PR BODY MASS INDEX (BMI) DOCUMENTED: ICD-10-PCS | Mod: CPTII,S$GLB,, | Performed by: PODIATRIST

## 2022-03-17 PROCEDURE — 1160F RVW MEDS BY RX/DR IN RCRD: CPT | Mod: CPTII,S$GLB,, | Performed by: PODIATRIST

## 2022-03-17 NOTE — PROGRESS NOTES
Subjective:      Patient ID: Loi Cordova is a 56 y.o. female.    Chief Complaint: Foot Injury (LEFT)      56 y.o. female presenting with L ankle injury after slip and fall. PMH of CAD, PAD, CHF, Type 2 DM, tobacco abuse. With her son. Was seen in ED yesterday. Xrays revealed L ankle fibular avulsion fracture. Complains of aching and throbbing pain. Points lateral distal fibular for pain. She was discharged with norco but pain was not being controlled. She instead took some percocet that she had before and has been helping with pain. She tells me she can not put pressure on her left foot due to pain. Smokes half pack a day.     10/4/21 f/u L ankle injury. Avulsion fracture distal tip of lateral malleolus. In CAM today. Pain and swelling slowly improving but still symptomatic. Still having hard time putting pressure for long period. Aching and throbbing pain.     10/22/21 f/u L ankle pain. Lateral ankle pain. Still smokes. Pain overall slowly improving but  when she tries to put full pressure on L foot. Ambulating in CAM today. Denies slips/fall since last visit. She has been feeling some sharp pain along lateral aspect of the ankle during walking.     11/18/21 left ankle pain.  Patient has been ambulating in a long [facility] walker.  Patient is presenting with  for worker's comp.  Patient has been going to physical therapy.  Still complains of left ankle pain along the lateral aspect of the ankle.  Still complains stiffening, aching, sharp pain over the lateral aspect of the ankle.  Patient states smokes.  Denies any slips since last visit.    12/16/21 L ankle pain. In long CAM. Undergoing treatment with physical therapy for now. Pain and swelling have been improving slowly however still dealing with limited/decreased ankle joint motion. With  for worker's comp. +smoking.    3/17/22 L ankle pain. Here to review MRI. Finished physical therapy. In regular tennis shoe. L ankle continues to  bother her especially along lateral ankle. Went for second opinion at [facility] who requested MRI. Pain gets worse during walking. Ankle gets painful especially when she tries to invert the ankle. +smoking.       Hemoglobin A1C   Date Value Ref Range Status   03/10/2021 8.3 (H) 4.0 - 5.6 % Final     Comment:     ADA Screening Guidelines:  5.7-6.4%  Consistent with prediabetes  >or=6.5%  Consistent with diabetes    High levels of fetal hemoglobin interfere with the HbA1C  assay. Heterozygous hemoglobin variants (HbS, HgC, etc)do  not significantly interfere with this assay.   However, presence of multiple variants may affect accuracy.     09/17/2020 7.6 (H) 4.0 - 5.6 % Final     Comment:     ADA Screening Guidelines:  5.7-6.4%  Consistent with prediabetes  >or=6.5%  Consistent with diabetes  High levels of fetal hemoglobin interfere with the HbA1C  assay. Heterozygous hemoglobin variants (HbS, HgC, etc)do  not significantly interfere with this assay.   However, presence of multiple variants may affect accuracy.     10/10/2019 6.8 (H) 4.0 - 5.6 % Final     Comment:     ADA Screening Guidelines:  5.7-6.4%  Consistent with prediabetes  >or=6.5%  Consistent with diabetes  High levels of fetal hemoglobin interfere with the HbA1C  assay. Heterozygous hemoglobin variants (HbS, HgC, etc)do  not significantly interfere with this assay.   However, presence of multiple variants may affect accuracy.       Review of Systems   Constitutional: Negative for chills, decreased appetite, fever and malaise/fatigue.   HENT: Negative for congestion, ear discharge and sore throat.    Eyes: Negative for discharge and pain.   Cardiovascular: Negative for chest pain, claudication and leg swelling.   Respiratory: Negative for cough and shortness of breath.    Skin: Negative for color change, nail changes and rash.   Musculoskeletal: Positive for stiffness. Negative for arthritis, joint pain, joint swelling and muscle  weakness.        L ankle pain     Gastrointestinal: Negative for bloating, abdominal pain, diarrhea, nausea and vomiting.   Genitourinary: Negative for flank pain and hematuria.   Neurological: Negative for headaches, numbness and weakness.   Psychiatric/Behavioral: Negative for altered mental status.             Past Medical History:   Diagnosis Date    Allergy     Asthma     Coronary artery disease     GERD (gastroesophageal reflux disease)     History of back surgery 2/20/2018    Hyperlipidemia     Hypertension     Type 2 diabetes mellitus with diabetic polyneuropathy, without long-term current use of insulin 2/8/2019       Past Surgical History:   Procedure Laterality Date    BILATERAL OOPHORECTOMY Bilateral 2000    BREAST BIOPSY Left 2/14/2020    Procedure: BIOPSY, BREAST-Left medial breast palpation guided;  Surgeon: Paulino Espinoza MD;  Location: [facility] OR [address] [place] [address];  Service: General;  Laterality: Left;    CARDIAC CATHETERIZATION      7 stents    CHOLECYSTECTOMY      COLONOSCOPY N/A 7/16/2019    Procedure: COLONOSCOPY;  Surgeon: Sarah Gamez MD;  Location: [facility];  Service: Endoscopy;  Laterality: N/A;    ESOPHAGOGASTRODUODENOSCOPY N/A 1/9/2020    Procedure: EGD (ESOPHAGOGASTRODUODENOSCOPY);  Surgeon: Sarah Gamez MD;  Location: [facility];  Service: Endoscopy;  Laterality: N/A;    HYSTERECTOMY      PARTIAL HYSTERECTOMY N/A 1990    Uterus taken out    SHOULDER SURGERY      TOTAL KNEE ARTHROPLASTY         Family History   Problem Relation Age of Onset    Heart disease Mother     Hyperlipidemia Mother     Hypertension Mother     Diabetes Mother     Heart disease Father     Hyperlipidemia Father     Cancer Father     Diabetes Maternal Aunt     Prostate cancer Neg Hx     Kidney disease Neg Hx        Social History     Socioeconomic History    Marital status:    Tobacco Use    Smoking status: Current Every Day Smoker     Packs/day: 0.50     Years: 34.00     Pack  years: 17.00     Types: Cigarettes    Smokeless tobacco: Never Used    Tobacco comment: in smoking program 1/25/21   Substance and Sexual Activity    Alcohol use: Not Currently     Comment: 6 months    Drug use: No    Sexual activity: Yes     Partners: Male       Current Outpatient Medications   Medication Sig Dispense Refill    albuterol (PROVENTIL/VENTOLIN HFA) 90 mcg/actuation inhaler Inhale 2 puffs into the lungs every 4 to 6 hours as needed. 18 g 3    ALPRAZolam (XANAX) 1 MG tablet TAKE 1 TABLET BY MOUTH TWICE DAILY AS NEEDED. 60 tablet 2    amLODIPine (NORVASC) 5 MG tablet Take 1 tablet by mouth once a day. 30 tablet 12    aspirin 81 MG Chew Take 81 mg by mouth once daily.      atorvastatin (LIPITOR) 80 MG tablet Take 1 tablet by mouth once a day. 90 tablet 3    ezetimibe (ZETIA) 10 mg tablet Take 1 tablet by mouth once a day. 30 tablet 12    fenofibrate 160 MG Tab Take 1 tablet (160 mg total) by mouth once daily. 90 tablet 3    fluticasone furoate-vilanteroL (BREO) 100-25 mcg/dose diskus inhaler Inhale 1 puff into the lungs once daily. Controller 60 each 0    furosemide (LASIX) 40 MG tablet Take 1 tablet (40 mg total) by mouth daily as needed (edema). 90 tablet 1    ibuprofen (ADVIL,MOTRIN) 600 MG tablet Take 1 tablet (600 mg total) by mouth every 6 (six) hours as needed for Pain. 20 tablet 0    isosorbide mononitrate (IMDUR) 30 MG 24 hr tablet Take 1 tablet (30 mg total) by mouth once daily. 30 tablet 11    lisinopriL (PRINIVIL,ZESTRIL) 40 MG tablet Take 0.5 tablets (20 mg total) by mouth once daily. COURTESY FILL, APPT. NEEDED 90 tablet 0    metoprolol succinate (TOPROL-XL) 100 MG 24 hr tablet Take 1 tablet (100 mg total) by mouth once daily. 90 tablet 3    metoprolol succinate (TOPROL-XL) 25 MG 24 hr tablet Take 1 tablet (25 mg total) by mouth once daily. COURTESY FILL, APPT. NEEDED 90 tablet 0    nicotine (NICODERM CQ) 21 mg/24 hr Place 1 patch onto the skin once daily. 28 patch 0     nitroGLYCERIN (NITROSTAT) 0.4 MG SL tablet Place 1 tablet under the tongue once every 5 minutes for 3 doses for chest pain, if pain continues call 911 25 tablet 3    omeprazole (PRILOSEC) 40 MG capsule Take 1 capsule (40 mg total) by mouth once daily. 90 capsule 1    potassium chloride (K-TAB) 20 mEq Take 1 tablet (20 mEq total) by mouth daily as needed (edema). Take with lasix if needed for edema 90 tablet 1    rOPINIRole (REQUIP) 0.5 MG tablet Take 1 tablet by mouth once in the evening. 30 tablet 12    traZODone (DESYREL) 100 MG tablet Take 1 tablet orally once in the evening. 30 tablet 12    HYDROcodone-acetaminophen (NORCO) 5-325 mg per tablet Take 1 tablet by mouth every 4 (four) hours as needed for Pain. 18 tablet 0    lisinopriL (PRINIVIL,ZESTRIL) 40 MG tablet Take 1 tablet by mouth once a day. 90 tablet 4    valACYclovir (VALTREX) 1000 MG tablet Take 1 tablet (1,000 mg total) by mouth 3 (three) times daily. for 11 days (Patient not taking: Reported on 4/1/2021) 33 tablet 0     No current facility-administered medications for this visit.     Facility-Administered Medications Ordered in Other Visits   Medication Dose Route Frequency Provider Last Rate Last Admin    0.9%  NaCl infusion   Intravenous Continuous Sarah Gamez MD   Stopped at 01/09/20 0925    0.9%  NaCl infusion   Intravenous Continuous Sarah Gamez MD   Stopped at 01/09/20 1026    0.9%  NaCl infusion   Intravenous Continuous Angélica De La Cruz MD        ceFAZolin injection 2 g  2 g Intravenous On Call Procedure Angélica De La Cruz MD        lidocaine (PF) 10 mg/ml (1%) injection 10 mg  1 mL Intradermal Once Angélica De La Cruz MD        ondansetron injection 4 mg  4 mg Intravenous Q12H PRN Angélica De La Cruz MD        sodium chloride 0.9% flush 10 mL  10 mL Intravenous PRN Sarah Gamez MD        sodium chloride 0.9% flush 10 mL  10 mL Intravenous PRN Sarah Gamez MD           Review of patient's allergies  indicates:   Allergen Reactions    Crayfish Anaphylaxis     (crawfish only)       Vitals:    03/17/22 1320   Weight: 84.5 kg (186 lb 3.2 oz)   Height: 5' (1.524 m)   PainSc:   2   PainLoc: Foot       Objective:      Physical Exam  Constitutional:       General: She is not in acute distress.     Appearance: She is well-developed.   HENT:      Nose: Nose normal.   Eyes:      Conjunctiva/sclera: Conjunctivae normal.   Pulmonary:      Effort: Pulmonary effort is normal.   Chest:      Chest wall: No tenderness.   Abdominal:      Tenderness: There is no abdominal tenderness.   Musculoskeletal:      Cervical back: Normal range of motion.   Neurological:      Mental Status: She is alert and oriented to person, place, and time.   Psychiatric:         Behavior: Behavior normal.         Vascular: Distal DP/PT pulses palpable 1/4. CRT < 3 sec to tips of toes. No vericosities noted to LEs. Hair growth present LE, warm to touch LE.  L ankle: mild edema noted to LE.    Dermatologic: No open lesions, lacerations or wounds. Interdigital spaces clean, dry and intact. No erythema, rubor, calor noted LE    Musculoskeletal: No calf tenderness LE, Compartments soft/compressible.   L ankle: ttp disetal tip of lateral malleolus. No pain proximal tib/fib. Decreased  ROM of ankle due to pain. Lateral ankle pain during inversion of ankle. Mild tenderness to peroneal tendon retromalleolar region. Not able to appropriately evaluate stress/drawer test due to pain/guarding.     Neurological: Light touch, proprioception, and sharp/dull sensation are all intact. Protective threshold with the Panacea-Wienstein monofilament is intact. Vibratory sensation intact.         Assessment:       Encounter Diagnoses   Name Primary?    Closed nondisplaced fracture of lateral malleolus of left fibula with routine healing, subsequent encounter Yes    Pain of joint of left ankle and foot     Decreased strength of lower extremity     Tobacco abuse     Type 2  diabetes mellitus with diabetic polyneuropathy, without long-term current use of insulin     Rupture of ligament of left ankle          Plan:       Loi was seen today for foot injury.    Diagnoses and all orders for this visit:    Closed nondisplaced fracture of lateral malleolus of left fibula with routine healing, subsequent encounter    Pain of joint of left ankle and foot    Decreased strength of lower extremity    Tobacco abuse    Type 2 diabetes mellitus with diabetic polyneuropathy, without long-term current use of insulin    Rupture of ligament of left ankle      I counseled the patient on her conditions, their implications and medical management.    56 y.o. female with L ankle fibular avulsion fx  With ATFL/CFL rupture.     -L ankle MRI reviewed. ATFL rupture with possible CFL rupture/attentuation. Avulsion lateral malleolar fracture.   -L ankle continues to bother her with pain. Pain over ATFL and CFL on my physical examination. Guarding as well. Confirmed diagnose of lateral ankle ligament pathology. Discussed different options. We discussed both conservative and surgical options.     -I reviewed imaging, clinical history, and diagnosis as above with the patient. I attempted to use layman's terms to educate the patient as well as utilize foot models and/or pictures. I personally went through imaging with the patient.    -The nature of the condition, options for management, as well as potential risks and complications were discussed in detail with patient. Patient was amenable to my recommendations and left my office fully informed and will follow up as instructed or sooner if necessary.    -Advised for optimal glucose control and maintenance per primary care physician. Patient was also educated on healthy diet that is naturally rich in nutrients and low in fat and calories.   -Discuss in detail the harmful effects of nicotine/tobacco/cigarette smoking, especially in relation to the lower extremity.  Recommend consultation with primary care provider for further discussed of smoking cessation methods. Smoking & Tobacco use cessation couseling was rendered at today's visit; intermediate, bewteen 3 and 10 minutes.  -f/u prn    Note dictated with voice recognition software, please excuse any grammatical errors.

## 2022-03-25 ENCOUNTER — PATIENT MESSAGE (OUTPATIENT)
Dept: PODIATRY | Facility: CLINIC | Age: 57
End: 2022-03-25
Payer: COMMERCIAL

## 2022-03-31 ENCOUNTER — TELEPHONE (OUTPATIENT)
Dept: FAMILY MEDICINE | Facility: CLINIC | Age: 57
End: 2022-03-31
Payer: COMMERCIAL

## 2022-04-01 ENCOUNTER — PATIENT MESSAGE (OUTPATIENT)
Dept: FAMILY MEDICINE | Facility: CLINIC | Age: 57
End: 2022-04-01

## 2022-04-01 ENCOUNTER — OFFICE VISIT (OUTPATIENT)
Dept: FAMILY MEDICINE | Facility: CLINIC | Age: 57
End: 2022-04-01
Payer: COMMERCIAL

## 2022-04-01 VITALS
WEIGHT: 186.31 LBS | HEART RATE: 88 BPM | SYSTOLIC BLOOD PRESSURE: 118 MMHG | DIASTOLIC BLOOD PRESSURE: 68 MMHG | HEIGHT: 60 IN | OXYGEN SATURATION: 98 % | BODY MASS INDEX: 36.58 KG/M2

## 2022-04-01 DIAGNOSIS — Z72.0 TOBACCO ABUSE: ICD-10-CM

## 2022-04-01 DIAGNOSIS — E11.42 TYPE 2 DIABETES MELLITUS WITH DIABETIC POLYNEUROPATHY, WITHOUT LONG-TERM CURRENT USE OF INSULIN: Primary | ICD-10-CM

## 2022-04-01 DIAGNOSIS — I10 ESSENTIAL HYPERTENSION: ICD-10-CM

## 2022-04-01 DIAGNOSIS — Z98.61 CAD S/P PERCUTANEOUS CORONARY ANGIOPLASTY: ICD-10-CM

## 2022-04-01 DIAGNOSIS — F17.210 CIGARETTE NICOTINE DEPENDENCE WITHOUT COMPLICATION: ICD-10-CM

## 2022-04-01 DIAGNOSIS — F41.9 ANXIETY: ICD-10-CM

## 2022-04-01 DIAGNOSIS — L02.92 BOILS: ICD-10-CM

## 2022-04-01 DIAGNOSIS — I25.10 CAD S/P PERCUTANEOUS CORONARY ANGIOPLASTY: ICD-10-CM

## 2022-04-01 PROCEDURE — 3008F BODY MASS INDEX DOCD: CPT | Mod: CPTII,S$GLB,, | Performed by: FAMILY MEDICINE

## 2022-04-01 PROCEDURE — 99214 OFFICE O/P EST MOD 30 MIN: CPT | Mod: S$GLB,,, | Performed by: FAMILY MEDICINE

## 2022-04-01 PROCEDURE — 4010F PR ACE/ARB THEARPY RXD/TAKEN: ICD-10-PCS | Mod: CPTII,S$GLB,, | Performed by: FAMILY MEDICINE

## 2022-04-01 PROCEDURE — 99214 PR OFFICE/OUTPT VISIT, EST, LEVL IV, 30-39 MIN: ICD-10-PCS | Mod: S$GLB,,, | Performed by: FAMILY MEDICINE

## 2022-04-01 PROCEDURE — 3008F PR BODY MASS INDEX (BMI) DOCUMENTED: ICD-10-PCS | Mod: CPTII,S$GLB,, | Performed by: FAMILY MEDICINE

## 2022-04-01 PROCEDURE — 99999 PR PBB SHADOW E&M-EST. PATIENT-LVL V: ICD-10-PCS | Mod: PBBFAC,,, | Performed by: FAMILY MEDICINE

## 2022-04-01 PROCEDURE — 1160F RVW MEDS BY RX/DR IN RCRD: CPT | Mod: CPTII,S$GLB,, | Performed by: FAMILY MEDICINE

## 2022-04-01 PROCEDURE — 99999 PR PBB SHADOW E&M-EST. PATIENT-LVL V: CPT | Mod: PBBFAC,,, | Performed by: FAMILY MEDICINE

## 2022-04-01 PROCEDURE — 3078F DIAST BP <80 MM HG: CPT | Mod: CPTII,S$GLB,, | Performed by: FAMILY MEDICINE

## 2022-04-01 PROCEDURE — 3078F PR MOST RECENT DIASTOLIC BLOOD PRESSURE < 80 MM HG: ICD-10-PCS | Mod: CPTII,S$GLB,, | Performed by: FAMILY MEDICINE

## 2022-04-01 PROCEDURE — 3074F SYST BP LT 130 MM HG: CPT | Mod: CPTII,S$GLB,, | Performed by: FAMILY MEDICINE

## 2022-04-01 PROCEDURE — 1159F PR MEDICATION LIST DOCUMENTED IN MEDICAL RECORD: ICD-10-PCS | Mod: CPTII,S$GLB,, | Performed by: FAMILY MEDICINE

## 2022-04-01 PROCEDURE — 1159F MED LIST DOCD IN RCRD: CPT | Mod: CPTII,S$GLB,, | Performed by: FAMILY MEDICINE

## 2022-04-01 PROCEDURE — 4010F ACE/ARB THERAPY RXD/TAKEN: CPT | Mod: CPTII,S$GLB,, | Performed by: FAMILY MEDICINE

## 2022-04-01 PROCEDURE — 1160F PR REVIEW ALL MEDS BY PRESCRIBER/CLIN PHARMACIST DOCUMENTED: ICD-10-PCS | Mod: CPTII,S$GLB,, | Performed by: FAMILY MEDICINE

## 2022-04-01 PROCEDURE — 3074F PR MOST RECENT SYSTOLIC BLOOD PRESSURE < 130 MM HG: ICD-10-PCS | Mod: CPTII,S$GLB,, | Performed by: FAMILY MEDICINE

## 2022-04-01 RX ORDER — ALPRAZOLAM 0.25 MG/1
0.25 TABLET ORAL DAILY PRN
Qty: 30 TABLET | Refills: 0 | Status: SHIPPED | OUTPATIENT
Start: 2022-04-01 | End: 2022-06-09 | Stop reason: SDUPTHER

## 2022-04-01 RX ORDER — IBUPROFEN 200 MG
1 TABLET ORAL DAILY
Qty: 28 PATCH | Refills: 0 | Status: SHIPPED | OUTPATIENT
Start: 2022-04-01 | End: 2023-07-23

## 2022-04-01 RX ORDER — CEPHALEXIN 500 MG/1
500 CAPSULE ORAL EVERY 12 HOURS
Qty: 14 CAPSULE | Refills: 0 | Status: SHIPPED | OUTPATIENT
Start: 2022-04-01 | End: 2022-04-06 | Stop reason: ALTCHOICE

## 2022-04-01 NOTE — PROGRESS NOTES
EST PATIENT VISIT FAMILY MEDICINE    CC:   Chief Complaint   Patient presents with    Follow-up     Has boils under folds of stomach    Diabetes       HPI: Loi Cordova  is a 56 y.o. female:    Patient is known to me. She is here for follow up on chronic conditions. She was last seen 1 year ago.     DM  -did not tolerate metformin due to diarrhea  -checks BG ranges from 120-140s    Notes painful boils on her abdomen of different times of onset    Also has intermittent anxiety. Previously prescribed Xanax. Has tried SSRI in the past and reports it caused SI so she stopped it.     She has made efforts to improve her diet.     Interested in quitting. Would like to try nicotine patch as this worked for her in the past. Declines smoking cessation program referral      ROS: Review of Systems   Constitutional: Negative for fever.   Respiratory: Negative for shortness of breath.    Cardiovascular: Negative for chest pain.       PMHX:   Past Medical History:   Diagnosis Date    Allergy     Asthma     Coronary artery disease     GERD (gastroesophageal reflux disease)     History of back surgery 2/20/2018    Hyperlipidemia     Hypertension     Type 2 diabetes mellitus with diabetic polyneuropathy, without long-term current use of insulin 2/8/2019       PSHX:   Past Surgical History:   Procedure Laterality Date    BILATERAL OOPHORECTOMY Bilateral 2000    BREAST BIOPSY Left 2/14/2020    Procedure: BIOPSY, BREAST-Left medial breast palpation guided;  Surgeon: Paulino Espinoza MD;  Location: [facility] OR [address] [place] [address];  Service: General;  Laterality: Left;    CARDIAC CATHETERIZATION      7 stents    CHOLECYSTECTOMY      COLONOSCOPY N/A 7/16/2019    Procedure: COLONOSCOPY;  Surgeon: Sarah Gamez MD;  Location: [facility];  Service: Endoscopy;  Laterality: N/A;    ESOPHAGOGASTRODUODENOSCOPY N/A 1/9/2020    Procedure: EGD (ESOPHAGOGASTRODUODENOSCOPY);  Surgeon: Sarah Gamez MD;  Location: [facility];  Service:  Endoscopy;  Laterality: N/A;    HYSTERECTOMY      PARTIAL HYSTERECTOMY N/A 1990    Uterus taken out    SHOULDER SURGERY      TOTAL KNEE ARTHROPLASTY         FAMHX:   Family History   Problem Relation Age of Onset    Heart disease Mother     Hyperlipidemia Mother     Hypertension Mother     Diabetes Mother     Heart disease Father     Hyperlipidemia Father     Cancer Father     Diabetes Maternal Aunt     Prostate cancer Neg Hx     Kidney disease Neg Hx        SOCHX:   Social History     Socioeconomic History    Marital status:    Tobacco Use    Smoking status: Current Every Day Smoker     Packs/day: 0.50     Years: 34.00     Pack years: 17.00     Types: Cigarettes    Smokeless tobacco: Never Used    Tobacco comment: in smoking program 1/25/21   Substance and Sexual Activity    Alcohol use: Not Currently     Comment: 6 months    Drug use: No    Sexual activity: Yes     Partners: Male     Social Determinants of Health     Financial Resource Strain: Low Risk     Difficulty of Paying Living Expenses: Not hard at all   Food Insecurity: No Food Insecurity    Worried About Running Out of Food in the Last Year: Never true    Ran Out of Food in the Last Year: Never true   Transportation Needs: No Transportation Needs    Lack of Transportation (Medical): No    Lack of Transportation (Non-Medical): No   Physical Activity: Unknown    Days of Exercise per Week: 0 days   Stress: Stress Concern Present    Feeling of Stress : Very much   Social Connections: Unknown    Frequency of Communication with Friends and Family: More than three times a week    Frequency of Social Gatherings with Friends and Family: More than three times a week    Active Member of Clubs or Organizations: No    Attends Club or Organization Meetings: Never    Marital Status:    Housing Stability: High Risk    Unable to Pay for Housing in the Last Year: Yes    Number of Places Lived in the Last Year: 1    Unstable  Housing in the Last Year: No       ALLERGIES:   Review of patient's allergies indicates:   Allergen Reactions    Crayfish Anaphylaxis     (crawfish only)       MEDS:   Current Outpatient Medications:     amLODIPine (NORVASC) 5 MG tablet, Take 1 tablet by mouth once a day., Disp: 30 tablet, Rfl: 12    aspirin 81 MG Chew, Take 81 mg by mouth once daily., Disp: , Rfl:     atorvastatin (LIPITOR) 80 MG tablet, Take 1 tablet by mouth once a day., Disp: 90 tablet, Rfl: 0    ezetimibe (ZETIA) 10 mg tablet, Take 1 tablet by mouth once a day., Disp: 30 tablet, Rfl: 12    fenofibrate 160 MG Tab, Take 1 tablet (160 mg total) by mouth once daily., Disp: 90 tablet, Rfl: 0    fluticasone furoate-vilanteroL (BREO) 100-25 mcg/dose diskus inhaler, Inhale 1 puff into the lungs once daily. Controller, Disp: 60 each, Rfl: 0    furosemide (LASIX) 40 MG tablet, Take 1 tablet (40 mg total) by mouth daily as needed (edema)., Disp: 90 tablet, Rfl: 1    ibuprofen (ADVIL,MOTRIN) 600 MG tablet, Take 1 tablet (600 mg total) by mouth every 6 (six) hours as needed for Pain., Disp: 20 tablet, Rfl: 0    isosorbide mononitrate (IMDUR) 30 MG 24 hr tablet, Take 1 tablet (30 mg total) by mouth once daily., Disp: 30 tablet, Rfl: 11    lisinopriL (PRINIVIL,ZESTRIL) 40 MG tablet, Take 0.5 tablets (20 mg total) by mouth once daily. COURTESY NAYE, APPT. NEEDED, Disp: 90 tablet, Rfl: 0    metoprolol succinate (TOPROL-XL) 100 MG 24 hr tablet, Take 1 tablet (100 mg total) by mouth once daily., Disp: 90 tablet, Rfl: 3    nitroGLYCERIN (NITROSTAT) 0.4 MG SL tablet, Place 1 tablet under the tongue once every 5 minutes for 3 doses for chest pain, if pain continues call 911, Disp: 25 tablet, Rfl: 3    omeprazole (PRILOSEC) 40 MG capsule, Take 1 capsule (40 mg total) by mouth once daily., Disp: 90 capsule, Rfl: 1    potassium chloride (K-TAB) 20 mEq, Take 1 tablet (20 mEq total) by mouth daily as needed (edema). Take with lasix if needed for edema,  Disp: 90 tablet, Rfl: 1    rOPINIRole (REQUIP) 0.5 MG tablet, Take 1 tablet by mouth once in the evening., Disp: 30 tablet, Rfl: 12    traZODone (DESYREL) 100 MG tablet, Take 1 tablet orally once in the evening., Disp: 30 tablet, Rfl: 12    albuterol (PROVENTIL/VENTOLIN HFA) 90 mcg/actuation inhaler, Inhale 2 puffs into the lungs every 4 to 6 hours as needed., Disp: 18 g, Rfl: 3    ALPRAZolam (XANAX) 0.25 MG tablet, Take 1 tablet (0.25 mg total) by mouth daily as needed for Anxiety., Disp: 30 tablet, Rfl: 0    cephALEXin (KEFLEX) 500 MG capsule, Take 1 capsule (500 mg total) by mouth every 12 (twelve) hours. for 7 days, Disp: 14 capsule, Rfl: 0    nicotine (NICODERM CQ) 21 mg/24 hr, Place 1 patch onto the skin once daily., Disp: 28 patch, Rfl: 0    valACYclovir (VALTREX) 1000 MG tablet, Take 1 tablet (1,000 mg total) by mouth 3 (three) times daily. for 11 days (Patient not taking: Reported on 4/1/2021), Disp: 33 tablet, Rfl: 0  No current facility-administered medications for this visit.    Facility-Administered Medications Ordered in Other Visits:     0.9%  NaCl infusion, , Intravenous, Continuous, Sarah Gamez MD, Stopped at 01/09/20 0925    0.9%  NaCl infusion, , Intravenous, Continuous, Sarah Gamez MD, Stopped at 01/09/20 1026    0.9%  NaCl infusion, , Intravenous, Continuous, Angélica De La Cruz MD    ceFAZolin injection 2 g, 2 g, Intravenous, On Call Procedure, Angélica De La Cruz MD    lidocaine (PF) 10 mg/ml (1%) injection 10 mg, 1 mL, Intradermal, Once, Angélica De La Cruz MD    ondansetron injection 4 mg, 4 mg, Intravenous, Q12H PRN, Angélica De La Cruz MD    sodium chloride 0.9% flush 10 mL, 10 mL, Intravenous, PRN, Sarah Gamez MD    sodium chloride 0.9% flush 10 mL, 10 mL, Intravenous, PRN, Sarah Gamez MD    OBJECTIVE:   Vitals:    04/01/22 0905   BP: 118/68   BP Location: Left arm   Patient Position: Sitting   BP Method: Large (Manual)   Pulse: 88   SpO2: 98%    Weight: 84.5 kg (186 lb 4.6 oz)   Height: 5' (1.524 m)     Body mass index is 36.38 kg/m².      Physical Exam  Vitals and nursing note reviewed.   Constitutional:       Appearance: Normal appearance.   HENT:      Head: Normocephalic.   Eyes:      General:         Right eye: No discharge.         Left eye: No discharge.      Extraocular Movements: Extraocular movements intact.   Cardiovascular:      Rate and Rhythm: Normal rate and regular rhythm.      Heart sounds: Normal heart sounds.   Pulmonary:      Effort: Pulmonary effort is normal.      Breath sounds: Normal breath sounds.   Skin:     Comments: Abdomen: 1cm erythematous nodule with surrounding erythema, tender. No active drainage   Neurological:      Mental Status: She is alert.   Psychiatric:         Behavior: Behavior normal.           LABS:   A1C:  Recent Labs   Lab 03/10/21  0228   Hemoglobin A1C 8.3 H     CBC:  Recent Labs   Lab 03/12/21  0905   WBC 9.81   RBC 4.43   Hemoglobin 13.2   Hematocrit 39.3   Platelets 439 H   MCV 89   MCH 29.8   MCHC 33.6     CMP:  Recent Labs   Lab 03/12/21  0905   Glucose 151 H   Calcium 9.5   Albumin 3.8   Total Protein 7.5   Sodium 138   Potassium 3.7   CO2 24   Chloride 104   BUN 9   Creatinine 0.9   Alkaline Phosphatase 108   ALT 63 H   AST 67 H   Total Bilirubin 0.6     LIPIDS:  Recent Labs   Lab 04/02/20  0849 09/17/20  0708 01/11/22  0704   TSH 1.510  --   --    HDL 46   < > 56   Cholesterol 193   < > 172   Triglycerides 203 H   < > 140   LDL Cholesterol 106.4   < > 88.0   HDL/Cholesterol Ratio 23.8   < > 32.6   Non-HDL Cholesterol 147   < > 116   Total Cholesterol/HDL Ratio 4.2   < > 3.1    < > = values in this interval not displayed.     TSH:  Recent Labs   Lab 04/02/20  0849   TSH 1.510         ASSESSMENT & PLAN:    Problem List Items Addressed This Visit        Psychiatric    Anxiety (Chronic)    Overview     Uncontrolled. SI with SSRI in the past. Xanax PRN not intended for daily use. Can consider buspar in  future if needed.            Relevant Medications    ALPRAZolam (XANAX) 0.25 MG tablet       Cardiac/Vascular    CAD S/P percutaneous coronary angioplasty (Chronic)    Overview     Followed by Cardiology, Dr Corey           Essential hypertension (Chronic)    Overview     Well controlled. Continue current regimen              Endocrine    Type 2 diabetes mellitus with diabetic polyneuropathy, without long-term current use of insulin - Primary    Relevant Orders    CBC Auto Differential    Comprehensive Metabolic Panel    Hemoglobin A1C       Other    Tobacco abuse (Chronic)    Overview     Advised to pick quit date and start nicotine patch. Declined smoking cessation program              Other Visit Diagnoses     Nicotine dependence        Relevant Medications    nicotine (NICODERM CQ) 21 mg/24 hr    Boils        Relevant Medications    cephALEXin (KEFLEX) 500 MG capsule    Other Relevant Orders    Ambulatory referral/consult to Dermatology          Follow up for already scheduled.      RTC/ED precautions discussed where applicable.   Encouraged patient to let me know if there are any further questions/concerns.     Advise patient/caretaker to check with insurance regarding orders to avoid unexpected fees/costs.     The patient/caretaker indicates understanding of these issues and agrees with the plan.    Dr. Hari Francis MD  Family Medicine

## 2022-04-05 ENCOUNTER — TELEPHONE (OUTPATIENT)
Dept: FAMILY MEDICINE | Facility: CLINIC | Age: 57
End: 2022-04-05
Payer: COMMERCIAL

## 2022-04-05 ENCOUNTER — PATIENT MESSAGE (OUTPATIENT)
Dept: FAMILY MEDICINE | Facility: CLINIC | Age: 57
End: 2022-04-05
Payer: COMMERCIAL

## 2022-04-05 NOTE — TELEPHONE ENCOUNTER
----- Message from Chelly Bruno MA sent at 4/5/2022  4:19 PM CDT -----  Regarding: FW: ER f/u    ----- Message -----  From: Ana Castaneda  Sent: 4/4/2022   4:17 PM CDT  To: Tito Noriega Staff  Subject: ER f/u                                           Patient need to be seen in 2 days. She was seen in the ER yesterday. Please call patient to schedule.

## 2022-04-06 ENCOUNTER — OFFICE VISIT (OUTPATIENT)
Dept: FAMILY MEDICINE | Facility: CLINIC | Age: 57
End: 2022-04-06
Payer: COMMERCIAL

## 2022-04-06 VITALS
WEIGHT: 186.5 LBS | DIASTOLIC BLOOD PRESSURE: 70 MMHG | HEIGHT: 60 IN | BODY MASS INDEX: 36.61 KG/M2 | HEART RATE: 70 BPM | OXYGEN SATURATION: 98 % | SYSTOLIC BLOOD PRESSURE: 120 MMHG

## 2022-04-06 DIAGNOSIS — I10 ESSENTIAL HYPERTENSION: Chronic | ICD-10-CM

## 2022-04-06 DIAGNOSIS — E11.42 TYPE 2 DIABETES MELLITUS WITH DIABETIC POLYNEUROPATHY, WITHOUT LONG-TERM CURRENT USE OF INSULIN: Chronic | ICD-10-CM

## 2022-04-06 DIAGNOSIS — L03.311 CELLULITIS OF ABDOMINAL WALL: ICD-10-CM

## 2022-04-06 DIAGNOSIS — Z12.31 BREAST CANCER SCREENING BY MAMMOGRAM: Primary | ICD-10-CM

## 2022-04-06 PROCEDURE — 99214 OFFICE O/P EST MOD 30 MIN: CPT | Mod: S$GLB,,, | Performed by: FAMILY MEDICINE

## 2022-04-06 PROCEDURE — 1160F PR REVIEW ALL MEDS BY PRESCRIBER/CLIN PHARMACIST DOCUMENTED: ICD-10-PCS | Mod: CPTII,S$GLB,, | Performed by: FAMILY MEDICINE

## 2022-04-06 PROCEDURE — 3008F PR BODY MASS INDEX (BMI) DOCUMENTED: ICD-10-PCS | Mod: CPTII,S$GLB,, | Performed by: FAMILY MEDICINE

## 2022-04-06 PROCEDURE — 4010F ACE/ARB THERAPY RXD/TAKEN: CPT | Mod: CPTII,S$GLB,, | Performed by: FAMILY MEDICINE

## 2022-04-06 PROCEDURE — 99999 PR PBB SHADOW E&M-EST. PATIENT-LVL V: CPT | Mod: PBBFAC,,, | Performed by: FAMILY MEDICINE

## 2022-04-06 PROCEDURE — 3008F BODY MASS INDEX DOCD: CPT | Mod: CPTII,S$GLB,, | Performed by: FAMILY MEDICINE

## 2022-04-06 PROCEDURE — 4010F PR ACE/ARB THEARPY RXD/TAKEN: ICD-10-PCS | Mod: CPTII,S$GLB,, | Performed by: FAMILY MEDICINE

## 2022-04-06 PROCEDURE — 1159F MED LIST DOCD IN RCRD: CPT | Mod: CPTII,S$GLB,, | Performed by: FAMILY MEDICINE

## 2022-04-06 PROCEDURE — 3078F DIAST BP <80 MM HG: CPT | Mod: CPTII,S$GLB,, | Performed by: FAMILY MEDICINE

## 2022-04-06 PROCEDURE — 3078F PR MOST RECENT DIASTOLIC BLOOD PRESSURE < 80 MM HG: ICD-10-PCS | Mod: CPTII,S$GLB,, | Performed by: FAMILY MEDICINE

## 2022-04-06 PROCEDURE — 1160F RVW MEDS BY RX/DR IN RCRD: CPT | Mod: CPTII,S$GLB,, | Performed by: FAMILY MEDICINE

## 2022-04-06 PROCEDURE — 1159F PR MEDICATION LIST DOCUMENTED IN MEDICAL RECORD: ICD-10-PCS | Mod: CPTII,S$GLB,, | Performed by: FAMILY MEDICINE

## 2022-04-06 PROCEDURE — 3074F SYST BP LT 130 MM HG: CPT | Mod: CPTII,S$GLB,, | Performed by: FAMILY MEDICINE

## 2022-04-06 PROCEDURE — 99214 PR OFFICE/OUTPT VISIT, EST, LEVL IV, 30-39 MIN: ICD-10-PCS | Mod: S$GLB,,, | Performed by: FAMILY MEDICINE

## 2022-04-06 PROCEDURE — 99999 PR PBB SHADOW E&M-EST. PATIENT-LVL V: ICD-10-PCS | Mod: PBBFAC,,, | Performed by: FAMILY MEDICINE

## 2022-04-06 PROCEDURE — 3074F PR MOST RECENT SYSTOLIC BLOOD PRESSURE < 130 MM HG: ICD-10-PCS | Mod: CPTII,S$GLB,, | Performed by: FAMILY MEDICINE

## 2022-04-06 RX ORDER — SULFAMETHOXAZOLE AND TRIMETHOPRIM 800; 160 MG/1; MG/1
1 TABLET ORAL 2 TIMES DAILY
Qty: 14 TABLET | Refills: 0 | Status: SHIPPED | OUTPATIENT
Start: 2022-04-06 | End: 2022-04-13 | Stop reason: ALTCHOICE

## 2022-04-06 NOTE — PROGRESS NOTES
EST PATIENT VISIT FAMILY MEDICINE    CC:   Chief Complaint   Patient presents with    Follow-up    Abdominal Pain     Under fold of there is packing and it a ED f/u       HPI: Loi Cordova  is a 56 y.o. female:    Patient is known to me. She presents for ED f/u. She was seen on 4/1 and started on keflex for boil on abdomen and associated cellulitis. She continue to have worsening pain and presented to ED on 4/3 and had 2cm abscess drained and packed. She was prescribed doxycycline but did not tolerate this due to GI side effects; she resumed keflex. She is still having significant pain at the area of the wound.     ROS: Review of Systems   Constitutional: Negative for fever.   Respiratory: Negative for shortness of breath.    Cardiovascular: Negative for chest pain.       PMHX:   Past Medical History:   Diagnosis Date    Allergy     Asthma     Coronary artery disease     GERD (gastroesophageal reflux disease)     History of back surgery 2/20/2018    Hyperlipidemia     Hypertension     Type 2 diabetes mellitus with diabetic polyneuropathy, without long-term current use of insulin 2/8/2019       PSHX:   Past Surgical History:   Procedure Laterality Date    BILATERAL OOPHORECTOMY Bilateral 2000    BREAST BIOPSY Left 2/14/2020    Procedure: BIOPSY, BREAST-Left medial breast palpation guided;  Surgeon: Paulino Espinoza MD;  Location: [facility] OR [address] [place] [address];  Service: General;  Laterality: Left;    CARDIAC CATHETERIZATION      7 stents    CHOLECYSTECTOMY      COLONOSCOPY N/A 7/16/2019    Procedure: COLONOSCOPY;  Surgeon: Sarah Gamez MD;  Location: [facility];  Service: Endoscopy;  Laterality: N/A;    ESOPHAGOGASTRODUODENOSCOPY N/A 1/9/2020    Procedure: EGD (ESOPHAGOGASTRODUODENOSCOPY);  Surgeon: Sarah Gamez MD;  Location: [facility];  Service: Endoscopy;  Laterality: N/A;    HYSTERECTOMY      PARTIAL HYSTERECTOMY N/A 1990    Uterus taken out    SHOULDER SURGERY      TOTAL KNEE ARTHROPLASTY          FAMHX:   Family History   Problem Relation Age of Onset    Heart disease Mother     Hyperlipidemia Mother     Hypertension Mother     Diabetes Mother     Heart disease Father     Hyperlipidemia Father     Cancer Father     Diabetes Maternal Aunt     Prostate cancer Neg Hx     Kidney disease Neg Hx        SOCHX:   Social History     Socioeconomic History    Marital status:    Tobacco Use    Smoking status: Current Every Day Smoker     Packs/day: 0.50     Years: 34.00     Pack years: 17.00     Types: Cigarettes    Smokeless tobacco: Never Used    Tobacco comment: in smoking program 1/25/21   Substance and Sexual Activity    Alcohol use: Not Currently     Comment: 6 months    Drug use: No    Sexual activity: Yes     Partners: Male     Social Determinants of Health     Financial Resource Strain: Low Risk     Difficulty of Paying Living Expenses: Not hard at all   Food Insecurity: No Food Insecurity    Worried About Running Out of Food in the Last Year: Never true    Ran Out of Food in the Last Year: Never true   Transportation Needs: No Transportation Needs    Lack of Transportation (Medical): No    Lack of Transportation (Non-Medical): No   Physical Activity: Unknown    Days of Exercise per Week: 0 days   Stress: Stress Concern Present    Feeling of Stress : Very much   Social Connections: Unknown    Frequency of Communication with Friends and Family: More than three times a week    Frequency of Social Gatherings with Friends and Family: More than three times a week    Active Member of Clubs or Organizations: No    Attends Club or Organization Meetings: Never    Marital Status:    Housing Stability: High Risk    Unable to Pay for Housing in the Last Year: Yes    Number of Places Lived in the Last Year: 1    Unstable Housing in the Last Year: No       ALLERGIES:   Review of patient's allergies indicates:   Allergen Reactions    Crayfish Anaphylaxis     (crawfish only)        MEDS:   Current Outpatient Medications:     albuterol (PROVENTIL/VENTOLIN HFA) 90 mcg/actuation inhaler, Inhale 2 puffs into the lungs every 4 to 6 hours as needed., Disp: 18 g, Rfl: 3    ALPRAZolam (XANAX) 0.25 MG tablet, Take 1 tablet (0.25 mg total) by mouth daily as needed for Anxiety., Disp: 30 tablet, Rfl: 0    amLODIPine (NORVASC) 5 MG tablet, Take 1 tablet by mouth once a day., Disp: 30 tablet, Rfl: 12    aspirin 81 MG Chew, Take 81 mg by mouth once daily., Disp: , Rfl:     atorvastatin (LIPITOR) 80 MG tablet, Take 1 tablet by mouth once a day., Disp: 90 tablet, Rfl: 0    ezetimibe (ZETIA) 10 mg tablet, Take 1 tablet by mouth once a day., Disp: 30 tablet, Rfl: 12    fenofibrate 160 MG Tab, Take 1 tablet (160 mg total) by mouth once daily., Disp: 90 tablet, Rfl: 0    fluticasone furoate-vilanteroL (BREO) 100-25 mcg/dose diskus inhaler, Inhale 1 puff into the lungs once daily. Controller, Disp: 60 each, Rfl: 0    furosemide (LASIX) 40 MG tablet, Take 1 tablet (40 mg total) by mouth daily as needed (edema)., Disp: 90 tablet, Rfl: 1    ibuprofen (ADVIL,MOTRIN) 600 MG tablet, Take 1 tablet (600 mg total) by mouth every 6 (six) hours as needed for Pain., Disp: 20 tablet, Rfl: 0    isosorbide mononitrate (IMDUR) 30 MG 24 hr tablet, Take 1 tablet (30 mg total) by mouth once daily., Disp: 30 tablet, Rfl: 11    lisinopriL (PRINIVIL,ZESTRIL) 40 MG tablet, Take 0.5 tablets (20 mg total) by mouth once daily. COURTESY NAYE, APPT. NEEDED, Disp: 90 tablet, Rfl: 0    metoprolol succinate (TOPROL-XL) 100 MG 24 hr tablet, Take 1 tablet (100 mg total) by mouth once daily., Disp: 90 tablet, Rfl: 3    nitroGLYCERIN (NITROSTAT) 0.4 MG SL tablet, Place 1 tablet under the tongue once every 5 minutes for 3 doses for chest pain, if pain continues call 911, Disp: 25 tablet, Rfl: 3    omeprazole (PRILOSEC) 40 MG capsule, Take 1 capsule (40 mg total) by mouth once daily., Disp: 90 capsule, Rfl: 1    potassium  chloride (K-TAB) 20 mEq, Take 1 tablet (20 mEq total) by mouth daily as needed (edema). Take with lasix if needed for edema, Disp: 90 tablet, Rfl: 1    rOPINIRole (REQUIP) 0.5 MG tablet, Take 1 tablet by mouth once in the evening., Disp: 30 tablet, Rfl: 12    traZODone (DESYREL) 100 MG tablet, Take 1 tablet orally once in the evening., Disp: 30 tablet, Rfl: 12    doxycycline (VIBRAMYCIN) 100 MG Cap, Take 1 capsule (100 mg total) by mouth 2 (two) times daily. for 10 days (Patient not taking: Reported on 4/6/2022), Disp: 20 capsule, Rfl: 0    nicotine (NICODERM CQ) 21 mg/24 hr, Place 1 patch onto the skin once daily. (Patient not taking: Reported on 4/6/2022), Disp: 28 patch, Rfl: 0    sulfamethoxazole-trimethoprim 800-160mg (BACTRIM DS) 800-160 mg Tab, Take 1 tablet by mouth 2 (two) times daily. for 7 days, Disp: 14 tablet, Rfl: 0    valACYclovir (VALTREX) 1000 MG tablet, Take 1 tablet (1,000 mg total) by mouth 3 (three) times daily. for 11 days (Patient not taking: Reported on 4/1/2021), Disp: 33 tablet, Rfl: 0  No current facility-administered medications for this visit.    Facility-Administered Medications Ordered in Other Visits:     0.9%  NaCl infusion, , Intravenous, Continuous, Sarah Gamez MD, Stopped at 01/09/20 0925    0.9%  NaCl infusion, , Intravenous, Continuous, Sarah Gamez MD, Stopped at 01/09/20 1026    0.9%  NaCl infusion, , Intravenous, Continuous, Angélica De La Cruz MD    ceFAZolin injection 2 g, 2 g, Intravenous, On Call Procedure, Angélica De La Cruz MD    lidocaine (PF) 10 mg/ml (1%) injection 10 mg, 1 mL, Intradermal, Once, Angélica De La Cruz MD    ondansetron injection 4 mg, 4 mg, Intravenous, Q12H PRN, Angélica De La Cruz MD    sodium chloride 0.9% flush 10 mL, 10 mL, Intravenous, PRN, Sarah Gamez MD    sodium chloride 0.9% flush 10 mL, 10 mL, Intravenous, PRN, Sarah Gamez MD    OBJECTIVE:   Vitals:    04/06/22 1128   BP: 120/70   BP Location: Left arm    Patient Position: Sitting   BP Method: Large (Manual)   Pulse: 70   SpO2: 98%   Weight: 84.6 kg (186 lb 8.2 oz)   Height: 5' (1.524 m)     Body mass index is 36.43 kg/m².      Physical Exam  Vitals and nursing note reviewed.   Constitutional:       Appearance: Normal appearance.   HENT:      Head: Normocephalic and atraumatic.   Eyes:      General: No scleral icterus.     Extraocular Movements: Extraocular movements intact.   Pulmonary:      Effort: Pulmonary effort is normal.   Skin:     Comments: Right lower abdomen: less than 1cm wound with packing removed. No active drainage. About 0.5cm in depth. Surrounding erythema and induration. No fluctuance.    Neurological:      Mental Status: She is alert.           LABS:   A1C:  Recent Labs   Lab 03/10/21  0228   Hemoglobin A1C 8.3 H     CBC:  Recent Labs   Lab 03/12/21  0905   WBC 9.81   RBC 4.43   Hemoglobin 13.2   Hematocrit 39.3   Platelets 439 H   MCV 89   MCH 29.8   MCHC 33.6     CMP:  Recent Labs   Lab 03/12/21  0905   Glucose 151 H   Calcium 9.5   Albumin 3.8   Total Protein 7.5   Sodium 138   Potassium 3.7   CO2 24   Chloride 104   BUN 9   Creatinine 0.9   Alkaline Phosphatase 108   ALT 63 H   AST 67 H   Total Bilirubin 0.6     LIPIDS:  Recent Labs   Lab 04/02/20  0849 09/17/20  0708 01/11/22  0704   TSH 1.510  --   --    HDL 46   < > 56   Cholesterol 193   < > 172   Triglycerides 203 H   < > 140   LDL Cholesterol 106.4   < > 88.0   HDL/Cholesterol Ratio 23.8   < > 32.6   Non-HDL Cholesterol 147   < > 116   Total Cholesterol/HDL Ratio 4.2   < > 3.1    < > = values in this interval not displayed.     TSH:  Recent Labs   Lab 04/02/20  0849   TSH 1.510         ASSESSMENT & PLAN:    Problem List Items Addressed This Visit        Cardiac/Vascular    Essential hypertension (Chronic)    Overview     Well controlled. Continue current regimen              ID    Cellulitis of abdominal wall    Overview     Failed keflex. Did not tolerate doxycycline due to GI side  effects. Switch to Bactrim and close f/u.            Relevant Medications    sulfamethoxazole-trimethoprim 800-160mg (BACTRIM DS) 800-160 mg Tab       Endocrine    Type 2 diabetes mellitus with diabetic polyneuropathy, without long-term current use of insulin (Chronic)    Overview     Advised to get labs             Other Visit Diagnoses     Breast cancer screening by mammogram    -  Primary    Relevant Orders    Mammo Digital Screening Bilat            Follow up in about 1 week (around 4/13/2022) for cellulitis/wound.      RTC/ED precautions discussed where applicable.   Encouraged patient to let me know if there are any further questions/concerns.     Advise patient/caretaker to check with insurance regarding orders to avoid unexpected fees/costs.     The patient/caretaker indicates understanding of these issues and agrees with the plan.    Dr. Hari Francis MD  Family Medicine

## 2022-04-07 ENCOUNTER — TELEPHONE (OUTPATIENT)
Dept: FAMILY MEDICINE | Facility: CLINIC | Age: 57
End: 2022-04-07
Payer: COMMERCIAL

## 2022-04-07 ENCOUNTER — PATIENT MESSAGE (OUTPATIENT)
Dept: FAMILY MEDICINE | Facility: CLINIC | Age: 57
End: 2022-04-07
Payer: COMMERCIAL

## 2022-04-07 NOTE — TELEPHONE ENCOUNTER
----- Message from Hari Francis MD sent at 4/7/2022 12:14 PM CDT -----  Does not look like hemglobin a1c was drawn with other labs as ordered; please contact lab to find out why and ask patient to go back to lab if needed

## 2022-04-08 ENCOUNTER — TELEPHONE (OUTPATIENT)
Dept: FAMILY MEDICINE | Facility: CLINIC | Age: 57
End: 2022-04-08
Payer: COMMERCIAL

## 2022-04-10 ENCOUNTER — PATIENT MESSAGE (OUTPATIENT)
Dept: PODIATRY | Facility: CLINIC | Age: 57
End: 2022-04-10
Payer: COMMERCIAL

## 2022-04-12 RX ORDER — FLUCONAZOLE 150 MG/1
150 TABLET ORAL DAILY
Qty: 1 TABLET | Refills: 0 | Status: SHIPPED | OUTPATIENT
Start: 2022-04-12 | End: 2022-04-14

## 2022-04-13 ENCOUNTER — OFFICE VISIT (OUTPATIENT)
Dept: FAMILY MEDICINE | Facility: CLINIC | Age: 57
End: 2022-04-13
Payer: COMMERCIAL

## 2022-04-13 VITALS
SYSTOLIC BLOOD PRESSURE: 120 MMHG | HEART RATE: 85 BPM | OXYGEN SATURATION: 98 % | HEIGHT: 60 IN | DIASTOLIC BLOOD PRESSURE: 60 MMHG | BODY MASS INDEX: 36.87 KG/M2 | WEIGHT: 187.81 LBS

## 2022-04-13 DIAGNOSIS — I10 ESSENTIAL HYPERTENSION: Chronic | ICD-10-CM

## 2022-04-13 DIAGNOSIS — E11.9 TYPE 2 DIABETES MELLITUS WITHOUT COMPLICATION, UNSPECIFIED WHETHER LONG TERM INSULIN USE: ICD-10-CM

## 2022-04-13 DIAGNOSIS — L30.9 DERMATITIS: ICD-10-CM

## 2022-04-13 DIAGNOSIS — E11.42 TYPE 2 DIABETES MELLITUS WITH DIABETIC POLYNEUROPATHY, WITHOUT LONG-TERM CURRENT USE OF INSULIN: Primary | ICD-10-CM

## 2022-04-13 PROCEDURE — 1160F PR REVIEW ALL MEDS BY PRESCRIBER/CLIN PHARMACIST DOCUMENTED: ICD-10-PCS | Mod: CPTII,S$GLB,, | Performed by: FAMILY MEDICINE

## 2022-04-13 PROCEDURE — 99214 OFFICE O/P EST MOD 30 MIN: CPT | Mod: S$GLB,,, | Performed by: FAMILY MEDICINE

## 2022-04-13 PROCEDURE — 3046F PR MOST RECENT HEMOGLOBIN A1C LEVEL > 9.0%: ICD-10-PCS | Mod: CPTII,S$GLB,, | Performed by: FAMILY MEDICINE

## 2022-04-13 PROCEDURE — 99999 PR PBB SHADOW E&M-EST. PATIENT-LVL V: CPT | Mod: PBBFAC,,, | Performed by: FAMILY MEDICINE

## 2022-04-13 PROCEDURE — 3008F PR BODY MASS INDEX (BMI) DOCUMENTED: ICD-10-PCS | Mod: CPTII,S$GLB,, | Performed by: FAMILY MEDICINE

## 2022-04-13 PROCEDURE — 3078F PR MOST RECENT DIASTOLIC BLOOD PRESSURE < 80 MM HG: ICD-10-PCS | Mod: CPTII,S$GLB,, | Performed by: FAMILY MEDICINE

## 2022-04-13 PROCEDURE — 1160F RVW MEDS BY RX/DR IN RCRD: CPT | Mod: CPTII,S$GLB,, | Performed by: FAMILY MEDICINE

## 2022-04-13 PROCEDURE — 1159F PR MEDICATION LIST DOCUMENTED IN MEDICAL RECORD: ICD-10-PCS | Mod: CPTII,S$GLB,, | Performed by: FAMILY MEDICINE

## 2022-04-13 PROCEDURE — 3078F DIAST BP <80 MM HG: CPT | Mod: CPTII,S$GLB,, | Performed by: FAMILY MEDICINE

## 2022-04-13 PROCEDURE — 3074F SYST BP LT 130 MM HG: CPT | Mod: CPTII,S$GLB,, | Performed by: FAMILY MEDICINE

## 2022-04-13 PROCEDURE — 3074F PR MOST RECENT SYSTOLIC BLOOD PRESSURE < 130 MM HG: ICD-10-PCS | Mod: CPTII,S$GLB,, | Performed by: FAMILY MEDICINE

## 2022-04-13 PROCEDURE — 99214 PR OFFICE/OUTPT VISIT, EST, LEVL IV, 30-39 MIN: ICD-10-PCS | Mod: S$GLB,,, | Performed by: FAMILY MEDICINE

## 2022-04-13 PROCEDURE — 3008F BODY MASS INDEX DOCD: CPT | Mod: CPTII,S$GLB,, | Performed by: FAMILY MEDICINE

## 2022-04-13 PROCEDURE — 1159F MED LIST DOCD IN RCRD: CPT | Mod: CPTII,S$GLB,, | Performed by: FAMILY MEDICINE

## 2022-04-13 PROCEDURE — 4010F ACE/ARB THERAPY RXD/TAKEN: CPT | Mod: CPTII,S$GLB,, | Performed by: FAMILY MEDICINE

## 2022-04-13 PROCEDURE — 4010F PR ACE/ARB THEARPY RXD/TAKEN: ICD-10-PCS | Mod: CPTII,S$GLB,, | Performed by: FAMILY MEDICINE

## 2022-04-13 PROCEDURE — 99999 PR PBB SHADOW E&M-EST. PATIENT-LVL V: ICD-10-PCS | Mod: PBBFAC,,, | Performed by: FAMILY MEDICINE

## 2022-04-13 PROCEDURE — 3046F HEMOGLOBIN A1C LEVEL >9.0%: CPT | Mod: CPTII,S$GLB,, | Performed by: FAMILY MEDICINE

## 2022-04-13 RX ORDER — DULAGLUTIDE 0.75 MG/.5ML
0.75 INJECTION, SOLUTION SUBCUTANEOUS
Qty: 4 PEN | Refills: 1 | Status: SHIPPED | OUTPATIENT
Start: 2022-04-13 | End: 2022-05-13 | Stop reason: DRUGHIGH

## 2022-04-13 RX ORDER — TRIAMCINOLONE ACETONIDE 0.25 MG/G
CREAM TOPICAL 2 TIMES DAILY
Qty: 80 G | Refills: 0 | Status: SHIPPED | OUTPATIENT
Start: 2022-04-13 | End: 2023-07-23

## 2022-04-13 NOTE — PROGRESS NOTES
EST PATIENT VISIT FAMILY MEDICINE    CC:   Chief Complaint   Patient presents with    Follow-up       HPI: Loi Cordova  is a 56 y.o. female:    Patient is known to me. She presents for follow up on cellulitis and type 2 diabetes. Cellulitis has resolved. She is no longer having pain/drainage. She is about to complete her course of antibiotics. No hx of pancreatitis. She reports she is taking her medications as prescribed and attempting to improve diet.       ROS: Review of Systems   Constitutional: Negative for fever.   Respiratory: Negative for shortness of breath.    Cardiovascular: Negative for chest pain.       PMHX:   Past Medical History:   Diagnosis Date    Allergy     Asthma     Coronary artery disease     GERD (gastroesophageal reflux disease)     History of back surgery 2/20/2018    Hyperlipidemia     Hypertension     Type 2 diabetes mellitus with diabetic polyneuropathy, without long-term current use of insulin 2/8/2019       PSHX:   Past Surgical History:   Procedure Laterality Date    BILATERAL OOPHORECTOMY Bilateral 2000    BREAST BIOPSY Left 2/14/2020    Procedure: BIOPSY, BREAST-Left medial breast palpation guided;  Surgeon: Paulino Espinoza MD;  Location: [facility] OR [address] [place] [address];  Service: General;  Laterality: Left;    CARDIAC CATHETERIZATION      7 stents    CHOLECYSTECTOMY      COLONOSCOPY N/A 7/16/2019    Procedure: COLONOSCOPY;  Surgeon: Sarah Gamez MD;  Location: [facility];  Service: Endoscopy;  Laterality: N/A;    ESOPHAGOGASTRODUODENOSCOPY N/A 1/9/2020    Procedure: EGD (ESOPHAGOGASTRODUODENOSCOPY);  Surgeon: Sarah Gamez MD;  Location: [facility];  Service: Endoscopy;  Laterality: N/A;    HYSTERECTOMY      PARTIAL HYSTERECTOMY N/A 1990    Uterus taken out    SHOULDER SURGERY      TOTAL KNEE ARTHROPLASTY         FAMHX:   Family History   Problem Relation Age of Onset    Heart disease Mother     Hyperlipidemia Mother     Hypertension Mother     Diabetes Mother      Heart disease Father     Hyperlipidemia Father     Cancer Father     Diabetes Maternal Aunt     Prostate cancer Neg Hx     Kidney disease Neg Hx        SOCHX:   Social History     Socioeconomic History    Marital status:    Tobacco Use    Smoking status: Current Every Day Smoker     Packs/day: 0.50     Years: 34.00     Pack years: 17.00     Types: Cigarettes    Smokeless tobacco: Never Used    Tobacco comment: in smoking program 1/25/21   Substance and Sexual Activity    Alcohol use: Not Currently     Comment: 6 months    Drug use: No    Sexual activity: Yes     Partners: Male     Social Determinants of Health     Financial Resource Strain: Low Risk     Difficulty of Paying Living Expenses: Not hard at all   Food Insecurity: No Food Insecurity    Worried About Running Out of Food in the Last Year: Never true    Ran Out of Food in the Last Year: Never true   Transportation Needs: No Transportation Needs    Lack of Transportation (Medical): No    Lack of Transportation (Non-Medical): No   Physical Activity: Unknown    Days of Exercise per Week: 0 days   Stress: Stress Concern Present    Feeling of Stress : Very much   Social Connections: Unknown    Frequency of Communication with Friends and Family: More than three times a week    Frequency of Social Gatherings with Friends and Family: More than three times a week    Active Member of Clubs or Organizations: No    Attends Club or Organization Meetings: Never    Marital Status:    Housing Stability: High Risk    Unable to Pay for Housing in the Last Year: Yes    Number of Places Lived in the Last Year: 1    Unstable Housing in the Last Year: No       ALLERGIES:   Review of patient's allergies indicates:   Allergen Reactions    Crayfish Anaphylaxis     (crawfish only)       MEDS:   Current Outpatient Medications:     albuterol (PROVENTIL/VENTOLIN HFA) 90 mcg/actuation inhaler, Inhale 2 puffs into the lungs every 4 to 6 hours  as needed., Disp: 18 g, Rfl: 3    ALPRAZolam (XANAX) 0.25 MG tablet, Take 1 tablet (0.25 mg total) by mouth daily as needed for Anxiety., Disp: 30 tablet, Rfl: 0    amLODIPine (NORVASC) 5 MG tablet, Take 1 tablet by mouth once a day., Disp: 30 tablet, Rfl: 12    aspirin 81 MG Chew, Take 81 mg by mouth once daily., Disp: , Rfl:     atorvastatin (LIPITOR) 80 MG tablet, Take 1 tablet by mouth once a day., Disp: 90 tablet, Rfl: 0    ezetimibe (ZETIA) 10 mg tablet, Take 1 tablet by mouth once a day., Disp: 30 tablet, Rfl: 12    fenofibrate 160 MG Tab, Take 1 tablet (160 mg total) by mouth once daily., Disp: 90 tablet, Rfl: 0    fluticasone furoate-vilanteroL (BREO) 100-25 mcg/dose diskus inhaler, Inhale 1 puff into the lungs once daily. Controller, Disp: 60 each, Rfl: 0    furosemide (LASIX) 40 MG tablet, Take 1 tablet (40 mg total) by mouth daily as needed (edema)., Disp: 90 tablet, Rfl: 1    ibuprofen (ADVIL,MOTRIN) 600 MG tablet, Take 1 tablet (600 mg total) by mouth every 6 (six) hours as needed for Pain., Disp: 20 tablet, Rfl: 0    isosorbide mononitrate (IMDUR) 30 MG 24 hr tablet, Take 1 tablet (30 mg total) by mouth once daily., Disp: 30 tablet, Rfl: 11    lisinopriL (PRINIVIL,ZESTRIL) 40 MG tablet, Take 0.5 tablets (20 mg total) by mouth once daily. COURTESY NAYE, APPT. NEEDED, Disp: 90 tablet, Rfl: 0    metoprolol succinate (TOPROL-XL) 100 MG 24 hr tablet, Take 1 tablet (100 mg total) by mouth once daily., Disp: 90 tablet, Rfl: 3    nitroGLYCERIN (NITROSTAT) 0.4 MG SL tablet, Place 1 tablet under the tongue once every 5 minutes for 3 doses for chest pain, if pain continues call 911, Disp: 25 tablet, Rfl: 3    omeprazole (PRILOSEC) 40 MG capsule, Take 1 capsule (40 mg total) by mouth once daily., Disp: 90 capsule, Rfl: 1    potassium chloride (K-TAB) 20 mEq, Take 1 tablet (20 mEq total) by mouth daily as needed (edema). Take with lasix if needed for edema, Disp: 90 tablet, Rfl: 1    rOPINIRole  (REQUIP) 0.5 MG tablet, Take 1 tablet by mouth once in the evening., Disp: 30 tablet, Rfl: 12    traZODone (DESYREL) 100 MG tablet, Take 1 tablet orally once in the evening., Disp: 30 tablet, Rfl: 12    dulaglutide (TRULICITY) 0.75 mg/0.5 mL pen injector, Inject 0.75 mg into the skin every 7 days., Disp: 4 pen, Rfl: 1    nicotine (NICODERM CQ) 21 mg/24 hr, Place 1 patch onto the skin once daily. (Patient not taking: No sig reported), Disp: 28 patch, Rfl: 0    triamcinolone acetonide 0.025% (KENALOG) 0.025 % cream, Apply topically 2 (two) times daily., Disp: 80 g, Rfl: 0  No current facility-administered medications for this visit.    Facility-Administered Medications Ordered in Other Visits:     0.9%  NaCl infusion, , Intravenous, Continuous, Sarah Gamez MD, Stopped at 01/09/20 0925    0.9%  NaCl infusion, , Intravenous, Continuous, Sarah Gamez MD, Stopped at 01/09/20 1026    0.9%  NaCl infusion, , Intravenous, Continuous, Angélica De La Cruz MD    ceFAZolin injection 2 g, 2 g, Intravenous, On Call Procedure, Angélica De La Cruz MD    lidocaine (PF) 10 mg/ml (1%) injection 10 mg, 1 mL, Intradermal, Once, Angélica De La Cruz MD    ondansetron injection 4 mg, 4 mg, Intravenous, Q12H PRN, Angélica De La Cruz MD    sodium chloride 0.9% flush 10 mL, 10 mL, Intravenous, PRN, Sarah Gamez MD    sodium chloride 0.9% flush 10 mL, 10 mL, Intravenous, PRN, Sarah Gamez MD    OBJECTIVE:   Vitals:    04/13/22 1538   BP: 120/60   Pulse: 85   SpO2: 98%   Weight: 85.2 kg (187 lb 12.8 oz)   Height: 5' (1.524 m)     Body mass index is 36.68 kg/m².      Physical Exam  Vitals and nursing note reviewed.   Constitutional:       Appearance: Normal appearance.   HENT:      Head: Normocephalic and atraumatic.   Eyes:      General: No scleral icterus.     Extraocular Movements: Extraocular movements intact.   Pulmonary:      Effort: Pulmonary effort is normal.   Neurological:      Mental Status: She is  alert.           LABS:   A1C:  Recent Labs   Lab 04/09/22  1147   Hemoglobin A1C 9.5 H     CBC:  Recent Labs   Lab 04/06/22  1558   WBC 8.20   RBC 4.45   Hemoglobin 13.0   Hematocrit 39.9   Platelets 486 H   MCV 90   MCH 29.2   MCHC 32.6     CMP:  Recent Labs   Lab 04/06/22  1558   Glucose 298 H   Calcium 9.5   Albumin 4.3   Total Protein 7.4   Sodium 141   Potassium 4.4   CO2 31 H   Chloride 102   BUN 13   Creatinine 1.10   Alkaline Phosphatase 89   ALT 45 H   AST 37   Total Bilirubin 0.3     LIPIDS:  Recent Labs   Lab 04/02/20  0849 09/17/20  0708 01/11/22  0704   TSH 1.510  --   --    HDL 46   < > 56   Cholesterol 193   < > 172   Triglycerides 203 H   < > 140   LDL Cholesterol 106.4   < > 88.0   HDL/Cholesterol Ratio 23.8   < > 32.6   Non-HDL Cholesterol 147   < > 116   Total Cholesterol/HDL Ratio 4.2   < > 3.1    < > = values in this interval not displayed.     TSH:  Recent Labs   Lab 04/02/20  0849   TSH 1.510         ASSESSMENT & PLAN:    Problem List Items Addressed This Visit        Cardiac/Vascular    Essential hypertension (Chronic)    Overview     Well controlled. Continue current regimen              Endocrine    Type 2 diabetes mellitus with diabetic polyneuropathy, without long-term current use of insulin - Primary (Chronic)    Overview     Uncontrolled. Start trulicity. Referral to DM education.            Relevant Medications    dulaglutide (TRULICITY) 0.75 mg/0.5 mL pen injector    Other Relevant Orders    Ambulatory referral/consult to Diabetes Education      Other Visit Diagnoses     Dermatitis        Relevant Medications    triamcinolone acetonide 0.025% (KENALOG) 0.025 % cream          Follow up in about 1 month (around 5/13/2022) for diabetes.      RTC/ED precautions discussed where applicable.   Encouraged patient to let me know if there are any further questions/concerns.     Advise patient/caretaker to check with insurance regarding orders to avoid unexpected fees/costs.     The  patient/caretaker indicates understanding of these issues and agrees with the plan.    Dr. Hari Francis MD  Family Medicine

## 2022-04-20 ENCOUNTER — PATIENT OUTREACH (OUTPATIENT)
Dept: ADMINISTRATIVE | Facility: OTHER | Age: 57
End: 2022-04-20
Payer: COMMERCIAL

## 2022-04-20 DIAGNOSIS — E11.9 TYPE 2 DIABETES MELLITUS WITHOUT COMPLICATION, UNSPECIFIED WHETHER LONG TERM INSULIN USE: ICD-10-CM

## 2022-04-20 NOTE — PROGRESS NOTES
Health Maintenance Due   Topic Date Due    Hepatitis C Screening  Never done    Shingles Vaccine (1 of 2) Never done    Foot Exam  02/08/2020    Diabetes Urine Screening  09/12/2020    Mammogram  01/17/2021    Eye Exam  03/09/2022     Updates were requested from care everywhere.  Chart was reviewed for overdue Proactive [facility] Encounters ([facility]) topics (CRS, Breast Cancer Screening, Eye exam)  Health Maintenance has been updated.  LINKS immunization registry triggered.  Immunizations were reconciled.

## 2022-05-03 ENCOUNTER — OFFICE VISIT (OUTPATIENT)
Dept: GASTROENTEROLOGY | Facility: CLINIC | Age: 57
End: 2022-05-03
Payer: COMMERCIAL

## 2022-05-03 VITALS
DIASTOLIC BLOOD PRESSURE: 84 MMHG | HEART RATE: 84 BPM | BODY MASS INDEX: 35.58 KG/M2 | WEIGHT: 182.19 LBS | SYSTOLIC BLOOD PRESSURE: 134 MMHG

## 2022-05-03 DIAGNOSIS — K30 DELAYED GASTRIC EMPTYING: ICD-10-CM

## 2022-05-03 DIAGNOSIS — R10.12 LUQ PAIN: Primary | ICD-10-CM

## 2022-05-03 PROBLEM — R10.84 ABDOMINAL PAIN, GENERALIZED: Status: RESOLVED | Noted: 2019-06-27 | Resolved: 2022-05-03

## 2022-05-03 PROCEDURE — 4010F ACE/ARB THERAPY RXD/TAKEN: CPT | Mod: CPTII,S$GLB,, | Performed by: INTERNAL MEDICINE

## 2022-05-03 PROCEDURE — 1159F PR MEDICATION LIST DOCUMENTED IN MEDICAL RECORD: ICD-10-PCS | Mod: CPTII,S$GLB,, | Performed by: INTERNAL MEDICINE

## 2022-05-03 PROCEDURE — 1160F RVW MEDS BY RX/DR IN RCRD: CPT | Mod: CPTII,S$GLB,, | Performed by: INTERNAL MEDICINE

## 2022-05-03 PROCEDURE — 3075F PR MOST RECENT SYSTOLIC BLOOD PRESS GE 130-139MM HG: ICD-10-PCS | Mod: CPTII,S$GLB,, | Performed by: INTERNAL MEDICINE

## 2022-05-03 PROCEDURE — 3046F HEMOGLOBIN A1C LEVEL >9.0%: CPT | Mod: CPTII,S$GLB,, | Performed by: INTERNAL MEDICINE

## 2022-05-03 PROCEDURE — 99999 PR PBB SHADOW E&M-EST. PATIENT-LVL V: ICD-10-PCS | Mod: PBBFAC,,, | Performed by: INTERNAL MEDICINE

## 2022-05-03 PROCEDURE — 3008F PR BODY MASS INDEX (BMI) DOCUMENTED: ICD-10-PCS | Mod: CPTII,S$GLB,, | Performed by: INTERNAL MEDICINE

## 2022-05-03 PROCEDURE — 4010F PR ACE/ARB THEARPY RXD/TAKEN: ICD-10-PCS | Mod: CPTII,S$GLB,, | Performed by: INTERNAL MEDICINE

## 2022-05-03 PROCEDURE — 99214 OFFICE O/P EST MOD 30 MIN: CPT | Mod: S$GLB,,, | Performed by: INTERNAL MEDICINE

## 2022-05-03 PROCEDURE — 1160F PR REVIEW ALL MEDS BY PRESCRIBER/CLIN PHARMACIST DOCUMENTED: ICD-10-PCS | Mod: CPTII,S$GLB,, | Performed by: INTERNAL MEDICINE

## 2022-05-03 PROCEDURE — 3008F BODY MASS INDEX DOCD: CPT | Mod: CPTII,S$GLB,, | Performed by: INTERNAL MEDICINE

## 2022-05-03 PROCEDURE — 1159F MED LIST DOCD IN RCRD: CPT | Mod: CPTII,S$GLB,, | Performed by: INTERNAL MEDICINE

## 2022-05-03 PROCEDURE — 99999 PR PBB SHADOW E&M-EST. PATIENT-LVL V: CPT | Mod: PBBFAC,,, | Performed by: INTERNAL MEDICINE

## 2022-05-03 PROCEDURE — 3075F SYST BP GE 130 - 139MM HG: CPT | Mod: CPTII,S$GLB,, | Performed by: INTERNAL MEDICINE

## 2022-05-03 PROCEDURE — 3046F PR MOST RECENT HEMOGLOBIN A1C LEVEL > 9.0%: ICD-10-PCS | Mod: CPTII,S$GLB,, | Performed by: INTERNAL MEDICINE

## 2022-05-03 PROCEDURE — 3079F PR MOST RECENT DIASTOLIC BLOOD PRESSURE 80-89 MM HG: ICD-10-PCS | Mod: CPTII,S$GLB,, | Performed by: INTERNAL MEDICINE

## 2022-05-03 PROCEDURE — 3079F DIAST BP 80-89 MM HG: CPT | Mod: CPTII,S$GLB,, | Performed by: INTERNAL MEDICINE

## 2022-05-03 PROCEDURE — 99214 PR OFFICE/OUTPT VISIT, EST, LEVL IV, 30-39 MIN: ICD-10-PCS | Mod: S$GLB,,, | Performed by: INTERNAL MEDICINE

## 2022-05-03 RX ORDER — SUCRALFATE 1 G/1
1 TABLET ORAL
Qty: 60 TABLET | Refills: 1 | Status: SHIPPED | OUTPATIENT
Start: 2022-05-03 | End: 2022-05-19

## 2022-05-03 RX ORDER — DICYCLOMINE HYDROCHLORIDE 20 MG/1
20 TABLET ORAL 3 TIMES DAILY PRN
Qty: 90 TABLET | Refills: 2 | Status: SHIPPED | OUTPATIENT
Start: 2022-05-03 | End: 2023-07-23

## 2022-05-03 NOTE — PROGRESS NOTES
Subjective:       Patient ID: Loi Cordova is a 56 y.o. female.    Chief Complaint: Abdominal Pain, Diarrhea, and Emesis    57 yo F known to me with GERD and alternating bowel habits complains of LUQ pain.  She had an acute illness with n/v/d with the vomiting and diarrhea having resolved, but the pain has persisted.  It is left of middle at the rib cage and extends under the left rib cage.  She does not relate the pain to eating or defecating, and neither does she relate it to deep breathing but I see her wince when she breathes deep or moves a certain way.  The Robaxin given by the ED did not help (took for 3 days).    She continues to have foul smelling belches, which is more frequent than previous.  This is not related to this new pain.    Review of Systems   Constitutional: Negative for chills and fever.   Respiratory: Negative for shortness of breath and wheezing.    Cardiovascular: Negative for chest pain, palpitations and leg swelling.   Gastrointestinal: Positive for abdominal pain.   Neurological: Negative for dizziness and speech difficulty.     Objective:       /84 (BP Location: Right arm, Patient Position: Sitting, BP Method: Large (Manual))   Pulse 84   Wt 82.6 kg (182 lb 3.2 oz)   LMP 07/29/1990 (Within Years) Comment: Partical Hysterectomy in 1990, Gutierrez Ooopherectomy in 2000  BMI 35.58 kg/m²     Physical Exam  Constitutional:       Appearance: Normal appearance. She is well-developed.      Comments: Visibly uncomfortable at times   HENT:      Head: Normocephalic and atraumatic.   Eyes:      Extraocular Movements: Extraocular movements intact.      Pupils: Pupils are equal, round, and reactive to light.   Pulmonary:      Effort: Pulmonary effort is normal. No respiratory distress.   Abdominal:      General: There is no distension.      Palpations: Abdomen is soft.      Tenderness: There is abdominal tenderness.   Neurological:      General: No focal deficit present.      Mental Status: She is  alert and oriented to person, place, and time.   Psychiatric:         Mood and Affect: Mood normal.         Behavior: Behavior normal.         Thought Content: Thought content normal.         Judgment: Judgment normal.       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    Assessment:       Problem List Items Addressed This Visit        GI    LUQ pain - Primary    Relevant Medications    sucralfate (CARAFATE) 1 gram tablet    dicyclomine (BENTYL) 20 mg tablet    Other Relevant Orders    US Abdomen Complete    NM Gastric Emptying    Delayed gastric emptying    Relevant Medications    sucralfate (CARAFATE) 1 gram tablet    dicyclomine (BENTYL) 20 mg tablet    Other Relevant Orders    US Abdomen Complete    NM Gastric Emptying          Plan:       LUQ pain  -     US Abdomen Complete; Future; Expected date: 05/03/2022  -     sucralfate (CARAFATE) 1 gram tablet; Take 1 tablet (1 g total) by mouth 4 (four) times daily before meals and nightly. for 14 days  Dispense: 60 tablet; Refill: 1  -     dicyclomine (BENTYL) 20 mg tablet; Take 1 tablet (20 mg total) by mouth 3 (three) times daily as needed (abd pain).  Dispense: 90 tablet; Refill: 2  -     X-Ray Chest PA And Lateral; Future; Expected date: 05/03/2022    Delayed gastric emptying  -     NM Gastric Emptying; Future; Expected date: 05/03/2022

## 2022-05-04 ENCOUNTER — PATIENT OUTREACH (OUTPATIENT)
Dept: DIABETES | Facility: CLINIC | Age: 57
End: 2022-05-04
Payer: COMMERCIAL

## 2022-05-06 ENCOUNTER — OFFICE VISIT (OUTPATIENT)
Dept: CARDIOLOGY | Facility: CLINIC | Age: 57
End: 2022-05-06
Payer: COMMERCIAL

## 2022-05-06 ENCOUNTER — PATIENT OUTREACH (OUTPATIENT)
Dept: ADMINISTRATIVE | Facility: HOSPITAL | Age: 57
End: 2022-05-06
Payer: COMMERCIAL

## 2022-05-06 VITALS
DIASTOLIC BLOOD PRESSURE: 101 MMHG | SYSTOLIC BLOOD PRESSURE: 128 MMHG | BODY MASS INDEX: 26.03 KG/M2 | OXYGEN SATURATION: 98 % | WEIGHT: 137.88 LBS | HEIGHT: 61 IN | HEART RATE: 136 BPM

## 2022-05-06 DIAGNOSIS — I25.118 CORONARY ARTERY DISEASE OF NATIVE ARTERY OF NATIVE HEART WITH STABLE ANGINA PECTORIS: ICD-10-CM

## 2022-05-06 DIAGNOSIS — I50.32 CHRONIC DIASTOLIC CHF (CONGESTIVE HEART FAILURE): ICD-10-CM

## 2022-05-06 DIAGNOSIS — E11.42 TYPE 2 DIABETES MELLITUS WITH DIABETIC POLYNEUROPATHY, WITHOUT LONG-TERM CURRENT USE OF INSULIN: Chronic | ICD-10-CM

## 2022-05-06 DIAGNOSIS — E66.01 CLASS 2 SEVERE OBESITY DUE TO EXCESS CALORIES WITH SERIOUS COMORBIDITY AND BODY MASS INDEX (BMI) OF 36.0 TO 36.9 IN ADULT: ICD-10-CM

## 2022-05-06 DIAGNOSIS — R09.89 LEFT CAROTID BRUIT: ICD-10-CM

## 2022-05-06 DIAGNOSIS — I25.10 CAD S/P PERCUTANEOUS CORONARY ANGIOPLASTY: Chronic | ICD-10-CM

## 2022-05-06 DIAGNOSIS — E78.00 PURE HYPERCHOLESTEROLEMIA: ICD-10-CM

## 2022-05-06 DIAGNOSIS — Z98.61 CAD S/P PERCUTANEOUS CORONARY ANGIOPLASTY: Chronic | ICD-10-CM

## 2022-05-06 DIAGNOSIS — I10 ESSENTIAL HYPERTENSION: Chronic | ICD-10-CM

## 2022-05-06 DIAGNOSIS — Z72.0 TOBACCO ABUSE: Chronic | ICD-10-CM

## 2022-05-06 DIAGNOSIS — R07.89 OTHER CHEST PAIN: ICD-10-CM

## 2022-05-06 PROCEDURE — 99204 OFFICE O/P NEW MOD 45 MIN: CPT | Mod: S$GLB,,,

## 2022-05-06 PROCEDURE — 99999 PR PBB SHADOW E&M-EST. PATIENT-LVL V: ICD-10-PCS | Mod: PBBFAC,,,

## 2022-05-06 PROCEDURE — 3074F PR MOST RECENT SYSTOLIC BLOOD PRESSURE < 130 MM HG: ICD-10-PCS | Mod: CPTII,S$GLB,,

## 2022-05-06 PROCEDURE — 3080F PR MOST RECENT DIASTOLIC BLOOD PRESSURE >= 90 MM HG: ICD-10-PCS | Mod: CPTII,S$GLB,,

## 2022-05-06 PROCEDURE — 3074F SYST BP LT 130 MM HG: CPT | Mod: CPTII,S$GLB,,

## 2022-05-06 PROCEDURE — 1160F PR REVIEW ALL MEDS BY PRESCRIBER/CLIN PHARMACIST DOCUMENTED: ICD-10-PCS | Mod: CPTII,S$GLB,,

## 2022-05-06 PROCEDURE — 3008F BODY MASS INDEX DOCD: CPT | Mod: CPTII,S$GLB,,

## 2022-05-06 PROCEDURE — 99204 PR OFFICE/OUTPT VISIT, NEW, LEVL IV, 45-59 MIN: ICD-10-PCS | Mod: S$GLB,,,

## 2022-05-06 PROCEDURE — 1160F RVW MEDS BY RX/DR IN RCRD: CPT | Mod: CPTII,S$GLB,,

## 2022-05-06 PROCEDURE — 1159F MED LIST DOCD IN RCRD: CPT | Mod: CPTII,S$GLB,,

## 2022-05-06 PROCEDURE — 4010F PR ACE/ARB THEARPY RXD/TAKEN: ICD-10-PCS | Mod: CPTII,S$GLB,,

## 2022-05-06 PROCEDURE — 3008F PR BODY MASS INDEX (BMI) DOCUMENTED: ICD-10-PCS | Mod: CPTII,S$GLB,,

## 2022-05-06 PROCEDURE — 4010F ACE/ARB THERAPY RXD/TAKEN: CPT | Mod: CPTII,S$GLB,,

## 2022-05-06 PROCEDURE — 1159F PR MEDICATION LIST DOCUMENTED IN MEDICAL RECORD: ICD-10-PCS | Mod: CPTII,S$GLB,,

## 2022-05-06 PROCEDURE — 3080F DIAST BP >= 90 MM HG: CPT | Mod: CPTII,S$GLB,,

## 2022-05-06 PROCEDURE — 3046F HEMOGLOBIN A1C LEVEL >9.0%: CPT | Mod: CPTII,S$GLB,,

## 2022-05-06 PROCEDURE — 3046F PR MOST RECENT HEMOGLOBIN A1C LEVEL > 9.0%: ICD-10-PCS | Mod: CPTII,S$GLB,,

## 2022-05-06 PROCEDURE — 99999 PR PBB SHADOW E&M-EST. PATIENT-LVL V: CPT | Mod: PBBFAC,,,

## 2022-05-06 NOTE — ASSESSMENT & PLAN NOTE
Patient is identified as having Diastolic (HFpEF) heart failure that is Acute on chronic. CHF is currently uncontrolled due to Continued edema of extremities and Dyspnea not returned to baseline after 0 doses of IV diuretic. Latest ECHO performed and demonstrates- No results found for this or any previous visit.  . Continue Beta Blocker, ACE/ARB and Furosemide and monitor clinical status closely. Monitor on telemetry. Patient is off CHF pathway.  Monitor strict Is&Os and daily weights.  Place on fluid restriction of 2 L. Continue to stress to patient importance of self efficacy and  on diet for CHF. Last BNP reviewed- and noted below @LABRCNTIP(BNP,BNPTRIAGEBLO)@.    -continue furosemide.  -Repeat Cardiac echo ordered

## 2022-05-06 NOTE — ASSESSMENT & PLAN NOTE
-Pt aware of health complications a/w smoking and desires to quit.    - cessation counseling provided   - following with cessation course

## 2022-05-06 NOTE — ASSESSMENT & PLAN NOTE
- carotid doppler.  1/7/22  Impression:     50-69% stenosis on the right.     Less than 50% stenosis on the left.

## 2022-05-06 NOTE — ASSESSMENT & PLAN NOTE
Goal LDL < 70.   - Improving LDL 88 1/11/22  - continue medical therapy  - risk factor and lifestyle modifications

## 2022-05-06 NOTE — PROGRESS NOTES
Subjective:    Patient ID:  Loi Cordova is a 56 y.o. female who presents for follow-up of No chief complaint on file.      PCP: Hari Francis MD     HPI: Pt is a 57 yo F w/ PMH of tobacco abuse (1/2 PPD, 38 pk/yrs), DM2, Class 2 Obesity w/ BMI 36.3, CAD s/p PCI ( LAD, D2,Om2 PTCA OM3 2016) ( PTCA mLAD 2018, mRCA ISR 2017) (ostial RCA w/ 3.0x15 mm THEE 8/24/2019), HTN, and HLD who presents today for follow up appt.  She was last seen by Dr. Corey on 1/7/22 to establish care from prior Cardiologist abdominal tenderness which time she reported symptoms of neck pain and chest pain which is similar to her prior angina and is pressure like occurring for 2-3 wks associated with  postural presyncope x1 day and fatigue which is chronic. She was continued on medical therapy and cardiac work up test ordered. Recent ED visit for GI issues. She notes hall at times however is not severe.  She also has BLE edema and orthopnea. She denies palpitations, LOC, claudication, and PND.  She notes compliance w/ meds and denies side effects. She does not monitor her BP regularly.  She does not exercise and is sedentary.       Past Medical History:   Diagnosis Date    Allergy     Asthma     Coronary artery disease     GERD (gastroesophageal reflux disease)     History of back surgery 2/20/2018    Hyperlipidemia     Hypertension     Type 2 diabetes mellitus with diabetic polyneuropathy, without long-term current use of insulin 2/8/2019     Past Surgical History:   Procedure Laterality Date    BILATERAL OOPHORECTOMY Bilateral 2000    BREAST BIOPSY Left 2/14/2020    Procedure: BIOPSY, BREAST-Left medial breast palpation guided;  Surgeon: Paulino Espinoza MD;  Location: [facility] OR [address] [place] [address];  Service: General;  Laterality: Left;    CARDIAC CATHETERIZATION      7 stents    CHOLECYSTECTOMY      COLONOSCOPY N/A 7/16/2019    Procedure: COLONOSCOPY;  Surgeon: Sarah Gamez MD;  Location: [facility];  Service: Endoscopy;   Laterality: N/A;    ESOPHAGOGASTRODUODENOSCOPY N/A 1/9/2020    Procedure: EGD (ESOPHAGOGASTRODUODENOSCOPY);  Surgeon: Sarah Gamez MD;  Location[facility];  Service: Endoscopy;  Laterality: N/A;    HYSTERECTOMY      PARTIAL HYSTERECTOMY N/A 1990    Uterus taken out    SHOULDER SURGERY      TOTAL KNEE ARTHROPLASTY       Social History     Socioeconomic History    Marital status:    Tobacco Use    Smoking status: Current Every Day Smoker     Packs/day: 0.50     Years: 34.00     Pack years: 17.00     Types: Cigarettes    Smokeless tobacco: Never Used    Tobacco comment: in smoking program 1/25/21   Substance and Sexual Activity    Alcohol use: Not Currently     Comment: 6 months    Drug use: No    Sexual activity: Yes     Partners: Male     Social Determinants of Health     Financial Resource Strain: Low Risk     Difficulty of Paying Living Expenses: Not hard at all   Food Insecurity: No Food Insecurity    Worried About Running Out of Food in the Last Year: Never true    Ran Out of Food in the Last Year: Never true   Transportation Needs: No Transportation Needs    Lack of Transportation (Medical): No    Lack of Transportation (Non-Medical): No   Physical Activity: Unknown    Days of Exercise per Week: 0 days   Stress: Stress Concern Present    Feeling of Stress : Very much   Social Connections: Unknown    Frequency of Communication with Friends and Family: More than three times a week    Frequency of Social Gatherings with Friends and Family: More than three times a week    Active Member of Clubs or Organizations: No    Attends Club or Organization Meetings: Never    Marital Status:    Housing Stability: High Risk    Unable to Pay for Housing in the Last Year: Yes    Number of Places Lived in the Last Year: 1    Unstable Housing in the Last Year: No     Family History   Problem Relation Age of Onset    Heart disease Mother     Hyperlipidemia Mother     Hypertension  Mother     Diabetes Mother     Heart disease Father     Hyperlipidemia Father     Cancer Father     Diabetes Maternal Aunt     Prostate cancer Neg Hx     Kidney disease Neg Hx        Review of patient's allergies indicates:   Allergen Reactions    Crayfish Anaphylaxis     (crawfish only)       Medication List with Changes/Refills   Current Medications    ALBUTEROL (PROVENTIL/VENTOLIN HFA) 90 MCG/ACTUATION INHALER    Inhale 2 puffs into the lungs every 4 to 6 hours as needed.    ALPRAZOLAM (XANAX) 0.25 MG TABLET    Take 1 tablet (0.25 mg total) by mouth daily as needed for Anxiety.    AMLODIPINE (NORVASC) 5 MG TABLET    Take 1 tablet by mouth once a day.    ASPIRIN 81 MG CHEW    Take 81 mg by mouth once daily.    ATORVASTATIN (LIPITOR) 80 MG TABLET    Take 1 tablet by mouth once a day.    DICYCLOMINE (BENTYL) 20 MG TABLET    Take 1 tablet (20 mg total) by mouth 3 (three) times daily as needed (abd pain).    DULAGLUTIDE (TRULICITY) 0.75 MG/0.5 ML PEN INJECTOR    Inject 0.75 mg into the skin every 7 days.    EZETIMIBE (ZETIA) 10 MG TABLET    Take 1 tablet by mouth once a day.    FENOFIBRATE 160 MG TAB    Take 1 tablet (160 mg total) by mouth once daily.    FLUTICASONE FUROATE-VILANTEROL (BREO) 100-25 MCG/DOSE DISKUS INHALER    Inhale 1 puff into the lungs once daily. Controller    FUROSEMIDE (LASIX) 40 MG TABLET    Take 1 tablet (40 mg total) by mouth daily as needed (edema).    IBUPROFEN (ADVIL,MOTRIN) 600 MG TABLET    Take 1 tablet (600 mg total) by mouth every 6 (six) hours as needed for Pain.    ISOSORBIDE MONONITRATE (IMDUR) 30 MG 24 HR TABLET    Take 1 tablet (30 mg total) by mouth once daily.    LISINOPRIL (PRINIVIL,ZESTRIL) 40 MG TABLET    Take 0.5 tablets (20 mg total) by mouth once daily. COURTESY NAYE, APPT. NEEDED    METOPROLOL SUCCINATE (TOPROL-XL) 100 MG 24 HR TABLET    Take 1 tablet (100 mg total) by mouth once daily.    NICOTINE (NICODERM CQ) 21 MG/24 HR    Place 1 patch onto the skin once  "daily.    NITROGLYCERIN (NITROSTAT) 0.4 MG SL TABLET    Place 1 tablet under the tongue once every 5 minutes for 3 doses for chest pain, if pain continues call 911    OMEPRAZOLE (PRILOSEC) 40 MG CAPSULE    Take 1 capsule (40 mg total) by mouth once daily.    POTASSIUM CHLORIDE (K-TAB) 20 MEQ    Take 1 tablet (20 mEq total) by mouth daily as needed (edema). Take with lasix if needed for edema    ROPINIROLE (REQUIP) 0.5 MG TABLET    Take 1 tablet by mouth once in the evening.    SUCRALFATE (CARAFATE) 1 GRAM TABLET    Take 1 tablet (1 g total) by mouth 4 (four) times daily before meals and nightly. for 14 days    TRAZODONE (DESYREL) 100 MG TABLET    Take 1 tablet orally once in the evening.    TRIAMCINOLONE ACETONIDE 0.025% (KENALOG) 0.025 % CREAM    Apply topically 2 (two) times daily.       Review of Systems   Constitutional: Negative for diaphoresis and fever.   HENT: Negative for congestion and hearing loss.    Eyes: Negative for blurred vision and pain.   Cardiovascular: Positive for dyspnea on exertion. Negative for chest pain, claudication, leg swelling, near-syncope, palpitations and syncope.   Respiratory: Negative for shortness of breath and sleep disturbances due to breathing.    Hematologic/Lymphatic: Negative for bleeding problem. Does not bruise/bleed easily.   Skin: Negative for color change and poor wound healing.   Gastrointestinal: Negative for abdominal pain and nausea.   Genitourinary: Negative for bladder incontinence and flank pain.   Neurological: Negative for focal weakness and light-headedness.        Objective:   BP (!) 128/101   Pulse (!) 136   Ht 5' 1" (1.549 m)   Wt 62.6 kg (137 lb 14.4 oz)   LMP 07/29/1990 (Within Years) Comment: Partical Hysterectomy in 1990, Gutierrez Ooopherectomy in 2000  SpO2 98%   BMI 26.06 kg/m²    Physical Exam  Constitutional:       Appearance: She is well-developed. She is not diaphoretic.   HENT:      Head: Normocephalic and atraumatic.   Eyes:      General: No " scleral icterus.     Pupils: Pupils are equal, round, and reactive to light.   Neck:      Vascular: No JVD.   Cardiovascular:      Rate and Rhythm: Normal rate and regular rhythm.      Pulses: Intact distal pulses.           Carotid pulses are on the left side with bruit.       Radial pulses are 2+ on the right side and 2+ on the left side.        Popliteal pulses are 2+ on the right side and 2+ on the left side.        Dorsalis pedis pulses are 2+ on the right side and 2+ on the left side.      Heart sounds: S1 normal and S2 normal. No murmur heard.    No friction rub. No gallop.   Pulmonary:      Effort: Pulmonary effort is normal. No respiratory distress.      Breath sounds: Normal breath sounds. No wheezing or rales.   Chest:      Chest wall: No tenderness.   Abdominal:      General: Bowel sounds are normal. There is no distension.      Palpations: Abdomen is soft. There is no mass.      Tenderness: There is no abdominal tenderness. There is no rebound.   Musculoskeletal:         General: No tenderness. Normal range of motion.      Cervical back: Normal range of motion and neck supple.   Skin:     General: Skin is warm and dry.      Coloration: Skin is not pale.   Neurological:      Mental Status: She is alert and oriented to person, place, and time.      Coordination: Coordination normal.      Deep Tendon Reflexes: Reflexes normal.   Psychiatric:         Behavior: Behavior normal.         Judgment: Judgment normal.           Assessment:       1. Chest pain    2. CAD S/P percutaneous coronary angioplasty    3. Essential hypertension    4. Coronary artery disease of native artery of native heart with stable angina pectoris    5. Pure hypercholesterolemia    6. Left carotid bruit    7. Type 2 diabetes mellitus with diabetic polyneuropathy, without long-term current use of insulin    8. Class 2 severe obesity due to excess calories with serious comorbidity and body mass index (BMI) of 36.0 to 36.9 in adult    9.  Tobacco abuse    10. Chronic diastolic CHF (congestive heart failure)         Plan:         CAD S/P percutaneous coronary angioplasty  Pt w/ typical and atypical features of cp.  Compliant w/ meds.  H/o multiple PCIs in past.    - continue medical therapy   - risk factor and lifestyle modifications  -  Lexiscan      1/7/22   Conclusion         The EKG portion of this study is negative for ischemia.    The patient reported no chest pain during the stress test.    There were no arrhythmias during stress.    The nuclear portion of this study will be reported separately.  Impression:     No significant abnormality.         -Cardiac echo previously order and patient has not completed the test       Essential hypertension  Goal BP < 130/80.  Compliant w/ meds.    - continue medical therapy  - enroll in digital medicine prgm  - risk factor and lifestyle modifications     Coronary artery disease of native artery of native heart with stable angina pectoris  Pt w/ typical and atypical features of cp.  Compliant w/ meds.  H/o multiple PCIs in past.    - continue medical therapy   - risk factor and lifestyle modifications  -  Lexiscan      1/7/22   Conclusion         The EKG portion of this study is negative for ischemia.    The patient reported no chest pain during the stress test.    There were no arrhythmias during stress.    The nuclear portion of this study will be reported separately.  Impression:     No significant abnormality.         -Cardiac echo previously order and patient has not completed the test       Pure hypercholesterolemia  Goal LDL < 70.   - Improving LDL 88 1/11/22  - continue medical therapy  - risk factor and lifestyle modifications     Left carotid bruit  - carotid doppler.  1/7/22  Impression:     50-69% stenosis on the right.     Less than 50% stenosis on the left.       Type 2 diabetes mellitus with diabetic polyneuropathy, without long-term current use of insulin  Followed by PCP.      Class  2 severe obesity due to excess calories with serious comorbidity and body mass index (BMI) of 36.0 to 36.9 in adult  Encouraged lifestyle modifications (diet, exercise, and weight loss).      Tobacco abuse  -Pt aware of health complications a/w smoking and desires to quit.    - cessation counseling provided   - following with cessation course    Chronic diastolic CHF (congestive heart failure)  Patient is identified as having Diastolic (HFpEF) heart failure that is Acute on chronic. CHF is currently uncontrolled due to Continued edema of extremities and Dyspnea not returned to baseline after 0 doses of IV diuretic. Latest ECHO performed and demonstrates- No results found for this or any previous visit.  . Continue Beta Blocker, ACE/ARB and Furosemide and monitor clinical status closely. Monitor on telemetry. Patient is off CHF pathway.  Monitor strict Is&Os and daily weights.  Place on fluid restriction of 2 L. Continue to stress to patient importance of self efficacy and  on diet for CHF. Last BNP reviewed- and noted below @LABRCNTIP(BNP,BNPTRIAGEBLO)@.    -continue furosemide.  -Repeat Cardiac echo ordered       Total duration of face to face visit time 30 minutes.  Total time spent counseling greater than fifty percent of total visit time.  Counseling included discussion regarding imaging findings, diagnosis, possibilities, treatment options, risks and benefits.  The patient had many questions regarding the options and long-term effects      Jose Marcum, DARRELL  Cardiology

## 2022-05-06 NOTE — ASSESSMENT & PLAN NOTE
Pt w/ typical and atypical features of cp.  Compliant w/ meds.  H/o multiple PCIs in past.    - continue medical therapy   - risk factor and lifestyle modifications  -  Lexiscan      1/7/22   Conclusion         The EKG portion of this study is negative for ischemia.    The patient reported no chest pain during the stress test.    There were no arrhythmias during stress.    The nuclear portion of this study will be reported separately.  Impression:     No significant abnormality.         -Cardiac echo previously order and patient has not completed the test

## 2022-05-11 DIAGNOSIS — E11.9 TYPE 2 DIABETES MELLITUS WITHOUT COMPLICATION: ICD-10-CM

## 2022-05-13 ENCOUNTER — OFFICE VISIT (OUTPATIENT)
Dept: FAMILY MEDICINE | Facility: CLINIC | Age: 57
End: 2022-05-13
Payer: COMMERCIAL

## 2022-05-13 VITALS
SYSTOLIC BLOOD PRESSURE: 110 MMHG | HEART RATE: 78 BPM | BODY MASS INDEX: 34.55 KG/M2 | WEIGHT: 183 LBS | DIASTOLIC BLOOD PRESSURE: 60 MMHG | HEIGHT: 61 IN | OXYGEN SATURATION: 97 %

## 2022-05-13 DIAGNOSIS — I10 ESSENTIAL HYPERTENSION: Primary | Chronic | ICD-10-CM

## 2022-05-13 DIAGNOSIS — E11.42 TYPE 2 DIABETES MELLITUS WITH DIABETIC POLYNEUROPATHY, WITHOUT LONG-TERM CURRENT USE OF INSULIN: ICD-10-CM

## 2022-05-13 DIAGNOSIS — R35.1 NOCTURIA: ICD-10-CM

## 2022-05-13 PROCEDURE — 99999 PR PBB SHADOW E&M-EST. PATIENT-LVL V: ICD-10-PCS | Mod: PBBFAC,,, | Performed by: FAMILY MEDICINE

## 2022-05-13 PROCEDURE — 99214 PR OFFICE/OUTPT VISIT, EST, LEVL IV, 30-39 MIN: ICD-10-PCS | Mod: S$GLB,,, | Performed by: FAMILY MEDICINE

## 2022-05-13 PROCEDURE — 1160F RVW MEDS BY RX/DR IN RCRD: CPT | Mod: CPTII,S$GLB,, | Performed by: FAMILY MEDICINE

## 2022-05-13 PROCEDURE — 3078F DIAST BP <80 MM HG: CPT | Mod: CPTII,S$GLB,, | Performed by: FAMILY MEDICINE

## 2022-05-13 PROCEDURE — 99999 PR PBB SHADOW E&M-EST. PATIENT-LVL V: CPT | Mod: PBBFAC,,, | Performed by: FAMILY MEDICINE

## 2022-05-13 PROCEDURE — 3074F SYST BP LT 130 MM HG: CPT | Mod: CPTII,S$GLB,, | Performed by: FAMILY MEDICINE

## 2022-05-13 PROCEDURE — 3046F HEMOGLOBIN A1C LEVEL >9.0%: CPT | Mod: CPTII,S$GLB,, | Performed by: FAMILY MEDICINE

## 2022-05-13 PROCEDURE — 4010F PR ACE/ARB THEARPY RXD/TAKEN: ICD-10-PCS | Mod: CPTII,S$GLB,, | Performed by: FAMILY MEDICINE

## 2022-05-13 PROCEDURE — 4010F ACE/ARB THERAPY RXD/TAKEN: CPT | Mod: CPTII,S$GLB,, | Performed by: FAMILY MEDICINE

## 2022-05-13 PROCEDURE — 3008F PR BODY MASS INDEX (BMI) DOCUMENTED: ICD-10-PCS | Mod: CPTII,S$GLB,, | Performed by: FAMILY MEDICINE

## 2022-05-13 PROCEDURE — 3008F BODY MASS INDEX DOCD: CPT | Mod: CPTII,S$GLB,, | Performed by: FAMILY MEDICINE

## 2022-05-13 PROCEDURE — 1159F MED LIST DOCD IN RCRD: CPT | Mod: CPTII,S$GLB,, | Performed by: FAMILY MEDICINE

## 2022-05-13 PROCEDURE — 3046F PR MOST RECENT HEMOGLOBIN A1C LEVEL > 9.0%: ICD-10-PCS | Mod: CPTII,S$GLB,, | Performed by: FAMILY MEDICINE

## 2022-05-13 PROCEDURE — 1160F PR REVIEW ALL MEDS BY PRESCRIBER/CLIN PHARMACIST DOCUMENTED: ICD-10-PCS | Mod: CPTII,S$GLB,, | Performed by: FAMILY MEDICINE

## 2022-05-13 PROCEDURE — 1159F PR MEDICATION LIST DOCUMENTED IN MEDICAL RECORD: ICD-10-PCS | Mod: CPTII,S$GLB,, | Performed by: FAMILY MEDICINE

## 2022-05-13 PROCEDURE — 3078F PR MOST RECENT DIASTOLIC BLOOD PRESSURE < 80 MM HG: ICD-10-PCS | Mod: CPTII,S$GLB,, | Performed by: FAMILY MEDICINE

## 2022-05-13 PROCEDURE — 99214 OFFICE O/P EST MOD 30 MIN: CPT | Mod: S$GLB,,, | Performed by: FAMILY MEDICINE

## 2022-05-13 PROCEDURE — 3074F PR MOST RECENT SYSTOLIC BLOOD PRESSURE < 130 MM HG: ICD-10-PCS | Mod: CPTII,S$GLB,, | Performed by: FAMILY MEDICINE

## 2022-05-13 RX ORDER — DULAGLUTIDE 1.5 MG/.5ML
1.5 INJECTION, SOLUTION SUBCUTANEOUS
Qty: 4 PEN | Refills: 0 | Status: SHIPPED | OUTPATIENT
Start: 2022-05-13 | End: 2022-06-21 | Stop reason: SDUPTHER

## 2022-05-13 RX ORDER — OXYBUTYNIN CHLORIDE 10 MG/1
10 TABLET, EXTENDED RELEASE ORAL DAILY
Qty: 30 TABLET | Refills: 1 | Status: SHIPPED | OUTPATIENT
Start: 2022-05-13 | End: 2022-06-23 | Stop reason: DRUGHIGH

## 2022-05-13 RX ORDER — DEXTROSE 4 G
TABLET,CHEWABLE ORAL
Qty: 1 EACH | Refills: 0 | Status: SHIPPED | OUTPATIENT
Start: 2022-05-13

## 2022-05-13 RX ORDER — DULAGLUTIDE 0.75 MG/.5ML
1.5 INJECTION, SOLUTION SUBCUTANEOUS
Qty: 4 PEN | Refills: 1 | Status: CANCELLED | OUTPATIENT
Start: 2022-05-13 | End: 2022-06-12

## 2022-05-13 RX ORDER — LANCETS 33 GAUGE
1 EACH MISCELLANEOUS DAILY PRN
Qty: 100 EACH | Refills: 3 | Status: SHIPPED | OUTPATIENT
Start: 2022-05-13

## 2022-05-13 NOTE — PATIENT INSTRUCTIONS
INCREASE TRULICITY FROM FROM 0.75MG TO 1.5MG WEEKLY. CHECK WITH PHARMACY IF YOU CAN EXCHANGE UNOPENED BOX

## 2022-05-13 NOTE — PROGRESS NOTES
EST PATIENT VISIT FAMILY MEDICINE    CC:   Chief Complaint   Patient presents with    Follow-up    Diabetes       HPI: Loi Cordova  is a 56 y.o. female:    Patient is here for diabetes follow up. Has not been checking sugars, is out of supplies. Has chronic urinary urgency with nocturia. She also notes stress incontinence and wears panty liners regularly. Previously was on a medication for overactive bladder which she thinks helped. Denies constipation.    ROS: Review of Systems   Constitutional: Negative for weight loss.   Eyes: Negative for blurred vision.   Cardiovascular: Negative for chest pain.   Neurological: Negative for dizziness, tremors, seizures, weakness and headaches.   Endo/Heme/Allergies: Negative for polydipsia.   Psychiatric/Behavioral: The patient is not nervous/anxious.        PMHX:   Past Medical History:   Diagnosis Date    Allergy     Asthma     Coronary artery disease     GERD (gastroesophageal reflux disease)     History of back surgery 2/20/2018    Hyperlipidemia     Hypertension     Type 2 diabetes mellitus with diabetic polyneuropathy, without long-term current use of insulin 2/8/2019       PSHX:   Past Surgical History:   Procedure Laterality Date    BILATERAL OOPHORECTOMY Bilateral 2000    BREAST BIOPSY Left 2/14/2020    Procedure: BIOPSY, BREAST-Left medial breast palpation guided;  Surgeon: Paulino Espinoza MD;  Location: [facility] OR [address] [place] [address];  Service: General;  Laterality: Left;    CARDIAC CATHETERIZATION      7 stents    CHOLECYSTECTOMY      COLONOSCOPY N/A 7/16/2019    Procedure: COLONOSCOPY;  Surgeon: Sarah Gamez MD;  Location: [facility];  Service: Endoscopy;  Laterality: N/A;    ESOPHAGOGASTRODUODENOSCOPY N/A 1/9/2020    Procedure: EGD (ESOPHAGOGASTRODUODENOSCOPY);  Surgeon: Sarah Gamez MD;  Location: [facility];  Service: Endoscopy;  Laterality: N/A;    HYSTERECTOMY      PARTIAL HYSTERECTOMY N/A 1990    Uterus taken out    SHOULDER SURGERY       TOTAL KNEE ARTHROPLASTY         FAMHX:   Family History   Problem Relation Age of Onset    Heart disease Mother     Hyperlipidemia Mother     Hypertension Mother     Diabetes Mother     Heart disease Father     Hyperlipidemia Father     Cancer Father     Diabetes Maternal Aunt     Prostate cancer Neg Hx     Kidney disease Neg Hx        SOCHX:   Social History     Socioeconomic History    Marital status:    Tobacco Use    Smoking status: Current Every Day Smoker     Packs/day: 0.50     Years: 34.00     Pack years: 17.00     Types: Cigarettes    Smokeless tobacco: Never Used    Tobacco comment: in smoking program 1/25/21   Substance and Sexual Activity    Alcohol use: Not Currently     Comment: 6 months    Drug use: No    Sexual activity: Yes     Partners: Male     Social Determinants of Health     Financial Resource Strain: Low Risk     Difficulty of Paying Living Expenses: Not hard at all   Food Insecurity: No Food Insecurity    Worried About Running Out of Food in the Last Year: Never true    Ran Out of Food in the Last Year: Never true   Transportation Needs: No Transportation Needs    Lack of Transportation (Medical): No    Lack of Transportation (Non-Medical): No   Physical Activity: Inactive    Days of Exercise per Week: 0 days    Minutes of Exercise per Session: 0 min   Stress: No Stress Concern Present    Feeling of Stress : Not at all   Social Connections: Unknown    Frequency of Communication with Friends and Family: More than three times a week    Frequency of Social Gatherings with Friends and Family: Twice a week    Active Member of Clubs or Organizations: No    Attends Club or Organization Meetings: Never    Marital Status:    Housing Stability: Low Risk     Unable to Pay for Housing in the Last Year: No    Number of Places Lived in the Last Year: 1    Unstable Housing in the Last Year: No       ALLERGIES:   Review of patient's allergies indicates:    Allergen Reactions    Crayfish Anaphylaxis     (crawfish only)       MEDS:   Current Outpatient Medications:     albuterol (PROVENTIL/VENTOLIN HFA) 90 mcg/actuation inhaler, Inhale 2 puffs into the lungs every 4 to 6 hours as needed., Disp: 18 g, Rfl: 3    ALPRAZolam (XANAX) 0.25 MG tablet, Take 1 tablet (0.25 mg total) by mouth daily as needed for Anxiety., Disp: 30 tablet, Rfl: 0    amLODIPine (NORVASC) 5 MG tablet, Take 1 tablet by mouth once a day., Disp: 30 tablet, Rfl: 12    aspirin 81 MG Chew, Take 81 mg by mouth once daily., Disp: , Rfl:     atorvastatin (LIPITOR) 80 MG tablet, Take 1 tablet by mouth once a day., Disp: 90 tablet, Rfl: 0    dicyclomine (BENTYL) 20 mg tablet, Take 1 tablet (20 mg total) by mouth 3 (three) times daily as needed (abd pain)., Disp: 90 tablet, Rfl: 2    ezetimibe (ZETIA) 10 mg tablet, Take 1 tablet by mouth once a day., Disp: 30 tablet, Rfl: 12    fenofibrate 160 MG Tab, Take 1 tablet (160 mg total) by mouth once daily., Disp: 90 tablet, Rfl: 0    fluticasone furoate-vilanteroL (BREO) 100-25 mcg/dose diskus inhaler, Inhale 1 puff into the lungs once daily. Controller, Disp: 60 each, Rfl: 0    furosemide (LASIX) 40 MG tablet, Take 1 tablet (40 mg total) by mouth daily as needed (edema)., Disp: 90 tablet, Rfl: 1    isosorbide mononitrate (IMDUR) 30 MG 24 hr tablet, Take 1 tablet (30 mg total) by mouth once daily., Disp: 30 tablet, Rfl: 11    lisinopriL (PRINIVIL,ZESTRIL) 40 MG tablet, Take 0.5 tablets (20 mg total) by mouth once daily. COURTESY NAYE, APPT. NEEDED, Disp: 90 tablet, Rfl: 0    metoprolol succinate (TOPROL-XL) 100 MG 24 hr tablet, Take 1 tablet (100 mg total) by mouth once daily., Disp: 90 tablet, Rfl: 3    nitroGLYCERIN (NITROSTAT) 0.4 MG SL tablet, Place 1 tablet under the tongue once every 5 minutes for 3 doses for chest pain, if pain continues call 911, Disp: 25 tablet, Rfl: 3    omeprazole (PRILOSEC) 40 MG capsule, Take 1 capsule (40 mg total) by  mouth once daily., Disp: 90 capsule, Rfl: 1    potassium chloride (K-TAB) 20 mEq, Take 1 tablet (20 mEq total) by mouth daily as needed (edema). Take with lasix if needed for edema, Disp: 90 tablet, Rfl: 1    rOPINIRole (REQUIP) 0.5 MG tablet, Take 1 tablet by mouth once in the evening., Disp: 30 tablet, Rfl: 12    sucralfate (CARAFATE) 1 gram tablet, Take 1 tablet (1 g total) by mouth 4 (four) times daily before meals and nightly. for 14 days, Disp: 60 tablet, Rfl: 1    traZODone (DESYREL) 100 MG tablet, Take 1 tablet orally once in the evening., Disp: 30 tablet, Rfl: 12    blood sugar diagnostic Strp, Use 1 strip to check blood sugar once daily, Disp: 100 each, Rfl: 0    blood-glucose meter Misc, USE ONCE DAILY TO MONITOR GLUCOSE, Disp: 1 each, Rfl: 0    dulaglutide (TRULICITY) 1.5 mg/0.5 mL pen injector, Inject 1.5 mg into the skin every 7 days., Disp: 4 pen, Rfl: 0    ibuprofen (ADVIL,MOTRIN) 600 MG tablet, Take 1 tablet (600 mg total) by mouth every 6 (six) hours as needed for Pain. (Patient not taking: No sig reported), Disp: 20 tablet, Rfl: 0    lancets 33 gauge Misc, USE ONCE DAILY TO MONITOR GLUCOSE, Disp: 100 each, Rfl: 3    nicotine (NICODERM CQ) 21 mg/24 hr, Place 1 patch onto the skin once daily. (Patient not taking: No sig reported), Disp: 28 patch, Rfl: 0    oxybutynin (DITROPAN-XL) 10 MG 24 hr tablet, Take 1 tablet (10 mg total) by mouth once daily., Disp: 30 tablet, Rfl: 1    triamcinolone acetonide 0.025% (KENALOG) 0.025 % cream, Apply topically 2 (two) times daily., Disp: 80 g, Rfl: 0  No current facility-administered medications for this visit.    Facility-Administered Medications Ordered in Other Visits:     0.9%  NaCl infusion, , Intravenous, Continuous, Sarah Gamez MD, Stopped at 01/09/20 0925    0.9%  NaCl infusion, , Intravenous, Continuous, Sarah Gamez MD, Stopped at 01/09/20 1026    0.9%  NaCl infusion, , Intravenous, Continuous, Angélica De La Cruz MD     "ceFAZolin injection 2 g, 2 g, Intravenous, On Call Procedure, Angélica De La Cruz MD    lidocaine (PF) 10 mg/ml (1%) injection 10 mg, 1 mL, Intradermal, Once, Angélica De La Cruz MD    ondansetron injection 4 mg, 4 mg, Intravenous, Q12H PRN, Angélica De La Cruz MD    sodium chloride 0.9% flush 10 mL, 10 mL, Intravenous, PRN, Sarah Gamez MD    sodium chloride 0.9% flush 10 mL, 10 mL, Intravenous, PRN, Sarah Gamez MD    OBJECTIVE:   Vitals:    05/13/22 1259   BP: 110/60   BP Location: Left arm   Patient Position: Sitting   BP Method: Large (Manual)   Pulse: 78   SpO2: 97%   Weight: 83 kg (182 lb 15.7 oz)   Height: 5' 1" (1.549 m)     Body mass index is 34.57 kg/m².      Physical Exam  Vitals and nursing note reviewed.   Constitutional:       Appearance: Normal appearance.   HENT:      Head: Normocephalic and atraumatic.   Eyes:      General: No scleral icterus.     Extraocular Movements: Extraocular movements intact.   Pulmonary:      Effort: Pulmonary effort is normal.   Neurological:      Mental Status: She is alert.           LABS:   A1C:  Recent Labs   Lab 04/09/22  1147   Hemoglobin A1C 9.5 H     CBC:  Recent Labs   Lab 04/28/22  1950   WBC 7.22   RBC 4.48   Hemoglobin 13.4   Hematocrit 39.6   Platelets 378   MCV 88   MCH 29.9   MCHC 33.8     CMP:  Recent Labs   Lab 04/28/22  1950   Glucose 190 H   Calcium 9.3   Albumin 4.2   Total Protein 7.5   Sodium 140   Potassium 4.0   CO2 26   Chloride 106   BUN 10   Creatinine 1.01   Alkaline Phosphatase 80   ALT 32   AST 33   Total Bilirubin 0.4     LIPIDS:  Recent Labs   Lab 04/02/20  0849 09/17/20  0708 01/11/22  0704   TSH 1.510  --   --    HDL 46   < > 56   Cholesterol 193   < > 172   Triglycerides 203 H   < > 140   LDL Cholesterol 106.4   < > 88.0   HDL/Cholesterol Ratio 23.8   < > 32.6   Non-HDL Cholesterol 147   < > 116   Total Cholesterol/HDL Ratio 4.2   < > 3.1    < > = values in this interval not displayed.     TSH:  Recent Labs   Lab " 04/02/20  0849   TSH 1.510         ASSESSMENT & PLAN:    Problem List Items Addressed This Visit        Cardiac/Vascular    Essential hypertension - Primary (Chronic)    Overview     Well controlled. Continue current regimen              Renal/    Nocturia (Chronic)    Overview     Trial of oxybutynin              Endocrine    Type 2 diabetes mellitus with diabetic polyneuropathy, without long-term current use of insulin (Chronic)    Overview     Increase trulicity. Referral to DM education in place           Relevant Medications    dulaglutide (TRULICITY) 1.5 mg/0.5 mL pen injector            Follow up in about 1 month (around 6/13/2022) for diabetes.      RTC/ED precautions discussed where applicable.   Encouraged patient to let me know if there are any further questions/concerns.     Advise patient/caretaker to check with insurance regarding orders to avoid unexpected fees/costs.     The patient/caretaker indicates understanding of these issues and agrees with the plan.    Dr. Hari Francis MD  Family Medicine

## 2022-05-17 ENCOUNTER — PATIENT MESSAGE (OUTPATIENT)
Dept: ADMINISTRATIVE | Facility: HOSPITAL | Age: 57
End: 2022-05-17
Payer: COMMERCIAL

## 2022-05-19 ENCOUNTER — PATIENT MESSAGE (OUTPATIENT)
Dept: GASTROENTEROLOGY | Facility: CLINIC | Age: 57
End: 2022-05-19
Payer: COMMERCIAL

## 2022-05-19 DIAGNOSIS — Z98.61 CAD S/P PERCUTANEOUS CORONARY ANGIOPLASTY: Primary | ICD-10-CM

## 2022-05-19 DIAGNOSIS — Z72.0 TOBACCO ABUSE: ICD-10-CM

## 2022-05-19 DIAGNOSIS — E11.42 TYPE 2 DIABETES MELLITUS WITH DIABETIC POLYNEUROPATHY, WITHOUT LONG-TERM CURRENT USE OF INSULIN: ICD-10-CM

## 2022-05-19 DIAGNOSIS — I25.10 CAD S/P PERCUTANEOUS CORONARY ANGIOPLASTY: Primary | ICD-10-CM

## 2022-05-31 ENCOUNTER — PATIENT MESSAGE (OUTPATIENT)
Dept: ADMINISTRATIVE | Facility: HOSPITAL | Age: 57
End: 2022-05-31
Payer: COMMERCIAL

## 2022-06-06 ENCOUNTER — PATIENT MESSAGE (OUTPATIENT)
Dept: FAMILY MEDICINE | Facility: CLINIC | Age: 57
End: 2022-06-06
Payer: COMMERCIAL

## 2022-06-06 DIAGNOSIS — R53.83 FATIGUE, UNSPECIFIED TYPE: Primary | ICD-10-CM

## 2022-06-08 NOTE — TELEPHONE ENCOUNTER
Labs placed    Orders Placed This Encounter    CBC Without Differential    Ferritin    Iron and TIBC    TSH     Dr. Linda Barrera D.O.   [facility]

## 2022-06-09 DIAGNOSIS — F41.9 ANXIETY: ICD-10-CM

## 2022-06-10 RX ORDER — ALPRAZOLAM 0.25 MG/1
0.25 TABLET ORAL DAILY PRN
Qty: 30 TABLET | Refills: 0 | Status: SHIPPED | OUTPATIENT
Start: 2022-06-10 | End: 2022-09-17 | Stop reason: SDUPTHER

## 2022-06-10 NOTE — TELEPHONE ENCOUNTER
No new care gaps identified.  [facility] Embedded Care Gaps. Reference number: [id number]. 6/10/2022   7:15:44 AM CDT

## 2022-06-13 ENCOUNTER — PATIENT MESSAGE (OUTPATIENT)
Dept: FAMILY MEDICINE | Facility: CLINIC | Age: 57
End: 2022-06-13
Payer: COMMERCIAL

## 2022-06-13 DIAGNOSIS — D75.839 THROMBOCYTOSIS: Primary | ICD-10-CM

## 2022-06-21 NOTE — TELEPHONE ENCOUNTER
No new care gaps identified.  [facility] Embedded Care Gaps. Reference number: [id number]. 6/21/2022   4:51:00 PM CDT

## 2022-06-21 NOTE — TELEPHONE ENCOUNTER
Refill Routing Note   Medication(s) are not appropriate for processing by [facility] for the following reason(s):      - Medication requested has undergone a recent dosage adjustment (<3 months)    [facility] action(s):  Defer       Medication Therapy Plan: New start (5/13/22); Defer  Medication reconciliation completed: No     Appointments  past 12m or future 3m with PCP    Date Provider   Last Visit   5/13/2022 Hari Francis MD   Next Visit   6/23/2022 Hari Francis MD   ED visits in past 90 days: 2        Note composed:5:24 PM 06/21/2022

## 2022-06-22 ENCOUNTER — OFFICE VISIT (OUTPATIENT)
Dept: HEMATOLOGY/ONCOLOGY | Facility: CLINIC | Age: 57
End: 2022-06-22
Payer: COMMERCIAL

## 2022-06-22 DIAGNOSIS — I10 ESSENTIAL HYPERTENSION: ICD-10-CM

## 2022-06-22 DIAGNOSIS — D75.839 THROMBOCYTOSIS: Primary | ICD-10-CM

## 2022-06-22 DIAGNOSIS — R53.82 CHRONIC FATIGUE: ICD-10-CM

## 2022-06-22 DIAGNOSIS — Z72.0 TOBACCO ABUSE: ICD-10-CM

## 2022-06-22 PROCEDURE — 3046F HEMOGLOBIN A1C LEVEL >9.0%: CPT | Mod: CPTII,95,, | Performed by: NURSE PRACTITIONER

## 2022-06-22 PROCEDURE — 1160F RVW MEDS BY RX/DR IN RCRD: CPT | Mod: CPTII,95,, | Performed by: NURSE PRACTITIONER

## 2022-06-22 PROCEDURE — 99205 PR OFFICE/OUTPT VISIT, NEW, LEVL V, 60-74 MIN: ICD-10-PCS | Mod: 95,,, | Performed by: NURSE PRACTITIONER

## 2022-06-22 PROCEDURE — 1160F PR REVIEW ALL MEDS BY PRESCRIBER/CLIN PHARMACIST DOCUMENTED: ICD-10-PCS | Mod: CPTII,95,, | Performed by: NURSE PRACTITIONER

## 2022-06-22 PROCEDURE — 3046F PR MOST RECENT HEMOGLOBIN A1C LEVEL > 9.0%: ICD-10-PCS | Mod: CPTII,95,, | Performed by: NURSE PRACTITIONER

## 2022-06-22 PROCEDURE — 4010F ACE/ARB THERAPY RXD/TAKEN: CPT | Mod: CPTII,95,, | Performed by: NURSE PRACTITIONER

## 2022-06-22 PROCEDURE — 99205 OFFICE O/P NEW HI 60 MIN: CPT | Mod: 95,,, | Performed by: NURSE PRACTITIONER

## 2022-06-22 PROCEDURE — 1159F PR MEDICATION LIST DOCUMENTED IN MEDICAL RECORD: ICD-10-PCS | Mod: CPTII,95,, | Performed by: NURSE PRACTITIONER

## 2022-06-22 PROCEDURE — 1159F MED LIST DOCD IN RCRD: CPT | Mod: CPTII,95,, | Performed by: NURSE PRACTITIONER

## 2022-06-22 PROCEDURE — 4010F PR ACE/ARB THEARPY RXD/TAKEN: ICD-10-PCS | Mod: CPTII,95,, | Performed by: NURSE PRACTITIONER

## 2022-06-22 RX ORDER — DULAGLUTIDE 1.5 MG/.5ML
1.5 INJECTION, SOLUTION SUBCUTANEOUS
Qty: 4 PEN | Refills: 0 | Status: SHIPPED | OUTPATIENT
Start: 2022-06-22 | End: 2022-06-23 | Stop reason: DRUGHIGH

## 2022-06-22 NOTE — PROGRESS NOTES
Patient ID: Loi Cordova is a 56 y.o. female.    Chief Complaint: No chief complaint on file.      Telemedicine visit:  The patient location is: home  The chief complaint leading to consultation is: lung cancer    Visit type: audiovisual    Face to Face time with patient: 20 minutes  40 minutes of total time spent on the encounter, which includes face to face time and non-face to face time preparing to see the patient (eg, review of tests), Obtaining and/or reviewing separately obtained history, Documenting clinical information in the electronic or other health record, Independently interpreting results (not separately reported) and communicating results to the patient/family/caregiver, or Care coordination (not separately reported).     Each patient to whom he or she provides medical services by telemedicine is:  (1) informed of the relationship between the physician and patient and the respective role of any other health care provider with respect to management of the patient; and (2) notified that he or she may decline to receive medical services by telemedicine and may withdraw from such care at any time.    Notes: see below      History   HPI     Ms. Cordova requesting an evaluation of thrombocytosis and fatigue.  This is a 56-year-old female who was noted to have a platelet count of 544,000 on a CBC done 6/16/22 and notable elevations in past. Prior labs showed that her thrombocytosis has been present at least since December 2016 when her platelet count was 617,000, and it has been normal 3/2021 and 4/2022.  Hemoglobin and WBC are unremarkable. Recent bloodwork per PCP with no evidence anemia. No history of PE or DVT. No family history of bleeding or clotting disorders.     Recently started trulicity for her diabetes, blood sugars at home improved to 150s on average, sees pcp tomorrow for follow up. No improvement in her fatigue from better control over her blood sugars.      She has coronary artery disease and  was taking a combination of aspirin plus Effient, has now been off 3yrs since last stenting.  History of 6 blockages.  She sees NP Black now at [facility]. Stress test 1/2022 normal pt states with nuclear medicine.     She has been a smoker and currently still smoking, smoking cessation program done in past, had afib from chanx she states. Plans to start nicotine patches soon. Does not want referral to program for cessation at this time.        Past medical, surgical and family history reviewed and updated.     Past Medical History:   Diagnosis Date    Allergy     Asthma     Coronary artery disease     GERD (gastroesophageal reflux disease)     History of back surgery 2/20/2018    Hyperlipidemia     Hypertension     Type 2 diabetes mellitus with diabetic polyneuropathy, without long-term current use of insulin 2/8/2019       Past Surgical History:   Procedure Laterality Date    BILATERAL OOPHORECTOMY Bilateral 2000    BREAST BIOPSY Left 2/14/2020    Procedure: BIOPSY, BREAST-Left medial breast palpation guided;  Surgeon: Paulino Espinoza MD;  Location: [facility] OR [address] [place] [address];  Service: General;  Laterality: Left;    CARDIAC CATHETERIZATION      7 stents    CHOLECYSTECTOMY      COLONOSCOPY N/A 7/16/2019    Procedure: COLONOSCOPY;  Surgeon: Sarah Gamez MD;  Location: [facility];  Service: Endoscopy;  Laterality: N/A;    ESOPHAGOGASTRODUODENOSCOPY N/A 1/9/2020    Procedure: EGD (ESOPHAGOGASTRODUODENOSCOPY);  Surgeon: Sarah Gamez MD;  Location: [facility];  Service: Endoscopy;  Laterality: N/A;    HYSTERECTOMY      PARTIAL HYSTERECTOMY N/A 1990    Uterus taken out    SHOULDER SURGERY      TOTAL KNEE ARTHROPLASTY         Oncology History:  Oncology History    No history exists.        Review of Systems:  Review of Systems   Constitutional: Positive for fatigue. Negative for fever and unexpected weight change.   HENT: Negative for mouth sores and nosebleeds.    Eyes: Negative for visual  disturbance.   Respiratory: Negative for shortness of breath.    Cardiovascular: Negative for chest pain and palpitations.   Gastrointestinal: Positive for nausea and vomiting. Negative for anal bleeding, blood in stool, change in bowel habit, constipation, diarrhea and change in bowel habit.        Followed with GI for this, recent studies completed   Genitourinary: Negative for hematuria and vaginal bleeding.   Neurological: Negative for dizziness and light-headedness.   Hematological: Negative for adenopathy. Does not bruise/bleed easily.          Physical Exam   Vitals:  LMP 07/29/1990 (Within Years) Comment: Partical Hysterectomy in 1990, Gutierrez Ooopherectomy in 2000    Labs:  No visits with results within 2 Day(s) from this visit.   Latest known visit with results is:   Lab Visit on 06/16/2022   Component Date Value Ref Range Status    Pathologist Review 06/16/2022 Review required   Final    WBC 06/16/2022 8.41  3.90 - 12.70 K/uL Final    RBC 06/16/2022 4.65  4.00 - 5.40 M/uL Final    Hemoglobin 06/16/2022 13.8  12.0 - 16.0 g/dL Final    Hematocrit 06/16/2022 42.3  37.0 - 48.5 % Final    MCV 06/16/2022 91  82 - 98 fL Final    MCH 06/16/2022 29.7  27.0 - 31.0 pg Final    MCHC 06/16/2022 32.6  32.0 - 36.0 g/dL Final    RDW 06/16/2022 14.1  11.5 - 14.5 % Final    Platelets 06/16/2022 544 (A) 150 - 450 K/uL Final    MPV 06/16/2022 8.8 (A) 9.2 - 12.9 fL Final    Immature Granulocytes 06/16/2022 0.2  0.0 - 0.5 % Final    Gran # (ANC) 06/16/2022 5.3  1.8 - 7.7 K/uL Final    Immature Grans (Abs) 06/16/2022 0.02  0.00 - 0.04 K/uL Final    Comment: Mild elevation in immature granulocytes is non specific and   can be seen in a variety of conditions including stress response,   acute inflammation, trauma and pregnancy. Correlation with other   laboratory and clinical findings is essential.      Lymph # 06/16/2022 2.3  1.0 - 4.8 K/uL Final    Mono # 06/16/2022 0.5  0.3 - 1.0 K/uL Final    Eos # 06/16/2022 0.2   0.0 - 0.5 K/uL Final    Baso # 2022 0.06  0.00 - 0.20 K/uL Final    nRBC 2022 0  0 /100 WBC Final    Gran % 2022 63.5  38.0 - 73.0 % Final    Lymph % 2022 27.3  18.0 - 48.0 % Final    Mono % 2022 6.3  4.0 - 15.0 % Final    Eosinophil % 2022 2.0  0.0 - 8.0 % Final    Basophil % 2022 0.7  0.0 - 1.9 % Final    Differential Method 2022 Automated   Final            Lab Results   Component Value Date    IRON 72 2022    TIBC 574 (H) 2022    FERRITIN 84 2022       US ABDOMEN COMPLETE  The visualized portion of the pancreas, aorta, and IVC are unremarkable.  The gallbladder is absent.  The common duct measures 0.9 cm.  The liver measures 18.1 cm and demonstrates an overall increased echogenicity consistent with a diffuse infiltrative process such as diffuse fatty infiltration.  The right kidney measures 11.6 cm and is unremarkable.  The spleen measures 9.8 cm and is unremarkable.  The left kidney measures 10.6 cm and is unremarkable.     Impression:     Enlarged fatty liver.        Electronically signed by: Matti Downs MD  Date:                                            05/10/2022  Time:                                           09:10    Physical Exam:  Limited as telehealth visit  Physical Exam  Constitutional:       Appearance: She is obese.   HENT:      Head: Normocephalic and atraumatic.   Pulmonary:      Comments: Talkative with no sob/hall noted.   Neurological:      Mental Status: She is alert and oriented to person, place, and time.   Psychiatric:         Mood and Affect: Mood normal.         Behavior: Behavior normal.          ECO  ECOG SCORE            Discussion     Problem List:  Problem List Items Addressed This Visit        Cardiac/Vascular    Essential hypertension (Chronic)    Overview     Well controlled. Continue current regimen              Other    Tobacco abuse (Chronic)    Overview     Advised to pick quit date and start  nicotine patch. Declined smoking cessation program              Other Visit Diagnoses     Thrombocytosis    -  Primary    Relevant Orders    CBC Auto Differential    Comprehensive Metabolic Panel    Sedimentation rate    C-REACTIVE PROTEIN    MPN (JAK2 V617F)WITH REFLEX TO CALR,MPL    Reticulocytes    Uric Acid    Lactate Dehydrogenase    Chronic fatigue        Relevant Orders    CBC Auto Differential    Comprehensive Metabolic Panel    Sedimentation rate    C-REACTIVE PROTEIN    MPN (JAK2 V617F)WITH REFLEX TO CALR,MPL    Reticulocytes    Uric Acid    Lactate Dehydrogenase             Thrombocytosis  -Chronic  -Labs ordered, will review and call patient with results  -Discussed this also could be related to smoking    Chronic Fatigue  - TSH normal, improving control over home blood sugars, no evidence anemia on recent bloodwork  - Consider cardiology follow up additionally  - Continue to monitor    Essential HTN  - BP home monitoring 120-130s/70s per pt  - Management per pcp and cardiology    Tobacco Abuse  - Cessation encouraged, discussed cardiac risks, effects on health including bloodwork results  - Continue to monitor    Follow up pending lab results      Bruna Gamboa, NP-C  [facility]  Hematology/Oncology  [address] [place] [address] [address]  [id number]  [phone]

## 2022-06-23 ENCOUNTER — OFFICE VISIT (OUTPATIENT)
Dept: FAMILY MEDICINE | Facility: CLINIC | Age: 57
End: 2022-06-23
Payer: COMMERCIAL

## 2022-06-23 VITALS
DIASTOLIC BLOOD PRESSURE: 80 MMHG | BODY MASS INDEX: 34.96 KG/M2 | HEIGHT: 61 IN | SYSTOLIC BLOOD PRESSURE: 118 MMHG | HEART RATE: 75 BPM | WEIGHT: 185.19 LBS | OXYGEN SATURATION: 97 %

## 2022-06-23 DIAGNOSIS — I50.32 CHRONIC DIASTOLIC CHF (CONGESTIVE HEART FAILURE): ICD-10-CM

## 2022-06-23 DIAGNOSIS — G47.00 INSOMNIA, UNSPECIFIED TYPE: ICD-10-CM

## 2022-06-23 DIAGNOSIS — K21.9 GASTROESOPHAGEAL REFLUX DISEASE, UNSPECIFIED WHETHER ESOPHAGITIS PRESENT: ICD-10-CM

## 2022-06-23 DIAGNOSIS — F41.9 ANXIETY: Chronic | ICD-10-CM

## 2022-06-23 DIAGNOSIS — R35.1 NOCTURIA: ICD-10-CM

## 2022-06-23 DIAGNOSIS — E11.42 TYPE 2 DIABETES MELLITUS WITH DIABETIC POLYNEUROPATHY, WITHOUT LONG-TERM CURRENT USE OF INSULIN: Primary | ICD-10-CM

## 2022-06-23 DIAGNOSIS — G25.81 RESTLESS LEG SYNDROME: ICD-10-CM

## 2022-06-23 DIAGNOSIS — J45.909 ASTHMA, UNSPECIFIED ASTHMA SEVERITY, UNSPECIFIED WHETHER COMPLICATED, UNSPECIFIED WHETHER PERSISTENT: ICD-10-CM

## 2022-06-23 DIAGNOSIS — I10 ESSENTIAL HYPERTENSION: ICD-10-CM

## 2022-06-23 PROCEDURE — 4010F PR ACE/ARB THEARPY RXD/TAKEN: ICD-10-PCS | Mod: CPTII,S$GLB,, | Performed by: FAMILY MEDICINE

## 2022-06-23 PROCEDURE — 3074F PR MOST RECENT SYSTOLIC BLOOD PRESSURE < 130 MM HG: ICD-10-PCS | Mod: CPTII,S$GLB,, | Performed by: FAMILY MEDICINE

## 2022-06-23 PROCEDURE — 4010F ACE/ARB THERAPY RXD/TAKEN: CPT | Mod: CPTII,S$GLB,, | Performed by: FAMILY MEDICINE

## 2022-06-23 PROCEDURE — 1160F PR REVIEW ALL MEDS BY PRESCRIBER/CLIN PHARMACIST DOCUMENTED: ICD-10-PCS | Mod: CPTII,S$GLB,, | Performed by: FAMILY MEDICINE

## 2022-06-23 PROCEDURE — 99214 OFFICE O/P EST MOD 30 MIN: CPT | Mod: S$GLB,,, | Performed by: FAMILY MEDICINE

## 2022-06-23 PROCEDURE — 1160F RVW MEDS BY RX/DR IN RCRD: CPT | Mod: CPTII,S$GLB,, | Performed by: FAMILY MEDICINE

## 2022-06-23 PROCEDURE — 3051F HG A1C>EQUAL 7.0%<8.0%: CPT | Mod: CPTII,S$GLB,, | Performed by: FAMILY MEDICINE

## 2022-06-23 PROCEDURE — 3051F PR MOST RECENT HEMOGLOBIN A1C LEVEL 7.0 - < 8.0%: ICD-10-PCS | Mod: CPTII,S$GLB,, | Performed by: FAMILY MEDICINE

## 2022-06-23 PROCEDURE — 3008F BODY MASS INDEX DOCD: CPT | Mod: CPTII,S$GLB,, | Performed by: FAMILY MEDICINE

## 2022-06-23 PROCEDURE — 3074F SYST BP LT 130 MM HG: CPT | Mod: CPTII,S$GLB,, | Performed by: FAMILY MEDICINE

## 2022-06-23 PROCEDURE — 99214 PR OFFICE/OUTPT VISIT, EST, LEVL IV, 30-39 MIN: ICD-10-PCS | Mod: S$GLB,,, | Performed by: FAMILY MEDICINE

## 2022-06-23 PROCEDURE — 3079F DIAST BP 80-89 MM HG: CPT | Mod: CPTII,S$GLB,, | Performed by: FAMILY MEDICINE

## 2022-06-23 PROCEDURE — 3008F PR BODY MASS INDEX (BMI) DOCUMENTED: ICD-10-PCS | Mod: CPTII,S$GLB,, | Performed by: FAMILY MEDICINE

## 2022-06-23 PROCEDURE — 1159F PR MEDICATION LIST DOCUMENTED IN MEDICAL RECORD: ICD-10-PCS | Mod: CPTII,S$GLB,, | Performed by: FAMILY MEDICINE

## 2022-06-23 PROCEDURE — 99999 PR PBB SHADOW E&M-EST. PATIENT-LVL V: ICD-10-PCS | Mod: PBBFAC,,, | Performed by: FAMILY MEDICINE

## 2022-06-23 PROCEDURE — 1159F MED LIST DOCD IN RCRD: CPT | Mod: CPTII,S$GLB,, | Performed by: FAMILY MEDICINE

## 2022-06-23 PROCEDURE — 99999 PR PBB SHADOW E&M-EST. PATIENT-LVL V: CPT | Mod: PBBFAC,,, | Performed by: FAMILY MEDICINE

## 2022-06-23 PROCEDURE — 3079F PR MOST RECENT DIASTOLIC BLOOD PRESSURE 80-89 MM HG: ICD-10-PCS | Mod: CPTII,S$GLB,, | Performed by: FAMILY MEDICINE

## 2022-06-23 RX ORDER — FLUTICASONE FUROATE AND VILANTEROL 100; 25 UG/1; UG/1
1 POWDER RESPIRATORY (INHALATION) DAILY
Qty: 60 EACH | Refills: 0 | Status: SHIPPED | OUTPATIENT
Start: 2022-06-23 | End: 2023-04-03 | Stop reason: SDUPTHER

## 2022-06-23 RX ORDER — LISINOPRIL 40 MG/1
40 TABLET ORAL DAILY
Qty: 90 TABLET | Refills: 3 | Status: SHIPPED | OUTPATIENT
Start: 2022-06-23 | End: 2022-12-16

## 2022-06-23 RX ORDER — OXYBUTYNIN CHLORIDE 15 MG/1
30 TABLET, EXTENDED RELEASE ORAL DAILY
Qty: 180 TABLET | Refills: 1 | Status: SHIPPED | OUTPATIENT
Start: 2022-06-23 | End: 2023-07-23

## 2022-06-23 RX ORDER — ATORVASTATIN CALCIUM 80 MG/1
TABLET, FILM COATED ORAL
Qty: 90 TABLET | Refills: 3 | Status: SHIPPED | OUTPATIENT
Start: 2022-06-23 | End: 2023-07-07 | Stop reason: SDUPTHER

## 2022-06-23 RX ORDER — METOPROLOL SUCCINATE 100 MG/1
100 TABLET, EXTENDED RELEASE ORAL DAILY
Qty: 90 TABLET | Refills: 3 | Status: SHIPPED | OUTPATIENT
Start: 2022-06-23 | End: 2023-07-12 | Stop reason: SDUPTHER

## 2022-06-23 RX ORDER — FENOFIBRATE 160 MG/1
160 TABLET ORAL DAILY
Qty: 90 TABLET | Refills: 3 | Status: SHIPPED | OUTPATIENT
Start: 2022-06-23 | End: 2023-07-12 | Stop reason: SDUPTHER

## 2022-06-23 RX ORDER — EZETIMIBE 10 MG/1
TABLET ORAL
Qty: 90 TABLET | Refills: 3 | Status: SHIPPED | OUTPATIENT
Start: 2022-06-23 | End: 2023-07-12 | Stop reason: SDUPTHER

## 2022-06-23 RX ORDER — ALBUTEROL SULFATE 90 UG/1
2 AEROSOL, METERED RESPIRATORY (INHALATION)
Qty: 18 G | Refills: 3 | Status: SHIPPED | OUTPATIENT
Start: 2022-06-23 | End: 2023-07-23 | Stop reason: SDUPTHER

## 2022-06-23 RX ORDER — FUROSEMIDE 40 MG/1
40 TABLET ORAL DAILY PRN
Qty: 90 TABLET | Refills: 1 | Status: SHIPPED | OUTPATIENT
Start: 2022-06-23

## 2022-06-23 RX ORDER — ROPINIROLE 0.5 MG/1
TABLET, FILM COATED ORAL
Qty: 90 TABLET | Refills: 3 | Status: SHIPPED | OUTPATIENT
Start: 2022-06-23 | End: 2023-07-26 | Stop reason: SDUPTHER

## 2022-06-23 RX ORDER — AMLODIPINE BESYLATE 5 MG/1
TABLET ORAL
Qty: 90 TABLET | Refills: 3 | Status: ON HOLD | OUTPATIENT
Start: 2022-06-23 | End: 2023-07-25 | Stop reason: SDUPTHER

## 2022-06-23 RX ORDER — DULAGLUTIDE 3 MG/.5ML
3 INJECTION, SOLUTION SUBCUTANEOUS
Qty: 4 PEN | Refills: 1 | Status: SHIPPED | OUTPATIENT
Start: 2022-06-23 | End: 2022-08-17 | Stop reason: SDUPTHER

## 2022-06-23 RX ORDER — ISOSORBIDE MONONITRATE 30 MG/1
30 TABLET, EXTENDED RELEASE ORAL DAILY
Qty: 30 TABLET | Refills: 11 | Status: SHIPPED | OUTPATIENT
Start: 2022-06-23 | End: 2022-12-16

## 2022-06-23 RX ORDER — OMEPRAZOLE 40 MG/1
40 CAPSULE, DELAYED RELEASE ORAL DAILY
Qty: 90 CAPSULE | Refills: 1 | Status: SHIPPED | OUTPATIENT
Start: 2022-06-23 | End: 2022-07-27 | Stop reason: SDUPTHER

## 2022-06-23 RX ORDER — TRAZODONE HYDROCHLORIDE 100 MG/1
TABLET ORAL
Qty: 90 TABLET | Refills: 3 | Status: SHIPPED | OUTPATIENT
Start: 2022-06-23 | End: 2023-07-07 | Stop reason: SDUPTHER

## 2022-06-23 NOTE — PROGRESS NOTES
EST PATIENT VISIT FAMILY MEDICINE    CC:   Chief Complaint   Patient presents with    Follow-up     Wants increase in bladder med/swollen legs       HPI: Loi Cordova  is a 56 y.o. female:    Patient is known to me. She presents for 1 month follow up on diabetes. Notes her sugars are improved overall. Tolerating trulicity. Would like to try increasing her bladder medication as this helped somewhat but still having symptoms.     Takes Xanax about every other day for anxiety, sometimes less frequently. She notes she has tried multiple medications in the past including SSRIs but did not tolerate other medications for anxiety.     ROS: Review of Systems   Constitutional: Negative for fever.   Respiratory: Negative for shortness of breath.    Cardiovascular: Negative for chest pain.       PMHX:   Past Medical History:   Diagnosis Date    Allergy     Asthma     Coronary artery disease     GERD (gastroesophageal reflux disease)     History of back surgery 2/20/2018    Hyperlipidemia     Hypertension     Type 2 diabetes mellitus with diabetic polyneuropathy, without long-term current use of insulin 2/8/2019       PSHX:   Past Surgical History:   Procedure Laterality Date    BILATERAL OOPHORECTOMY Bilateral 2000    BREAST BIOPSY Left 2/14/2020    Procedure: BIOPSY, BREAST-Left medial breast palpation guided;  Surgeon: Paulino Espinoza MD;  Location: [address];  Service: General;  Laterality: Left;    CARDIAC CATHETERIZATION      7 stents    CHOLECYSTECTOMY      COLONOSCOPY N/A 7/16/2019    Procedure: COLONOSCOPY;  Surgeon: Sarah Gamez MD;  Location: [facility];  Service: Endoscopy;  Laterality: N/A;    ESOPHAGOGASTRODUODENOSCOPY N/A 1/9/2020    Procedure: EGD (ESOPHAGOGASTRODUODENOSCOPY);  Surgeon: Sarah Gamez MD;  Location: [facility];  Service: Endoscopy;  Laterality: N/A;    HYSTERECTOMY      PARTIAL HYSTERECTOMY N/A 1990    Uterus taken out    SHOULDER SURGERY      TOTAL KNEE  ARTHROPLASTY         FAMHX:   Family History   Problem Relation Age of Onset    Heart disease Mother     Hyperlipidemia Mother     Hypertension Mother     Diabetes Mother     Heart disease Father     Hyperlipidemia Father     Cancer Father     Diabetes Maternal Aunt     Prostate cancer Neg Hx     Kidney disease Neg Hx        SOCHX:   Social History     Socioeconomic History    Marital status:    Tobacco Use    Smoking status: Current Every Day Smoker     Packs/day: 0.50     Years: 34.00     Pack years: 17.00     Types: Cigarettes    Smokeless tobacco: Never Used    Tobacco comment: in smoking program 1/25/21   Substance and Sexual Activity    Alcohol use: Not Currently     Comment: 6 months    Drug use: No    Sexual activity: Yes     Partners: Male     Social Determinants of Health     Financial Resource Strain: Low Risk     Difficulty of Paying Living Expenses: Not hard at all   Food Insecurity: No Food Insecurity    Worried About Running Out of Food in the Last Year: Never true    Ran Out of Food in the Last Year: Never true   Transportation Needs: No Transportation Needs    Lack of Transportation (Medical): No    Lack of Transportation (Non-Medical): No   Physical Activity: Inactive    Days of Exercise per Week: 0 days    Minutes of Exercise per Session: 10 min   Stress: No Stress Concern Present    Feeling of Stress : Not at all   Social Connections: Unknown    Frequency of Communication with Friends and Family: More than three times a week    Frequency of Social Gatherings with Friends and Family: Once a week    Active Member of Clubs or Organizations: No    Attends Club or Organization Meetings: Never    Marital Status:    Housing Stability: Low Risk     Unable to Pay for Housing in the Last Year: No    Number of Places Lived in the Last Year: 1    Unstable Housing in the Last Year: No       ALLERGIES:   Review of patient's allergies indicates:   Allergen  Reactions    Crayfish Anaphylaxis     (crawfish only)       MEDS:   Current Outpatient Medications:     ALPRAZolam (XANAX) 0.25 MG tablet, Take 1 tablet (0.25 mg total) by mouth daily as needed for Anxiety., Disp: 30 tablet, Rfl: 0    aspirin 81 MG Chew, Take 81 mg by mouth once daily., Disp: , Rfl:     dicyclomine (BENTYL) 20 mg tablet, Take 1 tablet (20 mg total) by mouth 3 (three) times daily as needed (abd pain)., Disp: 90 tablet, Rfl: 2    nitroGLYCERIN (NITROSTAT) 0.4 MG SL tablet, Place 1 tablet under the tongue once every 5 minutes for 3 doses for chest pain, if pain continues call 911, Disp: 25 tablet, Rfl: 3    ondansetron (ZOFRAN-ODT) 4 MG TbDL, Dissolve 1 tablet (4 mg total) by mouth every 6 (six) hours as needed., Disp: 60 tablet, Rfl: 5    potassium chloride (K-TAB) 20 mEq, Take 1 tablet (20 mEq total) by mouth daily as needed (edema). Take with lasix if needed for edema, Disp: 90 tablet, Rfl: 1    triamcinolone acetonide 0.025% (KENALOG) 0.025 % cream, Apply topically 2 (two) times daily., Disp: 80 g, Rfl: 0    albuterol (PROVENTIL/VENTOLIN HFA) 90 mcg/actuation inhaler, Inhale 2 puffs into the lungs every 4 to 6 hours as needed., Disp: 18 g, Rfl: 3    amLODIPine (NORVASC) 5 MG tablet, Take 1 tablet by mouth once a day., Disp: 90 tablet, Rfl: 3    atorvastatin (LIPITOR) 80 MG tablet, Take 1 tablet by mouth once a day., Disp: 90 tablet, Rfl: 3    blood sugar diagnostic Strp, Use 1 strip to check blood sugar once daily, Disp: 100 each, Rfl: 0    blood-glucose meter Misc, USE ONCE DAILY TO MONITOR GLUCOSE, Disp: 1 each, Rfl: 0    dulaglutide (TRULICITY) 3 mg/0.5 mL pen injector, Inject 3 mg into the skin every 7 days., Disp: 4 pen, Rfl: 1    ezetimibe (ZETIA) 10 mg tablet, Take 1 tablet by mouth once a day., Disp: 90 tablet, Rfl: 3    fenofibrate 160 MG Tab, Take 1 tablet (160 mg total) by mouth once daily., Disp: 90 tablet, Rfl: 3    fluticasone furoate-vilanteroL (BREO) 100-25 mcg/dose  diskus inhaler, Inhale 1 puff into the lungs once daily. Controller, Disp: 60 each, Rfl: 0    furosemide (LASIX) 40 MG tablet, Take 1 tablet (40 mg total) by mouth daily as needed (edema)., Disp: 90 tablet, Rfl: 1    ibuprofen (ADVIL,MOTRIN) 600 MG tablet, Take 1 tablet (600 mg total) by mouth every 6 (six) hours as needed for Pain. (Patient not taking: No sig reported), Disp: 20 tablet, Rfl: 0    isosorbide mononitrate (IMDUR) 30 MG 24 hr tablet, Take 1 tablet (30 mg total) by mouth once daily., Disp: 30 tablet, Rfl: 11    lancets 33 gauge Misc, USE ONCE DAILY TO MONITOR GLUCOSE, Disp: 100 each, Rfl: 3    lisinopriL (PRINIVIL,ZESTRIL) 40 MG tablet, Take 1 tablet (40 mg total) by mouth once daily., Disp: 90 tablet, Rfl: 3    metoprolol succinate (TOPROL-XL) 100 MG 24 hr tablet, Take 1 tablet (100 mg total) by mouth once daily., Disp: 90 tablet, Rfl: 3    nicotine (NICODERM CQ) 21 mg/24 hr, Place 1 patch onto the skin once daily. (Patient not taking: No sig reported), Disp: 28 patch, Rfl: 0    omeprazole (PRILOSEC) 40 MG capsule, Take 1 capsule (40 mg total) by mouth once daily., Disp: 90 capsule, Rfl: 1    oxybutynin (DITROPAN XL) 15 MG TR24, Take 2 tablets (30 mg total) by mouth once daily., Disp: 180 tablet, Rfl: 1    rOPINIRole (REQUIP) 0.5 MG tablet, Take 1 tablet by mouth once in the evening., Disp: 90 tablet, Rfl: 3    traZODone (DESYREL) 100 MG tablet, Take 1 tablet orally once in the evening., Disp: 90 tablet, Rfl: 3  No current facility-administered medications for this visit.    Facility-Administered Medications Ordered in Other Visits:     0.9%  NaCl infusion, , Intravenous, Continuous, Sarah Gamez MD, Stopped at 01/09/20 0925    0.9%  NaCl infusion, , Intravenous, Continuous, Sarah Gamez MD, Stopped at 01/09/20 1026    0.9%  NaCl infusion, , Intravenous, Continuous, Angélica De La Cruz MD    ceFAZolin injection 2 g, 2 g, Intravenous, On Call Procedure, Angélica De La Cruz MD    " lidocaine (PF) 10 mg/ml (1%) injection 10 mg, 1 mL, Intradermal, Once, Angélica De La rCuz MD    ondansetron injection 4 mg, 4 mg, Intravenous, Q12H PRN, Angélica De La Cruz MD    sodium chloride 0.9% flush 10 mL, 10 mL, Intravenous, PRN, Sarah Gamez MD    sodium chloride 0.9% flush 10 mL, 10 mL, Intravenous, PRN, Sarah Gamez MD    OBJECTIVE:   Vitals:    06/23/22 1440   BP: 118/80   BP Location: Left arm   Patient Position: Sitting   BP Method: Large (Manual)   Pulse: 75   SpO2: 97%   Weight: 84 kg (185 lb 3 oz)   Height: 5' 1" (1.549 m)     Body mass index is 34.99 kg/m².      Physical Exam  Vitals and nursing note reviewed.   Constitutional:       Appearance: Normal appearance.   HENT:      Head: Normocephalic and atraumatic.   Eyes:      General: No scleral icterus.     Extraocular Movements: Extraocular movements intact.   Pulmonary:      Effort: Pulmonary effort is normal.   Neurological:      Mental Status: She is alert.           LABS:   A1C:  Recent Labs   Lab 06/24/22  1223   Hemoglobin A1C 7.7 H     CBC:  Recent Labs   Lab 06/24/22  1223   WBC 10.54   RBC 4.48   Hemoglobin 13.2   Hematocrit 39.6   Platelets 527 H   MCV 88   MCH 29.5   MCHC 33.3     CMP:  Recent Labs   Lab 06/24/22  1223   Glucose 127 H   Calcium 9.7   Albumin 4.8   Total Protein 7.7   Sodium 132 L   Potassium 4.3   CO2 25   Chloride 100   BUN 15   Creatinine 0.82   Alkaline Phosphatase 62   ALT 31   AST 34   Total Bilirubin 0.7     LIPIDS:  Recent Labs   Lab 01/11/22  0704 06/09/22  0705   TSH  --  1.550   HDL 56  --    Cholesterol 172  --    Triglycerides 140  --    LDL Cholesterol 88.0  --    HDL/Cholesterol Ratio 32.6  --    Non-HDL Cholesterol 116  --    Total Cholesterol/HDL Ratio 3.1  --      TSH:  Recent Labs   Lab 06/09/22  0705   TSH 1.550         ASSESSMENT & PLAN:    Problem List Items Addressed This Visit        Neuro    Restless leg syndrome    Relevant Medications    rOPINIRole (REQUIP) 0.5 MG tablet       " Psychiatric    Anxiety (Chronic)    Overview     Uncontrolled. SI with SSRI in the past. Xanax PRN not intended for daily use. Okay to fill 30 tabs every 2-6 months. Can consider buspar in future if needed.  reviewed and as expected.               Cardiac/Vascular    Essential hypertension (Chronic)    Overview     Well controlled. Continue current regimen           Relevant Medications    amLODIPine (NORVASC) 5 MG tablet    furosemide (LASIX) 40 MG tablet    isosorbide mononitrate (IMDUR) 30 MG 24 hr tablet    lisinopriL (PRINIVIL,ZESTRIL) 40 MG tablet    metoprolol succinate (TOPROL-XL) 100 MG 24 hr tablet    Chronic diastolic CHF (congestive heart failure)       Renal/    Nocturia (Chronic)    Overview     Trial of oxybutynin           Relevant Medications    oxybutynin (DITROPAN XL) 15 MG TR24       Endocrine    Type 2 diabetes mellitus with diabetic polyneuropathy, without long-term current use of insulin - Primary (Chronic)    Overview     Increase trulicity. Referral to DM education in place           Relevant Medications    dulaglutide (TRULICITY) 3 mg/0.5 mL pen injector    atorvastatin (LIPITOR) 80 MG tablet    ezetimibe (ZETIA) 10 mg tablet    fenofibrate 160 MG Tab    Other Relevant Orders    Hemoglobin A1C (Completed)    Ambulatory referral/consult to Optometry    Microalbumin/Creatinine Ratio, Urine       GI    GERD (gastroesophageal reflux disease)    Relevant Medications    omeprazole (PRILOSEC) 40 MG capsule      Other Visit Diagnoses     Insomnia, unspecified type        Relevant Medications    traZODone (DESYREL) 100 MG tablet    Asthma, unspecified asthma severity, unspecified whether complicated, unspecified whether persistent        Relevant Medications    albuterol (PROVENTIL/VENTOLIN HFA) 90 mcg/actuation inhaler    fluticasone furoate-vilanteroL (BREO) 100-25 mcg/dose diskus inhaler            Follow up in about 1 month (around 7/23/2022) for diabetes.      RTC/ED precautions discussed  where applicable.   Encouraged patient to let me know if there are any further questions/concerns.     Advise patient/caretaker to check with insurance regarding orders to avoid unexpected fees/costs.     The patient/caretaker indicates understanding of these issues and agrees with the plan.    Dr. Hari Francis MD  Family Medicine

## 2022-07-01 ENCOUNTER — TELEPHONE (OUTPATIENT)
Dept: HEMATOLOGY/ONCOLOGY | Facility: CLINIC | Age: 57
End: 2022-07-01
Payer: COMMERCIAL

## 2022-07-01 NOTE — TELEPHONE ENCOUNTER
No visits with results within 1 Day(s) from this visit.   Latest known visit with results is:   Lab Visit on 06/24/2022   Component Date Value Ref Range Status    WBC 06/24/2022 10.54  3.90 - 12.70 K/uL Final    RBC 06/24/2022 4.48  4.00 - 5.40 M/uL Final    Hemoglobin 06/24/2022 13.2  12.0 - 16.0 g/dL Final    Hematocrit 06/24/2022 39.6  37.0 - 48.5 % Final    MCV 06/24/2022 88  82 - 98 fL Final    MCH 06/24/2022 29.5  27.0 - 31.0 pg Final    MCHC 06/24/2022 33.3  32.0 - 36.0 g/dL Final    RDW 06/24/2022 14.2  11.5 - 14.5 % Final    Platelets 06/24/2022 527 (A) 150 - 450 K/uL Final    MPV 06/24/2022 8.5 (A) 9.2 - 12.9 fL Final    Immature Granulocytes 06/24/2022 0.3  0.0 - 0.5 % Final    Gran # (ANC) 06/24/2022 6.9  1.8 - 7.7 K/uL Final    Immature Grans (Abs) 06/24/2022 0.03  0.00 - 0.04 K/uL Final    Comment: Mild elevation in immature granulocytes is non specific and   can be seen in a variety of conditions including stress response,   acute inflammation, trauma and pregnancy. Correlation with other   laboratory and clinical findings is essential.      Lymph # 06/24/2022 2.9  1.0 - 4.8 K/uL Final    Mono # 06/24/2022 0.5  0.3 - 1.0 K/uL Final    Eos # 06/24/2022 0.1  0.0 - 0.5 K/uL Final    Baso # 06/24/2022 0.04  0.00 - 0.20 K/uL Final    nRBC 06/24/2022 0  0 /100 WBC Final    Gran % 06/24/2022 65.8  38.0 - 73.0 % Final    Lymph % 06/24/2022 27.3  18.0 - 48.0 % Final    Mono % 06/24/2022 5.0  4.0 - 15.0 % Final    Eosinophil % 06/24/2022 1.2  0.0 - 8.0 % Final    Basophil % 06/24/2022 0.4  0.0 - 1.9 % Final    Differential Method 06/24/2022 Automated   Final    Sodium 06/24/2022 132 (A) 136 - 145 mmol/L Final    Potassium 06/24/2022 4.3  3.5 - 5.1 mmol/L Final    Chloride 06/24/2022 100  95 - 110 mmol/L Final    CO2 06/24/2022 25  23 - 29 mmol/L Final    Glucose 06/24/2022 127 (A) 70 - 110 mg/dL Final    BUN 06/24/2022 15  7 - 17 mg/dL Final    Creatinine 06/24/2022 0.82  0.50 -  1.40 mg/dL Final    Calcium 06/24/2022 9.7  8.7 - 10.5 mg/dL Final    Total Protein 06/24/2022 7.7  6.0 - 8.4 g/dL Final    Albumin 06/24/2022 4.8  3.5 - 5.2 g/dL Final    Total Bilirubin 06/24/2022 0.7  0.1 - 1.0 mg/dL Final    Comment: For infants and newborns, interpretation of results should be based  on gestational age, weight and in agreement with clinical  observations.    Premature Infant recommended reference ranges:  Up to 24 hours.............<8.0 mg/dL  Up to 48 hours............<12.0 mg/dL  3-5 days..................<15.0 mg/dL  6-29 days.................<15.0 mg/dL      Alkaline Phosphatase 06/24/2022 62  38 - 126 U/L Final    AST 06/24/2022 34  15 - 46 U/L Final    ALT 06/24/2022 31  10 - 44 U/L Final    Anion Gap 06/24/2022 7 (A) 8 - 16 mmol/L Final    eGFR if African American 06/24/2022 >60.0  >60 mL/min/1.73 m^2 Final    eGFR if non African American 06/24/2022 >60.0  >60 mL/min/1.73 m^2 Final    Comment: Calculation used to obtain the estimated glomerular filtration  rate (eGFR) is the CKD-EPI equation.       Sed Rate 06/24/2022 15  0 - 20 mm/Hr Final    CRP 06/24/2022 9.0 (A) 0.0 - 8.2 mg/L Final    MPNR  Specimen type 06/24/2022 n/a   Final    Retic 06/24/2022 1.8  0.5 - 2.5 % Final    Uric Acid 06/24/2022 4.1  2.4 - 5.7 mg/dL Final    LD 06/24/2022 202  110 - 260 U/L Final    Results are increased in hemolyzed samples.    Hemoglobin A1C 06/24/2022 7.7 (A) 4.0 - 5.6 % Final    Comment: ADA Screening Guidelines:  5.7-6.4%  Consistent with prediabetes  >or=6.5%  Consistent with diabetes    High levels of fetal hemoglobin interfere with the HbA1C  assay. Heterozygous hemoglobin variants (HbS, HgC, etc)do  not significantly interfere with this assay.   However, presence of multiple variants may affect accuracy.      Estimated Avg Glucose 06/24/2022 174 (A) 68 - 131 mg/dL Final     Left message, pending Jak2 result.

## 2022-07-06 ENCOUNTER — TELEPHONE (OUTPATIENT)
Dept: HEMATOLOGY/ONCOLOGY | Facility: CLINIC | Age: 57
End: 2022-07-06
Payer: COMMERCIAL

## 2022-07-06 NOTE — TELEPHONE ENCOUNTER
No visits with results within 1 Day(s) from this visit.   Latest known visit with results is:   Lab Visit on 06/24/2022   Component Date Value Ref Range Status    WBC 06/24/2022 10.54  3.90 - 12.70 K/uL Final    RBC 06/24/2022 4.48  4.00 - 5.40 M/uL Final    Hemoglobin 06/24/2022 13.2  12.0 - 16.0 g/dL Final    Hematocrit 06/24/2022 39.6  37.0 - 48.5 % Final    MCV 06/24/2022 88  82 - 98 fL Final    MCH 06/24/2022 29.5  27.0 - 31.0 pg Final    MCHC 06/24/2022 33.3  32.0 - 36.0 g/dL Final    RDW 06/24/2022 14.2  11.5 - 14.5 % Final    Platelets 06/24/2022 527 (A) 150 - 450 K/uL Final    MPV 06/24/2022 8.5 (A) 9.2 - 12.9 fL Final    Immature Granulocytes 06/24/2022 0.3  0.0 - 0.5 % Final    Gran # (ANC) 06/24/2022 6.9  1.8 - 7.7 K/uL Final    Immature Grans (Abs) 06/24/2022 0.03  0.00 - 0.04 K/uL Final    Comment: Mild elevation in immature granulocytes is non specific and   can be seen in a variety of conditions including stress response,   acute inflammation, trauma and pregnancy. Correlation with other   laboratory and clinical findings is essential.      Lymph # 06/24/2022 2.9  1.0 - 4.8 K/uL Final    Mono # 06/24/2022 0.5  0.3 - 1.0 K/uL Final    Eos # 06/24/2022 0.1  0.0 - 0.5 K/uL Final    Baso # 06/24/2022 0.04  0.00 - 0.20 K/uL Final    nRBC 06/24/2022 0  0 /100 WBC Final    Gran % 06/24/2022 65.8  38.0 - 73.0 % Final    Lymph % 06/24/2022 27.3  18.0 - 48.0 % Final    Mono % 06/24/2022 5.0  4.0 - 15.0 % Final    Eosinophil % 06/24/2022 1.2  0.0 - 8.0 % Final    Basophil % 06/24/2022 0.4  0.0 - 1.9 % Final    Differential Method 06/24/2022 Automated   Final    Sodium 06/24/2022 132 (A) 136 - 145 mmol/L Final    Potassium 06/24/2022 4.3  3.5 - 5.1 mmol/L Final    Chloride 06/24/2022 100  95 - 110 mmol/L Final    CO2 06/24/2022 25  23 - 29 mmol/L Final    Glucose 06/24/2022 127 (A) 70 - 110 mg/dL Final    BUN 06/24/2022 15  7 - 17 mg/dL Final    Creatinine 06/24/2022 0.82  0.50 -  1.40 mg/dL Final    Calcium 06/24/2022 9.7  8.7 - 10.5 mg/dL Final    Total Protein 06/24/2022 7.7  6.0 - 8.4 g/dL Final    Albumin 06/24/2022 4.8  3.5 - 5.2 g/dL Final    Total Bilirubin 06/24/2022 0.7  0.1 - 1.0 mg/dL Final    Comment: For infants and newborns, interpretation of results should be based  on gestational age, weight and in agreement with clinical  observations.    Premature Infant recommended reference ranges:  Up to 24 hours.............<8.0 mg/dL  Up to 48 hours............<12.0 mg/dL  3-5 days..................<15.0 mg/dL  6-29 days.................<15.0 mg/dL      Alkaline Phosphatase 06/24/2022 62  38 - 126 U/L Final    AST 06/24/2022 34  15 - 46 U/L Final    ALT 06/24/2022 31  10 - 44 U/L Final    Anion Gap 06/24/2022 7 (A) 8 - 16 mmol/L Final    eGFR if African American 06/24/2022 >60.0  >60 mL/min/1.73 m^2 Final    eGFR if non African American 06/24/2022 >60.0  >60 mL/min/1.73 m^2 Final    Comment: Calculation used to obtain the estimated glomerular filtration  rate (eGFR) is the CKD-EPI equation.       Sed Rate 06/24/2022 15  0 - 20 mm/Hr Final    CRP 06/24/2022 9.0 (A) 0.0 - 8.2 mg/L Final    MPNR  Specimen type 06/24/2022 n/a   Final    MPNR Result 06/24/2022 see interpretation   Final    MPNR  Final Diagnosis: 06/24/2022 SEE BELOW   Final    Comment: Peripheral blood, JAK2 V617F mutation analysis:    Negative for JAK2 V617F.    Method summary - JAK2 V617F analysis: Quantitative,   allele-specific polymerase chain reaction (PCR) assay was   performed using extracted genomic DNA to evaluate for the   point mutation causing JAK2 V617F. The analytic sensitivity   of this assay has been determined at 0.06%.      Peripheral blood, CALR mutation analysis, exon 9.    Negative. No deletion or insertion was detected in CALR,   exon 9.    Method summary - CALR: Exon 9 of CALR was amplified from   genomic DNA by polymerase chain reaction (PCR). The size of   the PCR product was  analyzed by capillary electrophoresis.   The analytic sensitivity of this assay is approximately 6%   for the majority of CALR mutations and is approximately 20%   for the rare type 1-bp deletion.      Peripheral blood, MPL exon 10 mutation analysis:    Negative. No mutation was detected in MPL, exon 10.    Method summary - MPL exon 10 mutation analysis: Genomic DNA   w                           as extracted and Samuel sequencing used to evaluate for   mutations in MPL, exon 10. The sensitivity of this assay is   approximately 20%, such that samples containing lower   percentages of mutated DNA will appear negative.    Comment:  Negative results for [id number], CALR exon 9, and MPL exon   10 mutations do not exclude the diagnosis of a   myeloproliferative neoplasm. Correlation with clinical and   morphologic finding is recommended for a definitive   diagnosis. Samples containing mutations in these genes at   levels below the analytical sensitivity of each assay may   not be detected by these methods. If there are persistent   unexplained erythrocytosis, JAK2 Exon 12 analysis could be   considered (JAKXB or JAKXM) to rule out polycythemia vera   (PV).    Signing Pathologist: Nikhil Feliz M.D. (Jane), Ph.D.    -------------------ADDITIONAL INFORMATION-------------------  This test was developed and its performance characteristics   determined by [facility] in a manner consistent wit                           h CLIA   requirements. This test has not been cleared or approved by   the U.S. Food and Drug Administration.    Test Performed by:  [place] [address]  : Matt Casper M.D. Ph.D.; CLIA# [id number]      Retic 06/24/2022 1.8  0.5 - 2.5 % Final    Uric Acid 06/24/2022 4.1  2.4 - 5.7 mg/dL Final    LD 06/24/2022 202  110 - 260 U/L Final    Results are increased in hemolyzed samples.    Hemoglobin A1C 06/24/2022 7.7 (A) 4.0 - 5.6 %  Final    Comment: ADA Screening Guidelines:  5.7-6.4%  Consistent with prediabetes  >or=6.5%  Consistent with diabetes    High levels of fetal hemoglobin interfere with the HbA1C  assay. Heterozygous hemoglobin variants (HbS, HgC, etc)do  not significantly interfere with this assay.   However, presence of multiple variants may affect accuracy.      Estimated Avg Glucose 06/24/2022 174 (A) 68 - 131 mg/dL Final       reviewed neg JAK2 results with pt. Pt to make follow up appt with cardiology additionally to discuss fatigue. Advised to call with any questions or concerns.

## 2022-07-07 ENCOUNTER — PATIENT MESSAGE (OUTPATIENT)
Dept: GASTROENTEROLOGY | Facility: CLINIC | Age: 57
End: 2022-07-07
Payer: COMMERCIAL

## 2022-07-07 DIAGNOSIS — R11.2 NON-INTRACTABLE VOMITING WITH NAUSEA, UNSPECIFIED VOMITING TYPE: Primary | ICD-10-CM

## 2022-07-07 RX ORDER — PROMETHAZINE HYDROCHLORIDE 6.25 MG/5ML
12.5 SYRUP ORAL EVERY 8 HOURS PRN
Qty: 300 ML | Refills: 1 | Status: SHIPPED | OUTPATIENT
Start: 2022-07-07 | End: 2023-07-23

## 2022-07-11 ENCOUNTER — OFFICE VISIT (OUTPATIENT)
Dept: GASTROENTEROLOGY | Facility: CLINIC | Age: 57
End: 2022-07-11
Payer: COMMERCIAL

## 2022-07-11 VITALS
DIASTOLIC BLOOD PRESSURE: 70 MMHG | BODY MASS INDEX: 33.11 KG/M2 | WEIGHT: 175.38 LBS | HEIGHT: 61 IN | SYSTOLIC BLOOD PRESSURE: 113 MMHG | HEART RATE: 64 BPM

## 2022-07-11 DIAGNOSIS — R10.84 GENERALIZED ABDOMINAL PAIN: Primary | ICD-10-CM

## 2022-07-11 DIAGNOSIS — K59.04 CHRONIC IDIOPATHIC CONSTIPATION: ICD-10-CM

## 2022-07-11 PROCEDURE — 1160F RVW MEDS BY RX/DR IN RCRD: CPT | Mod: CPTII,S$GLB,, | Performed by: NURSE PRACTITIONER

## 2022-07-11 PROCEDURE — 99999 PR PBB SHADOW E&M-EST. PATIENT-LVL V: ICD-10-PCS | Mod: PBBFAC,,, | Performed by: NURSE PRACTITIONER

## 2022-07-11 PROCEDURE — 3078F DIAST BP <80 MM HG: CPT | Mod: CPTII,S$GLB,, | Performed by: NURSE PRACTITIONER

## 2022-07-11 PROCEDURE — 99214 OFFICE O/P EST MOD 30 MIN: CPT | Mod: S$GLB,,, | Performed by: NURSE PRACTITIONER

## 2022-07-11 PROCEDURE — 4010F ACE/ARB THERAPY RXD/TAKEN: CPT | Mod: CPTII,S$GLB,, | Performed by: NURSE PRACTITIONER

## 2022-07-11 PROCEDURE — 1159F MED LIST DOCD IN RCRD: CPT | Mod: CPTII,S$GLB,, | Performed by: NURSE PRACTITIONER

## 2022-07-11 PROCEDURE — 3008F BODY MASS INDEX DOCD: CPT | Mod: CPTII,S$GLB,, | Performed by: NURSE PRACTITIONER

## 2022-07-11 PROCEDURE — 3074F SYST BP LT 130 MM HG: CPT | Mod: CPTII,S$GLB,, | Performed by: NURSE PRACTITIONER

## 2022-07-11 PROCEDURE — 99999 PR PBB SHADOW E&M-EST. PATIENT-LVL V: CPT | Mod: PBBFAC,,, | Performed by: NURSE PRACTITIONER

## 2022-07-11 PROCEDURE — 3051F PR MOST RECENT HEMOGLOBIN A1C LEVEL 7.0 - < 8.0%: ICD-10-PCS | Mod: CPTII,S$GLB,, | Performed by: NURSE PRACTITIONER

## 2022-07-11 PROCEDURE — 3074F PR MOST RECENT SYSTOLIC BLOOD PRESSURE < 130 MM HG: ICD-10-PCS | Mod: CPTII,S$GLB,, | Performed by: NURSE PRACTITIONER

## 2022-07-11 PROCEDURE — 3051F HG A1C>EQUAL 7.0%<8.0%: CPT | Mod: CPTII,S$GLB,, | Performed by: NURSE PRACTITIONER

## 2022-07-11 PROCEDURE — 99214 PR OFFICE/OUTPT VISIT, EST, LEVL IV, 30-39 MIN: ICD-10-PCS | Mod: S$GLB,,, | Performed by: NURSE PRACTITIONER

## 2022-07-11 PROCEDURE — 3008F PR BODY MASS INDEX (BMI) DOCUMENTED: ICD-10-PCS | Mod: CPTII,S$GLB,, | Performed by: NURSE PRACTITIONER

## 2022-07-11 PROCEDURE — 4010F PR ACE/ARB THEARPY RXD/TAKEN: ICD-10-PCS | Mod: CPTII,S$GLB,, | Performed by: NURSE PRACTITIONER

## 2022-07-11 PROCEDURE — 1159F PR MEDICATION LIST DOCUMENTED IN MEDICAL RECORD: ICD-10-PCS | Mod: CPTII,S$GLB,, | Performed by: NURSE PRACTITIONER

## 2022-07-11 PROCEDURE — 1160F PR REVIEW ALL MEDS BY PRESCRIBER/CLIN PHARMACIST DOCUMENTED: ICD-10-PCS | Mod: CPTII,S$GLB,, | Performed by: NURSE PRACTITIONER

## 2022-07-11 PROCEDURE — 3078F PR MOST RECENT DIASTOLIC BLOOD PRESSURE < 80 MM HG: ICD-10-PCS | Mod: CPTII,S$GLB,, | Performed by: NURSE PRACTITIONER

## 2022-07-11 RX ORDER — HYOSCYAMINE SULFATE 0.125 MG
125 TABLET ORAL EVERY 6 HOURS PRN
Qty: 120 TABLET | Refills: 2 | Status: SHIPPED | OUTPATIENT
Start: 2022-07-11 | End: 2023-07-23

## 2022-07-11 NOTE — PATIENT INSTRUCTIONS
- Take omeprazole 40 mg every morning on an empty stomach 30-45 minutes before food or any other medications  -

## 2022-07-15 NOTE — PROGRESS NOTES
Subjective:       Patient ID: Loi Cordova is a 56 y.o. female.    Chief Complaint: Abdominal Pain    57 y/o female with hypertension, type 2 diabetes, and GERD presents to clinic for follow up with c/o abdominal pain. Patient is new to me; established with Dr. Gamez.     Abdominal Pain  This is a recurrent problem. The problem occurs intermittently. The problem has been gradually worsening. The pain is located in the generalized abdominal region. The quality of the pain is aching and cramping. The abdominal pain does not radiate. Associated symptoms include constipation and nausea. Pertinent negatives include no diarrhea, hematochezia, melena, vomiting or weight loss. The pain is aggravated by eating. The pain is relieved by nothing. She has tried antacids, H2 blockers and proton pump inhibitors for the symptoms. The treatment provided mild relief. Prior diagnostic workup includes GI consult, lower endoscopy, upper endoscopy and ultrasound. Her past medical history is significant for GERD. There is no history of gallstones or pancreatitis. Patient's medical history does not include kidney stones.       Past Medical History:   Diagnosis Date    Allergy     Asthma     Coronary artery disease     GERD (gastroesophageal reflux disease)     History of back surgery 2/20/2018    Hyperlipidemia     Hypertension     Type 2 diabetes mellitus with diabetic polyneuropathy, without long-term current use of insulin 2/8/2019       Past Surgical History:   Procedure Laterality Date    BILATERAL OOPHORECTOMY Bilateral 2000    BREAST BIOPSY Left 2/14/2020    Procedure: BIOPSY, BREAST-Left medial breast palpation guided;  Surgeon: Paulino Espinoza MD;  Location: [address];  Service: General;  Laterality: Left;    CARDIAC CATHETERIZATION      7 stents    CHOLECYSTECTOMY      COLONOSCOPY N/A 7/16/2019    Procedure: COLONOSCOPY;  Surgeon: Sarah Gamez MD;  Location: [facility];  Service: Endoscopy;   Laterality: N/A;    ESOPHAGOGASTRODUODENOSCOPY N/A 1/9/2020    Procedure: EGD (ESOPHAGOGASTRODUODENOSCOPY);  Surgeon: Sarah Gamez MD;  Location: [facility];  Service: Endoscopy;  Laterality: N/A;    HYSTERECTOMY      PARTIAL HYSTERECTOMY N/A 1990    Uterus taken out    SHOULDER SURGERY      TOTAL KNEE ARTHROPLASTY         Family History   Problem Relation Age of Onset    Heart disease Mother     Hyperlipidemia Mother     Hypertension Mother     Diabetes Mother     Heart disease Father     Hyperlipidemia Father     Cancer Father     Diabetes Maternal Aunt     Prostate cancer Neg Hx     Kidney disease Neg Hx        Social History     Socioeconomic History    Marital status:    Tobacco Use    Smoking status: Current Every Day Smoker     Packs/day: 0.50     Years: 34.00     Pack years: 17.00     Types: Cigarettes    Smokeless tobacco: Never Used    Tobacco comment: in smoking program 1/25/21   Substance and Sexual Activity    Alcohol use: Not Currently     Comment: 6 months    Drug use: No    Sexual activity: Yes     Partners: Male     Social Determinants of Health     Financial Resource Strain: Low Risk     Difficulty of Paying Living Expenses: Not hard at all   Food Insecurity: No Food Insecurity    Worried About Running Out of Food in the Last Year: Never true    Ran Out of Food in the Last Year: Never true   Transportation Needs: No Transportation Needs    Lack of Transportation (Medical): No    Lack of Transportation (Non-Medical): No   Physical Activity: Inactive    Days of Exercise per Week: 0 days    Minutes of Exercise per Session: 10 min   Stress: No Stress Concern Present    Feeling of Stress : Not at all   Social Connections: Unknown    Frequency of Communication with Friends and Family: More than three times a week    Frequency of Social Gatherings with Friends and Family: Once a week    Active Member of Clubs or Organizations: No    Attends Club or  "Organization Meetings: Never    Marital Status:    Housing Stability: Low Risk     Unable to Pay for Housing in the Last Year: No    Number of Places Lived in the Last Year: 1    Unstable Housing in the Last Year: No       Review of Systems   Constitutional: Negative for appetite change, unexpected weight change and weight loss.   HENT: Negative for trouble swallowing.    Respiratory: Negative for shortness of breath.    Cardiovascular: Negative for chest pain.   Gastrointestinal: Positive for abdominal pain, constipation and nausea. Negative for diarrhea, hematochezia, melena and vomiting.   Neurological: Negative for dizziness and weakness.   Hematological: Negative for adenopathy. Does not bruise/bleed easily.   Psychiatric/Behavioral: Negative for dysphoric mood.         Objective:     Vitals:    07/11/22 1441   BP: 113/70   BP Location: Right arm   Pulse: 64   Weight: 79.6 kg (175 lb 6.4 oz)   Height: 5' 1" (1.549 m)          Physical Exam  Constitutional:       General: She is not in acute distress.     Appearance: Normal appearance. She is not ill-appearing.   HENT:      Head: Normocephalic.   Eyes:      Conjunctiva/sclera: Conjunctivae normal.      Pupils: Pupils are equal, round, and reactive to light.   Pulmonary:      Effort: Pulmonary effort is normal. No respiratory distress.   Musculoskeletal:         General: Normal range of motion.      Cervical back: Normal range of motion.   Skin:     General: Skin is warm and dry.   Neurological:      Mental Status: She is alert and oriented to person, place, and time.   Psychiatric:         Mood and Affect: Mood normal.         Behavior: Behavior normal.               Assessment:         ICD-10-CM ICD-9-CM   1. Generalized abdominal pain  R10.84 789.07   2. Chronic idiopathic constipation  K59.04 564.00       Plan:       Generalized abdominal pain  -     X-Ray Abdomen Flat And Erect; Future; Expected date: 07/11/2022  -     hyoscyamine (ANASPAZ,LEVSIN) " 0.125 mg Tab; Take 1 tablet (125 mcg total) by mouth every 6 (six) hours as needed (abdominal pain).  Dispense: 120 tablet; Refill: 2    Chronic idiopathic constipation  -     linaCLOtide (LINZESS) 72 mcg Cap capsule; Take 1 capsule (72 mcg total) by mouth once daily.  Dispense: 30 capsule; Refill: 2    Xray to quantify stool burden; see result note for further recs.    Follow up if symptoms worsen or fail to improve.     Patient's Medications   New Prescriptions    HYOSCYAMINE (ANASPAZ,LEVSIN) 0.125 MG TAB    Take 1 tablet (125 mcg total) by mouth every 6 (six) hours as needed (abdominal pain).    LINACLOTIDE (LINZESS) 72 MCG CAP CAPSULE    Take 1 capsule (72 mcg total) by mouth once daily.   Previous Medications    ALBUTEROL (PROVENTIL/VENTOLIN HFA) 90 MCG/ACTUATION INHALER    Inhale 2 puffs into the lungs every 4 to 6 hours as needed.    ALPRAZOLAM (XANAX) 0.25 MG TABLET    Take 1 tablet (0.25 mg total) by mouth daily as needed for Anxiety.    AMLODIPINE (NORVASC) 5 MG TABLET    Take 1 tablet by mouth once a day.    ASPIRIN 81 MG CHEW    Take 81 mg by mouth once daily.    ATORVASTATIN (LIPITOR) 80 MG TABLET    Take 1 tablet by mouth once a day.    BLOOD SUGAR DIAGNOSTIC STRP    Use 1 strip to check blood sugar once daily    BLOOD-GLUCOSE METER MISC    USE ONCE DAILY TO MONITOR GLUCOSE    DICYCLOMINE (BENTYL) 20 MG TABLET    Take 1 tablet (20 mg total) by mouth 3 (three) times daily as needed (abd pain).    DULAGLUTIDE (TRULICITY) 3 MG/0.5 ML PEN INJECTOR    Inject 3 mg into the skin every 7 days.    EZETIMIBE (ZETIA) 10 MG TABLET    Take 1 tablet by mouth once a day.    FENOFIBRATE 160 MG TAB    Take 1 tablet (160 mg total) by mouth once daily.    FLUTICASONE FUROATE-VILANTEROL (BREO) 100-25 MCG/DOSE DISKUS INHALER    Inhale 1 puff into the lungs once daily. Controller    FUROSEMIDE (LASIX) 40 MG TABLET    Take 1 tablet (40 mg total) by mouth daily as needed (edema).    IBUPROFEN (ADVIL,MOTRIN) 600 MG TABLET     Take 1 tablet (600 mg total) by mouth every 6 (six) hours as needed for Pain.    ISOSORBIDE MONONITRATE (IMDUR) 30 MG 24 HR TABLET    Take 1 tablet (30 mg total) by mouth once daily.    LANCETS 33 GAUGE MISC    USE ONCE DAILY TO MONITOR GLUCOSE    LISINOPRIL (PRINIVIL,ZESTRIL) 40 MG TABLET    Take 1 tablet (40 mg total) by mouth once daily.    METOPROLOL SUCCINATE (TOPROL-XL) 100 MG 24 HR TABLET    Take 1 tablet (100 mg total) by mouth once daily.    NICOTINE (NICODERM CQ) 21 MG/24 HR    Place 1 patch onto the skin once daily.    NITROGLYCERIN (NITROSTAT) 0.4 MG SL TABLET    Place 1 tablet under the tongue once every 5 minutes for 3 doses for chest pain, if pain continues call 911    OMEPRAZOLE (PRILOSEC) 40 MG CAPSULE    Take 1 capsule (40 mg total) by mouth once daily.    ONDANSETRON (ZOFRAN-ODT) 4 MG TBDL    Dissolve 1 tablet (4 mg total) by mouth every 6 (six) hours as needed.    OXYBUTYNIN (DITROPAN XL) 15 MG TR24    Take 2 tablets (30 mg total) by mouth once daily.    POTASSIUM CHLORIDE (K-TAB) 20 MEQ    Take 1 tablet (20 mEq total) by mouth daily as needed (edema). Take with lasix if needed for edema    PROMETHAZINE (PHENERGAN) 6.25 MG/5 ML SYRUP    Take 10 mLs (12.5 mg total) by mouth every 8 (eight) hours as needed for Nausea.    ROPINIROLE (REQUIP) 0.5 MG TABLET    Take 1 tablet by mouth once in the evening.    TRAZODONE (DESYREL) 100 MG TABLET    Take 1 tablet orally once in the evening.    TRIAMCINOLONE ACETONIDE 0.025% (KENALOG) 0.025 % CREAM    Apply topically 2 (two) times daily.   Modified Medications    No medications on file   Discontinued Medications    No medications on file

## 2022-07-27 ENCOUNTER — PATIENT MESSAGE (OUTPATIENT)
Dept: GASTROENTEROLOGY | Facility: CLINIC | Age: 57
End: 2022-07-27
Payer: COMMERCIAL

## 2022-07-27 DIAGNOSIS — K21.9 GASTROESOPHAGEAL REFLUX DISEASE, UNSPECIFIED WHETHER ESOPHAGITIS PRESENT: ICD-10-CM

## 2022-07-27 RX ORDER — OMEPRAZOLE 40 MG/1
40 CAPSULE, DELAYED RELEASE ORAL 2 TIMES DAILY
Qty: 180 CAPSULE | Refills: 3 | Status: SHIPPED | OUTPATIENT
Start: 2022-07-27 | End: 2023-08-08 | Stop reason: SDUPTHER

## 2022-08-16 ENCOUNTER — IMMUNIZATION (OUTPATIENT)
Dept: FAMILY MEDICINE | Facility: CLINIC | Age: 57
End: 2022-08-16
Payer: COMMERCIAL

## 2022-08-16 DIAGNOSIS — Z23 NEED FOR VACCINATION: Primary | ICD-10-CM

## 2022-08-16 PROCEDURE — 91305 COVID-19, MRNA, LNP-S, PF, 30 MCG/0.3 ML DOSE VACCINE (PFIZER): CPT | Mod: PBBFAC | Performed by: FAMILY MEDICINE

## 2022-08-17 DIAGNOSIS — E11.42 TYPE 2 DIABETES MELLITUS WITH DIABETIC POLYNEUROPATHY, WITHOUT LONG-TERM CURRENT USE OF INSULIN: ICD-10-CM

## 2022-08-17 RX ORDER — DULAGLUTIDE 3 MG/.5ML
3 INJECTION, SOLUTION SUBCUTANEOUS
Qty: 6 PEN | Refills: 1 | Status: SHIPPED | OUTPATIENT
Start: 2022-08-17 | End: 2023-01-10 | Stop reason: SDUPTHER

## 2022-08-17 NOTE — TELEPHONE ENCOUNTER
No new care gaps identified.  [facility] Embedded Care Gaps. Reference number: [id number]. 8/17/2022   11:56:57 AM CLINTT

## 2022-08-18 NOTE — TELEPHONE ENCOUNTER
Refill Decision Note   Loi Cordova  is requesting a refill authorization.  Brief Assessment and Rationale for Refill:  Approve     Medication Therapy Plan:       Medication Reconciliation Completed: No   Comments:     No Care Gaps recommended.     Note composed:7:21 PM 08/17/2022

## 2022-08-24 ENCOUNTER — PATIENT MESSAGE (OUTPATIENT)
Dept: ADMINISTRATIVE | Facility: HOSPITAL | Age: 57
End: 2022-08-24
Payer: COMMERCIAL

## 2022-09-17 DIAGNOSIS — F41.9 ANXIETY: ICD-10-CM

## 2022-09-17 NOTE — TELEPHONE ENCOUNTER
No new care gaps identified.  [facility] Embedded Care Gaps. Reference number: [id number]. 9/17/2022   11:37:59 AM CLINTT

## 2022-09-20 RX ORDER — ALPRAZOLAM 0.25 MG/1
0.25 TABLET ORAL DAILY PRN
Qty: 30 TABLET | Refills: 0 | Status: SHIPPED | OUTPATIENT
Start: 2022-09-20 | End: 2023-06-14 | Stop reason: SDUPTHER

## 2022-10-10 ENCOUNTER — PATIENT MESSAGE (OUTPATIENT)
Dept: ADMINISTRATIVE | Facility: HOSPITAL | Age: 57
End: 2022-10-10
Payer: COMMERCIAL

## 2022-11-26 ENCOUNTER — PATIENT MESSAGE (OUTPATIENT)
Dept: CARDIOLOGY | Facility: CLINIC | Age: 57
End: 2022-11-26
Payer: COMMERCIAL

## 2022-11-28 NOTE — TELEPHONE ENCOUNTER
Called pt regarding message, informed pt if they were having active chest pains they should go to the ER, set up apt with patient for tomorrow. Pt expressed understanding.

## 2022-12-16 ENCOUNTER — OFFICE VISIT (OUTPATIENT)
Dept: CARDIOLOGY | Facility: CLINIC | Age: 57
End: 2022-12-16
Payer: COMMERCIAL

## 2022-12-16 VITALS
WEIGHT: 166 LBS | DIASTOLIC BLOOD PRESSURE: 85 MMHG | SYSTOLIC BLOOD PRESSURE: 126 MMHG | HEART RATE: 69 BPM | BODY MASS INDEX: 31.34 KG/M2 | HEIGHT: 61 IN | OXYGEN SATURATION: 96 %

## 2022-12-16 DIAGNOSIS — I10 ESSENTIAL HYPERTENSION: Chronic | ICD-10-CM

## 2022-12-16 DIAGNOSIS — Z72.0 TOBACCO ABUSE: Chronic | ICD-10-CM

## 2022-12-16 DIAGNOSIS — R09.89 LEFT CAROTID BRUIT: ICD-10-CM

## 2022-12-16 DIAGNOSIS — I50.32 CHRONIC DIASTOLIC CHF (CONGESTIVE HEART FAILURE): ICD-10-CM

## 2022-12-16 DIAGNOSIS — E78.5 HYPERLIPIDEMIA, UNSPECIFIED HYPERLIPIDEMIA TYPE: ICD-10-CM

## 2022-12-16 DIAGNOSIS — I73.9 PAD (PERIPHERAL ARTERY DISEASE): ICD-10-CM

## 2022-12-16 DIAGNOSIS — E78.00 PURE HYPERCHOLESTEROLEMIA: ICD-10-CM

## 2022-12-16 DIAGNOSIS — I25.118 CORONARY ARTERY DISEASE OF NATIVE ARTERY OF NATIVE HEART WITH STABLE ANGINA PECTORIS: Primary | ICD-10-CM

## 2022-12-16 DIAGNOSIS — E66.01 CLASS 2 SEVERE OBESITY DUE TO EXCESS CALORIES WITH SERIOUS COMORBIDITY AND BODY MASS INDEX (BMI) OF 36.0 TO 36.9 IN ADULT: ICD-10-CM

## 2022-12-16 DIAGNOSIS — E11.8 DM (DIABETES MELLITUS) WITH COMPLICATIONS: ICD-10-CM

## 2022-12-16 DIAGNOSIS — Z98.61 CAD S/P PERCUTANEOUS CORONARY ANGIOPLASTY: Chronic | ICD-10-CM

## 2022-12-16 DIAGNOSIS — I25.10 CAD S/P PERCUTANEOUS CORONARY ANGIOPLASTY: Chronic | ICD-10-CM

## 2022-12-16 DIAGNOSIS — R07.9 CHEST PAIN, UNSPECIFIED TYPE: ICD-10-CM

## 2022-12-16 PROCEDURE — 1159F MED LIST DOCD IN RCRD: CPT | Mod: CPTII,S$GLB,, | Performed by: INTERNAL MEDICINE

## 2022-12-16 PROCEDURE — 3079F DIAST BP 80-89 MM HG: CPT | Mod: CPTII,S$GLB,, | Performed by: INTERNAL MEDICINE

## 2022-12-16 PROCEDURE — 4010F PR ACE/ARB THEARPY RXD/TAKEN: ICD-10-PCS | Mod: CPTII,S$GLB,, | Performed by: INTERNAL MEDICINE

## 2022-12-16 PROCEDURE — 3008F PR BODY MASS INDEX (BMI) DOCUMENTED: ICD-10-PCS | Mod: CPTII,S$GLB,, | Performed by: INTERNAL MEDICINE

## 2022-12-16 PROCEDURE — 1160F PR REVIEW ALL MEDS BY PRESCRIBER/CLIN PHARMACIST DOCUMENTED: ICD-10-PCS | Mod: CPTII,S$GLB,, | Performed by: INTERNAL MEDICINE

## 2022-12-16 PROCEDURE — 3074F SYST BP LT 130 MM HG: CPT | Mod: CPTII,S$GLB,, | Performed by: INTERNAL MEDICINE

## 2022-12-16 PROCEDURE — 99205 PR OFFICE/OUTPT VISIT, NEW, LEVL V, 60-74 MIN: ICD-10-PCS | Mod: S$GLB,,, | Performed by: INTERNAL MEDICINE

## 2022-12-16 PROCEDURE — 3051F PR MOST RECENT HEMOGLOBIN A1C LEVEL 7.0 - < 8.0%: ICD-10-PCS | Mod: CPTII,S$GLB,, | Performed by: INTERNAL MEDICINE

## 2022-12-16 PROCEDURE — 99999 PR PBB SHADOW E&M-EST. PATIENT-LVL III: CPT | Mod: PBBFAC,,, | Performed by: INTERNAL MEDICINE

## 2022-12-16 PROCEDURE — 99999 PR PBB SHADOW E&M-EST. PATIENT-LVL III: ICD-10-PCS | Mod: PBBFAC,,, | Performed by: INTERNAL MEDICINE

## 2022-12-16 PROCEDURE — 3074F PR MOST RECENT SYSTOLIC BLOOD PRESSURE < 130 MM HG: ICD-10-PCS | Mod: CPTII,S$GLB,, | Performed by: INTERNAL MEDICINE

## 2022-12-16 PROCEDURE — 1160F RVW MEDS BY RX/DR IN RCRD: CPT | Mod: CPTII,S$GLB,, | Performed by: INTERNAL MEDICINE

## 2022-12-16 PROCEDURE — 3079F PR MOST RECENT DIASTOLIC BLOOD PRESSURE 80-89 MM HG: ICD-10-PCS | Mod: CPTII,S$GLB,, | Performed by: INTERNAL MEDICINE

## 2022-12-16 PROCEDURE — 3008F BODY MASS INDEX DOCD: CPT | Mod: CPTII,S$GLB,, | Performed by: INTERNAL MEDICINE

## 2022-12-16 PROCEDURE — 3051F HG A1C>EQUAL 7.0%<8.0%: CPT | Mod: CPTII,S$GLB,, | Performed by: INTERNAL MEDICINE

## 2022-12-16 PROCEDURE — 4010F ACE/ARB THERAPY RXD/TAKEN: CPT | Mod: CPTII,S$GLB,, | Performed by: INTERNAL MEDICINE

## 2022-12-16 PROCEDURE — 99205 OFFICE O/P NEW HI 60 MIN: CPT | Mod: S$GLB,,, | Performed by: INTERNAL MEDICINE

## 2022-12-16 PROCEDURE — 1159F PR MEDICATION LIST DOCUMENTED IN MEDICAL RECORD: ICD-10-PCS | Mod: CPTII,S$GLB,, | Performed by: INTERNAL MEDICINE

## 2022-12-16 RX ORDER — NITROGLYCERIN 0.4 MG/1
TABLET SUBLINGUAL
Qty: 25 TABLET | Refills: 3 | Status: SHIPPED | OUTPATIENT
Start: 2022-12-16

## 2022-12-16 RX ORDER — LISINOPRIL 5 MG/1
5 TABLET ORAL DAILY
Qty: 90 TABLET | Refills: 3 | Status: SHIPPED | OUTPATIENT
Start: 2022-12-16 | End: 2024-02-09 | Stop reason: SDUPTHER

## 2022-12-16 NOTE — PROGRESS NOTES
Subjective:    Patient ID:  Loi Cordova is a 57 y.o. female who presents for evaluation of Coronary Artery Disease      HPI    56 y/o female who presents to [facility] care. She has a hx of CAD s/p multiple PCI's in the past with last intervention 2018, PAD (asymptomatic), HTN, HLD, uncontrolled DM, HFpEF, obesity, active smoker (~1/2 PPD). Last A1C 9.5 and LDL 88 (about 1 year ago). States she stopped her BP meds (lisinopril/Imdur and unclear if still taking amlodipine, still taking Toprol). Has stopped taking ASA. Does not exercise regularly and no heart healthy diet. States in the past had normal nuclear stress test with subsequent LHC with intervention. Has been having episodes of sudden, sharp, non exertional left sided CP, non radiating, lasts seconds that have been increasing in frequency. Associated CARDONA and palps. Denies orthopnea, PND, syncope, LE edema.     Review of Systems   Constitutional: Negative for malaise/fatigue.   HENT:  Negative for congestion.    Eyes:  Negative for blurred vision.   Cardiovascular:  Positive for chest pain, dyspnea on exertion and palpitations. Negative for claudication, cyanosis, irregular heartbeat, leg swelling, near-syncope, orthopnea, paroxysmal nocturnal dyspnea and syncope.   Respiratory:  Negative for shortness of breath.    Endocrine: Negative for polyuria.   Hematologic/Lymphatic: Negative for bleeding problem.   Skin:  Negative for itching and rash.   Musculoskeletal:  Negative for joint swelling, muscle cramps and muscle weakness.   Gastrointestinal:  Negative for abdominal pain, hematemesis, hematochezia, melena, nausea and vomiting.   Genitourinary:  Negative for dysuria and hematuria.   Neurological:  Negative for dizziness, focal weakness, headaches, light-headedness, loss of balance and weakness.   Psychiatric/Behavioral:  Negative for depression. The patient is not nervous/anxious.       Objective:    Physical Exam  Constitutional:       Appearance: She is  well-developed.   HENT:      Head: Normocephalic and atraumatic.   Neck:      Vascular: No JVD.   Cardiovascular:      Rate and Rhythm: Normal rate and regular rhythm.      Pulses:           Carotid pulses are 2+ on the right side and 2+ on the left side.       Radial pulses are 2+ on the right side and 2+ on the left side.        Femoral pulses are 2+ on the right side and 2+ on the left side.       Dorsalis pedis pulses are 2+ on the right side and 2+ on the left side.        Posterior tibial pulses are 2+ on the right side and 2+ on the left side.      Heart sounds: Normal heart sounds.   Pulmonary:      Effort: Pulmonary effort is normal.      Breath sounds: Normal breath sounds.   Abdominal:      General: Bowel sounds are normal.      Palpations: Abdomen is soft.   Musculoskeletal:      Cervical back: Neck supple.   Skin:     General: Skin is warm and dry.   Neurological:      Mental Status: She is alert and oriented to person, place, and time.   Psychiatric:         Behavior: Behavior normal.         Thought Content: Thought content normal.         Assessment:       1. Coronary artery disease of native artery of native heart with stable angina pectoris    2. Chest pain, unspecified type    3. Hyperlipidemia, unspecified hyperlipidemia type    4. DM (diabetes mellitus) with complications    5. CAD S/P percutaneous coronary angioplasty    6. PAD (peripheral artery disease)    7. Left carotid bruit    8. Essential hypertension    9. Pure hypercholesterolemia    10. Chronic diastolic CHF (congestive heart failure)    11. Class 2 severe obesity due to excess calories with serious comorbidity and body mass index (BMI) of 36.0 to 36.9 in adult    12. Tobacco abuse      57 year old patient with hx and presentation as above. Has risk factors for having CAD and will evaluate symptoms with noninvasive cardiac stress imaging and Holter. Lipids not at goal (LDL <70) and A1C not at goal (<7.0%). Had an extensive discussion  on optimal risk factor modification including glycemic and lipid control to goals. Also counseled on the importance of med compliance, nirmala ASA. Needs to stop smoking and stay active. Discussed the etiology, evaluation, and management of CAD, CHF, HTN, HLD, DM, smoking. Discussed the importance of med compliance, heart healthy diet, and regular exercise.        Plan:       -PET stress  -Holter x 48 hours  -ASA 81 mg daily indefinitely  -Lipid panel, A1C  -f/u in 1 month

## 2022-12-27 ENCOUNTER — HOSPITAL ENCOUNTER (OUTPATIENT)
Dept: CARDIOLOGY | Facility: HOSPITAL | Age: 57
Discharge: HOME | End: 2022-12-27
Attending: INTERNAL MEDICINE
Payer: COMMERCIAL

## 2022-12-27 VITALS
DIASTOLIC BLOOD PRESSURE: 77 MMHG | HEIGHT: 61 IN | BODY MASS INDEX: 31.34 KG/M2 | WEIGHT: 166 LBS | HEART RATE: 65 BPM | SYSTOLIC BLOOD PRESSURE: 133 MMHG

## 2022-12-27 DIAGNOSIS — R07.9 CHEST PAIN, UNSPECIFIED TYPE: ICD-10-CM

## 2022-12-27 LAB
CFR FLOW - ANTERIOR: 2.24
CFR FLOW - INFERIOR: 2.2
CFR FLOW - LATERAL: 2.23
CFR FLOW - MAX: 2.86
CFR FLOW - MIN: 1.8
CFR FLOW - SEPTAL: 2.26
CFR FLOW - WHOLE HEART: 2.23
CV PHARM DOSE: 0.4 MG
CV STRESS BASE HR: 65 BPM
DIASTOLIC BLOOD PRESSURE: 77 MMHG
EJECTION FRACTION- HIGH: 59 %
END DIASTOLIC INDEX-HIGH: 155 ML/M2
END DIASTOLIC INDEX-LOW: 91 ML/M2
END SYSTOLIC INDEX-HIGH: 78 ML/M2
END SYSTOLIC INDEX-LOW: 40 ML/M2
NUC REST DIASTOLIC VOLUME INDEX: 78
NUC REST EJECTION FRACTION: 72
NUC REST SYSTOLIC VOLUME INDEX: 22
NUC STRESS DIASTOLIC VOLUME INDEX: 80
NUC STRESS EJECTION FRACTION: 70 %
NUC STRESS SYSTOLIC VOLUME INDEX: 24
OHS CV CPX 85 PERCENT MAX PREDICTED HEART RATE MALE: 134
OHS CV CPX MAX PREDICTED HEART RATE: 158
OHS CV CPX PATIENT IS FEMALE: 1
OHS CV CPX PATIENT IS MALE: 0
OHS CV CPX PEAK DIASTOLIC BLOOD PRESSURE: 55 MMHG
OHS CV CPX PEAK HEAR RATE: 99 BPM
OHS CV CPX PEAK RATE PRESSURE PRODUCT: NORMAL
OHS CV CPX PEAK SYSTOLIC BLOOD PRESSURE: 115 MMHG
OHS CV CPX PERCENT MAX PREDICTED HEART RATE ACHIEVED: 63
OHS CV CPX RATE PRESSURE PRODUCT PRESENTING: 8645
PERFUSION DEFECT 1 SIZE IN %: 3 %
REST FLOW - ANTERIOR: 0.62 CC/MIN/G
REST FLOW - INFERIOR: 0.72 CC/MIN/G
REST FLOW - LATERAL: 0.73 CC/MIN/G
REST FLOW - MAX: 0.96 CC/MIN/G
REST FLOW - MIN: 0.35 CC/MIN/G
REST FLOW - SEPTAL: 0.55 CC/MIN/G
REST FLOW - WHOLE HEART: 0.66 CC/MIN/G
RETIRED EF AND QEF - SEE NOTES: 47 %
STRESS FLOW - ANTERIOR: 1.39 CC/MIN/G
STRESS FLOW - INFERIOR: 1.59 CC/MIN/G
STRESS FLOW - LATERAL: 1.63 CC/MIN/G
STRESS FLOW - MAX: 2.21 CC/MIN/G
STRESS FLOW - MIN: 0.73 CC/MIN/G
STRESS FLOW - SEPTAL: 1.25 CC/MIN/G
STRESS FLOW - WHOLE HEART: 1.47 CC/MIN/G
SYSTOLIC BLOOD PRESSURE: 133 MMHG

## 2022-12-27 PROCEDURE — 93016 CV STRESS TEST SUPVJ ONLY: CPT | Mod: ,,, | Performed by: INTERNAL MEDICINE

## 2022-12-27 PROCEDURE — 78434 CARDIAC PET SCAN STRESS (CUPID ONLY): ICD-10-PCS | Mod: 26,,, | Performed by: INTERNAL MEDICINE

## 2022-12-27 PROCEDURE — 78431 CARDIAC PET SCAN STRESS (CUPID ONLY): ICD-10-PCS | Mod: 26,,, | Performed by: INTERNAL MEDICINE

## 2022-12-27 PROCEDURE — 93018 CARDIAC PET SCAN STRESS (CUPID ONLY): ICD-10-PCS | Mod: ,,, | Performed by: INTERNAL MEDICINE

## 2022-12-27 PROCEDURE — 93018 CV STRESS TEST I&R ONLY: CPT | Mod: ,,, | Performed by: INTERNAL MEDICINE

## 2022-12-27 PROCEDURE — 78431 MYOCRD IMG PET RST&STRS CT: CPT | Mod: 26,,, | Performed by: INTERNAL MEDICINE

## 2022-12-27 PROCEDURE — 63600175 PHARM REV CODE 636 W HCPCS: Performed by: INTERNAL MEDICINE

## 2022-12-27 PROCEDURE — A9555 RB82 RUBIDIUM: HCPCS

## 2022-12-27 PROCEDURE — 93016 CARDIAC PET SCAN STRESS (CUPID ONLY): ICD-10-PCS | Mod: ,,, | Performed by: INTERNAL MEDICINE

## 2022-12-27 PROCEDURE — 78434 AQMBF PET REST & RX STRESS: CPT | Mod: 26,,, | Performed by: INTERNAL MEDICINE

## 2022-12-27 RX ORDER — CAFFEINE CITRATE 20 MG/ML
60 SOLUTION INTRAVENOUS
Status: COMPLETED | OUTPATIENT
Start: 2022-12-27 | End: 2022-12-27

## 2022-12-27 RX ORDER — ONDANSETRON 2 MG/ML
4 INJECTION INTRAMUSCULAR; INTRAVENOUS
Status: COMPLETED | OUTPATIENT
Start: 2022-12-27 | End: 2022-12-27

## 2022-12-27 RX ORDER — REGADENOSON 0.08 MG/ML
0.4 INJECTION, SOLUTION INTRAVENOUS ONCE
Status: COMPLETED | OUTPATIENT
Start: 2022-12-27 | End: 2022-12-27

## 2022-12-27 RX ADMIN — CAFFEINE CITRATE 60 MG: 20 INJECTION INTRAVENOUS at 09:12

## 2022-12-27 RX ADMIN — ONDANSETRON 4 MG: 2 INJECTION INTRAMUSCULAR; INTRAVENOUS at 09:12

## 2022-12-27 RX ADMIN — REGADENOSON 0.4 MG: 0.08 INJECTION, SOLUTION INTRAVENOUS at 09:12

## 2023-01-17 ENCOUNTER — PATIENT MESSAGE (OUTPATIENT)
Dept: ADMINISTRATIVE | Facility: HOSPITAL | Age: 58
End: 2023-01-17
Payer: COMMERCIAL

## 2023-02-01 ENCOUNTER — OFFICE VISIT (OUTPATIENT)
Dept: URGENT CARE | Facility: CLINIC | Age: 58
End: 2023-02-01
Payer: COMMERCIAL

## 2023-02-01 VITALS
WEIGHT: 168 LBS | SYSTOLIC BLOOD PRESSURE: 138 MMHG | TEMPERATURE: 98 F | HEIGHT: 60 IN | RESPIRATION RATE: 18 BRPM | BODY MASS INDEX: 32.98 KG/M2 | HEART RATE: 69 BPM | DIASTOLIC BLOOD PRESSURE: 68 MMHG | OXYGEN SATURATION: 95 %

## 2023-02-01 DIAGNOSIS — B96.89 BACTERIAL URI: Primary | ICD-10-CM

## 2023-02-01 DIAGNOSIS — J45.901 EXACERBATION OF ASTHMA, UNSPECIFIED ASTHMA SEVERITY, UNSPECIFIED WHETHER PERSISTENT: ICD-10-CM

## 2023-02-01 DIAGNOSIS — J06.9 BACTERIAL URI: Primary | ICD-10-CM

## 2023-02-01 PROCEDURE — 3008F BODY MASS INDEX DOCD: CPT | Mod: CPTII,S$GLB,, | Performed by: PHYSICIAN ASSISTANT

## 2023-02-01 PROCEDURE — 1160F PR REVIEW ALL MEDS BY PRESCRIBER/CLIN PHARMACIST DOCUMENTED: ICD-10-PCS | Mod: CPTII,S$GLB,, | Performed by: PHYSICIAN ASSISTANT

## 2023-02-01 PROCEDURE — 1159F PR MEDICATION LIST DOCUMENTED IN MEDICAL RECORD: ICD-10-PCS | Mod: CPTII,S$GLB,, | Performed by: PHYSICIAN ASSISTANT

## 2023-02-01 PROCEDURE — 3075F SYST BP GE 130 - 139MM HG: CPT | Mod: CPTII,S$GLB,, | Performed by: PHYSICIAN ASSISTANT

## 2023-02-01 PROCEDURE — 1159F MED LIST DOCD IN RCRD: CPT | Mod: CPTII,S$GLB,, | Performed by: PHYSICIAN ASSISTANT

## 2023-02-01 PROCEDURE — 1160F RVW MEDS BY RX/DR IN RCRD: CPT | Mod: CPTII,S$GLB,, | Performed by: PHYSICIAN ASSISTANT

## 2023-02-01 PROCEDURE — 3008F PR BODY MASS INDEX (BMI) DOCUMENTED: ICD-10-PCS | Mod: CPTII,S$GLB,, | Performed by: PHYSICIAN ASSISTANT

## 2023-02-01 PROCEDURE — 99213 PR OFFICE/OUTPT VISIT, EST, LEVL III, 20-29 MIN: ICD-10-PCS | Mod: S$GLB,,, | Performed by: PHYSICIAN ASSISTANT

## 2023-02-01 PROCEDURE — 3078F PR MOST RECENT DIASTOLIC BLOOD PRESSURE < 80 MM HG: ICD-10-PCS | Mod: CPTII,S$GLB,, | Performed by: PHYSICIAN ASSISTANT

## 2023-02-01 PROCEDURE — 3075F PR MOST RECENT SYSTOLIC BLOOD PRESS GE 130-139MM HG: ICD-10-PCS | Mod: CPTII,S$GLB,, | Performed by: PHYSICIAN ASSISTANT

## 2023-02-01 PROCEDURE — 3078F DIAST BP <80 MM HG: CPT | Mod: CPTII,S$GLB,, | Performed by: PHYSICIAN ASSISTANT

## 2023-02-01 PROCEDURE — 99213 OFFICE O/P EST LOW 20 MIN: CPT | Mod: S$GLB,,, | Performed by: PHYSICIAN ASSISTANT

## 2023-02-01 RX ORDER — ALBUTEROL SULFATE 90 UG/1
2 AEROSOL, METERED RESPIRATORY (INHALATION) EVERY 6 HOURS PRN
Qty: 6.7 G | Refills: 0 | Status: SHIPPED | OUTPATIENT
Start: 2023-02-01 | End: 2023-04-03 | Stop reason: SDUPTHER

## 2023-02-01 RX ORDER — BENZONATATE 100 MG/1
100 CAPSULE ORAL 3 TIMES DAILY PRN
Qty: 21 CAPSULE | Refills: 0 | Status: SHIPPED | OUTPATIENT
Start: 2023-02-01 | End: 2023-02-11

## 2023-02-01 RX ORDER — PREDNISONE 20 MG/1
20 TABLET ORAL DAILY
Qty: 5 TABLET | Refills: 0 | Status: SHIPPED | OUTPATIENT
Start: 2023-02-01 | End: 2023-02-07

## 2023-02-01 RX ORDER — DOXYCYCLINE 100 MG/1
100 CAPSULE ORAL 2 TIMES DAILY
Qty: 14 CAPSULE | Refills: 0 | Status: SHIPPED | OUTPATIENT
Start: 2023-02-01 | End: 2023-02-09

## 2023-02-01 NOTE — PROGRESS NOTES
Subjective:       Patient ID: Loi Cordova is a 57 y.o. female.    Vitals:  height is 5' (1.524 m) and weight is 76.2 kg (168 lb). Her oral temperature is 98.2 °F (36.8 °C). Her blood pressure is 138/68 and her pulse is 69. Her respiration is 18 and oxygen saturation is 95%.     Chief Complaint: Cough    Pt denied getting tested for anything. States that she took a COVID test on Friday and today and both were negative.    Patient provider note starts here:  Patient presents with a 10 day history of nasal congestion and cough productive of minimal amounts of thick, yellow sputum.  Also endorses some shortness of breath and anterior chest soreness due to coughing.  She denies fevers.  Has been using an albuterol inhaler as well as over-the-counter sinus and cold medications with only little relief.  She does smoke tobacco.  History of asthma and reports a change in her sputum production since feeling ill.  Reports that she has taken 2 different COVID test at home which have both been negative. Denies LE edema.     Cough  This is a new problem. The current episode started 1 to 4 weeks ago. The problem has been gradually worsening. The problem occurs constantly. Associated symptoms include ear congestion, nasal congestion, postnasal drip, a sore throat, shortness of breath and wheezing. Pertinent negatives include no chest pain, fever or headaches. Nothing aggravates the symptoms. Treatments tried: OTC sinus meds, OTC allergy meds. The treatment provided no relief.     Constitution: Negative for fever.   HENT:  Positive for congestion, postnasal drip and sore throat.    Neck: Negative for neck pain.   Cardiovascular:  Negative for chest pain, palpitations and sob on exertion.   Respiratory:  Positive for cough, sputum production, shortness of breath, wheezing and asthma. Negative for chest tightness.    Gastrointestinal:  Negative for abdominal pain, vomiting and diarrhea.   Skin:  Negative for color change and wound.    Allergic/Immunologic: Positive for asthma.   Neurological:  Negative for headaches, numbness and tingling.     Objective:      Physical Exam   Constitutional: She is oriented to person, place, and time. She appears well-developed. She is cooperative.  Non-toxic appearance. She does not appear ill. No distress.   HENT:   Head: Normocephalic and atraumatic.   Ears:   Right Ear: Hearing, tympanic membrane, external ear and ear canal normal.   Left Ear: Hearing, tympanic membrane, external ear and ear canal normal.   Nose: Congestion present. No mucosal edema, rhinorrhea or nasal deformity. No epistaxis. Right sinus exhibits no maxillary sinus tenderness and no frontal sinus tenderness. Left sinus exhibits no maxillary sinus tenderness and no frontal sinus tenderness.   Mouth/Throat: Uvula is midline, oropharynx is clear and moist and mucous membranes are normal. No trismus in the jaw. Normal dentition. No uvula swelling. No oropharyngeal exudate, posterior oropharyngeal edema or posterior oropharyngeal erythema.   Eyes: Conjunctivae and lids are normal. No scleral icterus.   Neck: Trachea normal and phonation normal. Neck supple. No edema present. No erythema present. No neck rigidity present.   Cardiovascular: Normal rate, regular rhythm, normal heart sounds and normal pulses.   Pulmonary/Chest: Effort normal. No respiratory distress. She has no decreased breath sounds. She has wheezes. She has no rhonchi.         Comments: Faint expiratory wheeze noted. Loud barking cough noted on exam.         Abdominal: Normal appearance.   Musculoskeletal: Normal range of motion.         General: No deformity. Normal range of motion.   Neurological: She is alert and oriented to person, place, and time. She exhibits normal muscle tone. Coordination normal.   Skin: Skin is warm, dry, intact, not diaphoretic and not pale.   Psychiatric: Her speech is normal and behavior is normal. Judgment and thought content normal.   Nursing note  and vitals reviewed.      Assessment:       1. Bacterial URI    2. Exacerbation of asthma, unspecified asthma severity, unspecified whether persistent        Results for orders placed or performed during the hospital encounter of 12/28/22   Holter monitor - 48 hour   Result Value Ref Range    holter length minutes 0     Event Monitor Day 0     Holter length hours 48     holter length dec hours 48.00     Sinus min HR 46     Sinus max hr 110     Sinus avg hr 74      *Note: Due to a large number of results and/or encounters for the requested time period, some results have not been displayed. A complete set of results can be found in Results Review.       Plan:         Bacterial URI  -     benzonatate (TESSALON) 100 MG capsule; Take 1 capsule (100 mg total) by mouth 3 (three) times daily as needed for Cough.  Dispense: 21 capsule; Refill: 0  -     doxycycline (VIBRAMYCIN) 100 MG Cap; Take 1 capsule (100 mg total) by mouth 2 (two) times daily. for 7 days  Dispense: 14 capsule; Refill: 0    Exacerbation of asthma, unspecified asthma severity, unspecified whether persistent  -     albuterol (PROVENTIL HFA) 90 mcg/actuation inhaler; Inhale 2 puffs into the lungs every 6 (six) hours as needed for Wheezing or Shortness of Breath. Rescue  Dispense: 6.7 g; Refill: 0  -     predniSONE (DELTASONE) 20 MG tablet; Take 1 tablet (20 mg total) by mouth once daily. for 5 days  Dispense: 5 tablet; Refill: 0           Medical Decision Making:   History:   Old Medical Records: I decided to obtain old medical records.  Old Records Summarized: other records.       <> Summary of Records: Last A1C was 7.7 in June 2022  Differential Diagnosis:   Differential diagnosis includes but is not limited to: viral vs bacterial URI, pharyngitis, otitis, COVID 19, influenza, pneumonia, asthmatic exacerbation    Urgent Care Management:  Patient presents with a 10 day history of URI like symptoms with associated dry cough, intermittent shortness of breath  and wheezing in the setting of having a history of asthma.  On exam, she is afebrile and nontoxic in appearance.  She has faint expiratory wheezing noted but vital signs are stable.  She has not tachypneic, not hypoxic, not tachycardic.  Reports 2- home COVID tests.  Of note, patient has a history of diabetes but reports that her glucose has been around 120-130s lately.  I have refilled her albuterol inhaler for her in addition to prescribing Tessalon as needed for cough.  I am also providing a short course of prednisone but I have strongly advised the patient to monitor her glucose and stop taking the prednisone if her sugar gets too elevated.  She verbalized understanding of this.  Also, due to a change in her sputum production and the length since onset of her symptoms, also covering for bacterial causes with doxycycline.  Encouraged very close follow-up with primary care.  ED precautions were discussed.  She verbalized understanding and agreed with plan.     Patient Instructions   You must understand that you've received an Urgent Care treatment only and that you may be released before all your medical problems are known or treated. You, the patient, will arrange for follow up care as instructed.      Follow up with your PCP or specialty clinic as instructed in the next 2-3 days if not improved or as needed. You can call [phone] to schedule an appointment with appropriate provider.      If you condition worsens, we recommend that you receive another evaluation at the emergency room immediately or contact your primary medical clinic's after hours call service to discuss your concerns.      Please return here or go to the Emergency Department for any concerns or worsening condition.      If you were prescribed a narcotic or controlled substance, do not drive or operate heavy equipment or machinery while taking these medications.

## 2023-02-01 NOTE — LETTER
February 1, 2023      [facility]  [address] [address], [address]  [place] [address]  Phone: [phone]  Fax: [phone]       Patient: Loi Cordova   YOB: 1965  Date of Visit: 02/01/2023    To Whom It May Concern:    Paul Cordova  was at [facility] on 02/01/2023. She may return to work/school on 2/3/2023 with no restrictions. If you have any questions or concerns, or if I can be of further assistance, please do not hesitate to contact me.    Sincerely,    Kaylie Ng PA-C

## 2023-02-01 NOTE — PATIENT INSTRUCTIONS
You must understand that you've received an Urgent Care treatment only and that you may be released before all your medical problems are known or treated. You, the patient, will arrange for follow up care as instructed.      Follow up with your PCP or specialty clinic as instructed in the next 2-3 days if not improved or as needed. You can call [phone] to schedule an appointment with appropriate provider.      If you condition worsens, we recommend that you receive another evaluation at the emergency room immediately or contact your primary medical clinic's after hours call service to discuss your concerns.      Please return here or go to the Emergency Department for any concerns or worsening condition.      If you were prescribed a narcotic or controlled substance, do not drive or operate heavy equipment or machinery while taking these medications.

## 2023-02-22 ENCOUNTER — TELEPHONE (OUTPATIENT)
Dept: OPTOMETRY | Facility: CLINIC | Age: 58
End: 2023-02-22
Payer: COMMERCIAL

## 2023-03-08 ENCOUNTER — OFFICE VISIT (OUTPATIENT)
Dept: OPTOMETRY | Facility: CLINIC | Age: 58
End: 2023-03-08
Payer: COMMERCIAL

## 2023-03-08 DIAGNOSIS — H52.01 HYPEROPIA OF RIGHT EYE WITH ASTIGMATISM AND PRESBYOPIA: ICD-10-CM

## 2023-03-08 DIAGNOSIS — Z01.00 ROUTINE EYE EXAM: Primary | ICD-10-CM

## 2023-03-08 DIAGNOSIS — H52.02 HYPEROPIA WITH PRESBYOPIA OF LEFT EYE: ICD-10-CM

## 2023-03-08 DIAGNOSIS — H52.4 HYPEROPIA OF RIGHT EYE WITH ASTIGMATISM AND PRESBYOPIA: ICD-10-CM

## 2023-03-08 DIAGNOSIS — H35.033 HYPERTENSIVE RETINOPATHY OF BOTH EYES: ICD-10-CM

## 2023-03-08 DIAGNOSIS — H52.4 HYPEROPIA WITH PRESBYOPIA OF LEFT EYE: ICD-10-CM

## 2023-03-08 DIAGNOSIS — H25.13 NUCLEAR SCLEROSIS OF BOTH EYES: ICD-10-CM

## 2023-03-08 DIAGNOSIS — H52.201 HYPEROPIA OF RIGHT EYE WITH ASTIGMATISM AND PRESBYOPIA: ICD-10-CM

## 2023-03-08 PROCEDURE — 92015 PR REFRACTION: ICD-10-PCS | Mod: S$GLB,,, | Performed by: OPTOMETRIST

## 2023-03-08 PROCEDURE — 92015 DETERMINE REFRACTIVE STATE: CPT | Mod: S$GLB,,, | Performed by: OPTOMETRIST

## 2023-03-08 PROCEDURE — 92004 PR EYE EXAM, NEW PATIENT,COMPREHESV: ICD-10-PCS | Mod: S$GLB,,, | Performed by: OPTOMETRIST

## 2023-03-08 PROCEDURE — 99999 PR PBB SHADOW E&M-EST. PATIENT-LVL III: CPT | Mod: PBBFAC,,, | Performed by: OPTOMETRIST

## 2023-03-08 PROCEDURE — 99999 PR PBB SHADOW E&M-EST. PATIENT-LVL III: ICD-10-PCS | Mod: PBBFAC,,, | Performed by: OPTOMETRIST

## 2023-03-08 PROCEDURE — 92004 COMPRE OPH EXAM NEW PT 1/>: CPT | Mod: S$GLB,,, | Performed by: OPTOMETRIST

## 2023-03-08 NOTE — PROGRESS NOTES
HPI    CC: Pt is here today for a diabetic exam. She states she is seeing well   with her current glasses. She is interested in contact lenses, but has   never worn them in the past.     NATY: 1 year    (-) Changes in vision   (-) Pain  (+) Irritation   (-) Itching   (-) Flashes  (-) Floaters  (+) Glasses wearer  (-) CL wearer  (-) Uses eye gtts    Does patient want a refraction today? yes    (-) Eye injury  (-) Eye surgery   (-)POHx  (+)FOHx, Glaucoma    (+)DM  Hemoglobin A1C       Date                     Value               Ref Range             Status                06/24/2022               7.7 (H)             4.0 - 5.6 %           Final                  04/09/2022               9.5 (H)             4.0 - 5.6 %           Final                   04/06/2022               9.3 (H)             4.0 - 5.6 %           Final                 Last edited by Zulay Ansari, OD on 3/8/2023  3:23 PM.            Assessment /Plan     For exam results, see Encounter Report.    Routine eye exam    Hyperopia of right eye with astigmatism and presbyopia    Hyperopia with presbyopia of left eye    Hypertensive retinopathy of both eyes    Nuclear sclerosis of both eyes      Eyemed Exam.     2-3. Updated SRx. Mild change OD, no change OS from habitual. Monitor yearly.    Ordered CL Trials. Will try monovision correction first. Also ordered trials for DVO option. Once trials arrive to office, pt to be scheduled for CL f/u. Will need I&R.     4. Vessel changes, OU. Discussed importance of BP control, taking medications as directed, and following-up with primary care. If changes in vision noted, RTC. Monitor yearly unless changes noted sooner.      5. Educated pt on findings. Not visually significant. No need for removal at this time. Monitor yearly.        RTC for CL f/u, then yearly for annual eye exam or sooner if needed.

## 2023-03-16 ENCOUNTER — PATIENT MESSAGE (OUTPATIENT)
Dept: OPTOMETRY | Facility: CLINIC | Age: 58
End: 2023-03-16
Payer: COMMERCIAL

## 2023-03-24 ENCOUNTER — PATIENT MESSAGE (OUTPATIENT)
Dept: ADMINISTRATIVE | Facility: HOSPITAL | Age: 58
End: 2023-03-24
Payer: COMMERCIAL

## 2023-03-24 DIAGNOSIS — E11.9 TYPE 2 DIABETES MELLITUS WITHOUT COMPLICATION, UNSPECIFIED WHETHER LONG TERM INSULIN USE: ICD-10-CM

## 2023-04-03 ENCOUNTER — OFFICE VISIT (OUTPATIENT)
Dept: URGENT CARE | Facility: CLINIC | Age: 58
End: 2023-04-03
Payer: COMMERCIAL

## 2023-04-03 VITALS
HEIGHT: 60 IN | TEMPERATURE: 98 F | OXYGEN SATURATION: 95 % | WEIGHT: 169 LBS | DIASTOLIC BLOOD PRESSURE: 77 MMHG | HEART RATE: 77 BPM | RESPIRATION RATE: 20 BRPM | BODY MASS INDEX: 33.18 KG/M2 | SYSTOLIC BLOOD PRESSURE: 161 MMHG

## 2023-04-03 DIAGNOSIS — J20.9 BRONCHITIS WITH ASTHMA, ACUTE: Primary | ICD-10-CM

## 2023-04-03 DIAGNOSIS — J45.909 BRONCHITIS WITH ASTHMA, ACUTE: Primary | ICD-10-CM

## 2023-04-03 DIAGNOSIS — J06.9 URI, ACUTE: ICD-10-CM

## 2023-04-03 PROCEDURE — 96372 PR INJECTION,THERAP/PROPH/DIAG2ST, IM OR SUBCUT: ICD-10-PCS | Mod: S$GLB,,, | Performed by: FAMILY MEDICINE

## 2023-04-03 PROCEDURE — 99213 PR OFFICE/OUTPT VISIT, EST, LEVL III, 20-29 MIN: ICD-10-PCS | Mod: 25,S$GLB,, | Performed by: NURSE PRACTITIONER

## 2023-04-03 PROCEDURE — 99213 OFFICE O/P EST LOW 20 MIN: CPT | Mod: 25,S$GLB,, | Performed by: NURSE PRACTITIONER

## 2023-04-03 PROCEDURE — 96372 THER/PROPH/DIAG INJ SC/IM: CPT | Mod: S$GLB,,, | Performed by: FAMILY MEDICINE

## 2023-04-03 RX ORDER — ALBUTEROL SULFATE 90 UG/1
2 AEROSOL, METERED RESPIRATORY (INHALATION) EVERY 6 HOURS PRN
Qty: 6.7 G | Refills: 0 | Status: SHIPPED | OUTPATIENT
Start: 2023-04-03 | End: 2023-11-08 | Stop reason: SDUPTHER

## 2023-04-03 RX ORDER — FLUTICASONE FUROATE AND VILANTEROL 100; 25 UG/1; UG/1
1 POWDER RESPIRATORY (INHALATION) DAILY
Qty: 60 EACH | Refills: 0 | Status: SHIPPED | OUTPATIENT
Start: 2023-04-03 | End: 2023-11-07 | Stop reason: SDUPTHER

## 2023-04-03 RX ORDER — PROMETHAZINE HYDROCHLORIDE AND DEXTROMETHORPHAN HYDROBROMIDE 6.25; 15 MG/5ML; MG/5ML
5 SYRUP ORAL 3 TIMES DAILY PRN
Qty: 180 ML | Refills: 0 | Status: SHIPPED | OUTPATIENT
Start: 2023-04-03 | End: 2023-04-15

## 2023-04-03 RX ORDER — DEXAMETHASONE SODIUM PHOSPHATE 100 MG/10ML
10 INJECTION INTRAMUSCULAR; INTRAVENOUS ONCE
Status: COMPLETED | OUTPATIENT
Start: 2023-04-03 | End: 2023-04-03

## 2023-04-03 RX ADMIN — DEXAMETHASONE SODIUM PHOSPHATE 10 MG: 100 INJECTION INTRAMUSCULAR; INTRAVENOUS at 11:04

## 2023-04-03 NOTE — PROGRESS NOTES
Subjective:      Patient ID: Loi Cordova is a 57 y.o. female.    Vitals:  height is 5' (1.524 m) and weight is 76.7 kg (169 lb). Her oral temperature is 97.6 °F (36.4 °C). Her blood pressure is 161/77 (abnormal) and her pulse is 77. Her respiration is 20 and oxygen saturation is 95%.     Chief Complaint: Cough    57-year-old female presents today with a chronic cough.  She reports a history of bronchitis and asthma.  She states the cough started 1 week ago and worsened.   She denies sputum production, fever, chills, shortness a breath, or chest pain.  She reports inconsistency with her daily inhaler, Breo.  She reports occasional use of albuterol inhaler with mild relief.  She is a current tobacco user.  Discussed smoking cessation program, states she is established with smoking cessation program; however she continues to smoke cigarettes daily.  Discussed risks and complications secondary to long-term abnormality, such as COPD and emphysema; she verbalized understanding.  Recommendation given for pulmonology referral secondary to ongoing history of bronchitis and asthma, she is amenable.    Cough  This is a new problem. Episode onset: 1 week. The problem has been unchanged. The problem occurs constantly. The cough is Non-productive. Associated symptoms include wheezing. Pertinent negatives include no chest pain, chills, ear pain or fever. The symptoms are aggravated by lying down (over heated). Treatments tried: hot lemon water, inhaler, tessalon pearls. The treatment provided mild relief. Her past medical history is significant for asthma and bronchitis. There is no history of COPD or pneumonia.     Constitution: Negative for chills, fever and generalized weakness.   HENT:  Negative for ear pain.    Neck: Negative for painful lymph nodes.   Cardiovascular:  Negative for chest pain, palpitations and sob on exertion.   Respiratory:  Positive for cough, wheezing and asthma. Negative for sputum production.    Skin:  Negative.    Allergic/Immunologic: Positive for asthma.   Hematologic/Lymphatic: Negative for swollen lymph nodes.    Objective:     Physical Exam   Constitutional: She is oriented to person, place, and time. She appears well-developed. She is cooperative.  Non-toxic appearance. She does not appear ill. No distress.   HENT:   Head: Normocephalic and atraumatic.   Ears:   Right Ear: Hearing, tympanic membrane, external ear and ear canal normal.   Left Ear: Hearing, tympanic membrane, external ear and ear canal normal.   Nose: Nose normal. No mucosal edema, rhinorrhea or nasal deformity. No epistaxis. Right sinus exhibits no maxillary sinus tenderness and no frontal sinus tenderness. Left sinus exhibits no maxillary sinus tenderness and no frontal sinus tenderness.   Mouth/Throat: Uvula is midline, oropharynx is clear and moist and mucous membranes are normal. No trismus in the jaw. Normal dentition. No uvula swelling. No oropharyngeal exudate, posterior oropharyngeal edema or posterior oropharyngeal erythema.   Eyes: Conjunctivae and lids are normal. No scleral icterus.   Neck: Trachea normal and phonation normal. Neck supple. No edema present. No erythema present. No neck rigidity present.   Cardiovascular: Normal rate, regular rhythm, normal heart sounds and normal pulses.   Pulmonary/Chest: Effort normal. No accessory muscle usage. No tachypnea. No respiratory distress. She has no decreased breath sounds. She has wheezes in the right upper field and the right middle field. She has rhonchi in the right upper field and the right middle field. She has no rales. She exhibits no tenderness.   Abdominal: Normal appearance.   Musculoskeletal: Normal range of motion.         General: No deformity. Normal range of motion.   Neurological: She is alert and oriented to person, place, and time. She exhibits normal muscle tone. Coordination normal.   Skin: Skin is warm, dry, intact, not diaphoretic and not pale.   Psychiatric:  Her speech is normal and behavior is normal. Judgment and thought content normal.   Nursing note and vitals reviewed.    Assessment:     1. Bronchitis with asthma, acute    2. URI, acute        Plan:   Recommendation given to avoid tobacco use.  She is established with smoking cessation.  Increase hydration.  Take medications as ordered.  Return to clinic if symptoms should worsen, chest pain, shortness a breath fever or chills.    Bronchitis with asthma, acute  -     dexAMETHasone injection 10 mg  -     promethazine-dextromethorphan (PROMETHAZINE-DM) 6.25-15 mg/5 mL Syrp; Take 5 mLs by mouth 3 (three) times daily as needed (cough).  Dispense: 180 mL; Refill: 0  -     fluticasone furoate-vilanteroL (BREO) 100-25 mcg/dose diskus inhaler; Inhale 1 puff into the lungs once daily. Controller  Dispense: 60 each; Refill: 0  -     Ambulatory referral/consult to Pulmonology    URI, acute  -     dexAMETHasone injection 10 mg    Other orders  -     albuterol (PROVENTIL HFA) 90 mcg/actuation inhaler; Inhale 2 puffs into the lungs every 6 (six) hours as needed for Wheezing or Shortness of Breath. Rescue  Dispense: 6.7 g; Refill: 0

## 2023-04-03 NOTE — PATIENT INSTRUCTIONS
"Bronchitis  If your condition worsens or fails to improve we recommend that you receive another evaluation at the ER immediately or contact your PCP to discuss your concerns or return here. You must understand that you've received an urgent care treatment only and that you may be released before all your medical problems are known or treated. You the patient will arrange for follouwp care as instructed. .  Rest and fluids are important  Can use honey with kelli to soothe your throat  Take inhaler as prescribed and needed for wheezing  Take prescription cough meds (pills) as prescribed; take prescription cough syrup at night as needed for cough.  Do not take both the prescribed cough pills and syrup at the same time.   -  Flonase (fluticasone) is a nasal spray which is available over the counter and may help with your symptoms.   -  Zyrtec D, Claritin D or Allegra D can also help with symptoms of congestion and drainage.   -  If you have hypertension avoid using the "D" which is the decongestant.  Instead you can use Coricidin HBP for cold and cough symptoms.    -  If you just have drainage you can take plain Zyrtec, Claritin or Allegra   -  If you just have a congested feeling you can take pseudoephedrine (unless you have high blood pressure) which you have to sign for behind the counter. Do not buy the phenylephrine which is on the shelf as it is not effective   -  Rest and fluids are also important.   -  Tylenol or ibuprofen can also be used as directed for pain unless you have an allergy to them or medical condition such as stomach ulcers, kidney or liver disease or blood thinners etc for which you should not be taking these type of medications.   Please follow up with your primary care doctor or specialist in the next 48-72hrs as needed and if no improvement  If you  smoke, please stop smoking.    You must understand that you've received an Urgent Care treatment only and that you may be released before all your " medical problems are known or treated. You, the patient, will arrange for follow up care as instructed.  Follow up with your PCP or specialty clinic as directed in the next 1-2 weeks if not improved or as needed.  You can call [phone] to schedule an appointment with the appropriate provider.  If your condition worsens we recommend that you receive another evaluation at the emergency room immediately or contact your primary medical clinics after hours call service to discuss your concerns.  Please return here or go to the Emergency Department for any concerns or worsening of condition.    If you were prescribed a narcotic or controlled medication, do not drive or operate heavy equipment or machinery while taking these medications.    Thank you for choosing [facility]!    Our goal in the Urgent Care is to always provide outstanding medical care. You may receive a survey by mail or e-mail in the next week regarding your experience today. We would greatly appreciate you completing and returning the survey. Your feedback provides us with a way to recognize our staff who provide very good care, and it helps us learn how to improve when your experience was below our aspiration of excellence.      We appreciate you trusting us with your medical care. We hope you feel better soon. We will be happy to take care of you for all of your future medical needs.    Sincerely,    Abilio Corey DNP, NUNOP

## 2023-04-11 ENCOUNTER — PATIENT MESSAGE (OUTPATIENT)
Dept: ADMINISTRATIVE | Facility: HOSPITAL | Age: 58
End: 2023-04-11
Payer: COMMERCIAL

## 2023-04-13 ENCOUNTER — OFFICE VISIT (OUTPATIENT)
Dept: PULMONOLOGY | Facility: CLINIC | Age: 58
End: 2023-04-13
Payer: COMMERCIAL

## 2023-04-13 VITALS
SYSTOLIC BLOOD PRESSURE: 114 MMHG | DIASTOLIC BLOOD PRESSURE: 76 MMHG | HEART RATE: 84 BPM | OXYGEN SATURATION: 97 % | BODY MASS INDEX: 34.55 KG/M2 | HEIGHT: 60 IN | WEIGHT: 176 LBS | RESPIRATION RATE: 20 BRPM

## 2023-04-13 DIAGNOSIS — F17.210 CIGARETTE NICOTINE DEPENDENCE WITHOUT COMPLICATION: Primary | ICD-10-CM

## 2023-04-13 DIAGNOSIS — K21.9 GASTROESOPHAGEAL REFLUX DISEASE, UNSPECIFIED WHETHER ESOPHAGITIS PRESENT: ICD-10-CM

## 2023-04-13 DIAGNOSIS — J45.51 SEVERE PERSISTENT ASTHMA WITH ACUTE EXACERBATION: ICD-10-CM

## 2023-04-13 DIAGNOSIS — E11.42 TYPE 2 DIABETES MELLITUS WITH DIABETIC POLYNEUROPATHY, WITHOUT LONG-TERM CURRENT USE OF INSULIN: Chronic | ICD-10-CM

## 2023-04-13 DIAGNOSIS — J82.83 EOSINOPHILIC ASTHMA: ICD-10-CM

## 2023-04-13 PROCEDURE — 99999 PR PBB SHADOW E&M-EST. PATIENT-LVL V: ICD-10-PCS | Mod: PBBFAC,,,

## 2023-04-13 PROCEDURE — 3074F PR MOST RECENT SYSTOLIC BLOOD PRESSURE < 130 MM HG: ICD-10-PCS | Mod: CPTII,S$GLB,,

## 2023-04-13 PROCEDURE — 99205 PR OFFICE/OUTPT VISIT, NEW, LEVL V, 60-74 MIN: ICD-10-PCS | Mod: S$GLB,,,

## 2023-04-13 PROCEDURE — 1159F MED LIST DOCD IN RCRD: CPT | Mod: CPTII,S$GLB,,

## 2023-04-13 PROCEDURE — 1159F PR MEDICATION LIST DOCUMENTED IN MEDICAL RECORD: ICD-10-PCS | Mod: CPTII,S$GLB,,

## 2023-04-13 PROCEDURE — 3074F SYST BP LT 130 MM HG: CPT | Mod: CPTII,S$GLB,,

## 2023-04-13 PROCEDURE — 4010F PR ACE/ARB THEARPY RXD/TAKEN: ICD-10-PCS | Mod: CPTII,S$GLB,,

## 2023-04-13 PROCEDURE — 3078F DIAST BP <80 MM HG: CPT | Mod: CPTII,S$GLB,,

## 2023-04-13 PROCEDURE — 99999 PR PBB SHADOW E&M-EST. PATIENT-LVL V: CPT | Mod: PBBFAC,,,

## 2023-04-13 PROCEDURE — 3008F PR BODY MASS INDEX (BMI) DOCUMENTED: ICD-10-PCS | Mod: CPTII,S$GLB,,

## 2023-04-13 PROCEDURE — 3008F BODY MASS INDEX DOCD: CPT | Mod: CPTII,S$GLB,,

## 2023-04-13 PROCEDURE — 3078F PR MOST RECENT DIASTOLIC BLOOD PRESSURE < 80 MM HG: ICD-10-PCS | Mod: CPTII,S$GLB,,

## 2023-04-13 PROCEDURE — 4010F ACE/ARB THERAPY RXD/TAKEN: CPT | Mod: CPTII,S$GLB,,

## 2023-04-13 PROCEDURE — 99205 OFFICE O/P NEW HI 60 MIN: CPT | Mod: S$GLB,,,

## 2023-04-13 RX ORDER — PREDNISONE 10 MG/1
TABLET ORAL
Qty: 6 TABLET | Refills: 0 | Status: SHIPPED | OUTPATIENT
Start: 2023-04-13 | End: 2023-07-23

## 2023-04-13 RX ORDER — AZITHROMYCIN 250 MG/1
TABLET, FILM COATED ORAL
Qty: 6 TABLET | Refills: 0 | Status: SHIPPED | OUTPATIENT
Start: 2023-04-13 | End: 2023-04-18

## 2023-04-13 RX ORDER — ALBUTEROL SULFATE 0.83 MG/ML
2.5 SOLUTION RESPIRATORY (INHALATION) EVERY 6 HOURS PRN
Qty: 240 ML | Refills: 11 | Status: SHIPPED | OUTPATIENT
Start: 2023-04-13 | End: 2023-11-07 | Stop reason: SDUPTHER

## 2023-04-13 NOTE — ASSESSMENT & PLAN NOTE
Followed per PCP, continue current medication. Discussed monitoring blood sugar levels while on prednisone as will cause high sugar levels, contact Primary care provider for levels above 200

## 2023-04-13 NOTE — PROGRESS NOTES
Patient ID:  Loi Cordova is a 57 y.o. female    New Patient Consult    Subjective:       Reason for visit:  No chief complaint on file.      Interval History:  4/13/2023:  Loi Cordova is a 57 y.o. female who presents in office for evaluation of asthma. Has had atleast 2 exacerbations within last year requiring urgent care visits. Daily productive cough, from white to green mucous, worse at night, with nocturnal arousals. Associated with occasional chest tightness, shortness of breath, wheezing, headaches, lightheadedness, LE edema, malaise, fatigue. Using albuterol inhaler 2-3 times daily. Has not used nebulizer in 6 years. Has daily controller Breo.     + GERD symptoms, follows GI, treated with omeprazole, + sinuses, post nasal drip, allergies, takes OTC sinus medications.    Urgent care visit 4/3/23 for asthma: received steroid injection, promethazine DM.   Urgent care visit 2/1/23 for URI and asthma exacerbation: discharged with tessalon perles, doxycycline, prednisone therapy    Steroid therapy hx: April 2023, Feb 2023, Dec 2020. Finished prednisone therapy, noticed some improvement in symptoms. Requires recurrent systemic steroid therapy in addition to daily inhaled steroid therapy. Eosinophils artificially reduced due to recurrent systemic steroid therapy.     Occupation- Works in cardiac rehab at [employer]  Smoking hx- current every day smoker, 40 years smoking hx, 1ppd  Occupational/Environmental Exposures: Mold/Asbestosis exposure: Father passed away from mesothelioma  Pets/Birds- 2 cats/1 dog; 2 parakeets for 6 mo    Childhood history of Lung Disease: coup, pneumonia  Medical history: previous shoulder in over 10 years ago, no chest surgery, No Cancer, had 7 stents since 2016   Radiation/Chemo MedicationsAdult onset Asthma and bronchitis  Family History: Lung Cancer: father mesothelioma, no COPD, Mother Asthma      The chief compliant  problem is new to me    Performance Status:The patient's activity  level is functions out of house.        Review of Systems   Constitutional:  Positive for fatigue. Negative for chills, fever and unexpected weight change.   HENT:  Negative for postnasal drip, sinus pressure and sinus pain.    Respiratory:  Positive for cough, chest tightness, shortness of breath and wheezing.    Cardiovascular:  Negative for chest pain, palpitations and leg swelling.   Skin:  Negative for color change, pallor and rash.   Neurological:  Positive for light-headedness and headaches. Negative for dizziness, weakness and numbness.     Objective:     Vitals:    04/13/23 1259   BP: 114/76   Pulse: 84   Resp: 20   SpO2: 97%   Weight: 79.8 kg (176 lb)   Height: 5' (1.524 m)         Physical Exam  Constitutional:       Appearance: Normal appearance. She is well-developed.   HENT:      Head: Normocephalic.      Mouth/Throat:      Mouth: Mucous membranes are moist.      Pharynx: Oropharynx is clear.   Cardiovascular:      Rate and Rhythm: Normal rate and regular rhythm.      Pulses: Normal pulses.      Heart sounds: Normal heart sounds.   Pulmonary:      Effort: Pulmonary effort is normal.      Breath sounds: Rhonchi (Expiratory, bilateral upper and lower lung fields) present. No wheezing.   Chest:      Chest wall: No tenderness.   Abdominal:      General: Abdomen is flat.   Musculoskeletal:         General: Normal range of motion.      Cervical back: Normal range of motion and neck supple.   Lymphadenopathy:      Cervical: No cervical adenopathy.   Skin:     General: Skin is warm and dry.   Neurological:      General: No focal deficit present.      Mental Status: She is alert and oriented to person, place, and time.   Psychiatric:         Mood and Affect: Mood normal.         Behavior: Behavior normal.         Thought Content: Thought content normal.         Judgment: Judgment normal.            Pertinent Studies Reviewed & Interpreted:     Labs reviewed       Lab Results   Component Value Date    WBC 9.40  04/20/2023    RBC 4.55 04/20/2023    HGB 13.7 04/20/2023    HCT 42.0 04/20/2023    MCV 92 04/20/2023    MCH 30.1 04/20/2023    MCHC 32.6 04/20/2023    RDW 13.8 04/20/2023     (H) 04/20/2023    MPV 8.8 (L) 04/20/2023    GRAN 6.4 04/20/2023    GRAN 67.8 04/20/2023    LYMPH 2.3 04/20/2023    LYMPH 23.9 04/20/2023    MONO 0.5 04/20/2023    MONO 5.6 04/20/2023    EOS 0.2 04/20/2023    BASO 0.06 04/20/2023    EOSINOPHIL 1.8 04/20/2023    BASOPHIL 0.6 04/20/2023      Latest Reference Range & Units Most Recent 06/16/22 06:56   Eos # 0.0 - 0.5 K/uL 0.1  6/24/22 12:23 0.2         Personal Diagnostic Review and Interpretation  Radiographs (ct chest and cxr) images personally reviewed: view by direct vision     CTA Chest Non-Coronary (PE Study) 08/23/18: No evidence of pulmonary embolus. There is dependent atelectatic versus fibrotic change.    X-Ray Chest PA And Lateral 12/29/20: lungs are well expanded and clear.      Pulmonary Function Tests: results reviewed  2/7/2019:  FVC: 2.42L (82.4% predicted)  FEV1: 1.89L (79.8% predicted)  FEV1/FVC:  78  TLC: 3.50L (82.6% predicted)  DLCO: 20.80 (96.8% predicted)  There is no obstruction on spirometry. There is no restriction on lung volume testing. The DLCO is normal. Normal study.       2D echo with color flow doppler 08/24/2018: Normal left ventricular systolic function (EF 55-60%). Normal right ventricular systolic function. Grade 1 diastolic dysfunction).       Assessment & Plan:       Clinical impression is resonably certain and repeated evaluation prn +/- follow up will be needed as below.    Problem List Items Addressed This Visit          Pulmonary    Severe persistent asthma with acute exacerbation    Current Assessment & Plan     Multiple asthma exacerbations over last 6 months requiring urgent care visits treated with steroid therapy, cough medication, and antibiotics. Using rescue inhaler 2-3 times daily, has not used nebulizer treatment in over 6 years, has daily  controller. Discussed importance of taking daily controller. Had elevated EOS count of 200 in June 2022. Discussed biologic therapy, would be a great candidate. Ordering CBC to check eosinophil count. Prior PFTs from 2019 were normal and showed no evidence of obstruction. Repeat PFTs to evaluate asthma and rule out COPD, rx'ed zpak, nebulizer solution, and prednisone therapy for current exacerbation.            Relevant Medications    albuterol (PROVENTIL) 2.5 mg /3 mL (0.083 %) nebulizer solution    predniSONE (DELTASONE) 10 MG tablet    Other Relevant Orders    NEBULIZER KIT (SUPPLIES) FOR HOME USE    CBC auto differential (Completed)    Complete PFT with bronchodilator (Completed)    Eosinophilic asthma    Current Assessment & Plan     Addendum 4/20/23:  -New CBC reveals eosinophil count 200  -Ideal biologic candidate              Endocrine    Type 2 diabetes mellitus with diabetic polyneuropathy, without long-term current use of insulin (Chronic)    Overview     Increase trulicity. Referral to DM education in place           Current Assessment & Plan     Followed per PCP, continue current medication. Discussed monitoring blood sugar levels while on prednisone as will cause high sugar levels, contact Primary care provider for levels above 200                  GI    GERD (gastroesophageal reflux disease)    Current Assessment & Plan     Followed by GI, continue current medication of omeprazole              Other    Cigarette nicotine dependence without complication - Primary    Current Assessment & Plan     Extensive smoking history, 40 years, 1ppd. Has been in the smoking cessation program for several years now but still smoking. Counseled on smoking cessation. Discussed at length long term health risks and complications, such as smoking increases risk of heart disease and lung cancer. LDCT lung screening to evaluate lung cancer             Relevant Orders    CT Chest Lung Screening Low Dose      Addendum  04/24/2023: Discussed PFTs with patient, encouraged smoking cessation. Complains of thrush, will rx nystatin    Addendum 5/05/2023: Discussed test results with patient. Mild emphysema present. Calcified granuloma identified. Multiple pulmonary nodules, largest 4mm, unchanged from previous CT in 2018. Repeat LDCT in 12 months and continue current treatment as discussed, will follow up at next visit. Will also follow up with her cardiologist regarding atherosclerotic calcification/stents. Stated she started biologic Fasenra yesterday (5/4/23).     Addendum 05/08/2023: Patient reported testing positive for COVID today, with sxs of fever, severe headaches, body aches. Collaborated with pulmonologist Dr. Gamez. Based on patient being high risk (asthma, smoker, diabetes, overweight), qualifies for paxlovid. Kidney function from 06/2022 show normal kidney function. Discussed with patient and she is agreeable to start medication BID x 5 days. Med sent to her pharmacy. Encouraged recovery and symptom management, drinking plenty of fluids, and starting paxlovid. Patient verbalized understanding.    Follow up in about 3 months (around 7/13/2023), or if symptoms worsen or fail to improve.    Discussed with patient above for education the following:      Patient Instructions   Ordering PFT's to evaluate lung strength function and evaluate for COPD. Do not use any inhalers 4 hours prior to exam.     Low Dose CT chest to screen for lung cancer due to extensive smoking history    Asthma Action plan:  Restart Breo daily, rinse mouth after using due to risk for thrush; if mouth or tongue has white sores contact clinic    Albuterol Inhaler 1-2 puffs every 4-6 hours as needed, for cough or shortness of breath    Use nebulizer 2-3 times daily until cough improves then once a day once symptoms improve    Azithromycin 250 mg 1 pill for three days for yellow or green mucous    Prednisone 10 mg pills, Take 1 pill a day for three days, repeat  for cough or wheeze. Monitor blood sugar levels while on prednisone, will cause high levels, contact Primary care provider for levels above 200.    Discussed biologic therapy, to get blood work then can start therapy                  60 minutes of total time spent on the encounter, which includes face to face time and non-face to face time preparing to see the patient (eg, review of tests), Obtaining and/or reviewing separately obtained history, Documenting clinical information in the electronic or other health record, Independently interpreting results (not separately reported) and communicating results to the patient/family/caregiver, or Care coordination (not separately reported).

## 2023-04-13 NOTE — ASSESSMENT & PLAN NOTE
Multiple asthma exacerbations over last 6 months requiring urgent care visits treated with steroid therapy, cough medication, and antibiotics. Using rescue inhaler 2-3 times daily, has not used nebulizer treatment in over 6 years, has daily controller. Discussed importance of taking daily controller. Had elevated EOS count of 200 in June 2022. Discussed biologic therapy, would be a great candidate. Ordering CBC to check eosinophil count. Prior PFTs from 2019 were normal and showed no evidence of obstruction. Repeat PFTs to evaluate asthma and rule out COPD, rx'ed zpak, nebulizer solution, and prednisone therapy for current exacerbation.

## 2023-04-13 NOTE — PATIENT INSTRUCTIONS
Ordering PFT's to evaluate lung strength function and evaluate for COPD. Do not use any inhalers 4 hours prior to exam.     Low Dose CT chest to screen for lung cancer due to extensive smoking history    Asthma Action plan:  Restart Breo daily, rinse mouth after using due to risk for thrush; if mouth or tongue has white sores contact clinic    Albuterol Inhaler 1-2 puffs every 4-6 hours as needed, for cough or shortness of breath    Use nebulizer 2-3 times daily until cough improves then once a day once symptoms improve    Azithromycin 250 mg 1 pill for three days for yellow or green mucous    Prednisone 10 mg pills, Take 1 pill a day for three days, repeat for cough or wheeze. Monitor blood sugar levels while on prednisone, will cause high levels, contact Primary care provider for levels above 200.    Discussed biologic therapy, to get blood work then can start therapy

## 2023-04-20 ENCOUNTER — TELEPHONE (OUTPATIENT)
Dept: PULMONOLOGY | Facility: CLINIC | Age: 58
End: 2023-04-20
Payer: COMMERCIAL

## 2023-04-20 DIAGNOSIS — J45.51 SEVERE PERSISTENT ASTHMA WITH ACUTE EXACERBATION: Primary | ICD-10-CM

## 2023-04-20 DIAGNOSIS — J82.83 EOSINOPHILIC ASTHMA: ICD-10-CM

## 2023-04-20 RX ORDER — BENRALIZUMAB 30 MG/ML
30 INJECTION, SOLUTION SUBCUTANEOUS
Qty: 1 ML | Refills: 6 | Status: SHIPPED | OUTPATIENT
Start: 2023-07-20 | End: 2023-08-11

## 2023-04-20 RX ORDER — BENRALIZUMAB 30 MG/ML
30 INJECTION, SOLUTION SUBCUTANEOUS
Qty: 1 ML | Refills: 2 | Status: SHIPPED | OUTPATIENT
Start: 2023-04-20 | End: 2023-06-26 | Stop reason: ALTCHOICE

## 2023-04-24 DIAGNOSIS — B37.0 ORAL THRUSH: Primary | ICD-10-CM

## 2023-04-24 RX ORDER — NYSTATIN 100000 [USP'U]/ML
4 SUSPENSION ORAL 4 TIMES DAILY
Qty: 120 ML | Refills: 3 | Status: SHIPPED | OUTPATIENT
Start: 2023-04-24 | End: 2023-05-04

## 2023-04-27 ENCOUNTER — TELEPHONE (OUTPATIENT)
Dept: PHARMACY | Facility: CLINIC | Age: 58
End: 2023-04-27
Payer: COMMERCIAL

## 2023-04-27 NOTE — TELEPHONE ENCOUNTER
Basilio, this is Ashely Gray, clinical pharmacist with [facility] that is part of your care team.  We have begun working on your prescription that your doctor has sent us. Our next steps include:     Working with your insurance company to obtain approval for your medication  Working with you to ensure your medication is affordable     We will be calling you along the way with updates on your medication but if you have any concerns or receive information that you would like to discuss please reach us at [phone].    Welcome call outcome: Left voicemail

## 2023-04-28 ENCOUNTER — SPECIALTY PHARMACY (OUTPATIENT)
Dept: PHARMACY | Facility: CLINIC | Age: 58
End: 2023-04-28
Payer: COMMERCIAL

## 2023-04-28 NOTE — TELEPHONE ENCOUNTER
PA approved and BI completed.     Fasenra copay is $0 with secondary insurance.    Forward to initial.

## 2023-05-01 ENCOUNTER — SPECIALTY PHARMACY (OUTPATIENT)
Dept: PHARMACY | Facility: CLINIC | Age: 58
End: 2023-05-01
Payer: COMMERCIAL

## 2023-05-01 DIAGNOSIS — J82.83 EOSINOPHILIC ASTHMA: Primary | ICD-10-CM

## 2023-05-01 NOTE — TELEPHONE ENCOUNTER
Specialty Pharmacy - Initial Clinical Assessment    Specialty Medication Orders Linked to Encounter      Flowsheet Row Most Recent Value   Medication #1 benralizumab (FASENRA PEN) 30 mg/mL AtIn (Order#[id number], Rx#[id number])   Medication #2 benralizumab (FASENRA PEN) 30 mg/mL AtIn (Order#[id number], Rx#[id number])          Patient Diagnosis   J82.83 - Eosinophilic asthma    Subjective    Loi Cordova is a 57 y.o. female, who is followed by the specialty pharmacy service for management and education.    Recent Encounters       Date Type Provider Description    05/01/2023 Specialty Pharmacy Ashely Gray, Wanda Initial Clinical Assessment    04/28/2023 Specialty Pharmacy Ashely Gray, Wanda Referral Authorization            Current Outpatient Medications   Medication Sig    albuterol (PROVENTIL HFA) 90 mcg/actuation inhaler Inhale 2 puffs into the lungs every 6 (six) hours as needed for Wheezing or Shortness of Breath. Rescue (Patient not taking: Reported on 4/13/2023)    albuterol (PROVENTIL) 2.5 mg /3 mL (0.083 %) nebulizer solution Use one vial (3 mL) (2.5 mg total) by nebulization every 6 (six) hours as needed for Wheezing. Rescue    albuterol (PROVENTIL/VENTOLIN HFA) 90 mcg/actuation inhaler Inhale 2 puffs into the lungs every 4 to 6 hours as needed.    ALPRAZolam (XANAX) 0.25 MG tablet Take 1 tablet (0.25 mg total) by mouth daily as needed for Anxiety. (Patient not taking: Reported on 4/13/2023)    amLODIPine (NORVASC) 5 MG tablet Take 1 tablet by mouth once a day.    aspirin 81 MG Chew Take 81 mg by mouth once daily.    atorvastatin (LIPITOR) 80 MG tablet Take 1 tablet by mouth once a day.    benralizumab (FASENRA PEN) 30 mg/mL AtIn Inject 30 mg into the skin every 28 days.    [START ON 7/20/2023] benralizumab (FASENRA PEN) 30 mg/mL AtIn Inject 30 mg into the skin every 8 weeks.    blood sugar diagnostic Strp Use 1 strip to check blood sugar once daily    blood-glucose meter Misc USE ONCE DAILY TO MONITOR  GLUCOSE    diclofenac (VOLTAREN) 75 MG EC tablet Take 1 tablet (75 mg total) by mouth 2 (two) times daily. (Patient not taking: Reported on 4/13/2023)    dicyclomine (BENTYL) 20 mg tablet Take 1 tablet (20 mg total) by mouth 3 (three) times daily as needed (abd pain). (Patient not taking: Reported on 4/13/2023)    dulaglutide (TRULICITY) 3 mg/0.5 mL pen injector Inject 3 mg into the skin every 7 days.    ezetimibe (ZETIA) 10 mg tablet Take 1 tablet by mouth once a day.    fenofibrate 160 MG Tab Take 1 tablet (160 mg total) by mouth once daily.    fluticasone furoate-vilanteroL (BREO) 100-25 mcg/dose diskus inhaler Inhale 1 puff into the lungs once daily. Controller    furosemide (LASIX) 40 MG tablet Take 1 tablet (40 mg total) by mouth daily as needed (edema).    hyoscyamine (ANASPAZ,LEVSIN) 0.125 mg Tab Take 1 tablet (125 mcg total) by mouth every 6 (six) hours as needed (abdominal pain). (Patient not taking: Reported on 4/13/2023)    ibuprofen (ADVIL,MOTRIN) 600 MG tablet Take 1 tablet (600 mg total) by mouth every 6 (six) hours as needed for Pain. (Patient not taking: Reported on 4/3/2023)    lancets 33 gauge Misc USE ONCE DAILY TO MONITOR GLUCOSE    linaCLOtide (LINZESS) 72 mcg Cap capsule Take 1 capsule (72 mcg total) by mouth once daily.    lisinopriL (PRINIVIL,ZESTRIL) 5 MG tablet Take 1 tablet (5 mg total) by mouth once daily.    metoprolol succinate (TOPROL-XL) 100 MG 24 hr tablet Take 1 tablet (100 mg total) by mouth once daily.    nicotine (NICODERM CQ) 21 mg/24 hr Place 1 patch onto the skin once daily.    nitroGLYCERIN (NITROSTAT) 0.4 MG SL tablet Place 1 tablet under the tongue once every 5 minutes for 3 doses for chest pain, if pain continues call 911    nystatin (MYCOSTATIN) 100,000 unit/mL suspension Use 4 mL by mouth four times daily as needed for 10 days for thrush. Swish and swallow in the mouth and retain for several minutes before swallowing.    omeprazole (PRILOSEC) 40 MG capsule Take 1  capsule (40 mg total) by mouth 2 (two) times a day.    ondansetron (ZOFRAN-ODT) 4 MG TbDL Dissolve 1 tablet (4 mg total) by mouth every 6 (six) hours as needed.    oxybutynin (DITROPAN XL) 15 MG TR24 Take 2 tablets (30 mg total) by mouth once daily. (Patient not taking: Reported on 4/13/2023)    potassium chloride (K-TAB) 20 mEq Take 1 tablet (20 mEq total) by mouth daily as needed (edema). Take with lasix if needed for edema    predniSONE (DELTASONE) 10 MG tablet Take 1 tablet by mouth daily for 3 days. Repeat for cough.    promethazine (PHENERGAN) 6.25 mg/5 mL syrup Take 10 mLs (12.5 mg total) by mouth every 8 (eight) hours as needed for Nausea.    rOPINIRole (REQUIP) 0.5 MG tablet Take 1 tablet by mouth once in the evening.    traZODone (DESYREL) 100 MG tablet Take 1 tablet orally once in the evening.    triamcinolone acetonide 0.025% (KENALOG) 0.025 % cream Apply topically 2 (two) times daily.   Last reviewed on 4/13/2023  1:02 PM by Talya Santana MA    Review of patient's allergies indicates:   Allergen Reactions    Crayfish Anaphylaxis     (crawfish only)   Last reviewed on  4/24/2023 3:33 PM by Ml Oliveros    Drug Interactions    Drug interactions evaluated: yes  Clinically relevant drug interactions identified: no  Provided the patient with educational material regarding drug interactions: not applicable         Adverse Effects    *All other systems reviewed and are negative       Assessment Questions - Documented Responses      Flowsheet Row Most Recent Value   Assessment    Medication Reconciliation completed for patient Yes   During the past 4 weeks, has patient missed any activities due to condition or medication? Missed Work   Number of Days missed 2   During the past 4 weeks, did patient have any of the following urgent care visits? Urgent Care   Goals of Therapy Status Discussed (new start)   Status of the patients ability to self-administer: Is Able   All education points have been covered with  patient? Yes, supplemental printed education provided   Welcome packet contents reviewed and discussed with patient? Yes   Assesment completed? Yes   Plan Therapy being initiated   Do you need to open a clinical intervention (i-vent)? No   Do you want to schedule first shipment? Yes   Medication #1 Assessment Info    Patient status New medication, New to [facility]   Is this medication appropriate for the patient? Yes   Is this medication effective? Not yet started   Medication #2 Assessment Info    Patient status New medication, New to [facility]   Is this medication appropriate for the patient? Yes   Is this medication effective? Not yet started          Refill Questions - Documented Responses      Flowsheet Row Most Recent Value   Refill Screening Questions    When does the patient need to receive the medication? 05/04/23   Refill Delivery Questions    How will the patient receive the medication?    When does the patient need to receive the medication? 05/04/23   Shipping Address Prescription   Address in [facility] confirmed and updated if neccessary? Yes   Expected Copay ($) 0   Is the patient able to afford the medication copay? Yes   Payment Method zero copay   Days supply of Refill 28   Supplies needed? No supplies needed   Refill activity completed? Yes   Refill activity plan Refill scheduled   Shipment/Pickup Date: 05/02/23            Objective    She has a past medical history of Allergy, Asthma, Coronary artery disease, GERD (gastroesophageal reflux disease), History of back surgery (2/20/2018), Hyperlipidemia, Hypertension, and Type 2 diabetes mellitus with diabetic polyneuropathy, without long-term current use of insulin (2/8/2019).    Tried/failed medications: Breo, albuterol    BP Readings from Last 4 Encounters:   04/13/23 114/76   04/03/23 (!) 161/77   02/01/23 138/68   12/27/22 133/77     Ht Readings from Last 4 Encounters:   04/13/23 5' (1.524 m)   04/03/23 5' (1.524 m)   02/01/23 5' (1.524 m)  "  12/27/22 5' 1" (1.549 m)     Wt Readings from Last 4 Encounters:   04/13/23 79.8 kg (176 lb)   04/03/23 76.7 kg (169 lb)   02/01/23 76.2 kg (168 lb)   12/27/22 75.3 kg (166 lb)       The goals of prescribed drug therapy management include:  Supporting patient to meet the prescriber's medical treatment objectives  Improving or maintaining quality of life  Maintaining optimal therapy adherence  Minimizing and managing side effects      Goals of Therapy Status: Discussed (new start)    Assessment/Plan  Patient plans to start therapy on 05/04/23      Indication, dosage, appropriateness, effectiveness, safety and convenience of her specialty medication(s) were reviewed today.     Patient Education   Patient received education on the following:   Expectations and possible outcomes of therapy  Proper use, timely administration, and missed dose management  Duration of therapy  Side effects, including prevention, minimization, and management  Contraindications and safety precautions  New or changed medications, including prescribe and over the counter medications and supplements  Reviews recommended vaccinations, as appropriate  Storage, safe handling, and disposal      Tasks added this encounter   No tasks added.   Tasks due within next 3 months   4/28/2023 - Initial Clinical Assessment/Patient Education (1 Time Occurence)  4/28/2023 - Set up Initial Fill     Ashely Gray, PharmD  [facility]  [address] [place], [place] [address]  [facility] [address]  Phone: [phone]  Fax: [phone]  "

## 2023-05-07 ENCOUNTER — PATIENT MESSAGE (OUTPATIENT)
Dept: PULMONOLOGY | Facility: CLINIC | Age: 58
End: 2023-05-07
Payer: COMMERCIAL

## 2023-05-08 ENCOUNTER — PATIENT MESSAGE (OUTPATIENT)
Dept: PULMONOLOGY | Facility: CLINIC | Age: 58
End: 2023-05-08
Payer: COMMERCIAL

## 2023-05-08 DIAGNOSIS — U07.1 COVID-19: Primary | ICD-10-CM

## 2023-05-08 RX ORDER — NIRMATRELVIR AND RITONAVIR 300-100 MG
KIT ORAL
Qty: 30 TABLET | Refills: 0 | Status: SHIPPED | OUTPATIENT
Start: 2023-05-08 | End: 2023-05-13

## 2023-05-08 NOTE — TELEPHONE ENCOUNTER
Collaborated with Dr. Gamez, and agrees that patient would benefit from taking paxlovid since she tested positive for COVID and qualifies for medication based on being high risk. Medication sent to pharmacy, spoke to patient and she verbalized understanding.

## 2023-05-19 ENCOUNTER — OFFICE VISIT (OUTPATIENT)
Dept: CARDIOLOGY | Facility: CLINIC | Age: 58
End: 2023-05-19
Payer: COMMERCIAL

## 2023-05-19 VITALS
BODY MASS INDEX: 34.95 KG/M2 | HEART RATE: 73 BPM | DIASTOLIC BLOOD PRESSURE: 97 MMHG | WEIGHT: 178 LBS | OXYGEN SATURATION: 97 % | SYSTOLIC BLOOD PRESSURE: 149 MMHG | HEIGHT: 60 IN

## 2023-05-19 DIAGNOSIS — I50.32 CHRONIC DIASTOLIC CHF (CONGESTIVE HEART FAILURE): ICD-10-CM

## 2023-05-19 DIAGNOSIS — E11.42 TYPE 2 DIABETES MELLITUS WITH DIABETIC POLYNEUROPATHY, WITHOUT LONG-TERM CURRENT USE OF INSULIN: Chronic | ICD-10-CM

## 2023-05-19 DIAGNOSIS — R09.89 LEFT CAROTID BRUIT: ICD-10-CM

## 2023-05-19 DIAGNOSIS — I10 ESSENTIAL HYPERTENSION: Chronic | ICD-10-CM

## 2023-05-19 DIAGNOSIS — Z98.61 CAD S/P PERCUTANEOUS CORONARY ANGIOPLASTY: Chronic | ICD-10-CM

## 2023-05-19 DIAGNOSIS — I25.118 CORONARY ARTERY DISEASE OF NATIVE ARTERY OF NATIVE HEART WITH STABLE ANGINA PECTORIS: ICD-10-CM

## 2023-05-19 DIAGNOSIS — R94.39 ABNORMAL STRESS TEST: ICD-10-CM

## 2023-05-19 DIAGNOSIS — E78.00 PURE HYPERCHOLESTEROLEMIA: ICD-10-CM

## 2023-05-19 DIAGNOSIS — I25.10 CAD S/P PERCUTANEOUS CORONARY ANGIOPLASTY: Chronic | ICD-10-CM

## 2023-05-19 DIAGNOSIS — I25.10 CORONARY ARTERY DISEASE INVOLVING NATIVE CORONARY ARTERY OF NATIVE HEART WITHOUT ANGINA PECTORIS: Primary | ICD-10-CM

## 2023-05-19 DIAGNOSIS — J82.83 EOSINOPHILIC ASTHMA: ICD-10-CM

## 2023-05-19 DIAGNOSIS — E66.01 CLASS 2 SEVERE OBESITY DUE TO EXCESS CALORIES WITH SERIOUS COMORBIDITY AND BODY MASS INDEX (BMI) OF 36.0 TO 36.9 IN ADULT: ICD-10-CM

## 2023-05-19 DIAGNOSIS — I73.9 PAD (PERIPHERAL ARTERY DISEASE): ICD-10-CM

## 2023-05-19 DIAGNOSIS — Z72.0 TOBACCO ABUSE: Chronic | ICD-10-CM

## 2023-05-19 PROCEDURE — 99214 OFFICE O/P EST MOD 30 MIN: CPT | Mod: S$GLB,,, | Performed by: INTERNAL MEDICINE

## 2023-05-19 PROCEDURE — 3008F BODY MASS INDEX DOCD: CPT | Mod: CPTII,S$GLB,, | Performed by: INTERNAL MEDICINE

## 2023-05-19 PROCEDURE — 99999 PR PBB SHADOW E&M-EST. PATIENT-LVL III: CPT | Mod: PBBFAC,,, | Performed by: INTERNAL MEDICINE

## 2023-05-19 PROCEDURE — 3077F PR MOST RECENT SYSTOLIC BLOOD PRESSURE >= 140 MM HG: ICD-10-PCS | Mod: CPTII,S$GLB,, | Performed by: INTERNAL MEDICINE

## 2023-05-19 PROCEDURE — 4010F ACE/ARB THERAPY RXD/TAKEN: CPT | Mod: CPTII,S$GLB,, | Performed by: INTERNAL MEDICINE

## 2023-05-19 PROCEDURE — 3077F SYST BP >= 140 MM HG: CPT | Mod: CPTII,S$GLB,, | Performed by: INTERNAL MEDICINE

## 2023-05-19 PROCEDURE — 1160F PR REVIEW ALL MEDS BY PRESCRIBER/CLIN PHARMACIST DOCUMENTED: ICD-10-PCS | Mod: CPTII,S$GLB,, | Performed by: INTERNAL MEDICINE

## 2023-05-19 PROCEDURE — 1159F MED LIST DOCD IN RCRD: CPT | Mod: CPTII,S$GLB,, | Performed by: INTERNAL MEDICINE

## 2023-05-19 PROCEDURE — 3080F DIAST BP >= 90 MM HG: CPT | Mod: CPTII,S$GLB,, | Performed by: INTERNAL MEDICINE

## 2023-05-19 PROCEDURE — 3080F PR MOST RECENT DIASTOLIC BLOOD PRESSURE >= 90 MM HG: ICD-10-PCS | Mod: CPTII,S$GLB,, | Performed by: INTERNAL MEDICINE

## 2023-05-19 PROCEDURE — 99999 PR PBB SHADOW E&M-EST. PATIENT-LVL III: ICD-10-PCS | Mod: PBBFAC,,, | Performed by: INTERNAL MEDICINE

## 2023-05-19 PROCEDURE — 1160F RVW MEDS BY RX/DR IN RCRD: CPT | Mod: CPTII,S$GLB,, | Performed by: INTERNAL MEDICINE

## 2023-05-19 PROCEDURE — 1159F PR MEDICATION LIST DOCUMENTED IN MEDICAL RECORD: ICD-10-PCS | Mod: CPTII,S$GLB,, | Performed by: INTERNAL MEDICINE

## 2023-05-19 PROCEDURE — 4010F PR ACE/ARB THEARPY RXD/TAKEN: ICD-10-PCS | Mod: CPTII,S$GLB,, | Performed by: INTERNAL MEDICINE

## 2023-05-19 PROCEDURE — 3008F PR BODY MASS INDEX (BMI) DOCUMENTED: ICD-10-PCS | Mod: CPTII,S$GLB,, | Performed by: INTERNAL MEDICINE

## 2023-05-19 PROCEDURE — 99214 PR OFFICE/OUTPT VISIT, EST, LEVL IV, 30-39 MIN: ICD-10-PCS | Mod: S$GLB,,, | Performed by: INTERNAL MEDICINE

## 2023-05-19 RX ORDER — RANOLAZINE 500 MG/1
500 TABLET, EXTENDED RELEASE ORAL 2 TIMES DAILY
Qty: 60 TABLET | Refills: 11 | Status: SHIPPED | OUTPATIENT
Start: 2023-05-19 | End: 2023-06-20

## 2023-05-19 NOTE — PROGRESS NOTES
"Subjective:    Patient ID:  Loi Cordova is a 57 y.o. female who presents for evaluation of Coronary Artery Disease      HPI    58 y/o female who presents to establish care. She has a hx of CAD s/p multiple PCI's in the past with last intervention 2018, PAD (asymptomatic), HTN, HLD, uncontrolled DM, HFpEF, obesity, active smoker (~1/2 PPD). Last A1C 9.5 and LDL 88 (about 1 year ago). States she stopped her BP meds (lisinopril/Imdur and unclear if still taking amlodipine, still taking Toprol). Has stopped taking ASA. Does not exercise regularly and no heart healthy diet. States in the past had normal nuclear stress test with subsequent LHC with intervention. Has been having episodes of sudden, sharp, non exertional left sided CP, non radiating, lasts seconds that have been increasing in frequency. Associated CARDONA and palps. Denies orthopnea, PND, syncope, LE edema.     5/19/2023:  Since last visit had PET stress with:    There is a very small (< 5%) sized, mild intensity mid septal fixed perfusion abnormality involving 3% of LV myocardium in the distribution of the 2nd septal consistent with a non-transmural scar. THese findings are consistent with a "jailed" S2.    There are no other significant perfusion abnormalities.    The whole heart absolute myocardial perfusion values averaged 0.66 cc/min/g at rest, which is normal; 1.47 cc/min/g at stress, which is mildly reduced; and CFR is 2.23 , which is mildly reduced.    CT attenuation images demonstrate mild diffuse aortic calcifications in the descending aorta.    The gated perfusion images showed an ejection fraction of 72% at rest and 70% during stress. A normal ejection fraction is greater than 47%.    There is mid septal wall hypokinesis at rest and during stress.    The LV cavity size is normal at rest and stress.    The ECG portion of the study is negative for ischemia.    There were no arrhythmias during stress.    The patient reported chest pain during the " stress test.    There are no prior studies for comparison.    Holter with:  Predominant Rhythm Sinus rhythm with heart rates varying between 46 and 110 BPM with an average of 74BPM.  There were very rare PVCs totalling 3 and averaging 0.06 per hour.  There were very rare PACs totalling 40 and averaging 0.83 per hour.  The longest RR interval was 1500 msec.  The diary was not returned. There were rare hookup related artifacts. Overall, the study was of good quality. The tape was adequate (0 days , 48 hours, 0 minutes).    Recent Covid infection and developed CP after and has had intermittent, non exertional CP. Chronic CARDONA, unchanged.     Review of Systems   Constitutional: Negative for malaise/fatigue.   HENT:  Negative for congestion.    Eyes:  Negative for blurred vision.   Cardiovascular:  Positive for chest pain, dyspnea on exertion and palpitations. Negative for claudication, cyanosis, irregular heartbeat, leg swelling, near-syncope, orthopnea, paroxysmal nocturnal dyspnea and syncope.   Respiratory:  Negative for shortness of breath.    Endocrine: Negative for polyuria.   Hematologic/Lymphatic: Negative for bleeding problem.   Skin:  Negative for itching and rash.   Musculoskeletal:  Negative for joint swelling, muscle cramps and muscle weakness.   Gastrointestinal:  Negative for abdominal pain, hematemesis, hematochezia, melena, nausea and vomiting.   Genitourinary:  Negative for dysuria and hematuria.   Neurological:  Negative for dizziness, focal weakness, headaches, light-headedness, loss of balance and weakness.   Psychiatric/Behavioral:  Negative for depression. The patient is not nervous/anxious.       Objective:    Physical Exam  Constitutional:       Appearance: She is well-developed.   HENT:      Head: Normocephalic and atraumatic.   Neck:      Vascular: No JVD.   Cardiovascular:      Rate and Rhythm: Normal rate and regular rhythm.      Pulses:           Carotid pulses are 2+ on the right side and  2+ on the left side.       Radial pulses are 2+ on the right side and 2+ on the left side.        Femoral pulses are 2+ on the right side and 2+ on the left side.       Dorsalis pedis pulses are 2+ on the right side and 2+ on the left side.        Posterior tibial pulses are 2+ on the right side and 2+ on the left side.      Heart sounds: Normal heart sounds.   Pulmonary:      Effort: Pulmonary effort is normal.      Breath sounds: Normal breath sounds.   Abdominal:      General: Bowel sounds are normal.      Palpations: Abdomen is soft.   Musculoskeletal:      Cervical back: Neck supple.   Skin:     General: Skin is warm and dry.   Neurological:      Mental Status: She is alert and oriented to person, place, and time.   Psychiatric:         Behavior: Behavior normal.         Thought Content: Thought content normal.         Assessment:       1. Coronary artery disease involving native coronary artery of native heart without angina pectoris    2. Coronary artery disease of native artery of native heart with stable angina pectoris    3. CAD S/P percutaneous coronary angioplasty    4. Chronic diastolic CHF (congestive heart failure)    5. Essential hypertension    6. Left carotid bruit    7. PAD (peripheral artery disease)    8. Pure hypercholesterolemia    9. Type 2 diabetes mellitus with diabetic polyneuropathy, without long-term current use of insulin    10. Class 2 severe obesity due to excess calories with serious comorbidity and body mass index (BMI) of 36.0 to 36.9 in adult    11. Tobacco abuse    12. Eosinophilic asthma    13. Abnormal stress test      58 y/o pt with hx and presentation as above. CP may be due Covid and/or lung hx, but given hx of CAD and PET stress, will obtain 2DE and give a trial of Ranexa. No imdur 2/2 severe HA's.Lipids not at goal (LDL <70) and A1C not at goal (<7.0%). Had an extensive discussion on optimal risk factor modification including glycemic and lipid control to goals. Also  counseled on the importance of med compliance, nirmala ASA. Needs to stop smoking and stay active. Discussed the etiology, evaluation, and management of CAD, CHF, HTN, HLD, DM, smoking. Discussed the importance of med compliance, heart healthy diet, and regular exercise.        Plan:       -2DE  -Ranexa 500 mg BID  -f/u in 1 month

## 2023-05-22 ENCOUNTER — TELEPHONE (OUTPATIENT)
Dept: CARDIOLOGY | Facility: CLINIC | Age: 58
End: 2023-05-22
Payer: COMMERCIAL

## 2023-05-22 DIAGNOSIS — E11.42 TYPE 2 DIABETES MELLITUS WITH DIABETIC POLYNEUROPATHY, WITHOUT LONG-TERM CURRENT USE OF INSULIN: ICD-10-CM

## 2023-05-22 NOTE — TELEPHONE ENCOUNTER
Reached out to patient today .    Returning the call.    No answer will call her back later.      Nw                                      ----- Message from Marsha Arreguin sent at 5/22/2023 10:53 AM CDT -----  Type:  Corine heller     Who Called:Selena with [facility]     Does the patient know what this is regarding?:her Echo that is scheduled today   Would the patient rather a call back or a response via [facility]? Call   Best Call Back Number:  Additional Information:

## 2023-05-22 NOTE — TELEPHONE ENCOUNTER
Care Due:                  Date            Visit Type   Department     Provider  --------------------------------------------------------------------------------                                EP -                              PRIMARY      [facility]  Last Visit: 06-      [facility]  Next Visit: None Scheduled  None         None Found                                                            Last  Test          Frequency    Reason                     Performed    Due Date  --------------------------------------------------------------------------------    Office Visit  12 months..  albuterol, atorvastatin,   06- 06-                             ezetimibe, fenofibrate,                             furosemide, metoprolol,                             traZODone................    CMP.........  12 months..  atorvastatin, ezetimibe,   06- 06-                             fenofibrate, furosemide..    Lipid Panel.  12 months..  atorvastatin, ezetimibe,   01- 01-                             fenofibrate..............    [employer] Embedded Care Due Messages. Reference number: [id number].   5/22/2023 5:27:13 PM CDT

## 2023-05-23 ENCOUNTER — SPECIALTY PHARMACY (OUTPATIENT)
Dept: PHARMACY | Facility: CLINIC | Age: 58
End: 2023-05-23
Payer: COMMERCIAL

## 2023-05-23 RX ORDER — DULAGLUTIDE 3 MG/.5ML
3 INJECTION, SOLUTION SUBCUTANEOUS
Qty: 4 PEN | Refills: 0 | Status: SHIPPED | OUTPATIENT
Start: 2023-05-23 | End: 2023-07-07 | Stop reason: SDUPTHER

## 2023-05-23 NOTE — TELEPHONE ENCOUNTER
Incoming call from patient. Reports last injection 5/4. Next dose due 6/1. RTS until 5/25. Patient ok for call back once prescription can be refilled.

## 2023-05-24 ENCOUNTER — TELEPHONE (OUTPATIENT)
Dept: PHARMACY | Facility: CLINIC | Age: 58
End: 2023-05-24
Payer: COMMERCIAL

## 2023-05-25 ENCOUNTER — PATIENT MESSAGE (OUTPATIENT)
Dept: PHARMACY | Facility: CLINIC | Age: 58
End: 2023-05-25
Payer: COMMERCIAL

## 2023-05-29 NOTE — TELEPHONE ENCOUNTER
Specialty Pharmacy - Refill Coordination    Specialty Medication Orders Linked to Encounter      Flowsheet Row Most Recent Value   Medication #1 benralizumab (FASENRA PEN) 30 mg/mL AtIn (Order#[id number], Rx#[id number])            Refill Questions - Documented Responses      Flowsheet Row Most Recent Value   Refill Screening Questions    Changes to allergies? No   Changes to medications? No   New conditions since last clinic visit? No   Unplanned office visit, urgent care, ED, or hospital admission in the last 4 weeks? No   How does patient/caregiver feel medication is working? Good   Financial problems or insurance changes? No   How many doses of your specialty medications were missed in the last 4 weeks? 0   Would patient like to speak to a pharmacist? No   When does the patient need to receive the medication? 06/01/23   Refill Delivery Questions    How will the patient receive the medication? MEDRx   When does the patient need to receive the medication? 06/01/23   Shipping Address Home   Address in [facility] confirmed and updated if neccessary? Yes   Expected Copay ($) 3   Is the patient able to afford the medication copay? Yes   Payment Method new CC added to file   Days supply of Refill 28   Supplies needed? No supplies needed   Refill activity completed? Yes   Refill activity plan Refill scheduled   Shipment/Pickup Date: 05/29/23            Current Outpatient Medications   Medication Sig    albuterol (PROVENTIL HFA) 90 mcg/actuation inhaler Inhale 2 puffs into the lungs every 6 (six) hours as needed for Wheezing or Shortness of Breath. Rescue    albuterol (PROVENTIL) 2.5 mg /3 mL (0.083 %) nebulizer solution Use one vial (3 mL) (2.5 mg total) by nebulization every 6 (six) hours as needed for Wheezing. Rescue    albuterol (PROVENTIL/VENTOLIN HFA) 90 mcg/actuation inhaler Inhale 2 puffs into the lungs every 4 to 6 hours as needed.    ALPRAZolam (XANAX) 0.25 MG tablet Take 1 tablet (0.25 mg total) by  mouth daily as needed for Anxiety.    amLODIPine (NORVASC) 5 MG tablet Take 1 tablet by mouth once a day.    aspirin 81 MG Chew Take 81 mg by mouth once daily.    atorvastatin (LIPITOR) 80 MG tablet Take 1 tablet by mouth once a day.    benralizumab (FASENRA PEN) 30 mg/mL AtIn Inject 30 mg into the skin every 28 days.    [START ON 7/20/2023] benralizumab (FASENRA PEN) 30 mg/mL AtIn Inject 30 mg into the skin every 8 weeks.    blood sugar diagnostic Strp Use 1 strip to check blood sugar once daily    blood-glucose meter Misc USE ONCE DAILY TO MONITOR GLUCOSE    diclofenac (VOLTAREN) 75 MG EC tablet Take 1 tablet (75 mg total) by mouth 2 (two) times daily.    dicyclomine (BENTYL) 20 mg tablet Take 1 tablet (20 mg total) by mouth 3 (three) times daily as needed (abd pain).    dulaglutide (TRULICITY) 3 mg/0.5 mL pen injector Inject 3 mg (one pen) into the skin every 7 days.    ezetimibe (ZETIA) 10 mg tablet Take 1 tablet by mouth once a day.    fenofibrate 160 MG Tab Take 1 tablet (160 mg total) by mouth once daily.    fluticasone furoate-vilanteroL (BREO) 100-25 mcg/dose diskus inhaler Inhale 1 puff into the lungs once daily. Controller    furosemide (LASIX) 40 MG tablet Take 1 tablet (40 mg total) by mouth daily as needed (edema).    hyoscyamine (ANASPAZ,LEVSIN) 0.125 mg Tab Take 1 tablet (125 mcg total) by mouth every 6 (six) hours as needed (abdominal pain).    ibuprofen (ADVIL,MOTRIN) 600 MG tablet Take 1 tablet (600 mg total) by mouth every 6 (six) hours as needed for Pain.    lancets 33 gauge Misc USE ONCE DAILY TO MONITOR GLUCOSE    linaCLOtide (LINZESS) 72 mcg Cap capsule Take 1 capsule (72 mcg total) by mouth once daily.    lisinopriL (PRINIVIL,ZESTRIL) 5 MG tablet Take 1 tablet (5 mg total) by mouth once daily.    metoprolol succinate (TOPROL-XL) 100 MG 24 hr tablet Take 1 tablet (100 mg total) by mouth once daily.    nicotine (NICODERM CQ) 21 mg/24 hr Place 1 patch onto the skin once daily.    nitroGLYCERIN  (NITROSTAT) 0.4 MG SL tablet Place 1 tablet under the tongue once every 5 minutes for 3 doses for chest pain, if pain continues call 911    omeprazole (PRILOSEC) 40 MG capsule Take 1 capsule (40 mg total) by mouth 2 (two) times a day.    ondansetron (ZOFRAN-ODT) 4 MG TbDL Dissolve 1 tablet (4 mg total) by mouth every 6 (six) hours as needed.    oxybutynin (DITROPAN XL) 15 MG TR24 Take 2 tablets (30 mg total) by mouth once daily.    potassium chloride (K-TAB) 20 mEq Take 1 tablet (20 mEq total) by mouth daily as needed (edema). Take with lasix if needed for edema    predniSONE (DELTASONE) 10 MG tablet Take 1 tablet by mouth daily for 3 days. Repeat for cough.    promethazine (PHENERGAN) 6.25 mg/5 mL syrup Take 10 mLs (12.5 mg total) by mouth every 8 (eight) hours as needed for Nausea.    ranolazine (RANEXA) 500 MG Tb12 Take 1 tablet (500 mg total) by mouth 2 (two) times daily.    rOPINIRole (REQUIP) 0.5 MG tablet Take 1 tablet by mouth once in the evening.    traZODone (DESYREL) 100 MG tablet Take 1 tablet orally once in the evening.    triamcinolone acetonide 0.025% (KENALOG) 0.025 % cream Apply topically 2 (two) times daily.   Last reviewed on 5/19/2023 12:04 PM by Sreekanth Hoffman MD    Review of patient's allergies indicates:   Allergen Reactions    Crayfish Anaphylaxis     (crawfish only)    Last reviewed on  5/19/2023 12:04 PM by Sreekanth Hoffman      Tasks added this encounter   No tasks added.   Tasks due within next 3 months   5/28/2023 - Refill Coordination Outreach (1 time occurrence)     Hailee Paul, PharmD  [facility]  [address] [place] [id number]  Phone: [phone]  Fax: [phone]

## 2023-06-01 ENCOUNTER — OFFICE VISIT (OUTPATIENT)
Dept: OPTOMETRY | Facility: CLINIC | Age: 58
End: 2023-06-01
Payer: COMMERCIAL

## 2023-06-01 DIAGNOSIS — H52.4 HYPEROPIA OF RIGHT EYE WITH ASTIGMATISM AND PRESBYOPIA: Primary | ICD-10-CM

## 2023-06-01 DIAGNOSIS — H04.123 DRY EYE SYNDROME OF BOTH EYES: ICD-10-CM

## 2023-06-01 DIAGNOSIS — H52.201 HYPEROPIA OF RIGHT EYE WITH ASTIGMATISM AND PRESBYOPIA: Primary | ICD-10-CM

## 2023-06-01 DIAGNOSIS — H52.4 HYPEROPIA WITH PRESBYOPIA OF LEFT EYE: ICD-10-CM

## 2023-06-01 DIAGNOSIS — H52.01 HYPEROPIA OF RIGHT EYE WITH ASTIGMATISM AND PRESBYOPIA: Primary | ICD-10-CM

## 2023-06-01 DIAGNOSIS — H52.02 HYPEROPIA WITH PRESBYOPIA OF LEFT EYE: ICD-10-CM

## 2023-06-01 DIAGNOSIS — Z97.3 WEARS CONTACT LENSES: ICD-10-CM

## 2023-06-01 PROCEDURE — 99499 UNLISTED E&M SERVICE: CPT | Mod: ,,, | Performed by: OPTOMETRIST

## 2023-06-01 PROCEDURE — 99499 NO LOS: ICD-10-PCS | Mod: ,,, | Performed by: OPTOMETRIST

## 2023-06-01 PROCEDURE — 92310 PR CONTACT LENS FITTING (NO CHANGE): ICD-10-PCS | Mod: CSM,S$GLB,, | Performed by: OPTOMETRIST

## 2023-06-01 PROCEDURE — 92310 CONTACT LENS FITTING OU: CPT | Mod: CSM,S$GLB,, | Performed by: OPTOMETRIST

## 2023-06-01 NOTE — PROGRESS NOTES
HPI    Presents today for CLFU. Pt wear monovision contacts. Pt states blurry   vision OD. No problems with contact lens fit. Pt denies eye pain.  Last edited by Demetria Corey MA on 6/1/2023  4:21 PM.            Assessment /Plan     For exam results, see Encounter Report.    Hyperopia of right eye with astigmatism and presbyopia    Hyperopia with presbyopia of left eye    Wears contact lenses    Dry eye syndrome of both eyes      Educated pt on findings. Will tweak OD rx by -0.25. Discussed pt may be bothered my toric lens OD. Will also order trial that is the SEQ. Pt to decide in between new toric lens vs sphere lens for OD. No change to OS lens. Pt successful with I&R. Reviewed proper CL care and hygiene. Also recommended ATs TID-QID, given handout with recommended preservative free option. Pt to d/c clear eyes gtt. Pt to  trials once they arrive to optical. She is to message via the portal with which CLs she likes best and will then finalize CL Rx in her chart. Pt then okay to order annual supply. Monitor yearly.       RTC for next annual (03/2024) or sooner if needed.

## 2023-06-12 ENCOUNTER — PATIENT MESSAGE (OUTPATIENT)
Dept: OPHTHALMOLOGY | Facility: CLINIC | Age: 58
End: 2023-06-12
Payer: COMMERCIAL

## 2023-06-13 ENCOUNTER — PATIENT MESSAGE (OUTPATIENT)
Dept: FAMILY MEDICINE | Facility: CLINIC | Age: 58
End: 2023-06-13
Payer: COMMERCIAL

## 2023-06-13 DIAGNOSIS — F41.9 ANXIETY: ICD-10-CM

## 2023-06-14 RX ORDER — ALPRAZOLAM 0.25 MG/1
0.25 TABLET ORAL DAILY PRN
Qty: 30 TABLET | Refills: 0 | Status: SHIPPED | OUTPATIENT
Start: 2023-06-14 | End: 2023-08-08 | Stop reason: SDUPTHER

## 2023-06-14 NOTE — TELEPHONE ENCOUNTER
Care Due:                  Date            Visit Type   Department     Provider  --------------------------------------------------------------------------------                                EP -                              PRIMARY      [facility]  Last Visit: 06-      [facility]  Next Visit: None Scheduled  None         None Found                                                            Last  Test          Frequency    Reason                     Performed    Due Date  --------------------------------------------------------------------------------    HBA1C.......  6 months...  dulaglutide..............  06- 12-    [facility] Embedded Care Due Messages. Reference number: [id number].   6/14/2023 7:46:43 AM CDT

## 2023-06-16 ENCOUNTER — PATIENT MESSAGE (OUTPATIENT)
Dept: PODIATRY | Facility: CLINIC | Age: 58
End: 2023-06-16
Payer: COMMERCIAL

## 2023-06-20 ENCOUNTER — TELEPHONE (OUTPATIENT)
Dept: PHARMACY | Facility: CLINIC | Age: 58
End: 2023-06-20
Payer: COMMERCIAL

## 2023-06-20 ENCOUNTER — OFFICE VISIT (OUTPATIENT)
Dept: CARDIOLOGY | Facility: CLINIC | Age: 58
End: 2023-06-20
Payer: COMMERCIAL

## 2023-06-20 VITALS
HEART RATE: 67 BPM | SYSTOLIC BLOOD PRESSURE: 138 MMHG | OXYGEN SATURATION: 96 % | DIASTOLIC BLOOD PRESSURE: 78 MMHG | HEIGHT: 60 IN | WEIGHT: 187.75 LBS | BODY MASS INDEX: 36.86 KG/M2

## 2023-06-20 DIAGNOSIS — I25.118 CORONARY ARTERY DISEASE OF NATIVE ARTERY OF NATIVE HEART WITH STABLE ANGINA PECTORIS: Primary | ICD-10-CM

## 2023-06-20 DIAGNOSIS — Z98.61 CAD S/P PERCUTANEOUS CORONARY ANGIOPLASTY: Chronic | ICD-10-CM

## 2023-06-20 DIAGNOSIS — E78.00 PURE HYPERCHOLESTEROLEMIA: ICD-10-CM

## 2023-06-20 DIAGNOSIS — R94.39 ABNORMAL STRESS TEST: ICD-10-CM

## 2023-06-20 DIAGNOSIS — I73.9 PAD (PERIPHERAL ARTERY DISEASE): ICD-10-CM

## 2023-06-20 DIAGNOSIS — E66.01 CLASS 2 SEVERE OBESITY DUE TO EXCESS CALORIES WITH SERIOUS COMORBIDITY AND BODY MASS INDEX (BMI) OF 36.0 TO 36.9 IN ADULT: ICD-10-CM

## 2023-06-20 DIAGNOSIS — E11.42 TYPE 2 DIABETES MELLITUS WITH DIABETIC POLYNEUROPATHY, WITHOUT LONG-TERM CURRENT USE OF INSULIN: Chronic | ICD-10-CM

## 2023-06-20 DIAGNOSIS — Z72.0 TOBACCO ABUSE: Chronic | ICD-10-CM

## 2023-06-20 DIAGNOSIS — I10 ESSENTIAL HYPERTENSION: Chronic | ICD-10-CM

## 2023-06-20 DIAGNOSIS — J82.83 EOSINOPHILIC ASTHMA: ICD-10-CM

## 2023-06-20 DIAGNOSIS — I25.10 CAD S/P PERCUTANEOUS CORONARY ANGIOPLASTY: Chronic | ICD-10-CM

## 2023-06-20 DIAGNOSIS — I50.32 CHRONIC DIASTOLIC CHF (CONGESTIVE HEART FAILURE): ICD-10-CM

## 2023-06-20 DIAGNOSIS — R09.89 LEFT CAROTID BRUIT: ICD-10-CM

## 2023-06-20 DIAGNOSIS — E11.9 DM TYPE 2, GOAL HBA1C < 7%: ICD-10-CM

## 2023-06-20 PROCEDURE — 3078F PR MOST RECENT DIASTOLIC BLOOD PRESSURE < 80 MM HG: ICD-10-PCS | Mod: CPTII,S$GLB,, | Performed by: INTERNAL MEDICINE

## 2023-06-20 PROCEDURE — 4010F ACE/ARB THERAPY RXD/TAKEN: CPT | Mod: CPTII,S$GLB,, | Performed by: INTERNAL MEDICINE

## 2023-06-20 PROCEDURE — 3008F PR BODY MASS INDEX (BMI) DOCUMENTED: ICD-10-PCS | Mod: CPTII,S$GLB,, | Performed by: INTERNAL MEDICINE

## 2023-06-20 PROCEDURE — 3075F PR MOST RECENT SYSTOLIC BLOOD PRESS GE 130-139MM HG: ICD-10-PCS | Mod: CPTII,S$GLB,, | Performed by: INTERNAL MEDICINE

## 2023-06-20 PROCEDURE — 1160F RVW MEDS BY RX/DR IN RCRD: CPT | Mod: CPTII,S$GLB,, | Performed by: INTERNAL MEDICINE

## 2023-06-20 PROCEDURE — 1160F PR REVIEW ALL MEDS BY PRESCRIBER/CLIN PHARMACIST DOCUMENTED: ICD-10-PCS | Mod: CPTII,S$GLB,, | Performed by: INTERNAL MEDICINE

## 2023-06-20 PROCEDURE — 3008F BODY MASS INDEX DOCD: CPT | Mod: CPTII,S$GLB,, | Performed by: INTERNAL MEDICINE

## 2023-06-20 PROCEDURE — 99999 PR PBB SHADOW E&M-EST. PATIENT-LVL V: ICD-10-PCS | Mod: PBBFAC,,, | Performed by: INTERNAL MEDICINE

## 2023-06-20 PROCEDURE — 1159F MED LIST DOCD IN RCRD: CPT | Mod: CPTII,S$GLB,, | Performed by: INTERNAL MEDICINE

## 2023-06-20 PROCEDURE — 99999 PR PBB SHADOW E&M-EST. PATIENT-LVL V: CPT | Mod: PBBFAC,,, | Performed by: INTERNAL MEDICINE

## 2023-06-20 PROCEDURE — 1159F PR MEDICATION LIST DOCUMENTED IN MEDICAL RECORD: ICD-10-PCS | Mod: CPTII,S$GLB,, | Performed by: INTERNAL MEDICINE

## 2023-06-20 PROCEDURE — 99214 OFFICE O/P EST MOD 30 MIN: CPT | Mod: S$GLB,,, | Performed by: INTERNAL MEDICINE

## 2023-06-20 PROCEDURE — 99214 PR OFFICE/OUTPT VISIT, EST, LEVL IV, 30-39 MIN: ICD-10-PCS | Mod: S$GLB,,, | Performed by: INTERNAL MEDICINE

## 2023-06-20 PROCEDURE — 3075F SYST BP GE 130 - 139MM HG: CPT | Mod: CPTII,S$GLB,, | Performed by: INTERNAL MEDICINE

## 2023-06-20 PROCEDURE — 3078F DIAST BP <80 MM HG: CPT | Mod: CPTII,S$GLB,, | Performed by: INTERNAL MEDICINE

## 2023-06-20 PROCEDURE — 4010F PR ACE/ARB THEARPY RXD/TAKEN: ICD-10-PCS | Mod: CPTII,S$GLB,, | Performed by: INTERNAL MEDICINE

## 2023-06-20 RX ORDER — RANOLAZINE 1000 MG/1
1000 TABLET, EXTENDED RELEASE ORAL 2 TIMES DAILY
Qty: 180 TABLET | Refills: 3 | Status: SHIPPED | OUTPATIENT
Start: 2023-06-20 | End: 2024-06-19

## 2023-06-20 NOTE — TELEPHONE ENCOUNTER
DOCUMENTATION ONLY  Ranolazine ER 1000mg (60 tablets per 30 days)  Approval date: 6/20/2023 to 6/19/2024   Case ID# [id number]

## 2023-06-20 NOTE — PROGRESS NOTES
"Subjective:    Patient ID:  Loi Cordova is a 57 y.o. female who presents for evaluation of Coronary Artery Disease      HPI    58 y/o female who presents to establish care. She has a hx of CAD s/p multiple PCI's in the past with last intervention 2018, PAD (asymptomatic), HTN, HLD, uncontrolled DM, HFpEF, obesity, active smoker (~1/2 PPD). Last A1C 9.5 and LDL 88 (about 1 year ago). States she stopped her BP meds (lisinopril/Imdur and unclear if still taking amlodipine, still taking Toprol). Has stopped taking ASA. Does not exercise regularly and no heart healthy diet. States in the past had normal nuclear stress test with subsequent LHC with intervention. Has been having episodes of sudden, sharp, non exertional left sided CP, non radiating, lasts seconds that have been increasing in frequency. Associated CARDONA and palps. Denies orthopnea, PND, syncope, LE edema.     5/19/2023:  Since last visit had PET stress with:    There is a very small (< 5%) sized, mild intensity mid septal fixed perfusion abnormality involving 3% of LV myocardium in the distribution of the 2nd septal consistent with a non-transmural scar. THese findings are consistent with a "jailed" S2.    There are no other significant perfusion abnormalities.    The whole heart absolute myocardial perfusion values averaged 0.66 cc/min/g at rest, which is normal; 1.47 cc/min/g at stress, which is mildly reduced; and CFR is 2.23 , which is mildly reduced.    CT attenuation images demonstrate mild diffuse aortic calcifications in the descending aorta.    The gated perfusion images showed an ejection fraction of 72% at rest and 70% during stress. A normal ejection fraction is greater than 47%.    There is mid septal wall hypokinesis at rest and during stress.    The LV cavity size is normal at rest and stress.    The ECG portion of the study is negative for ischemia.    There were no arrhythmias during stress.    The patient reported chest pain during the " stress test.    There are no prior studies for comparison.    Holter with:  Predominant Rhythm Sinus rhythm with heart rates varying between 46 and 110 BPM with an average of 74BPM.  There were very rare PVCs totalling 3 and averaging 0.06 per hour.  There were very rare PACs totalling 40 and averaging 0.83 per hour.  The longest RR interval was 1500 msec.  The diary was not returned. There were rare hookup related artifacts. Overall, the study was of good quality. The tape was adequate (0 days , 48 hours, 0 minutes).    Recent Covid infection and developed CP after and has had intermittent, non exertional CP. Chronic CARDONA, unchanged.     6/20/2023:  CP improved. Still intermittent mild, atypical episodes. Has CARDONA and fatigue. Continues to smoke about 1/2 PPD. 2DE normal.     Review of Systems   Constitutional: Negative for malaise/fatigue.   HENT:  Negative for congestion.    Eyes:  Negative for blurred vision.   Cardiovascular:  Positive for chest pain, dyspnea on exertion and palpitations. Negative for claudication, cyanosis, irregular heartbeat, leg swelling, near-syncope, orthopnea, paroxysmal nocturnal dyspnea and syncope.   Respiratory:  Negative for shortness of breath.    Endocrine: Negative for polyuria.   Hematologic/Lymphatic: Negative for bleeding problem.   Skin:  Negative for itching and rash.   Musculoskeletal:  Negative for joint swelling, muscle cramps and muscle weakness.   Gastrointestinal:  Negative for abdominal pain, hematemesis, hematochezia, melena, nausea and vomiting.   Genitourinary:  Negative for dysuria and hematuria.   Neurological:  Negative for dizziness, focal weakness, headaches, light-headedness, loss of balance and weakness.   Psychiatric/Behavioral:  Negative for depression. The patient is not nervous/anxious.       Objective:    Physical Exam  Constitutional:       Appearance: She is well-developed.   HENT:      Head: Normocephalic and atraumatic.   Neck:      Vascular: No JVD.    Cardiovascular:      Rate and Rhythm: Normal rate and regular rhythm.      Pulses:           Carotid pulses are 2+ on the right side and 2+ on the left side.       Radial pulses are 2+ on the right side and 2+ on the left side.        Femoral pulses are 2+ on the right side and 2+ on the left side.       Dorsalis pedis pulses are 2+ on the right side and 2+ on the left side.        Posterior tibial pulses are 2+ on the right side and 2+ on the left side.      Heart sounds: Normal heart sounds.   Pulmonary:      Effort: Pulmonary effort is normal.      Breath sounds: Normal breath sounds.   Abdominal:      General: Bowel sounds are normal.      Palpations: Abdomen is soft.   Musculoskeletal:      Cervical back: Neck supple.   Skin:     General: Skin is warm and dry.   Neurological:      Mental Status: She is alert and oriented to person, place, and time.   Psychiatric:         Behavior: Behavior normal.         Thought Content: Thought content normal.         Assessment:       1. Coronary artery disease of native artery of native heart with stable angina pectoris    2. DM type 2, goal HbA1c < 7%    3. Chronic diastolic CHF (congestive heart failure)    4. PAD (peripheral artery disease)    5. Pure hypercholesterolemia    6. Left carotid bruit    7. Essential hypertension    8. CAD S/P percutaneous coronary angioplasty    9. Abnormal stress test    10. Eosinophilic asthma    11. Type 2 diabetes mellitus with diabetic polyneuropathy, without long-term current use of insulin    12. Class 2 severe obesity due to excess calories with serious comorbidity and body mass index (BMI) of 36.0 to 36.9 in adult    13. Tobacco abuse      56 y/o pt with hx and presentation as above. CP improved. Increase Ranexa. Needs to be more active. No imdur 2/2 severe HA's.Lipids not at goal (LDL <70) and A1C not at goal (<7.0%). Had an extensive discussion on optimal risk factor modification including glycemic and lipid control to goals.  Labs from 1 year ago and will obtain new labs. Also counseled on the importance of med compliance, nirmala ASA. Needs to stop smoking and stay active. Discussed the etiology, evaluation, and management of CAD, CHF, HTN, HLD, DM, smoking. Discussed the importance of med compliance, heart healthy diet, and regular exercise.        Plan:       -labs - FLP, A1C  -Continue current medical management  -Smoking cessation  -f/u in 1 month

## 2023-06-21 ENCOUNTER — SPECIALTY PHARMACY (OUTPATIENT)
Dept: PHARMACY | Facility: CLINIC | Age: 58
End: 2023-06-21
Payer: COMMERCIAL

## 2023-06-21 NOTE — TELEPHONE ENCOUNTER
Outgoing call regarding Fasenra refill. P/a is required. Routing to [facility]. Pt is due 6/29

## 2023-06-26 NOTE — TELEPHONE ENCOUNTER
Patient wanted a refill of prilosec. Told patient that Dr. Fierro sent a prescription to the pharmacy on yesterday.  
Render In Strict Bullet Format?: No
Initiate Treatment: Aquaphor or Vaseline to shoulders
Detail Level: Simple

## 2023-06-26 NOTE — TELEPHONE ENCOUNTER
Specialty Pharmacy - Refill Coordination    Specialty Medication Orders Linked to Encounter      Flowsheet Row Most Recent Value   Medication #1 benralizumab (FASENRA PEN) 30 mg/mL AtIn (Order#[id number], Rx#[id number])        Reviewed dosing with patient. Loading doses completed; now switching to q8w dosing.     Refill Questions - Documented Responses      Flowsheet Row Most Recent Value   Patient Availability and HIPAA Verification    Does patient want to proceed with activity? Yes   HIPAA/medical authority confirmed? Yes   Relationship to patient of person spoken to? Self   Refill Screening Questions    Changes to allergies? No   Changes to medications? No   New conditions since last clinic visit? No   Unplanned office visit, urgent care, ED, or hospital admission in the last 4 weeks? No   How does patient/caregiver feel medication is working? Good   Financial problems or insurance changes? No   How many doses of your specialty medications were missed in the last 4 weeks? 0   Would patient like to speak to a pharmacist? No   When does the patient need to receive the medication? 06/28/23   Refill Delivery Questions    How will the patient receive the medication? MEDRx   When does the patient need to receive the medication? 06/28/23   Shipping Address Prescription   Address in [facility] confirmed and updated if neccessary? Yes   Expected Copay ($) 3   Is the patient able to afford the medication copay? Yes   Payment Method CC on file   Days supply of Refill 56   Supplies needed? No supplies needed   Refill activity completed? Yes   Refill activity plan Refill scheduled   Shipment/Pickup Date: 06/27/23            Current Outpatient Medications   Medication Sig    albuterol (PROVENTIL HFA) 90 mcg/actuation inhaler Inhale 2 puffs into the lungs every 6 (six) hours as needed for Wheezing or Shortness of Breath. Rescue    albuterol (PROVENTIL) 2.5 mg /3 mL (0.083 %) nebulizer solution Use one vial (3 mL) (2.5 mg  total) by nebulization every 6 (six) hours as needed for Wheezing. Rescue    albuterol (PROVENTIL/VENTOLIN HFA) 90 mcg/actuation inhaler Inhale 2 puffs into the lungs every 4 to 6 hours as needed.    ALPRAZolam (XANAX) 0.25 MG tablet Take 1 tablet (0.25 mg total) by mouth daily as needed for Anxiety.    amLODIPine (NORVASC) 5 MG tablet Take 1 tablet by mouth once a day.    aspirin 81 MG Chew Take 81 mg by mouth once daily.    atorvastatin (LIPITOR) 80 MG tablet Take 1 tablet by mouth once a day.    benralizumab (FASENRA PEN) 30 mg/mL AtIn Inject 30 mg into the skin every 28 days.    [START ON 7/20/2023] benralizumab (FASENRA PEN) 30 mg/mL AtIn Inject 30 mg into the skin every 8 weeks.    blood sugar diagnostic Strp Use 1 strip to check blood sugar once daily    blood-glucose meter Misc USE ONCE DAILY TO MONITOR GLUCOSE    diclofenac (VOLTAREN) 75 MG EC tablet Take 1 tablet (75 mg total) by mouth 2 (two) times daily.    dicyclomine (BENTYL) 20 mg tablet Take 1 tablet (20 mg total) by mouth 3 (three) times daily as needed (abd pain).    dulaglutide (TRULICITY) 3 mg/0.5 mL pen injector Inject 3 mg (one pen) into the skin every 7 days.    ezetimibe (ZETIA) 10 mg tablet Take 1 tablet by mouth once a day.    fenofibrate 160 MG Tab Take 1 tablet (160 mg total) by mouth once daily.    fluticasone furoate-vilanteroL (BREO) 100-25 mcg/dose diskus inhaler Inhale 1 puff into the lungs once daily. Controller    furosemide (LASIX) 40 MG tablet Take 1 tablet (40 mg total) by mouth daily as needed (edema).    hyoscyamine (ANASPAZ,LEVSIN) 0.125 mg Tab Take 1 tablet (125 mcg total) by mouth every 6 (six) hours as needed (abdominal pain).    ibuprofen (ADVIL,MOTRIN) 600 MG tablet Take 1 tablet (600 mg total) by mouth every 6 (six) hours as needed for Pain.    lancets 33 gauge Misc USE ONCE DAILY TO MONITOR GLUCOSE    linaCLOtide (LINZESS) 72 mcg Cap capsule Take 1 capsule (72 mcg total) by mouth once daily.    lisinopriL  (PRINIVIL,ZESTRIL) 5 MG tablet Take 1 tablet (5 mg total) by mouth once daily.    metoprolol succinate (TOPROL-XL) 100 MG 24 hr tablet Take 1 tablet (100 mg total) by mouth once daily.    nicotine (NICODERM CQ) 21 mg/24 hr Place 1 patch onto the skin once daily.    nitroGLYCERIN (NITROSTAT) 0.4 MG SL tablet Place 1 tablet under the tongue once every 5 minutes for 3 doses for chest pain, if pain continues call 911    omeprazole (PRILOSEC) 40 MG capsule Take 1 capsule (40 mg total) by mouth 2 (two) times a day.    ondansetron (ZOFRAN-ODT) 4 MG TbDL Dissolve 1 tablet (4 mg total) by mouth every 6 (six) hours as needed.    oxybutynin (DITROPAN XL) 15 MG TR24 Take 2 tablets (30 mg total) by mouth once daily.    potassium chloride (K-TAB) 20 mEq Take 1 tablet (20 mEq total) by mouth daily as needed (edema). Take with lasix if needed for edema    predniSONE (DELTASONE) 10 MG tablet Take 1 tablet by mouth daily for 3 days. Repeat for cough.    promethazine (PHENERGAN) 6.25 mg/5 mL syrup Take 10 mLs (12.5 mg total) by mouth every 8 (eight) hours as needed for Nausea.    ranolazine (RANEXA) 1,000 mg Tb12 Take 1 tablet (1,000 mg total) by mouth 2 (two) times daily.    rOPINIRole (REQUIP) 0.5 MG tablet Take 1 tablet by mouth once in the evening.    traZODone (DESYREL) 100 MG tablet Take 1 tablet orally once in the evening.    triamcinolone acetonide 0.025% (KENALOG) 0.025 % cream Apply topically 2 (two) times daily.   Last reviewed on 6/20/2023  8:57 AM by Sreekanth Hoffman MD    Review of patient's allergies indicates:   Allergen Reactions    Crayfish Anaphylaxis     (crawfish only)    Last reviewed on  6/20/2023 8:57 AM by Sreekanth Hoffman      Tasks added this encounter   No tasks added.   Tasks due within next 3 months   6/26/2023 - Refill Coordination Outreach (1 time occurrence)     Ashely Gray, PharmD  [facility]  [address]  [facility] [address]  Phone: [phone]  Fax:  [phone]

## 2023-07-07 DIAGNOSIS — E11.42 TYPE 2 DIABETES MELLITUS WITH DIABETIC POLYNEUROPATHY, WITHOUT LONG-TERM CURRENT USE OF INSULIN: ICD-10-CM

## 2023-07-07 DIAGNOSIS — G47.00 INSOMNIA, UNSPECIFIED TYPE: ICD-10-CM

## 2023-07-07 DIAGNOSIS — I10 ESSENTIAL HYPERTENSION: ICD-10-CM

## 2023-07-07 RX ORDER — EZETIMIBE 10 MG/1
TABLET ORAL
Qty: 90 TABLET | Refills: 3 | Status: CANCELLED | OUTPATIENT
Start: 2023-07-07

## 2023-07-07 RX ORDER — FENOFIBRATE 160 MG/1
160 TABLET ORAL DAILY
Qty: 90 TABLET | Refills: 3 | Status: CANCELLED | OUTPATIENT
Start: 2023-07-07 | End: 2024-07-06

## 2023-07-07 RX ORDER — METOPROLOL SUCCINATE 100 MG/1
100 TABLET, EXTENDED RELEASE ORAL DAILY
Qty: 90 TABLET | Refills: 3 | Status: CANCELLED | OUTPATIENT
Start: 2023-07-07

## 2023-07-07 NOTE — TELEPHONE ENCOUNTER
No care due was identified.  [facility] Embedded Care Due Messages. Reference number: [id number].   7/07/2023 11:04:45 AM CDT

## 2023-07-12 DIAGNOSIS — I10 ESSENTIAL HYPERTENSION: ICD-10-CM

## 2023-07-12 DIAGNOSIS — E11.42 TYPE 2 DIABETES MELLITUS WITH DIABETIC POLYNEUROPATHY, WITHOUT LONG-TERM CURRENT USE OF INSULIN: ICD-10-CM

## 2023-07-12 DIAGNOSIS — E11.9 TYPE 2 DIABETES MELLITUS WITHOUT COMPLICATION: ICD-10-CM

## 2023-07-12 RX ORDER — DULAGLUTIDE 3 MG/.5ML
3 INJECTION, SOLUTION SUBCUTANEOUS
Qty: 4 PEN | Refills: 0 | Status: SHIPPED | OUTPATIENT
Start: 2023-07-12 | End: 2023-07-19 | Stop reason: ALTCHOICE

## 2023-07-12 RX ORDER — TRAZODONE HYDROCHLORIDE 100 MG/1
TABLET ORAL
Qty: 90 TABLET | Refills: 0 | Status: SHIPPED | OUTPATIENT
Start: 2023-07-12 | End: 2023-10-03 | Stop reason: SDUPTHER

## 2023-07-12 RX ORDER — ATORVASTATIN CALCIUM 80 MG/1
TABLET, FILM COATED ORAL
Qty: 90 TABLET | Refills: 3 | Status: ON HOLD | OUTPATIENT
Start: 2023-07-12 | End: 2023-07-25 | Stop reason: SDUPTHER

## 2023-07-13 ENCOUNTER — PATIENT MESSAGE (OUTPATIENT)
Dept: FAMILY MEDICINE | Facility: CLINIC | Age: 58
End: 2023-07-13
Payer: COMMERCIAL

## 2023-07-13 RX ORDER — FENOFIBRATE 160 MG/1
160 TABLET ORAL DAILY
Qty: 90 TABLET | Refills: 3 | Status: SHIPPED | OUTPATIENT
Start: 2023-07-13 | End: 2024-07-12

## 2023-07-13 RX ORDER — METOPROLOL SUCCINATE 100 MG/1
100 TABLET, EXTENDED RELEASE ORAL DAILY
Qty: 90 TABLET | Refills: 3 | Status: SHIPPED | OUTPATIENT
Start: 2023-07-13

## 2023-07-13 RX ORDER — EZETIMIBE 10 MG/1
TABLET ORAL
Qty: 90 TABLET | Refills: 3 | Status: ON HOLD | OUTPATIENT
Start: 2023-07-13 | End: 2023-07-25 | Stop reason: SDUPTHER

## 2023-07-19 ENCOUNTER — TELEPHONE (OUTPATIENT)
Dept: PHARMACY | Facility: CLINIC | Age: 58
End: 2023-07-19
Payer: COMMERCIAL

## 2023-07-19 ENCOUNTER — OFFICE VISIT (OUTPATIENT)
Dept: FAMILY MEDICINE | Facility: CLINIC | Age: 58
End: 2023-07-19
Payer: COMMERCIAL

## 2023-07-19 DIAGNOSIS — Z98.61 CAD S/P PERCUTANEOUS CORONARY ANGIOPLASTY: Chronic | ICD-10-CM

## 2023-07-19 DIAGNOSIS — Z12.31 BREAST CANCER SCREENING BY MAMMOGRAM: ICD-10-CM

## 2023-07-19 DIAGNOSIS — E11.42 TYPE 2 DIABETES MELLITUS WITH DIABETIC POLYNEUROPATHY, WITHOUT LONG-TERM CURRENT USE OF INSULIN: Chronic | ICD-10-CM

## 2023-07-19 DIAGNOSIS — I25.10 CAD S/P PERCUTANEOUS CORONARY ANGIOPLASTY: Chronic | ICD-10-CM

## 2023-07-19 DIAGNOSIS — F41.9 ANXIETY: Chronic | ICD-10-CM

## 2023-07-19 DIAGNOSIS — R35.1 NOCTURIA: Chronic | ICD-10-CM

## 2023-07-19 DIAGNOSIS — I10 ESSENTIAL HYPERTENSION: Primary | Chronic | ICD-10-CM

## 2023-07-19 DIAGNOSIS — Z72.0 TOBACCO ABUSE: Chronic | ICD-10-CM

## 2023-07-19 DIAGNOSIS — R35.89 POLYURIA: ICD-10-CM

## 2023-07-19 PROCEDURE — 3051F HG A1C>EQUAL 7.0%<8.0%: CPT | Mod: CPTII,95,, | Performed by: FAMILY MEDICINE

## 2023-07-19 PROCEDURE — 99214 OFFICE O/P EST MOD 30 MIN: CPT | Mod: 95,,, | Performed by: FAMILY MEDICINE

## 2023-07-19 PROCEDURE — 3051F PR MOST RECENT HEMOGLOBIN A1C LEVEL 7.0 - < 8.0%: ICD-10-PCS | Mod: CPTII,95,, | Performed by: FAMILY MEDICINE

## 2023-07-19 PROCEDURE — 4010F PR ACE/ARB THEARPY RXD/TAKEN: ICD-10-PCS | Mod: CPTII,95,, | Performed by: FAMILY MEDICINE

## 2023-07-19 PROCEDURE — 4010F ACE/ARB THERAPY RXD/TAKEN: CPT | Mod: CPTII,95,, | Performed by: FAMILY MEDICINE

## 2023-07-19 PROCEDURE — 99214 PR OFFICE/OUTPT VISIT, EST, LEVL IV, 30-39 MIN: ICD-10-PCS | Mod: 95,,, | Performed by: FAMILY MEDICINE

## 2023-07-19 RX ORDER — TIRZEPATIDE 2.5 MG/.5ML
2.5 INJECTION, SOLUTION SUBCUTANEOUS
Qty: 4 PEN | Refills: 0 | Status: SHIPPED | OUTPATIENT
Start: 2023-07-19 | End: 2023-08-16 | Stop reason: SDUPTHER

## 2023-07-19 RX ORDER — NITROFURANTOIN 25; 75 MG/1; MG/1
100 CAPSULE ORAL 2 TIMES DAILY
Qty: 10 CAPSULE | Refills: 0 | Status: ON HOLD | OUTPATIENT
Start: 2023-07-19 | End: 2023-07-25 | Stop reason: HOSPADM

## 2023-07-19 RX ORDER — BLOOD-GLUCOSE TRANSMITTER
1 EACH MISCELLANEOUS ONCE
Qty: 1 EACH | Refills: 0 | Status: SHIPPED | OUTPATIENT
Start: 2023-07-19 | End: 2024-01-17

## 2023-07-19 RX ORDER — BLOOD-GLUCOSE SENSOR
1 EACH MISCELLANEOUS
Qty: 3 EACH | Refills: 11 | Status: SHIPPED | OUTPATIENT
Start: 2023-07-19 | End: 2024-07-18

## 2023-07-19 RX ORDER — BLOOD-GLUCOSE,RECEIVER,CONT
1 EACH MISCELLANEOUS ONCE
Qty: 1 EACH | Refills: 0 | Status: SHIPPED | OUTPATIENT
Start: 2023-07-19 | End: 2024-01-17

## 2023-07-19 NOTE — PROGRESS NOTES
VIRTUAL/TELEMEDICINE VISIT   FAMILY MEDICINE   [facility]     The patient location is: [place]  Visit type: Virtual visit with synchronous audio and video  Total time spent: 25 mins  Each patient to whom he or she provides medical services by telemedicine is:  (1) informed of the relationship between the physician and patient and the respective role of any other health care provider with respect to management of the patient; and (2) notified that he or she may decline to receive medical services by telemedicine and may withdraw from such care at any time.    FAMILY MEDICINE    Patient Active Problem List   Diagnosis    CAD S/P percutaneous coronary angioplasty    PAD (peripheral artery disease)    Essential hypertension    GERD (gastroesophageal reflux disease)    Heavy cigarette smoker    Coronary artery disease of native artery of native heart with stable angina pectoris    Chronic diastolic CHF (congestive heart failure)    Class 2 severe obesity due to excess calories with serious comorbidity and body mass index (BMI) of 36.0 to 36.9 in adult    Pure hypercholesterolemia    Hyperglycemia    Hypokalemia    Restless leg syndrome    Seasonal allergic rhinitis due to pollen    Hematuria    Type 2 diabetes mellitus with diabetic polyneuropathy, without long-term current use of insulin    Alternating constipation and diarrhea    History of esophageal ulcer    Abnormal finding on GI tract imaging    Breast discharge    Painful lumpy left breast    Preseptal cellulitis of right eye    Chronic cough    Varicella-zoster infection    Impaired mobility and activities of daily living    Decreased range of motion of left ankle    Weakness of left lower extremity    Left carotid bruit    Anxiety    Cellulitis of abdominal wall    LUQ pain    Delayed gastric emptying    Nocturia    Cigarette nicotine dependence without complication    Severe persistent asthma with acute exacerbation    Eosinophilic asthma    Abnormal stress  test    Wears contact lenses    Acute cystitis with hematuria    Obesity, diabetes, and hypertension syndrome       CC: diabetes f/u    SUBJECTIVE:  Loi Cordova   is a 57 y.o. female  Diabetes  She has type 2 diabetes mellitus. No MedicAlert identification noted. The initial diagnosis of diabetes was made 2 years ago. Pertinent negatives for hypoglycemia include no confusion, dizziness, headaches, hunger, mood changes, nervousness/anxiousness, pallor, seizures, sleepiness, speech difficulty, sweats or tremors. Associated symptoms include fatigue and polyuria. Pertinent negatives for diabetes include no blurred vision, no chest pain, no foot paresthesias, no foot ulcerations, no polydipsia, no polyphagia, no visual change, no weakness and no weight loss. Pertinent negatives for hypoglycemia complications include no blackouts, no hospitalization, no nocturnal hypoglycemia, no required assistance and no required glucagon injection. Symptoms are stable. Pertinent negatives for diabetic complications include no autonomic neuropathy, CVA, heart disease, nephropathy, peripheral neuropathy, PVD or retinopathy. Risk factors for coronary artery disease include dyslipidemia, family history, hypertension, obesity and tobacco exposure. Current diabetic treatment includes insulin injections. She is compliant with treatment most of the time. She is currently taking insulin pre-dinner. Insulin injections are given by patient. Rotation sites for injection include the abdominal wall. Her weight is stable. She is following a generally unhealthy diet. When asked about meal planning, she reported none. She has had a previous visit with a dietitian. She rarely participates in exercise. She monitors blood glucose at home 1-2 x per day. She monitors urine at home <1 x per month. Blood glucose monitoring compliance is fair. There is no change in her home blood glucose trend. She sees a podiatrist.Eye exam is current.     Notes recent  polyuria     ROS: Review of Systems   Constitutional:  Positive for fatigue. Negative for weight loss.   Eyes:  Negative for blurred vision.   Cardiovascular:  Negative for chest pain.   Endocrine: Positive for polyuria. Negative for polydipsia and polyphagia.   Skin:  Negative for pallor.   Neurological:  Negative for dizziness, tremors, seizures, speech difficulty, weakness and headaches.   Psychiatric/Behavioral:  Negative for confusion. The patient is not nervous/anxious.      Past Medical History:   Diagnosis Date    Allergy     Asthma     Chronic diastolic heart failure     Coronary artery disease     GERD (gastroesophageal reflux disease)     History of back surgery 02/20/2018    Hyperlipidemia     Hypertension     Type 2 diabetes mellitus with diabetic polyneuropathy, without long-term current use of insulin 02/08/2019       Past Surgical History:   Procedure Laterality Date    BILATERAL OOPHORECTOMY Bilateral 2000    BREAST BIOPSY Left 2/14/2020    Procedure: BIOPSY, BREAST-Left medial breast palpation guided;  Surgeon: Paulino Espinoza MD;  Location: [address];  Service: General;  Laterality: Left;    CARDIAC CATHETERIZATION      7 stents    CHOLECYSTECTOMY      COLONOSCOPY N/A 7/16/2019    Procedure: COLONOSCOPY;  Surgeon: Sarah Gamez MD;  Location: [facility];  Service: Endoscopy;  Laterality: N/A;    ESOPHAGOGASTRODUODENOSCOPY N/A 1/9/2020    Procedure: EGD (ESOPHAGOGASTRODUODENOSCOPY);  Surgeon: Sarah Gamez MD;  Location: [facility];  Service: Endoscopy;  Laterality: N/A;    HYSTERECTOMY      PARTIAL HYSTERECTOMY N/A 1990    Uterus taken out    SHOULDER SURGERY      TOTAL KNEE ARTHROPLASTY         Family History   Problem Relation Age of Onset    Heart disease Mother     Hyperlipidemia Mother     Hypertension Mother     Diabetes Mother     Heart disease Father     Hyperlipidemia Father     Cancer Father     Diabetes Maternal Aunt     Prostate cancer Neg Hx     Kidney disease Neg  Hx        Social History     Tobacco Use    Smoking status: Every Day     Packs/day: 0.50     Years: 34.00     Pack years: 17.00     Types: Cigarettes    Smokeless tobacco: Never    Tobacco comments:     in smoking program 1/25/21   Substance Use Topics    Alcohol use: Not Currently     Comment: 6 months    Drug use: No       Social History     Social History Narrative    Not on file       ALLERGIES:   Review of patient's allergies indicates:   Allergen Reactions    Crayfish Anaphylaxis     (crawfish only)    Rocephin [ceftriaxone] Rash       MEDS:   Current Outpatient Medications:     albuterol (PROVENTIL HFA) 90 mcg/actuation inhaler, Inhale 2 puffs into the lungs every 6 (six) hours as needed for Wheezing or Shortness of Breath. Rescue, Disp: 6.7 g, Rfl: 0    albuterol (PROVENTIL) 2.5 mg /3 mL (0.083 %) nebulizer solution, Use one vial (3 mL) (2.5 mg total) by nebulization every 6 (six) hours as needed for Wheezing. Rescue, Disp: 240 mL, Rfl: 11    ALPRAZolam (XANAX) 0.25 MG tablet, Take 1 tablet (0.25 mg total) by mouth daily as needed for Anxiety., Disp: 30 tablet, Rfl: 0    amLODIPine (NORVASC) 5 MG tablet, Take 1 tablet (5 mg total) by mouth every evening., Disp: , Rfl:     aspirin 81 MG Chew, Take 81 mg by mouth every evening., Disp: , Rfl:     atorvastatin (LIPITOR) 80 MG tablet, Take 1 tablet (80 mg total) by mouth every evening., Disp: , Rfl:     benralizumab (FASENRA PEN) 30 mg/mL AtIn, Inject 30 mg into the skin every 8 weeks., Disp: 1 mL, Rfl: 6    blood sugar diagnostic Strp, Use 1 strip to check blood sugar once daily, Disp: 100 each, Rfl: 0    blood-glucose meter Misc, USE ONCE DAILY TO MONITOR GLUCOSE, Disp: 1 each, Rfl: 0    blood-glucose meter,continuous (DEXCOM G6 ) Misc, 1 each by Misc.(Non-Drug; Combo Route) route once. for 1 dose, Disp: 1 each, Rfl: 0    blood-glucose sensor (DEXCOM G6 SENSOR) Jeannine, 1 each by Misc.(Non-Drug; Combo Route) route every 10 days., Disp: 3 each, Rfl: 11     blood-glucose transmitter (DEXCOM G6 TRANSMITTER) Jeannine, 1 each by Misc.(Non-Drug; Combo Route) route once. for 1 dose, Disp: 1 each, Rfl: 0    ciprofloxacin HCl (CIPRO) 500 MG tablet, Take 1 tablet (500 mg total) by mouth every 12 (twelve) hours. for 10 days, Disp: 20 tablet, Rfl: 0    diphenhydrAMINE (BENADRYL) 25 mg capsule, Take 1 capsule (25 mg total) by mouth every 6 (six) hours as needed for Itching., Disp: 30 capsule, Rfl: 0    ezetimibe (ZETIA) 10 mg tablet, Take 1 tablet (10 mg total) by mouth every evening., Disp: , Rfl:     fenofibrate 160 MG Tab, Take 1 tablet (160 mg total) by mouth once daily. (Patient taking differently: Take 160 mg by mouth every evening.), Disp: 90 tablet, Rfl: 3    fluticasone furoate-vilanteroL (BREO) 100-25 mcg/dose diskus inhaler, Inhale 1 puff into the lungs once daily. Controller, Disp: 60 each, Rfl: 0    furosemide (LASIX) 40 MG tablet, Take 1 tablet (40 mg total) by mouth daily as needed (edema)., Disp: 90 tablet, Rfl: 1    ibuprofen (ADVIL,MOTRIN) 600 MG tablet, Take 1 tablet (600 mg total) by mouth every 6 (six) hours as needed for Pain., Disp: 20 tablet, Rfl: 0    lancets 33 gauge Misc, USE ONCE DAILY TO MONITOR GLUCOSE, Disp: 100 each, Rfl: 3    lisinopriL (PRINIVIL,ZESTRIL) 5 MG tablet, Take 1 tablet (5 mg total) by mouth once daily. (Patient taking differently: Take 5 mg by mouth every evening.), Disp: 90 tablet, Rfl: 3    metoprolol succinate (TOPROL-XL) 100 MG 24 hr tablet, Take 1 tablet (100 mg total) by mouth once daily. (Patient taking differently: Take 100 mg by mouth every evening.), Disp: 90 tablet, Rfl: 3    nitroGLYCERIN (NITROSTAT) 0.4 MG SL tablet, Place 1 tablet under the tongue once every 5 minutes for 3 doses for chest pain, if pain continues call 911, Disp: 25 tablet, Rfl: 3    omeprazole (PRILOSEC) 40 MG capsule, Take 1 capsule (40 mg total) by mouth 2 (two) times a day., Disp: 180 capsule, Rfl: 3    ondansetron (ZOFRAN-ODT) 4 MG TbDL, Dissolve 1  tablet (4 mg total) by mouth every 6 (six) hours as needed., Disp: 60 tablet, Rfl: 5    potassium chloride (K-TAB) 20 mEq, Take 1 tablet (20 mEq total) by mouth daily as needed (edema). Take with lasix if needed for edema, Disp: 90 tablet, Rfl: 1    predniSONE (DELTASONE) 20 MG tablet, Take 1 tablet (20 mg total) by mouth once daily. for 5 days, Disp: 5 tablet, Rfl: 0    ranolazine (RANEXA) 1,000 mg Tb12, Take 1 tablet (1,000 mg total) by mouth 2 (two) times daily., Disp: 180 tablet, Rfl: 3    rOPINIRole (REQUIP) 0.5 MG tablet, Take 1 tablet (0.5 mg total) by mouth every evening., Disp: 90 tablet, Rfl: 3    tirzepatide (MOUNJARO) 2.5 mg/0.5 mL PnIj, Inject 2.5 mg into the skin every 7 days. (Patient taking differently: Inject 2.5 mg into the skin every Thursday.), Disp: 4 pen , Rfl: 0    traZODone (DESYREL) 100 MG tablet, Take 1 tablet orally once in the evening., Disp: 90 tablet, Rfl: 0  No current facility-administered medications for this visit.    Facility-Administered Medications Ordered in Other Visits:     0.9%  NaCl infusion, , Intravenous, Continuous, Sarah Gamez MD, Stopped at 01/09/20 0925    0.9%  NaCl infusion, , Intravenous, Continuous, Sarah Gamez MD, Stopped at 01/09/20 1026    0.9%  NaCl infusion, , Intravenous, Continuous, Angélica De La Cruz MD    ceFAZolin injection 2 g, 2 g, Intravenous, On Call Procedure, Angélica De La Cruz MD    lidocaine (PF) 10 mg/ml (1%) injection 10 mg, 1 mL, Intradermal, Once, Angélica De aL Cruz MD    sodium chloride 0.9% flush 10 mL, 10 mL, Intravenous, PRN, Sarah Gamez MD    sodium chloride 0.9% flush 10 mL, 10 mL, Intravenous, PRN, Sarah Gamez MD    OBJECTIVE:   There were no vitals filed for this visit.  There is no height or weight on file to calculate BMI.    Physical Exam      PERTINENT RESULTS:   No visits with results within 1 Week(s) from this visit.   Latest known visit with results is:   Lab Visit on 06/22/2023   Component Date  Value Ref Range Status    Hemoglobin A1C 06/22/2023 7.2 (H)  4.0 - 5.6 % Final    Comment: ADA Screening Guidelines:  5.7-6.4%  Consistent with prediabetes  >or=6.5%  Consistent with diabetes    High levels of fetal hemoglobin interfere with the HbA1C  assay. Heterozygous hemoglobin variants (HbS, HgC, etc)do  not significantly interfere with this assay.   However, presence of multiple variants may affect accuracy.      Estimated Avg Glucose 06/22/2023 160 (H)  68 - 131 mg/dL Final    Cholesterol 06/22/2023 179  120 - 199 mg/dL Final    Comment: The National Cholesterol Education Program (NCEP) has set the  following guidelines (reference ranges) for Cholesterol:  Optimal.....................<200 mg/dL  Borderline High.............200-239 mg/dL  High........................> or = 240 mg/dL      Triglycerides 06/22/2023 172 (H)  30 - 150 mg/dL Final    Comment: The National Cholesterol Education Program (NCEP) has set the  following guidelines (reference values) for triglycerides:  Normal......................<150 mg/dL  Borderline High.............150-199 mg/dL  High........................200-499 mg/dL      HDL 06/22/2023 61  40 - 75 mg/dL Final    Comment: The National Cholesterol Education Program (NCEP) has set the  following guidelines (reference values) for HDL Cholesterol:  Low...............<40 mg/dL  Optimal...........>60 mg/dL      LDL Cholesterol 06/22/2023 83.6  63.0 - 159.0 mg/dL Final    Comment: The National Cholesterol Education Program (NCEP) has set the  following guidelines (reference values) for LDL Cholesterol:  Optimal.......................<130 mg/dL  Borderline High...............130-159 mg/dL  High..........................160-189 mg/dL  Very High.....................>190 mg/dL      HDL/Cholesterol Ratio 06/22/2023 34.1  20.0 - 50.0 % Final    Total Cholesterol/HDL Ratio 06/22/2023 2.9  2.0 - 5.0 Final    Non-HDL Cholesterol 06/22/2023 118  mg/dL Final    Comment: Risk category and Non-HDL  cholesterol goals:  Coronary heart disease (CHD)or equivalent (10-year risk of CHD >20%):  Non-HDL cholesterol goal     <130 mg/dL  Two or more CHD risk factors and 10-year risk of CHD <= 20%:  Non-HDL cholesterol goal     <160 mg/dL  0 to 1 CHD risk factor:  Non-HDL cholesterol goal     <190 mg/dL         ASSESSMENT/PLAN:  Problem List Items Addressed This Visit          Psychiatric    Anxiety (Chronic)    Overview     Uncontrolled. SI with SSRI in the past. Xanax PRN not intended for daily use. Okay to fill 30 tabs every 2-6 months. Can consider buspar in future if needed.  reviewed and as expected.             Cardiac/Vascular    CAD S/P percutaneous coronary angioplasty (Chronic)    Overview     Followed by Cardiology, Dr Corey         Essential hypertension - Primary (Chronic)    Overview     Well controlled. Continue current regimen            Renal/    Nocturia (Chronic)    Overview     Trial of oxybutynin            Endocrine    Type 2 diabetes mellitus with diabetic polyneuropathy, without long-term current use of insulin (Chronic)    Overview     Switch from trulicity to mounjaro given injection site reaction with trulicity. Referral to DM education in place         Relevant Medications    tirzepatide (MOUNJARO) 2.5 mg/0.5 mL PnIj    blood-glucose sensor (DEXCOM G6 SENSOR) Jeannine    Other Relevant Orders    Comprehensive Metabolic Panel (Completed)    Hemoglobin A1C (Completed)       Other    Heavy cigarette smoker    Overview     Advised to pick quit date and start nicotine patch. Declined smoking cessation program           Other Visit Diagnoses       Polyuria        Relevant Orders    Urinalysis (Completed)    Urine culture (Completed)    Breast cancer screening by mammogram        Relevant Orders    Mammo Digital Screening Bilat w/ Jatin            ORDERS:   Orders Placed This Encounter    Urine culture    Mammo Digital Screening Bilat w/ Jatin    Urinalysis    Comprehensive Metabolic Panel     Hemoglobin A1C    tirzepatide (MOUNJARO) 2.5 mg/0.5 mL PnIj    blood-glucose transmitter (DEXCOM G6 TRANSMITTER) Jeannine    blood-glucose sensor (DEXCOM G6 SENSOR) Jeannine    blood-glucose meter,continuous (DEXCOM G6 ) Misc     Orders Placed This Encounter   Procedures    Urine culture     Standing Status:   Future     Number of Occurrences:   1     Standing Expiration Date:   8/19/2023    Mammo Digital Screening Bilat w/ Jatin     Standing Status:   Future     Standing Expiration Date:   7/19/2025     Order Specific Question:   May the Radiologist modify the order per protocol to meet the clinical needs of the patient?     Answer:   Yes     Order Specific Question:   Release to patient     Answer:   Immediate    Urinalysis     Standing Status:   Future     Number of Occurrences:   1     Standing Expiration Date:   9/16/2024     Order Specific Question:   Collection Type     Answer:   Urine, Clean Catch    Comprehensive Metabolic Panel     Standing Status:   Future     Number of Occurrences:   1     Standing Expiration Date:   7/19/2024    Hemoglobin A1C     Standing Status:   Future     Number of Occurrences:   1     Standing Expiration Date:   9/16/2024       Follow up 3-6 months for DM, HTN    Hari Francis MD   Family Medicine

## 2023-07-23 PROBLEM — N30.01 ACUTE CYSTITIS WITH HEMATURIA: Status: ACTIVE | Noted: 2023-07-23

## 2023-07-23 PROBLEM — A41.9 SEPSIS: Status: ACTIVE | Noted: 2023-07-23

## 2023-07-24 ENCOUNTER — TELEPHONE (OUTPATIENT)
Dept: PHARMACY | Facility: CLINIC | Age: 58
End: 2023-07-24
Payer: COMMERCIAL

## 2023-07-24 PROBLEM — I15.2 OBESITY, DIABETES, AND HYPERTENSION SYNDROME: Status: ACTIVE | Noted: 2023-07-24

## 2023-07-24 PROBLEM — E11.59 OBESITY, DIABETES, AND HYPERTENSION SYNDROME: Status: ACTIVE | Noted: 2023-07-24

## 2023-07-24 PROBLEM — E66.9 OBESITY, DIABETES, AND HYPERTENSION SYNDROME: Status: ACTIVE | Noted: 2023-07-24

## 2023-07-24 PROBLEM — E11.69 OBESITY, DIABETES, AND HYPERTENSION SYNDROME: Status: ACTIVE | Noted: 2023-07-24

## 2023-07-26 ENCOUNTER — PATIENT MESSAGE (OUTPATIENT)
Dept: FAMILY MEDICINE | Facility: CLINIC | Age: 58
End: 2023-07-26
Payer: COMMERCIAL

## 2023-07-26 ENCOUNTER — NURSE TRIAGE (OUTPATIENT)
Dept: ADMINISTRATIVE | Facility: CLINIC | Age: 58
End: 2023-07-26
Payer: COMMERCIAL

## 2023-07-26 ENCOUNTER — TELEPHONE (OUTPATIENT)
Dept: FAMILY MEDICINE | Facility: CLINIC | Age: 58
End: 2023-07-26
Payer: COMMERCIAL

## 2023-07-26 DIAGNOSIS — G25.81 RESTLESS LEG SYNDROME: ICD-10-CM

## 2023-07-26 PROBLEM — A41.9 SEPSIS: Status: RESOLVED | Noted: 2023-07-23 | Resolved: 2023-07-26

## 2023-07-26 RX ORDER — PREDNISONE 20 MG/1
20 TABLET ORAL DAILY
Qty: 5 TABLET | Refills: 0 | Status: SHIPPED | OUTPATIENT
Start: 2023-07-26 | End: 2023-07-26 | Stop reason: SDUPTHER

## 2023-07-26 RX ORDER — PREDNISONE 20 MG/1
20 TABLET ORAL DAILY
Qty: 5 TABLET | Refills: 0 | Status: SHIPPED | OUTPATIENT
Start: 2023-07-26 | End: 2023-08-08 | Stop reason: SDUPTHER

## 2023-07-26 RX ORDER — ROPINIROLE 0.5 MG/1
0.5 TABLET, FILM COATED ORAL NIGHTLY
Qty: 90 TABLET | Refills: 3 | Status: SHIPPED | OUTPATIENT
Start: 2023-07-26

## 2023-07-26 NOTE — TELEPHONE ENCOUNTER
Caller states she has itching all over s/p taking 2nd dose of ABX tonight. Caller denies difficulty swallow or breathing at this time or facial or mouth swelling. Caller states she took benadryl 25 mg prior to triage call. Caller advised to see provider within 24 hours per protocol and told that a message would be sent to providers office for further assistance. Caller advised to call office in AM to see if appt could or should be schedule or go into local  if needed.  Pt advised per protocol and verbalized understanding.       Reason for Disposition   Hives or itching    Additional Information   Negative: [1] Life-threatening reaction (anaphylaxis) in the past to the same drug AND [2] < 2 hours since exposure   Negative: Difficulty breathing or wheezing   Negative: [1] Hoarseness or cough AND [2] started soon after 1st dose of drug   Negative: [1] Swollen tongue AND [2] started soon after 1st dose of drug   Negative: [1] Purple or blood-colored rash (spots or dots) AND [2] fever   Negative: Sounds like a life-threatening emergency to the triager   Negative: Swollen tongue   Negative: [1] Widespread hives AND [2] onset < 2 hours of exposure to 1st dose of drug   Negative: Fever   Negative: Patient sounds very sick or weak to the triager   Negative: [1] Purple or blood-colored rash (spots or dots) AND [2] no fever AND [3] sounds well to triager   Negative: [1] Taking new prescription medication AND [2] rash within 4 hours of 1st dose   Negative: Large or small blisters on skin (i.e., fluid filled bubbles or sacs)   Negative: Bloody crusts on lips or sores in mouth   Negative: Face or lip swelling    Protocols used: Rash - Widespread On Drugs-A-AH

## 2023-08-04 DIAGNOSIS — E78.00 PURE HYPERCHOLESTEROLEMIA: Primary | ICD-10-CM

## 2023-08-04 DIAGNOSIS — I25.118 CORONARY ARTERY DISEASE OF NATIVE ARTERY OF NATIVE HEART WITH STABLE ANGINA PECTORIS: ICD-10-CM

## 2023-08-08 DIAGNOSIS — F41.9 ANXIETY: ICD-10-CM

## 2023-08-08 DIAGNOSIS — K21.9 GASTROESOPHAGEAL REFLUX DISEASE, UNSPECIFIED WHETHER ESOPHAGITIS PRESENT: ICD-10-CM

## 2023-08-08 RX ORDER — PREDNISONE 20 MG/1
20 TABLET ORAL DAILY
Qty: 5 TABLET | Refills: 0 | Status: SHIPPED | OUTPATIENT
Start: 2023-08-08 | End: 2023-08-14

## 2023-08-08 RX ORDER — ALPRAZOLAM 0.25 MG/1
0.25 TABLET ORAL DAILY PRN
Qty: 30 TABLET | Refills: 0 | Status: SHIPPED | OUTPATIENT
Start: 2023-08-08 | End: 2023-09-08 | Stop reason: SDUPTHER

## 2023-08-08 RX ORDER — OMEPRAZOLE 40 MG/1
40 CAPSULE, DELAYED RELEASE ORAL 2 TIMES DAILY
Qty: 180 CAPSULE | Refills: 3 | Status: SHIPPED | OUTPATIENT
Start: 2023-08-08

## 2023-08-08 NOTE — TELEPHONE ENCOUNTER
Care Due:                  Date            Visit Type   Department     Provider  --------------------------------------------------------------------------------                                EP -                              PRIMARY      [facility]  Last Visit: 06-      [facility]   [facility]  Next Visit: None Scheduled  None         None Found                                                            Last  Test          Frequency    Reason                     Performed    Due Date  --------------------------------------------------------------------------------    Office Visit  12 months..  furosemide, tirzepatide..  06- 06-    [facility] Embedded Care Due Messages. Reference number: [id number].   8/08/2023 9:25:42 AM CDT

## 2023-08-11 ENCOUNTER — OFFICE VISIT (OUTPATIENT)
Dept: PULMONOLOGY | Facility: CLINIC | Age: 58
End: 2023-08-11
Payer: COMMERCIAL

## 2023-08-11 ENCOUNTER — OFFICE VISIT (OUTPATIENT)
Dept: CARDIOLOGY | Facility: CLINIC | Age: 58
End: 2023-08-11
Payer: COMMERCIAL

## 2023-08-11 VITALS
BODY MASS INDEX: 35.53 KG/M2 | OXYGEN SATURATION: 96 % | DIASTOLIC BLOOD PRESSURE: 85 MMHG | HEART RATE: 83 BPM | HEIGHT: 60 IN | BODY MASS INDEX: 35.57 KG/M2 | DIASTOLIC BLOOD PRESSURE: 84 MMHG | HEART RATE: 83 BPM | WEIGHT: 181.19 LBS | HEIGHT: 60 IN | WEIGHT: 181 LBS | SYSTOLIC BLOOD PRESSURE: 129 MMHG | SYSTOLIC BLOOD PRESSURE: 125 MMHG

## 2023-08-11 DIAGNOSIS — Z98.61 CAD S/P PERCUTANEOUS CORONARY ANGIOPLASTY: Chronic | ICD-10-CM

## 2023-08-11 DIAGNOSIS — R00.2 PALPITATIONS: ICD-10-CM

## 2023-08-11 DIAGNOSIS — J43.9 PULMONARY EMPHYSEMA, UNSPECIFIED EMPHYSEMA TYPE: ICD-10-CM

## 2023-08-11 DIAGNOSIS — I25.10 CAD S/P PERCUTANEOUS CORONARY ANGIOPLASTY: Chronic | ICD-10-CM

## 2023-08-11 DIAGNOSIS — I25.118 CORONARY ARTERY DISEASE OF NATIVE ARTERY OF NATIVE HEART WITH STABLE ANGINA PECTORIS: Primary | ICD-10-CM

## 2023-08-11 DIAGNOSIS — E66.01 CLASS 2 SEVERE OBESITY DUE TO EXCESS CALORIES WITH SERIOUS COMORBIDITY AND BODY MASS INDEX (BMI) OF 36.0 TO 36.9 IN ADULT: ICD-10-CM

## 2023-08-11 DIAGNOSIS — I73.9 PAD (PERIPHERAL ARTERY DISEASE): ICD-10-CM

## 2023-08-11 DIAGNOSIS — I70.0 AORTIC ATHEROSCLEROSIS: ICD-10-CM

## 2023-08-11 DIAGNOSIS — I10 ESSENTIAL HYPERTENSION: Chronic | ICD-10-CM

## 2023-08-11 DIAGNOSIS — R94.39 ABNORMAL STRESS TEST: ICD-10-CM

## 2023-08-11 DIAGNOSIS — F17.210 CIGARETTE NICOTINE DEPENDENCE WITHOUT COMPLICATION: ICD-10-CM

## 2023-08-11 DIAGNOSIS — R09.89 LEFT CAROTID BRUIT: ICD-10-CM

## 2023-08-11 DIAGNOSIS — E11.42 TYPE 2 DIABETES MELLITUS WITH DIABETIC POLYNEUROPATHY, WITHOUT LONG-TERM CURRENT USE OF INSULIN: Chronic | ICD-10-CM

## 2023-08-11 DIAGNOSIS — E78.00 PURE HYPERCHOLESTEROLEMIA: ICD-10-CM

## 2023-08-11 DIAGNOSIS — I50.32 CHRONIC DIASTOLIC CHF (CONGESTIVE HEART FAILURE): ICD-10-CM

## 2023-08-11 DIAGNOSIS — J45.51 SEVERE PERSISTENT ASTHMA WITH ACUTE EXACERBATION: Primary | ICD-10-CM

## 2023-08-11 DIAGNOSIS — J84.10 CALCIFIED GRANULOMA OF LUNG: ICD-10-CM

## 2023-08-11 PROBLEM — J98.4 CALCIFIED GRANULOMA OF LUNG: Status: ACTIVE | Noted: 2023-08-11

## 2023-08-11 PROCEDURE — 4010F ACE/ARB THERAPY RXD/TAKEN: CPT | Mod: CPTII,S$GLB,, | Performed by: INTERNAL MEDICINE

## 2023-08-11 PROCEDURE — 1159F PR MEDICATION LIST DOCUMENTED IN MEDICAL RECORD: ICD-10-PCS | Mod: CPTII,S$GLB,, | Performed by: INTERNAL MEDICINE

## 2023-08-11 PROCEDURE — 4010F ACE/ARB THERAPY RXD/TAKEN: CPT | Mod: CPTII,S$GLB,,

## 2023-08-11 PROCEDURE — 3079F DIAST BP 80-89 MM HG: CPT | Mod: CPTII,S$GLB,,

## 2023-08-11 PROCEDURE — 3051F PR MOST RECENT HEMOGLOBIN A1C LEVEL 7.0 - < 8.0%: ICD-10-PCS | Mod: CPTII,S$GLB,,

## 2023-08-11 PROCEDURE — 3051F HG A1C>EQUAL 7.0%<8.0%: CPT | Mod: CPTII,S$GLB,, | Performed by: INTERNAL MEDICINE

## 2023-08-11 PROCEDURE — 99214 OFFICE O/P EST MOD 30 MIN: CPT | Mod: S$GLB,,, | Performed by: INTERNAL MEDICINE

## 2023-08-11 PROCEDURE — 3008F BODY MASS INDEX DOCD: CPT | Mod: CPTII,S$GLB,,

## 2023-08-11 PROCEDURE — 3079F PR MOST RECENT DIASTOLIC BLOOD PRESSURE 80-89 MM HG: ICD-10-PCS | Mod: CPTII,S$GLB,,

## 2023-08-11 PROCEDURE — 3074F SYST BP LT 130 MM HG: CPT | Mod: CPTII,S$GLB,, | Performed by: INTERNAL MEDICINE

## 2023-08-11 PROCEDURE — 3008F BODY MASS INDEX DOCD: CPT | Mod: CPTII,S$GLB,, | Performed by: INTERNAL MEDICINE

## 2023-08-11 PROCEDURE — 1160F PR REVIEW ALL MEDS BY PRESCRIBER/CLIN PHARMACIST DOCUMENTED: ICD-10-PCS | Mod: CPTII,S$GLB,, | Performed by: INTERNAL MEDICINE

## 2023-08-11 PROCEDURE — 3008F PR BODY MASS INDEX (BMI) DOCUMENTED: ICD-10-PCS | Mod: CPTII,S$GLB,, | Performed by: INTERNAL MEDICINE

## 2023-08-11 PROCEDURE — 3008F PR BODY MASS INDEX (BMI) DOCUMENTED: ICD-10-PCS | Mod: CPTII,S$GLB,,

## 2023-08-11 PROCEDURE — 3079F DIAST BP 80-89 MM HG: CPT | Mod: CPTII,S$GLB,, | Performed by: INTERNAL MEDICINE

## 2023-08-11 PROCEDURE — 99999 PR PBB SHADOW E&M-EST. PATIENT-LVL III: CPT | Mod: PBBFAC,,,

## 2023-08-11 PROCEDURE — 99214 PR OFFICE/OUTPT VISIT, EST, LEVL IV, 30-39 MIN: ICD-10-PCS | Mod: S$GLB,,, | Performed by: INTERNAL MEDICINE

## 2023-08-11 PROCEDURE — 99215 OFFICE O/P EST HI 40 MIN: CPT | Mod: S$GLB,,,

## 2023-08-11 PROCEDURE — 99999 PR PBB SHADOW E&M-EST. PATIENT-LVL V: ICD-10-PCS | Mod: PBBFAC,,, | Performed by: INTERNAL MEDICINE

## 2023-08-11 PROCEDURE — 99999 PR PBB SHADOW E&M-EST. PATIENT-LVL III: ICD-10-PCS | Mod: PBBFAC,,,

## 2023-08-11 PROCEDURE — 1160F RVW MEDS BY RX/DR IN RCRD: CPT | Mod: CPTII,S$GLB,, | Performed by: INTERNAL MEDICINE

## 2023-08-11 PROCEDURE — 1159F MED LIST DOCD IN RCRD: CPT | Mod: CPTII,S$GLB,, | Performed by: INTERNAL MEDICINE

## 2023-08-11 PROCEDURE — 4010F PR ACE/ARB THEARPY RXD/TAKEN: ICD-10-PCS | Mod: CPTII,S$GLB,, | Performed by: INTERNAL MEDICINE

## 2023-08-11 PROCEDURE — 4010F PR ACE/ARB THEARPY RXD/TAKEN: ICD-10-PCS | Mod: CPTII,S$GLB,,

## 2023-08-11 PROCEDURE — 3074F PR MOST RECENT SYSTOLIC BLOOD PRESSURE < 130 MM HG: ICD-10-PCS | Mod: CPTII,S$GLB,,

## 2023-08-11 PROCEDURE — 3079F PR MOST RECENT DIASTOLIC BLOOD PRESSURE 80-89 MM HG: ICD-10-PCS | Mod: CPTII,S$GLB,, | Performed by: INTERNAL MEDICINE

## 2023-08-11 PROCEDURE — 99215 PR OFFICE/OUTPT VISIT, EST, LEVL V, 40-54 MIN: ICD-10-PCS | Mod: S$GLB,,,

## 2023-08-11 PROCEDURE — 3051F HG A1C>EQUAL 7.0%<8.0%: CPT | Mod: CPTII,S$GLB,,

## 2023-08-11 PROCEDURE — 3074F SYST BP LT 130 MM HG: CPT | Mod: CPTII,S$GLB,,

## 2023-08-11 PROCEDURE — 3051F PR MOST RECENT HEMOGLOBIN A1C LEVEL 7.0 - < 8.0%: ICD-10-PCS | Mod: CPTII,S$GLB,, | Performed by: INTERNAL MEDICINE

## 2023-08-11 PROCEDURE — 99999 PR PBB SHADOW E&M-EST. PATIENT-LVL V: CPT | Mod: PBBFAC,,, | Performed by: INTERNAL MEDICINE

## 2023-08-11 PROCEDURE — 3074F PR MOST RECENT SYSTOLIC BLOOD PRESSURE < 130 MM HG: ICD-10-PCS | Mod: CPTII,S$GLB,, | Performed by: INTERNAL MEDICINE

## 2023-08-11 RX ORDER — MEPOLIZUMAB 100 MG/ML
100 INJECTION, SOLUTION SUBCUTANEOUS
Qty: 1 ML | Refills: 11 | Status: ACTIVE | OUTPATIENT
Start: 2023-08-11

## 2023-08-11 NOTE — PATIENT INSTRUCTIONS
Continue current asthma medication regimen    Discussed current biologic therapy, will switch over to Nucala due to frequency of therapy    Continue with annual LDCT to screen for lung cancer (next due 05/2024)    Discussed smoking cessation

## 2023-08-11 NOTE — ASSESSMENT & PLAN NOTE
-Noted on recent CT from 05/2023: mild paraseptal emphysematous changes identified at the right lung apex  -on Breo daily, albuterol and nebulizer p.r.n.

## 2023-08-11 NOTE — ASSESSMENT & PLAN NOTE
-continues to smoke, has cut down to 6 or 7 cigarettes per day, will continue to cut down  -Encouraged smoking cessation. Discussed at length long term health risks and complications, such as smoking increases risk of heart disease and lung cancer. Discussed smoking cessation program and patient is not interested at this time as the medications that they offer not effective  -recommend annual screening for lung cancer with LDCT, next due May 2024

## 2023-08-11 NOTE — PROGRESS NOTES
"Subjective:    Patient ID:  Loi Cordova is a 57 y.o. female who presents for evaluation of Coronary Artery Disease      HPI    58 y/o female who presents to establish care. She has a hx of CAD s/p multiple PCI's in the past with last intervention 2018, PAD (asymptomatic), HTN, HLD, uncontrolled DM, HFpEF, obesity, active smoker (~1/2 PPD). Last A1C 9.5 and LDL 88 (about 1 year ago). States she stopped her BP meds (lisinopril/Imdur and unclear if still taking amlodipine, still taking Toprol). Has stopped taking ASA. Does not exercise regularly and no heart healthy diet. States in the past had normal nuclear stress test with subsequent LHC with intervention. Has been having episodes of sudden, sharp, non exertional left sided CP, non radiating, lasts seconds that have been increasing in frequency. Associated CARDONA and palps. Denies orthopnea, PND, syncope, LE edema.     5/19/2023:  Since last visit had PET stress with:    There is a very small (< 5%) sized, mild intensity mid septal fixed perfusion abnormality involving 3% of LV myocardium in the distribution of the 2nd septal consistent with a non-transmural scar. THese findings are consistent with a "jailed" S2.    There are no other significant perfusion abnormalities.    The whole heart absolute myocardial perfusion values averaged 0.66 cc/min/g at rest, which is normal; 1.47 cc/min/g at stress, which is mildly reduced; and CFR is 2.23 , which is mildly reduced.    CT attenuation images demonstrate mild diffuse aortic calcifications in the descending aorta.    The gated perfusion images showed an ejection fraction of 72% at rest and 70% during stress. A normal ejection fraction is greater than 47%.    There is mid septal wall hypokinesis at rest and during stress.    The LV cavity size is normal at rest and stress.    The ECG portion of the study is negative for ischemia.    There were no arrhythmias during stress.    The patient reported chest pain during the " stress test.    There are no prior studies for comparison.    Holter with:  Predominant Rhythm Sinus rhythm with heart rates varying between 46 and 110 BPM with an average of 74BPM.  There were very rare PVCs totalling 3 and averaging 0.06 per hour.  There were very rare PACs totalling 40 and averaging 0.83 per hour.  The longest RR interval was 1500 msec.  The diary was not returned. There were rare hookup related artifacts. Overall, the study was of good quality. The tape was adequate (0 days , 48 hours, 0 minutes).    Recent Covid infection and developed CP after and has had intermittent, non exertional CP. Chronic CARDONA, unchanged.     6/20/2023:  CP improved. Still intermittent mild, atypical episodes. Has CARDONA and fatigue. Continues to smoke about 1/2 PPD. 2DE normal.     8/11/2023:  Since last visit had FLP with stable values. CP infrequent and atpical for angina. Cutting back on smoking. Had a presyncopal episode.     Review of Systems   Constitutional: Negative for malaise/fatigue.   HENT:  Negative for congestion.    Eyes:  Negative for blurred vision.   Cardiovascular:  Positive for chest pain, dyspnea on exertion and palpitations. Negative for claudication, cyanosis, irregular heartbeat, leg swelling, near-syncope, orthopnea, paroxysmal nocturnal dyspnea and syncope.   Respiratory:  Negative for shortness of breath.    Endocrine: Negative for polyuria.   Hematologic/Lymphatic: Negative for bleeding problem.   Skin:  Negative for itching and rash.   Musculoskeletal:  Negative for joint swelling, muscle cramps and muscle weakness.   Gastrointestinal:  Negative for abdominal pain, hematemesis, hematochezia, melena, nausea and vomiting.   Genitourinary:  Negative for dysuria and hematuria.   Neurological:  Negative for dizziness, focal weakness, headaches, light-headedness, loss of balance and weakness.   Psychiatric/Behavioral:  Negative for depression. The patient is not nervous/anxious.         Objective:     Physical Exam  Constitutional:       Appearance: She is well-developed.   HENT:      Head: Normocephalic and atraumatic.   Neck:      Vascular: No JVD.   Cardiovascular:      Rate and Rhythm: Normal rate and regular rhythm.      Pulses:           Carotid pulses are 2+ on the right side and 2+ on the left side.       Radial pulses are 2+ on the right side and 2+ on the left side.        Femoral pulses are 2+ on the right side and 2+ on the left side.       Dorsalis pedis pulses are 2+ on the right side and 2+ on the left side.        Posterior tibial pulses are 2+ on the right side and 2+ on the left side.      Heart sounds: Normal heart sounds.   Pulmonary:      Effort: Pulmonary effort is normal.      Breath sounds: Normal breath sounds.   Abdominal:      General: Bowel sounds are normal.      Palpations: Abdomen is soft.   Musculoskeletal:      Cervical back: Neck supple.   Skin:     General: Skin is warm and dry.   Neurological:      Mental Status: She is alert and oriented to person, place, and time.   Psychiatric:         Behavior: Behavior normal.         Thought Content: Thought content normal.           Assessment:       1. Coronary artery disease of native artery of native heart with stable angina pectoris    2. Palpitations    3. CAD S/P percutaneous coronary angioplasty    4. Essential hypertension    5. PAD (peripheral artery disease)    6. Chronic diastolic CHF (congestive heart failure)    7. Pure hypercholesterolemia    8. Left carotid bruit    9. Abnormal stress test    10. Type 2 diabetes mellitus with diabetic polyneuropathy, without long-term current use of insulin    11. Class 2 severe obesity due to excess calories with serious comorbidity and body mass index (BMI) of 36.0 to 36.9 in adult    12. Cigarette nicotine dependence without complication      58 y/o pt with hx and presentation as above. CP improved. Continue Ranexa. Needs to be more active. No imdur 2/2 severe HA's.Holter for palps.  Also counseled on the importance of med compliance, nirmala ASA. Needs to stop smoking and stay active. Discussed the etiology, evaluation, and management of CAD, CHF, HTN, HLD, DM, smoking. Discussed the importance of med compliance, heart healthy diet, and regular exercise.        Plan:       -Holter x 48 hours  -Continue current medical management  -Smoking cessation  -f/u in 3 months

## 2023-08-11 NOTE — PROGRESS NOTES
Patient ID:  Loi Cordova is a 57 y.o. female    Office Note     Subjective:       Reason for visit:  No chief complaint on file.      HPI:  4/13/2023:  Loi Cordova is a 57 y.o. female who presents in office for evaluation of asthma. Has had atleast 2 exacerbations within last year requiring urgent care visits. Daily productive cough, from white to green mucous, worse at night, with nocturnal arousals. Associated with occasional chest tightness, shortness of breath, wheezing, headaches, lightheadedness, LE edema, malaise, fatigue. Using albuterol inhaler 2-3 times daily. Has not used nebulizer in 6 years. Has daily controller Breo.     + GERD symptoms, follows GI, treated with omeprazole, + sinuses, post nasal drip, allergies, takes OTC sinus medications.    Urgent care visit 4/3/23 for asthma: received steroid injection, promethazine DM.   Urgent care visit 2/1/23 for URI and asthma exacerbation: discharged with tessalon perles, doxycycline, prednisone therapy    Steroid therapy hx: April 2023, Feb 2023, Dec 2020. Finished prednisone therapy, noticed some improvement in symptoms. Requires recurrent systemic steroid therapy in addition to daily inhaled steroid therapy. Eosinophils artificially reduced due to recurrent systemic steroid therapy.     Occupation- Works in cardiac rehab at [employer]  Smoking hx- current every day smoker, 40 years smoking hx, 1ppd  Occupational/Environmental Exposures: Mold/Asbestosis exposure: Father passed away from mesothelioma  Pets/Birds- 2 cats/1 dog; 2 parakeets for 6 mo  Childhood history of Lung Disease: coup, pneumonia  Medical history: previous shoulder in over 10 years ago, no chest surgery, No Cancer, had 7 stents since 2016   Radiation/Chemo MedicationsAdult onset Asthma and bronchitis  Family History: Lung Cancer: father mesothelioma, no COPD, Mother Asthma    The chief compliant  problem is new to me    Performance Status:The patient's activity level is functions out of  house.      Interval History:  08/11/2023:  Loi Cordova is a 57 y.o. female who presents in office for follow up of asthma. ACT score in office today is 22, asthma is well controlled. Hospital admission last month at [facility] for acute cystitis/UTI and pyelo, treated with antibiotics. States she is feeling better since last clinic visit and since starting Fasenra biologic therapy, however notices break thru asthma symptoms a few weeks into medication. Reports shortness of breath is stable, not as often, has some occasional cough with clear phlegm, and some wheezing, however continues to smoke, although has cut down and is smoking about 6-7 cigarettes a day, 1 pack will last 3 days, has the nicotine patch at home. Using albuterol rescue inhaler as needed, using less frequent, has not had to use nebulizer since starting biologic. Compliant with Breo everyday. Going on the 4th dose of fasenra, next dose due next month, however notices that some of her asthma symptoms flare up around the 3rd or 4th week, had recent prednisone therapy prescribed and completed, notices improvement in symptoms, and is inquiring about a more frequent dosage with the biologics. Eosinophils artificially reduced due to recurrent systemic steroid therapy.     Recent prescribed prednisone therapy:   08/08/2023, 07/26/2023        Review of Systems   Constitutional:  Positive for fatigue. Negative for chills, fever and unexpected weight change.   HENT:  Negative for postnasal drip, sinus pressure and sinus pain.    Respiratory:  Positive for cough, shortness of breath and wheezing. Negative for chest tightness.    Cardiovascular:  Negative for chest pain, palpitations and leg swelling.   Skin:  Negative for color change, pallor and rash.   Neurological:  Positive for light-headedness and headaches. Negative for dizziness, weakness and numbness.       Objective:     Vitals:    08/11/23 1023   BP: 125/85   Pulse: 83   Weight: 82.1 kg (181 lb)    Height: 5' (1.524 m)           Physical Exam  Constitutional:       Appearance: Normal appearance. She is well-developed.   HENT:      Head: Normocephalic.      Mouth/Throat:      Mouth: Mucous membranes are moist.      Pharynx: Oropharynx is clear.   Cardiovascular:      Rate and Rhythm: Normal rate and regular rhythm.      Pulses: Normal pulses.      Heart sounds: Normal heart sounds.   Pulmonary:      Effort: Pulmonary effort is normal.      Breath sounds: Wheezing present.   Chest:      Chest wall: No tenderness.   Abdominal:      General: Abdomen is flat.   Musculoskeletal:         General: Normal range of motion.      Cervical back: Normal range of motion and neck supple.   Lymphadenopathy:      Cervical: No cervical adenopathy.   Skin:     General: Skin is warm and dry.   Neurological:      General: No focal deficit present.      Mental Status: She is alert and oriented to person, place, and time.   Psychiatric:         Mood and Affect: Mood normal.         Behavior: Behavior normal.         Thought Content: Thought content normal.         Judgment: Judgment normal.              Pertinent Studies Reviewed & Interpreted:     Labs reviewed       Lab Results   Component Value Date    WBC 6.98 07/25/2023    RBC 3.91 (L) 07/25/2023    HGB 11.8 (L) 07/25/2023    HCT 34.7 (L) 07/25/2023    MCV 89 07/25/2023    MCH 30.2 07/25/2023    MCHC 34.0 07/25/2023    RDW 13.3 07/25/2023     07/25/2023    MPV 9.2 07/25/2023    GRAN 5.1 07/25/2023    GRAN 72.8 07/25/2023    LYMPH 1.4 07/25/2023    LYMPH 19.9 07/25/2023    MONO 0.5 07/25/2023    MONO 6.6 07/25/2023    EOS 0.0 07/25/2023    BASO 0.02 07/25/2023    EOSINOPHIL 0.0 07/25/2023    BASOPHIL 0.3 07/25/2023      Latest Reference Range & Units Most Recent 06/16/22 06:56   Eos # 0.0 - 0.5 K/uL 0.1  6/24/22 12:23 0.2         Personal Diagnostic Review and Interpretation  Radiographs (ct chest and cxr) images personally reviewed: view by direct vision     CT Chest  Lung Screening Low Dose 05/05/23: Lung-RADS Category:  2 - Benign Appearance or Behavior - continue annual screening with LDCT in 12 months. multiple pulmonary nodules identified within both lungs with the largest nodule on the left measuring 4 mm in size (image 99, series 4) and the largest nodule on the right measuring 4 mm (image 128, series 4). These nodules were also present on the prior CTA examination of the chest dated 08/23/2018. There are mild paraseptal emphysematous changes identified at the right lung apex. There is a calcified granuloma identified within the left upper lobe. Prominent atherosclerotic calcification/stents identified within the native coronary arteries in a multi-vessel distribution. Atherosclerotic calcification is also present within the thoracic aorta.    CTA Chest Non-Coronary (PE Study) 08/23/18: No evidence of pulmonary embolus. There is dependent atelectatic versus fibrotic change.    X-Ray Chest  07/23/23: Mild diffuse interstitial prominence may reflect edema or pneumonitis.     12/29/20: lungs are well expanded and clear.      Pulmonary Function Tests: results reviewed  04/19/2023:  FVC: 81.9% predicted  FEV1: 84.7% predicted; Post BD 93.3% predicted with 10.1% improvement post BD  FEV1/FVC:  83%  DLCO: 60.2% predicted  Spirometry is normal. Spirometry remains unimproved following bronchodilator. DLCO is mildly decreased.     2/7/2019:  FVC: 2.42L (82.4% predicted)  FEV1: 1.89L (79.8% predicted)  FEV1/FVC:  78  TLC: 3.50L (82.6% predicted)  DLCO: 20.80 (96.8% predicted)  There is no obstruction on spirometry. There is no restriction on lung volume testing. The DLCO is normal. Normal study.       Transthoracic echo (TTE) complete 5/22/23: The estimated ejection fraction is 65%. Normal left ventricular diastolic function. The estimated PA systolic pressure is 18 mmHg.     2D echo with color flow doppler 08/24/2018: Normal left ventricular systolic function (EF 55-60%). Normal  right ventricular systolic function. Grade 1 diastolic dysfunction).       Assessment & Plan:       Clinical impression is resonably certain and repeated evaluation prn +/- follow up will be needed as below.    Problem List Items Addressed This Visit          Pulmonary    Severe persistent asthma with acute exacerbation - Primary    Pulmonary emphysema    Current Assessment & Plan     -Noted on recent CT from 05/2023: mild paraseptal emphysematous changes identified at the right lung apex  -on Breo daily, albuterol and nebulizer p.r.n.         Calcified granuloma of lung    Current Assessment & Plan     -noted on CT from 05/2023: calcified granuloma identified within the left upper lobe            Cardiac/Vascular    Aortic atherosclerosis    Current Assessment & Plan     -noted on CT from 05/2023: Atherosclerotic calcification is also present within the thoracic aorta  -follows cardiology  -on Zetia, continue medicine              Other    Cigarette nicotine dependence without complication    Current Assessment & Plan     -continues to smoke, has cut down to 6 or 7 cigarettes per day, will continue to cut down  -Encouraged smoking cessation. Discussed at length long term health risks and complications, such as smoking increases risk of heart disease and lung cancer. Discussed smoking cessation program and patient is not interested at this time as the medications that they offer not effective  -recommend annual screening for lung cancer with LDCT, next due May 2024             Addendum 04/24/2023: Discussed PFTs with patient, encouraged smoking cessation. Complains of thrush, will rx nystatin    Addendum 5/05/2023: Discussed test results with patient. Mild emphysema present. Calcified granuloma identified. Multiple pulmonary nodules, largest 4mm, unchanged from previous CT in 2018. Repeat LDCT in 12 months and continue current treatment as discussed, will follow up at next visit. Will also follow up with her  cardiologist regarding atherosclerotic calcification/stents. Stated she started biologic Fasenra yesterday (5/4/23).     Addendum 05/08/2023: Patient reported testing positive for COVID today, with sxs of fever, severe headaches, body aches. Collaborated with pulmonologist Dr. Gamez. Based on patient being high risk (asthma, smoker, diabetes, overweight), qualifies for paxlovid. Kidney function from 06/2022 show normal kidney function. Discussed with patient and she is agreeable to start medication BID x 5 days. Med sent to her pharmacy. Encouraged recovery and symptom management, drinking plenty of fluids, and starting paxlovid. Patient verbalized understanding.    Follow up in about 3 months (around 11/11/2023), or if symptoms worsen or fail to improve.    Discussed with patient above for education the following:      Patient Instructions   Continue current asthma medication regimen    Discussed current biologic therapy, will switch over to Nucala due to frequency of therapy    Continue with annual LDCT to screen for lung cancer (next due 05/2024)    Discussed smoking cessation    45 minutes of total time spent on the encounter, which includes face to face time and non-face to face time preparing to see the patient (eg, review of tests), Obtaining and/or reviewing separately obtained history, Documenting clinical information in the electronic or other health record, Independently interpreting results (not separately reported) and communicating results to the patient/family/caregiver, or Care coordination (not separately reported).

## 2023-08-11 NOTE — ASSESSMENT & PLAN NOTE
-noted on CT from 05/2023: Atherosclerotic calcification is also present within the thoracic aorta  -follows cardiology  -on Zetia, continue medicine

## 2023-08-15 ENCOUNTER — TELEPHONE (OUTPATIENT)
Dept: PHARMACY | Facility: CLINIC | Age: 58
End: 2023-08-15
Payer: COMMERCIAL

## 2023-08-15 NOTE — TELEPHONE ENCOUNTER
Jason Richmond, this is Ashely Gray, clinical pharmacist with [facility] that is part of your care team.  We have begun working on your prescription that your doctor has sent us. Our next steps include:     Working with your insurance company to obtain approval for your medication  Working with you to ensure your medication is affordable     We will be calling you along the way with updates on your medication but if you have any concerns or receive information that you would like to discuss please reach us at [phone].    Welcome call outcome: Patient/caregiver reached

## 2023-08-16 DIAGNOSIS — E11.42 TYPE 2 DIABETES MELLITUS WITH DIABETIC POLYNEUROPATHY, WITHOUT LONG-TERM CURRENT USE OF INSULIN: Chronic | ICD-10-CM

## 2023-08-16 RX ORDER — TIRZEPATIDE 2.5 MG/.5ML
2.5 INJECTION, SOLUTION SUBCUTANEOUS
Qty: 4 PEN | Refills: 0 | Status: SHIPPED | OUTPATIENT
Start: 2023-08-16 | End: 2023-09-08 | Stop reason: SDUPTHER

## 2023-08-16 NOTE — TELEPHONE ENCOUNTER
No care due was identified.  [facility] Embedded Care Due Messages. Reference number: [id number].   8/16/2023 9:01:46 AM CDT

## 2023-08-17 ENCOUNTER — SPECIALTY PHARMACY (OUTPATIENT)
Dept: PHARMACY | Facility: CLINIC | Age: 58
End: 2023-08-17
Payer: COMMERCIAL

## 2023-08-17 DIAGNOSIS — J82.83 EOSINOPHILIC ASTHMA: ICD-10-CM

## 2023-08-17 DIAGNOSIS — J45.51 SEVERE PERSISTENT ASTHMA WITH ACUTE EXACERBATION: Primary | ICD-10-CM

## 2023-08-17 NOTE — TELEPHONE ENCOUNTER
Specialty Pharmacy - Initial Clinical Assessment    Specialty Medication Orders Linked to Encounter      Flowsheet Row Most Recent Value   Medication #1 mepolizumab 100 mg/mL AtIn (Order#[id number], Rx#[id number])          Patient Diagnosis   J45.51 - Severe persistent asthma with acute exacerbation    Subjective    Loi Cordova is a 57 y.o. female, who is followed by the specialty pharmacy service for management and education.    Recent Encounters       Date Type Provider Description    08/17/2023 Specialty Pharmacy Ashely Gray, Wanda Initial Clinical Assessment    08/17/2023 Specialty Pharmacy Ashely Gray PharmD Referral Authorization    06/21/2023 Specialty Pharmacy Moriah Lamb Refill Coordination    05/23/2023 Specialty Pharmacy Colleen Villalta PharmD Refill Coordination    05/01/2023 Specialty Pharmacy Ashely Gray, Wanda Initial Clinical Assessment            Current Outpatient Medications   Medication Sig    albuterol (PROVENTIL HFA) 90 mcg/actuation inhaler Inhale 2 puffs into the lungs every 6 (six) hours as needed for Wheezing or Shortness of Breath. Rescue    albuterol (PROVENTIL) 2.5 mg /3 mL (0.083 %) nebulizer solution Use one vial (3 mL) (2.5 mg total) by nebulization every 6 (six) hours as needed for Wheezing. Rescue    ALPRAZolam (XANAX) 0.25 MG tablet Take 1 tablet (0.25 mg total) by mouth daily as needed for Anxiety.    amLODIPine (NORVASC) 5 MG tablet Take 1 tablet (5 mg total) by mouth every evening.    aspirin 81 MG Chew Take 81 mg by mouth every evening.    atorvastatin (LIPITOR) 80 MG tablet Take 1 tablet (80 mg total) by mouth every evening.    blood sugar diagnostic Strp Use 1 strip to check blood sugar once daily    blood-glucose meter Misc USE ONCE DAILY TO MONITOR GLUCOSE    blood-glucose meter,continuous (DEXCOM G6 ) Misc 1 each by Misc.(Non-Drug; Combo Route) route once. for 1 dose    blood-glucose sensor (DEXCOM G6 SENSOR) Jeannine 1 each by Misc.(Non-Drug; Combo Route)  route every 10 days.    blood-glucose transmitter (DEXCOM G6 TRANSMITTER) Jeannine 1 each by Misc.(Non-Drug; Combo Route) route once. for 1 dose    diphenhydrAMINE (BENADRYL) 25 mg capsule Take 1 capsule (25 mg total) by mouth every 6 (six) hours as needed for Itching.    ezetimibe (ZETIA) 10 mg tablet Take 1 tablet (10 mg total) by mouth every evening.    fenofibrate 160 MG Tab Take 1 tablet (160 mg total) by mouth once daily. (Patient taking differently: Take 160 mg by mouth every evening.)    fluticasone furoate-vilanteroL (BREO) 100-25 mcg/dose diskus inhaler Inhale 1 puff into the lungs once daily. Controller    furosemide (LASIX) 40 MG tablet Take 1 tablet (40 mg total) by mouth daily as needed (edema).    ibuprofen (ADVIL,MOTRIN) 600 MG tablet Take 1 tablet (600 mg total) by mouth every 6 (six) hours as needed for Pain.    lancets 33 gauge Misc USE ONCE DAILY TO MONITOR GLUCOSE    lisinopriL (PRINIVIL,ZESTRIL) 5 MG tablet Take 1 tablet (5 mg total) by mouth once daily. (Patient taking differently: Take 5 mg by mouth every evening.)    mepolizumab 100 mg/mL AtIn Inject 1 mL (100 mg total) into the skin every 28 days.    metoprolol succinate (TOPROL-XL) 100 MG 24 hr tablet Take 1 tablet (100 mg total) by mouth once daily. (Patient taking differently: Take 100 mg by mouth every evening.)    nitroGLYCERIN (NITROSTAT) 0.4 MG SL tablet Place 1 tablet under the tongue once every 5 minutes for 3 doses for chest pain, if pain continues call 911    omeprazole (PRILOSEC) 40 MG capsule Take 1 capsule (40 mg total) by mouth 2 (two) times a day.    ondansetron (ZOFRAN-ODT) 4 MG TbDL Dissolve 1 tablet (4 mg total) by mouth every 6 (six) hours as needed.    potassium chloride (K-TAB) 20 mEq Take 1 tablet (20 mEq total) by mouth daily as needed (edema). Take with lasix if needed for edema    ranolazine (RANEXA) 1,000 mg Tb12 Take 1 tablet (1,000 mg total) by mouth 2 (two) times daily.    rOPINIRole (REQUIP) 0.5 MG tablet Take 1  tablet (0.5 mg total) by mouth every evening.    tirzepatide (MOUNJARO) 2.5 mg/0.5 mL PnIj Inject 2.5 mg into the skin every 7 days.    traZODone (DESYREL) 100 MG tablet Take 1 tablet orally once in the evening.   Last reviewed on 8/17/2023  4:20 PM by Ashely Gray, PharmD    Review of patient's allergies indicates:   Allergen Reactions    Crayfish Anaphylaxis     (crawfish only)    Rocephin [ceftriaxone] Rash   Last reviewed on  8/17/2023 4:19 PM by Ashely Gray    Drug Interactions    Clinically relevant drug interactions identified: no                       Assessment Questions - Documented Responses      Flowsheet Row Most Recent Value   Assessment    Medication Reconciliation completed for patient Yes   During the past 4 weeks, has patient missed any activities due to condition or medication? No   During the past 4 weeks, did patient have any of the following urgent care visits? None   Goals of Therapy Status Discussed (new start)   Status of the patients ability to self-administer: Is Able   All education points have been covered with patient? Yes, supplemental printed education provided   Welcome packet contents reviewed and discussed with patient? Yes   Assesment completed? Yes   Plan Therapy being initiated   Do you need to open a clinical intervention (i-vent)? No   Do you want to schedule first shipment? Yes   Medication #1 Assessment Info    Patient status New medication, Exisiting to [facility]   Is this medication appropriate for the patient? Yes   Is this medication effective? Not yet started          Refill Questions - Documented Responses      Flowsheet Row Most Recent Value   Patient Availability and HIPAA Verification    Does patient want to proceed with activity? Yes   HIPAA/medical authority confirmed? Yes   Relationship to patient of person spoken to? Self   Refill Screening Questions    When does the patient need to receive the medication? 08/21/23   Refill Delivery Questions    How will  the patient receive the medication? [facility]   When does the patient need to receive the medication? 08/21/23   Shipping Address Prescription   Address in [facility] confirmed and updated if neccessary? Yes   Expected Copay ($) 3   Is the patient able to afford the medication copay? Yes   Payment Method CC on file   Days supply of Refill 28   Supplies needed? No supplies needed   Refill activity completed? Yes   Refill activity plan Refill scheduled   Shipment/Pickup Date: 08/18/23            Objective    She has a past medical history of Allergy, Asthma, Chronic diastolic heart failure, Coronary artery disease, GERD (gastroesophageal reflux disease), History of back surgery (02/20/2018), Hyperlipidemia, Hypertension, Pulmonary emphysema (8/11/2023), and Type 2 diabetes mellitus with diabetic polyneuropathy, without long-term current use of insulin (02/08/2019).    Tried/failed medications: Breo    BP Readings from Last 4 Encounters:   08/11/23 125/85   08/11/23 129/84   07/25/23 (!) 145/73   06/20/23 138/78     Ht Readings from Last 4 Encounters:   08/11/23 5' (1.524 m)   08/11/23 5' (1.524 m)   07/23/23 5' (1.524 m)   06/20/23 5' (1.524 m)     Wt Readings from Last 4 Encounters:   08/11/23 82.1 kg (181 lb)   08/11/23 82.2 kg (181 lb 3.5 oz)   07/23/23 84 kg (185 lb 3 oz)   06/20/23 85.2 kg (187 lb 11.6 oz)       The goals of prescribed drug therapy management include:  Supporting patient to meet the prescriber's medical treatment objectives  Improving or maintaining quality of life  Maintaining optimal therapy adherence  Minimizing and managing side effects      Goals of Therapy Status: Discussed (new start)    Assessment/Plan  Patient plans to start therapy on 08/21/23      Indication, dosage, appropriateness, effectiveness, safety and convenience of her specialty medication(s) were reviewed today.     Patient Education   Patient received education on the following:   Expectations and possible outcomes of  therapy  Proper use, timely administration, and missed dose management  Duration of therapy  Side effects, including prevention, minimization, and management  Contraindications and safety precautions  New or changed medications, including prescribe and over the counter medications and supplements  Reviews recommended vaccinations, as appropriate  Storage, safe handling, and disposal        Tasks added this encounter   No tasks added.   Tasks due within next 3 months   8/17/2023 - Initial Clinical Assessment/Patient Education (1 Time Occurence)  8/17/2023 - Set up Initial Fill     Ashely Gray, PharmD  [facility]  [address] [place] [id number]  Phone: [phone]  Fax: [phone]

## 2023-08-17 NOTE — TELEPHONE ENCOUNTER
Jason PAINTER approved through 12/14/23.  Copay is $846 on primary ins; $3 with secondary Medicaid.     Forward to initial.

## 2023-09-08 DIAGNOSIS — F41.9 ANXIETY: ICD-10-CM

## 2023-09-08 DIAGNOSIS — E11.42 TYPE 2 DIABETES MELLITUS WITH DIABETIC POLYNEUROPATHY, WITHOUT LONG-TERM CURRENT USE OF INSULIN: ICD-10-CM

## 2023-09-08 DIAGNOSIS — E11.42 TYPE 2 DIABETES MELLITUS WITH DIABETIC POLYNEUROPATHY, WITHOUT LONG-TERM CURRENT USE OF INSULIN: Chronic | ICD-10-CM

## 2023-09-08 RX ORDER — ALPRAZOLAM 0.25 MG/1
0.25 TABLET ORAL DAILY PRN
Qty: 30 TABLET | Refills: 0 | Status: SHIPPED | OUTPATIENT
Start: 2023-09-08 | End: 2023-12-28 | Stop reason: SDUPTHER

## 2023-09-08 RX ORDER — TIRZEPATIDE 2.5 MG/.5ML
2.5 INJECTION, SOLUTION SUBCUTANEOUS
Qty: 4 PEN | Refills: 0 | Status: SHIPPED | OUTPATIENT
Start: 2023-09-08 | End: 2023-10-04 | Stop reason: SDUPTHER

## 2023-09-08 NOTE — TELEPHONE ENCOUNTER
No care due was identified.  Health [facility] Embedded Care Due Messages. Reference number: [id number].   9/08/2023 11:34:02 AM CDT

## 2023-09-11 RX ORDER — EZETIMIBE 10 MG/1
TABLET ORAL
Qty: 90 TABLET | Refills: 3 | OUTPATIENT
Start: 2023-09-11

## 2023-10-03 DIAGNOSIS — G47.00 INSOMNIA, UNSPECIFIED TYPE: ICD-10-CM

## 2023-10-03 NOTE — TELEPHONE ENCOUNTER
No care due was identified.  Health [facility] Embedded Care Due Messages. Reference number: [id number].   10/03/2023 4:38:16 PM CDT

## 2023-10-04 ENCOUNTER — PATIENT MESSAGE (OUTPATIENT)
Dept: FAMILY MEDICINE | Facility: CLINIC | Age: 58
End: 2023-10-04
Payer: COMMERCIAL

## 2023-10-04 ENCOUNTER — PATIENT MESSAGE (OUTPATIENT)
Dept: ADMINISTRATIVE | Facility: OTHER | Age: 58
End: 2023-10-04
Payer: COMMERCIAL

## 2023-10-04 DIAGNOSIS — E11.42 TYPE 2 DIABETES MELLITUS WITH DIABETIC POLYNEUROPATHY, WITHOUT LONG-TERM CURRENT USE OF INSULIN: Chronic | ICD-10-CM

## 2023-10-04 RX ORDER — TRAZODONE HYDROCHLORIDE 100 MG/1
100 TABLET ORAL NIGHTLY
Qty: 90 TABLET | Refills: 3 | Status: SHIPPED | OUTPATIENT
Start: 2023-10-04

## 2023-10-04 RX ORDER — TIRZEPATIDE 2.5 MG/.5ML
2.5 INJECTION, SOLUTION SUBCUTANEOUS
Qty: 4 PEN | Refills: 0 | Status: SHIPPED | OUTPATIENT
Start: 2023-10-04 | End: 2023-11-09 | Stop reason: DRUGHIGH

## 2023-10-04 NOTE — TELEPHONE ENCOUNTER
No care due was identified.  [facility] Embedded Care Due Messages. Reference number: [id number].   10/04/2023 8:03:33 AM CDT

## 2023-10-12 ENCOUNTER — PATIENT MESSAGE (OUTPATIENT)
Dept: RESPIRATORY THERAPY | Facility: HOSPITAL | Age: 58
End: 2023-10-12
Payer: COMMERCIAL

## 2023-10-31 ENCOUNTER — PATIENT MESSAGE (OUTPATIENT)
Dept: FAMILY MEDICINE | Facility: CLINIC | Age: 58
End: 2023-10-31
Payer: COMMERCIAL

## 2023-10-31 DIAGNOSIS — E11.42 TYPE 2 DIABETES MELLITUS WITH DIABETIC POLYNEUROPATHY, WITHOUT LONG-TERM CURRENT USE OF INSULIN: Primary | Chronic | ICD-10-CM

## 2023-11-07 ENCOUNTER — OFFICE VISIT (OUTPATIENT)
Dept: URGENT CARE | Facility: CLINIC | Age: 58
End: 2023-11-07
Payer: COMMERCIAL

## 2023-11-07 VITALS
WEIGHT: 181 LBS | OXYGEN SATURATION: 96 % | BODY MASS INDEX: 35.53 KG/M2 | HEART RATE: 88 BPM | TEMPERATURE: 97 F | DIASTOLIC BLOOD PRESSURE: 71 MMHG | SYSTOLIC BLOOD PRESSURE: 132 MMHG | RESPIRATION RATE: 16 BRPM | HEIGHT: 60 IN

## 2023-11-07 DIAGNOSIS — J43.9 PULMONARY EMPHYSEMA, UNSPECIFIED EMPHYSEMA TYPE: ICD-10-CM

## 2023-11-07 DIAGNOSIS — J20.9 BRONCHITIS WITH ASTHMA, ACUTE: Primary | ICD-10-CM

## 2023-11-07 DIAGNOSIS — M53.3 COCCYDYNIA: Primary | ICD-10-CM

## 2023-11-07 DIAGNOSIS — R05.3 CHRONIC COUGH: ICD-10-CM

## 2023-11-07 DIAGNOSIS — J45.909 BRONCHITIS WITH ASTHMA, ACUTE: Primary | ICD-10-CM

## 2023-11-07 DIAGNOSIS — R09.81 NASAL CONGESTION: ICD-10-CM

## 2023-11-07 PROCEDURE — 99213 PR OFFICE/OUTPT VISIT, EST, LEVL III, 20-29 MIN: ICD-10-PCS | Mod: S$GLB,,, | Performed by: NURSE PRACTITIONER

## 2023-11-07 PROCEDURE — 99213 OFFICE O/P EST LOW 20 MIN: CPT | Mod: S$GLB,,, | Performed by: NURSE PRACTITIONER

## 2023-11-07 RX ORDER — PROMETHAZINE HYDROCHLORIDE AND DEXTROMETHORPHAN HYDROBROMIDE 6.25; 15 MG/5ML; MG/5ML
5 SYRUP ORAL EVERY 8 HOURS PRN
Qty: 75 ML | Refills: 0 | Status: SHIPPED | OUTPATIENT
Start: 2023-11-07 | End: 2023-11-13

## 2023-11-07 RX ORDER — FLUTICASONE FUROATE AND VILANTEROL 100; 25 UG/1; UG/1
1 POWDER RESPIRATORY (INHALATION) DAILY
Qty: 60 EACH | Refills: 0 | Status: SHIPPED | OUTPATIENT
Start: 2023-11-07

## 2023-11-07 RX ORDER — AZELASTINE 1 MG/ML
1 SPRAY, METERED NASAL 2 TIMES DAILY
Qty: 30 ML | Refills: 0 | Status: SHIPPED | OUTPATIENT
Start: 2023-11-07 | End: 2024-11-06

## 2023-11-07 RX ORDER — ALBUTEROL SULFATE 0.83 MG/ML
2.5 SOLUTION RESPIRATORY (INHALATION) EVERY 6 HOURS PRN
Qty: 270 ML | Refills: 11 | Status: SHIPPED | OUTPATIENT
Start: 2023-11-07 | End: 2024-11-06

## 2023-11-07 NOTE — PROGRESS NOTES
"PATIENT VISIT FAMILY MEDICINE    CC: No chief complaint on file.      HPI: Loi Cordova  is a 58 y.o. female:    Patient is {ayintro:22400} Patient presents {ayintro3:37833}    {AYHPI:64172::"Patient doing well overall, taking medications as prescribed and with no acute concerns. "}      HPI    PMHX:   Past Medical History:   Diagnosis Date    Allergy     Asthma     Chronic diastolic heart failure     Coronary artery disease     GERD (gastroesophageal reflux disease)     History of back surgery 02/20/2018    Hyperlipidemia     Hypertension     Pulmonary emphysema 8/11/2023    Type 2 diabetes mellitus with diabetic polyneuropathy, without long-term current use of insulin 02/08/2019       PSHX:   Past Surgical History:   Procedure Laterality Date    BILATERAL OOPHORECTOMY Bilateral 2000    BREAST BIOPSY Left 2/14/2020    Procedure: BIOPSY, BREAST-Left medial breast palpation guided;  Surgeon: Paulino Espinoza MD;  Location: [facility] OR [address] [place] [address];  Service: General;  Laterality: Left;    CARDIAC CATHETERIZATION      7 stents    CHOLECYSTECTOMY      COLONOSCOPY N/A 7/16/2019    Procedure: COLONOSCOPY;  Surgeon: Sarah Gamez MD;  Location: [facility];  Service: Endoscopy;  Laterality: N/A;    ESOPHAGOGASTRODUODENOSCOPY N/A 1/9/2020    Procedure: EGD (ESOPHAGOGASTRODUODENOSCOPY);  Surgeon: Sarah Gamez MD;  Location: [facility];  Service: Endoscopy;  Laterality: N/A;    HYSTERECTOMY      PARTIAL HYSTERECTOMY N/A 1990    Uterus taken out    SHOULDER SURGERY      TOTAL KNEE ARTHROPLASTY         FAMHX:   Family History   Problem Relation Age of Onset    Heart disease Mother     Hyperlipidemia Mother     Hypertension Mother     Diabetes Mother     Heart disease Father     Hyperlipidemia Father     Cancer Father     Diabetes Maternal Aunt     Prostate cancer Neg Hx     Kidney disease Neg Hx        SOCHX:   Social History     Socioeconomic History    Marital status:    Tobacco Use    Smoking status: Every Day     " Current packs/day: 0.50     Average packs/day: 0.5 packs/day for 34.0 years (17.0 ttl pk-yrs)     Types: Cigarettes    Smokeless tobacco: Never    Tobacco comments:     in smoking program 1/25/21   Substance and Sexual Activity    Alcohol use: Not Currently     Comment: 6 months    Drug use: No    Sexual activity: Yes     Partners: Male     Social Determinants of Health     Financial Resource Strain: Low Risk  (7/19/2023)    Overall Financial Resource Strain (CARDIA)     Difficulty of Paying Living Expenses: Not very hard   Food Insecurity: No Food Insecurity (7/19/2023)    Hunger Vital Sign     Worried About Running Out of Food in the Last Year: Never true     Ran Out of Food in the Last Year: Never true   Transportation Needs: No Transportation Needs (7/19/2023)    PRAPARE - Transportation     Lack of Transportation (Medical): No     Lack of Transportation (Non-Medical): No   Physical Activity: Inactive (7/19/2023)    Exercise Vital Sign     Days of Exercise per Week: 0 days     Minutes of Exercise per Session: 0 min   Stress: No Stress Concern Present (7/19/2023)    [employer] [place] [employer] - Occupational Stress Questionnaire     Feeling of Stress : Not at all   Social Connections: Unknown (7/19/2023)    Social Connection and Isolation Panel [NHANES]     Frequency of Communication with Friends and Family: Three times a week     Frequency of Social Gatherings with Friends and Family: Once a week     Active Member of Clubs or Organizations: No     Attends Club or Organization Meetings: Never     Marital Status:    Housing Stability: Unknown (7/19/2023)    Housing Stability Vital Sign     Unable to Pay for Housing in the Last Year: No     Unstable Housing in the Last Year: No       ALLERGIES:   Review of patient's allergies indicates:   Allergen Reactions    Crayfish Anaphylaxis     (crawfish only)    Rocephin [ceftriaxone] Rash       MEDS:   Current Outpatient Medications:     albuterol  (PROVENTIL HFA) 90 mcg/actuation inhaler, Inhale 2 puffs into the lungs every 6 (six) hours as needed for Wheezing or Shortness of Breath. Rescue, Disp: 6.7 g, Rfl: 0    albuterol (PROVENTIL) 2.5 mg /3 mL (0.083 %) nebulizer solution, Use one vial (3 mL) (2.5 mg total) by nebulization every 6 (six) hours as needed for Wheezing. Rescue, Disp: 240 mL, Rfl: 11    ALPRAZolam (XANAX) 0.25 MG tablet, Take 1 tablet (0.25 mg total) by mouth daily as needed for Anxiety., Disp: 30 tablet, Rfl: 0    amLODIPine (NORVASC) 5 MG tablet, Take 1 tablet (5 mg total) by mouth every evening., Disp: , Rfl:     aspirin 81 MG Chew, Take 81 mg by mouth every evening., Disp: , Rfl:     atorvastatin (LIPITOR) 80 MG tablet, Take 1 tablet (80 mg total) by mouth every evening., Disp: , Rfl:     blood sugar diagnostic Strp, Use 1 strip to check blood sugar once daily, Disp: 100 each, Rfl: 0    blood-glucose meter Misc, USE ONCE DAILY TO MONITOR GLUCOSE, Disp: 1 each, Rfl: 0    blood-glucose meter,continuous (DEXCOM G6 ) Misc, 1 each by Misc.(Non-Drug; Combo Route) route once. for 1 dose, Disp: 1 each, Rfl: 0    blood-glucose sensor (DEXCOM G6 SENSOR) Jeannine, 1 each by Misc.(Non-Drug; Combo Route) route every 10 days., Disp: 3 each, Rfl: 11    blood-glucose transmitter (DEXCOM G6 TRANSMITTER) Jeannine, 1 each by Misc.(Non-Drug; Combo Route) route once. for 1 dose, Disp: 1 each, Rfl: 0    diphenhydrAMINE (BENADRYL) 25 mg capsule, Take 1 capsule (25 mg total) by mouth every 6 (six) hours as needed for Itching., Disp: 30 capsule, Rfl: 0    ezetimibe (ZETIA) 10 mg tablet, Take 1 tablet (10 mg total) by mouth every evening., Disp: , Rfl:     fenofibrate 160 MG Tab, Take 1 tablet (160 mg total) by mouth once daily. (Patient taking differently: Take 160 mg by mouth every evening.), Disp: 90 tablet, Rfl: 3    fluticasone furoate-vilanteroL (BREO) 100-25 mcg/dose diskus inhaler, Inhale 1 puff into the lungs once daily. Controller, Disp: 60 each, Rfl:  0    furosemide (LASIX) 40 MG tablet, Take 1 tablet (40 mg total) by mouth daily as needed (edema)., Disp: 90 tablet, Rfl: 1    ibuprofen (ADVIL,MOTRIN) 600 MG tablet, Take 1 tablet (600 mg total) by mouth every 6 (six) hours as needed for Pain., Disp: 20 tablet, Rfl: 0    lancets 33 gauge Misc, USE ONCE DAILY TO MONITOR GLUCOSE, Disp: 100 each, Rfl: 3    lisinopriL (PRINIVIL,ZESTRIL) 5 MG tablet, Take 1 tablet (5 mg total) by mouth once daily. (Patient taking differently: Take 5 mg by mouth every evening.), Disp: 90 tablet, Rfl: 3    mepolizumab 100 mg/mL AtIn, Inject 1 mL (100 mg total) into the skin every 28 days., Disp: 1 mL, Rfl: 11    metoprolol succinate (TOPROL-XL) 100 MG 24 hr tablet, Take 1 tablet (100 mg total) by mouth once daily. (Patient taking differently: Take 100 mg by mouth every evening.), Disp: 90 tablet, Rfl: 3    nitroGLYCERIN (NITROSTAT) 0.4 MG SL tablet, Place 1 tablet under the tongue once every 5 minutes for 3 doses for chest pain, if pain continues call 911, Disp: 25 tablet, Rfl: 3    omeprazole (PRILOSEC) 40 MG capsule, Take 1 capsule (40 mg total) by mouth 2 (two) times a day., Disp: 180 capsule, Rfl: 3    ondansetron (ZOFRAN-ODT) 4 MG TbDL, Dissolve 1 tablet (4 mg total) by mouth every 6 (six) hours as needed., Disp: 60 tablet, Rfl: 5    potassium chloride (K-TAB) 20 mEq, Take 1 tablet (20 mEq total) by mouth daily as needed (edema). Take with lasix if needed for edema, Disp: 90 tablet, Rfl: 1    ranolazine (RANEXA) 1,000 mg Tb12, Take 1 tablet (1,000 mg total) by mouth 2 (two) times daily., Disp: 180 tablet, Rfl: 3    rOPINIRole (REQUIP) 0.5 MG tablet, Take 1 tablet (0.5 mg total) by mouth every evening., Disp: 90 tablet, Rfl: 3    tirzepatide (MOUNJARO) 2.5 mg/0.5 mL PnIj, Inject 2.5 mg into the skin every 7 days., Disp: 4 pen , Rfl: 0    traZODone (DESYREL) 100 MG tablet, Take 1 tablet (100 mg total) by mouth every evening., Disp: 90 tablet, Rfl: 3  No current facility-administered  medications for this visit.    Facility-Administered Medications Ordered in Other Visits:     0.9%  NaCl infusion, , Intravenous, Continuous, Sarah Gamez MD, Stopped at 01/09/20 0925    0.9%  NaCl infusion, , Intravenous, Continuous, Sarah Gamez MD, Stopped at 01/09/20 1026    0.9%  NaCl infusion, , Intravenous, Continuous, Angélica De La Cruz MD    ceFAZolin injection 2 g, 2 g, Intravenous, On Call Procedure, Angélica De La Cruz MD    lidocaine (PF) 10 mg/ml (1%) injection 10 mg, 1 mL, Intradermal, Once, Angélica DeL a Cruz MD    sodium chloride 0.9% flush 10 mL, 10 mL, Intravenous, PRN, Sarah Gamez MD    sodium chloride 0.9% flush 10 mL, 10 mL, Intravenous, PRN, Sarah Gamez MD    OBJECTIVE:   There were no vitals filed for this visit.  There is no height or weight on file to calculate BMI.      Physical Exam      LABS:   A1C:  Recent Labs   Lab 07/20/23  0704   Hemoglobin A1C 7.0 H     CBC:  Recent Labs   Lab 07/25/23  0606   WBC 6.98   RBC 3.91 L   Hemoglobin 11.8 L   Hematocrit 34.7 L   Platelets 338   MCV 89   MCH 30.2   MCHC 34.0     CMP:  Recent Labs   Lab 07/23/23  1748 07/24/23  0533 07/25/23  0606   Glucose 172 H   < > 209 H   Calcium 9.4   < > 8.3 L   Albumin 4.2  --   --    Total Protein 7.7  --   --    Sodium 135 L   < > 136   Potassium 4.2   < > 3.4 L   CO2 19 L   < > 24   Chloride 102   < > 104   BUN 12   < > 10   Creatinine 0.91   < > 0.87   Alkaline Phosphatase 108  --   --    ALT 29  --   --    AST 32  --   --    Total Bilirubin 0.8  --   --     < > = values in this interval not displayed.     LIPIDS:  Recent Labs   Lab 06/09/22  0705 06/22/23  0635 08/08/23  0733   TSH 1.550  --   --    HDL  --    < > 56   Cholesterol  --    < > 171   Triglycerides  --    < > 134   LDL Cholesterol  --    < > 88.2   HDL/Cholesterol Ratio  --    < > 32.7   Non-HDL Cholesterol  --    < > 115   Total Cholesterol/HDL Ratio  --    < > 3.1    < > = values in this interval not displayed.      TSH:  Recent Labs   Lab 06/09/22  0705   TSH 1.550         ASSESSMENT & PLAN:    Problem List Items Addressed This Visit    None        No follow-ups on file.      RTC/ED precautions discussed where applicable.   Encouraged patient to let me know if there are any further questions/concerns.     Advise patient/caretaker to check with insurance regarding orders to avoid unexpected fees/costs.     The patient/caretaker indicates understanding of these issues and agrees with the plan.    Dr. Hari Francis MD  Family Medicine

## 2023-11-07 NOTE — PATIENT INSTRUCTIONS
You must understand that you've received an Urgent Care treatment only and that you may be released before all your medical problems are known or treated. You, the patient, will arrange for follow up care as instructed.  Follow up with your PCP or specialty clinic as directed in the next 1-2 weeks if not improved or as needed.  You can call [phone] to schedule an appointment with the appropriate provider.  If your condition worsens we recommend that you receive another evaluation at the emergency room immediately or contact your primary medical clinics after hours call service to discuss your concerns.  Please return here or go to the Emergency Department for any concerns or worsening of condition.    If you were prescribed a narcotic or controlled medication, do not drive or operate heavy equipment or machinery while taking these medications.    Thank you for choosing [facility]!    Our goal in the Urgent Care is to always provide outstanding medical care. You may receive a survey by mail or e-mail in the next week regarding your experience today. We would greatly appreciate you completing and returning the survey. Your feedback provides us with a way to recognize our staff who provide very good care, and it helps us learn how to improve when your experience was below our aspiration of excellence.      We appreciate you trusting us with your medical care. We hope you feel better soon. We will be happy to take care of you for all of your future medical needs.    Sincerely,    Abilio Corey DNP, NUNOP-C

## 2023-11-07 NOTE — PROGRESS NOTES
Subjective:      Patient ID: Loi Cordova is a 58 y.o. female.    Vitals:  height is 5' (1.524 m) and weight is 82.1 kg (181 lb). Her oral temperature is 97.4 °F (36.3 °C). Her blood pressure is 132/71 and her pulse is 88. Her respiration is 16 and oxygen saturation is 96%.     Chief Complaint: Sinus Problem    58-year-old female presents with a complaint of bilateral ear pain, nasal congestion, sinus pressure, nonproductive cough and sneezing.  She reports onset of symptoms, greater than 1 month ago.  She endorses a history of COPD, asthma.  She denies fever, chills, shortness a breath or chest pain.  She states she is established with pulmonology and have an upcoming appointment this coming Friday.  Patient is a current smoker.  Strongly advised to avoid smoking, discussed risks and complications of tobacco use with current history of COPD and asthma; she verbalized understanding.    Sinus Problem  This is a new problem. The current episode started more than 1 month ago (2 months). The problem has been gradually worsening since onset. There has been no fever. Associated symptoms include congestion, coughing, ear pain, sinus pressure and sneezing. Pertinent negatives include no chills, diaphoresis, headaches, shortness of breath, sore throat or swollen glands. (Left ear) Past treatments include acetaminophen. The treatment provided mild relief.       Constitution: Negative for chills, sweating, fever and generalized weakness.   HENT:  Positive for ear pain, congestion and sinus pressure. Negative for ear discharge, foreign body in ear, tinnitus, hearing loss, nosebleeds, foreign body in nose, postnasal drip, sinus pain and sore throat.    Cardiovascular:  Negative for chest pain and palpitations.   Respiratory:  Positive for cough, COPD, wheezing and asthma. Negative for sleep apnea, chest tightness, sputum production, bloody sputum, shortness of breath and stridor.    Gastrointestinal:  Negative for abdominal  pain, nausea and vomiting.   Allergic/Immunologic: Positive for asthma and sneezing.   Neurological:  Negative for headaches.      Objective:     Physical Exam   Constitutional: She is oriented to person, place, and time. She appears well-developed. She is cooperative.  Non-toxic appearance. She does not appear ill. No distress.   HENT:   Head: Normocephalic and atraumatic.   Ears:   Right Ear: Hearing, tympanic membrane, external ear and ear canal normal. impacted cerumen  Left Ear: Hearing, tympanic membrane, external ear and ear canal normal. impacted cerumen  Nose: Rhinorrhea and congestion present. No mucosal edema or nasal deformity. No epistaxis. Right sinus exhibits no maxillary sinus tenderness and no frontal sinus tenderness. Left sinus exhibits no maxillary sinus tenderness and no frontal sinus tenderness.   Mouth/Throat: Uvula is midline, oropharynx is clear and moist and mucous membranes are normal. No trismus in the jaw. Normal dentition. No uvula swelling. No oropharyngeal exudate, posterior oropharyngeal edema or posterior oropharyngeal erythema.   Eyes: Conjunctivae and lids are normal. No scleral icterus.   Neck: Trachea normal and phonation normal. Neck supple. No edema present. No erythema present. No neck rigidity present.   Cardiovascular: Normal rate, regular rhythm, normal heart sounds and normal pulses.   Pulmonary/Chest: Effort normal. No stridor. No respiratory distress. She has no decreased breath sounds. She has wheezes in the right middle field and the right lower field. She has no rhonchi. She has no rales. She exhibits no tenderness.   Abdominal: Normal appearance.   Musculoskeletal: Normal range of motion.         General: No deformity. Normal range of motion.   Neurological: She is alert and oriented to person, place, and time. She exhibits normal muscle tone. Coordination normal.   Skin: Skin is warm, dry, intact, not diaphoretic and not pale.   Psychiatric: Her speech is normal  and behavior is normal. Judgment and thought content normal.   Nursing note and vitals reviewed.      Assessment:     1. Bronchitis with asthma, acute    2. Chronic cough    3. Nasal congestion    4. Pulmonary emphysema, unspecified emphysema type        Plan:     Bronchitis with asthma, acute  -     albuterol (PROVENTIL) 2.5 mg /3 mL (0.083 %) nebulizer solution; Use one vial (3 mL) (2.5 mg total) by nebulization every 6 (six) hours as needed for Wheezing. Rescue  Dispense: 240 mL; Refill: 11    Chronic cough  -     promethazine-dextromethorphan (PROMETHAZINE-DM) 6.25-15 mg/5 mL Syrp; Take 5 mLs by mouth every 8 (eight) hours as needed (cough).  Dispense: 75 mL; Refill: 0    Nasal congestion  -     azelastine (ASTELIN) 137 mcg (0.1 %) nasal spray; 1 spray (137 mcg total) by Nasal route 2 (two) times daily.  Dispense: 30 mL; Refill: 0    Pulmonary emphysema, unspecified emphysema type  -     fluticasone furoate-vilanteroL (BREO) 100-25 mcg/dose diskus inhaler; Inhale 1 puff into the lungs once daily. Controller  Dispense: 60 each; Refill: 0    Advised to stop smoking; she verbalized her understanding.  Advised to follow up with Pulmonary, upcoming appointment.    You must understand that you've received an Urgent Care treatment only and that you may be released before all your medical problems are known or treated. You, the patient, will arrange for follow up care as instructed.  Follow up with your PCP or specialty clinic as directed in the next 1-2 weeks if not improved or as needed.  You can call [phone] to schedule an appointment with the appropriate provider.  If your condition worsens we recommend that you receive another evaluation at the emergency room immediately or contact your primary medical clinics after hours call service to discuss your concerns.  Please return here or go to the Emergency Department for any concerns or worsening of condition.    If you were prescribed a narcotic or controlled  medication, do not drive or operate heavy equipment or machinery while taking these medications.    Thank you for choosing [facility]!       Additional MDM:     Heart Failure Score:   COPD = Yes

## 2023-11-07 NOTE — LETTER
November 7, 2023      [facility]  [address], [address]  [place] [address]  Phone: [phone]  Fax: [phone]       Patient: Loi Cordova   YOB: 1965  Date of Visit: 11/07/2023    To Whom It May Concern:    Paul Cordova  was at [facility] on 11/07/2023. The patient may return to work/school on 11/09/2023 with no restrictions. If you have any questions or concerns, or if I can be of further assistance, please do not hesitate to contact me.    Sincerely,    Leyda Manriquez MA

## 2023-11-08 ENCOUNTER — OFFICE VISIT (OUTPATIENT)
Dept: PULMONOLOGY | Facility: CLINIC | Age: 58
End: 2023-11-08
Payer: COMMERCIAL

## 2023-11-08 DIAGNOSIS — J43.9 PULMONARY EMPHYSEMA, UNSPECIFIED EMPHYSEMA TYPE: ICD-10-CM

## 2023-11-08 DIAGNOSIS — J30.1 SEASONAL ALLERGIC RHINITIS DUE TO POLLEN: ICD-10-CM

## 2023-11-08 DIAGNOSIS — J45.51 SEVERE PERSISTENT ASTHMA WITH ACUTE EXACERBATION: Primary | ICD-10-CM

## 2023-11-08 PROCEDURE — 99214 PR OFFICE/OUTPT VISIT, EST, LEVL IV, 30-39 MIN: ICD-10-PCS | Mod: 95,,,

## 2023-11-08 PROCEDURE — 4010F ACE/ARB THERAPY RXD/TAKEN: CPT | Mod: CPTII,95,,

## 2023-11-08 PROCEDURE — 3051F PR MOST RECENT HEMOGLOBIN A1C LEVEL 7.0 - < 8.0%: ICD-10-PCS | Mod: CPTII,95,,

## 2023-11-08 PROCEDURE — 99214 OFFICE O/P EST MOD 30 MIN: CPT | Mod: 95,,,

## 2023-11-08 PROCEDURE — 4010F PR ACE/ARB THEARPY RXD/TAKEN: ICD-10-PCS | Mod: CPTII,95,,

## 2023-11-08 PROCEDURE — 3051F HG A1C>EQUAL 7.0%<8.0%: CPT | Mod: CPTII,95,,

## 2023-11-08 RX ORDER — ALBUTEROL SULFATE 90 UG/1
2 AEROSOL, METERED RESPIRATORY (INHALATION) EVERY 6 HOURS PRN
Qty: 18 G | Refills: 6 | Status: SHIPPED | OUTPATIENT
Start: 2023-11-08

## 2023-11-08 RX ORDER — FLUTICASONE FUROATE AND VILANTEROL 100; 25 UG/1; UG/1
1 POWDER RESPIRATORY (INHALATION) DAILY
Qty: 60 EACH | Refills: 6 | Status: SHIPPED | OUTPATIENT
Start: 2023-12-05

## 2023-11-08 RX ORDER — AZITHROMYCIN 250 MG/1
TABLET, FILM COATED ORAL
Qty: 6 TABLET | Refills: 0 | Status: SHIPPED | OUTPATIENT
Start: 2023-11-08 | End: 2024-02-05

## 2023-11-08 NOTE — ASSESSMENT & PLAN NOTE
-Flaring up asthma symptoms  -Start taking prescribed medication (astelin nasal spray)  -Continue other sinus meds

## 2023-11-08 NOTE — PROGRESS NOTES
Patient ID:  Loi Cordova is a 58 y.o. female      Subjective:     The patient location is: [place], [place]  The chief complaint leading to consultation is: bronchitis    Visit type: audiovisual    Face to Face time with patient: 15  30 minutes of total time spent on the encounter, which includes face to face time and non-face to face time preparing to see the patient (eg, review of tests), Obtaining and/or reviewing separately obtained history, Documenting clinical information in the electronic or other health record, Independently interpreting results (not separately reported) and communicating results to the patient/family/caregiver, or Care coordination (not separately reported).       Each patient to whom he or she provides medical services by telemedicine is:  (1) informed of the relationship between the physician and patient and the respective role of any other health care provider with respect to management of the patient; and (2) notified that he or she may decline to receive medical services by telemedicine and may withdraw from such care at any time.        HPI 4/13/2023:  Loi Cordova is a 58 y.o. female who presents in office for evaluation of asthma. Has had atleast 2 exacerbations within last year requiring urgent care visits. Daily productive cough, from white to green mucous, worse at night, with nocturnal arousals. Associated with occasional chest tightness, shortness of breath, wheezing, headaches, lightheadedness, LE edema, malaise, fatigue. Using albuterol inhaler 2-3 times daily. Has not used nebulizer in 6 years. Has daily controller Breo.     + GERD symptoms, follows GI, treated with omeprazole, + sinuses, post nasal drip, allergies, takes OTC sinus medications.    Urgent care visit 4/3/23 for asthma: received steroid injection, promethazine DM.   Urgent care visit 2/1/23 for URI and asthma exacerbation: discharged with tessalon perles, doxycycline, prednisone therapy    Steroid therapy hx:  April 2023, Feb 2023, Dec 2020. Finished prednisone therapy, noticed some improvement in symptoms. Requires recurrent systemic steroid therapy in addition to daily inhaled steroid therapy. Eosinophils artificially reduced due to recurrent systemic steroid therapy.     Occupation- Works in cardiac rehab at [employer]  Smoking hx- current every day smoker, 40 years smoking hx, 1ppd  Occupational/Environmental Exposures: Mold/Asbestosis exposure: Father passed away from mesothelioma  Pets/Birds- 2 cats/1 dog; 2 parakeets for 6 mo  Childhood history of Lung Disease: coup, pneumonia  Medical history: previous shoulder in over 10 years ago, no chest surgery, No Cancer, had 7 stents since 2016   Radiation/Chemo MedicationsAdult onset Asthma and bronchitis  Family History: Lung Cancer: father mesothelioma, no COPD, Mother Asthma    The chief compliant  problem is new to me    Performance Status:The patient's activity level is functions out of house.      Interval History 08/11/2023:  Loi Cordova is a 57 y.o. female who presents in office for follow up of asthma. ACT score in office today is 22, asthma is well controlled. Hospital admission last month at [facility] for acute cystitis/UTI and pyelo, treated with antibiotics. States she is feeling better since last clinic visit and since starting Fasenra biologic therapy, however notices break thru asthma symptoms a few weeks into medication. Reports shortness of breath is stable, not as often, has some occasional cough with clear phlegm, and some wheezing, however continues to smoke, although has cut down and is smoking about 6-7 cigarettes a day, 1 pack will last 3 days, has the nicotine patch at home. Using albuterol rescue inhaler as needed, using less frequent, has not had to use nebulizer since starting biologic. Compliant with Breo everyday. Going on the 4th dose of fasenra, next dose due next month, however notices that some of her asthma symptoms flare up around  the 3rd or 4th week, had recent prednisone therapy prescribed and completed, notices improvement in symptoms, and is inquiring about a more frequent dosage with the biologics. Eosinophils artificially reduced due to recurrent systemic steroid therapy.     Recent prescribed prednisone therapy:   08/08/2023, 07/26/2023    Interval History 11/08/2023:  Loi Cordova is a 57 y.o. female who presents via virtual visit for follow up of asthma. Went to urgent care yesterday for asthmatic bronchitis. States she has been feeling under the weather since Monday with nasal congestion and sinuses which have flared up her asthma. Was prescribed cough syrup and astelin nasal spray. Complaints of productive coughing with yellow sputum, wheezing, and shortness of breath. Compliant with her inhalers, states she is her 2nd or 3rd dose of nucala. Denies any fever or chills.      Review of Systems   Constitutional:  Positive for fatigue. Negative for chills, fever and unexpected weight change.   HENT:  Positive for postnasal drip and sinus pressure. Negative for sinus pain.    Respiratory:  Positive for cough, shortness of breath and wheezing.    Cardiovascular:  Negative for chest pain, palpitations and leg swelling.   Skin:  Negative for color change, pallor and rash.   Neurological:  Negative for weakness.       Objective:     There were no vitals filed for this visit.          Physical Exam   Constitutional: She is oriented to person, place, and time. She appears well-developed.   Neurological: She is alert and oriented to person, place, and time.   Psychiatric: She has a normal mood and affect. Her behavior is normal. Judgment and thought content normal.         Pertinent Studies Reviewed & Interpreted:     Labs reviewed       Lab Results   Component Value Date    WBC 6.98 07/25/2023    RBC 3.91 (L) 07/25/2023    HGB 11.8 (L) 07/25/2023    HCT 34.7 (L) 07/25/2023    MCV 89 07/25/2023    MCH 30.2 07/25/2023    MCHC 34.0 07/25/2023     RDW 13.3 07/25/2023     07/25/2023    MPV 9.2 07/25/2023    GRAN 5.1 07/25/2023    GRAN 72.8 07/25/2023    LYMPH 1.4 07/25/2023    LYMPH 19.9 07/25/2023    MONO 0.5 07/25/2023    MONO 6.6 07/25/2023    EOS 0.0 07/25/2023    BASO 0.02 07/25/2023    EOSINOPHIL 0.0 07/25/2023    BASOPHIL 0.3 07/25/2023      Latest Reference Range & Units Most Recent 06/16/22 06:56   Eos # 0.0 - 0.5 K/uL 0.1  6/24/22 12:23 0.2         Personal Diagnostic Review and Interpretation  Radiographs (ct chest and cxr) images personally reviewed: view by direct vision     CT Chest Lung Screening Low Dose 05/05/23: Lung-RADS Category:  2 - Benign Appearance or Behavior - continue annual screening with LDCT in 12 months. multiple pulmonary nodules identified within both lungs with the largest nodule on the left measuring 4 mm in size (image 99, series 4) and the largest nodule on the right measuring 4 mm (image 128, series 4). These nodules were also present on the prior CTA examination of the chest dated 08/23/2018. There are mild paraseptal emphysematous changes identified at the right lung apex. There is a calcified granuloma identified within the left upper lobe. Prominent atherosclerotic calcification/stents identified within the native coronary arteries in a multi-vessel distribution. Atherosclerotic calcification is also present within the thoracic aorta.    CTA Chest Non-Coronary (PE Study) 08/23/18: No evidence of pulmonary embolus. There is dependent atelectatic versus fibrotic change.    X-Ray Chest  07/23/23: Mild diffuse interstitial prominence may reflect edema or pneumonitis.     12/29/20: lungs are well expanded and clear.      Pulmonary Function Tests: results reviewed  04/19/2023:  FVC: 81.9% predicted  FEV1: 84.7% predicted; Post BD 93.3% predicted with 10.1% improvement post BD  FEV1/FVC:  83%  DLCO: 60.2% predicted  Spirometry is normal. Spirometry remains unimproved following bronchodilator. DLCO is mildly decreased.      2/7/2019:  FVC: 2.42L (82.4% predicted)  FEV1: 1.89L (79.8% predicted)  FEV1/FVC:  78  TLC: 3.50L (82.6% predicted)  DLCO: 20.80 (96.8% predicted)  There is no obstruction on spirometry. There is no restriction on lung volume testing. The DLCO is normal. Normal study.       Transthoracic echo (TTE) complete 5/22/23: The estimated ejection fraction is 65%. Normal left ventricular diastolic function. The estimated PA systolic pressure is 18 mmHg.     2D echo with color flow doppler 08/24/2018: Normal left ventricular systolic function (EF 55-60%). Normal right ventricular systolic function. Grade 1 diastolic dysfunction).       Assessment & Plan:       Clinical impression is resonably certain and repeated evaluation prn +/- follow up will be needed as below.    Problem List Items Addressed This Visit          ENT    Seasonal allergic rhinitis due to pollen    Current Assessment & Plan     -Flaring up asthma symptoms  -Start taking prescribed medication (astelin nasal spray)  -Continue other sinus meds            Pulmonary    Severe persistent asthma with acute exacerbation - Primary    Current Assessment & Plan     -Asthma symptoms flaring up from sinuses/nasal congestion  -Continue current asthma medication regimen (Breo daily, albuterol and nebulizer as needed)  -Continue Nucala biologic therapy  -Start taking prescribed medications from urgent care (cough syrup, astelin nasal spray)   -Will prescribe zpak for bronchitis (anti-inflammatory effect)           Relevant Medications    albuterol (PROVENTIL HFA) 90 mcg/actuation inhaler    azithromycin (Z-IGNACIA) 250 MG tablet    fluticasone furoate-vilanteroL (BREO) 100-25 mcg/dose diskus inhaler (Start on 12/5/2023)    Pulmonary emphysema    Relevant Medications    fluticasone furoate-vilanteroL (BREO) 100-25 mcg/dose diskus inhaler (Start on 12/5/2023)          Addendum 04/24/2023: Discussed PFTs with patient, encouraged smoking cessation. Complains of thrush, will rx  nystatin    Addendum 5/05/2023: Discussed test results with patient. Mild emphysema present. Calcified granuloma identified. Multiple pulmonary nodules, largest 4mm, unchanged from previous CT in 2018. Repeat LDCT in 12 months and continue current treatment as discussed, will follow up at next visit. Will also follow up with her cardiologist regarding atherosclerotic calcification/stents. Stated she started biologic Fasenra yesterday (5/4/23).     Addendum 05/08/2023: Patient reported testing positive for COVID today, with sxs of fever, severe headaches, body aches. Collaborated with pulmonologist Dr. Gamez. Based on patient being high risk (asthma, smoker, diabetes, overweight), qualifies for paxlovid. Kidney function from 06/2022 show normal kidney function. Discussed with patient and she is agreeable to start medication BID x 5 days. Med sent to her pharmacy. Encouraged recovery and symptom management, drinking plenty of fluids, and starting paxlovid. Patient verbalized understanding.    Follow up if symptoms worsen or fail to improve.    Discussed with patient above for education the following:      Patient Instructions   Continue current asthma medication regimen (Breo, albuterol, and nebulizer); will send refills  Continue taking nucala every 4 weeks  Start taking medications prescribed at urgent care (cough medication, astelin nasal spray)  Start taking Azithromycin (Z-ciro) 250mg 2 pills on day 1 and then 1 pill on days 2-5  Can stay at home from work 1 more day or until symptoms have improved  Follow up as needed      Ml Olivreos NP

## 2023-11-08 NOTE — ASSESSMENT & PLAN NOTE
-Asthma symptoms flaring up from sinuses/nasal congestion  -Continue current asthma medication regimen (Breo daily, albuterol and nebulizer as needed)  -Continue Nucala biologic therapy  -Start taking prescribed medications from urgent care (cough syrup, astelin nasal spray)   -Will prescribe zpak for bronchitis (anti-inflammatory effect)

## 2023-11-08 NOTE — PATIENT INSTRUCTIONS
Continue current asthma medication regimen (Breo, albuterol, and nebulizer); will send refills  Continue taking nucala every 4 weeks  Start taking medications prescribed at urgent care (cough medication, astelin nasal spray)  Start taking Azithromycin (Z-ciro) 250mg 2 pills on day 1 and then 1 pill on days 2-5  Can stay at home from work 1 more day or until symptoms have improved  Follow up as needed

## 2023-11-09 RX ORDER — TIRZEPATIDE 5 MG/.5ML
5 INJECTION, SOLUTION SUBCUTANEOUS
Qty: 4 PEN | Refills: 0 | Status: SHIPPED | OUTPATIENT
Start: 2023-11-09 | End: 2023-12-08 | Stop reason: SDUPTHER

## 2023-11-13 DIAGNOSIS — E11.42 TYPE 2 DIABETES MELLITUS WITH DIABETIC POLYNEUROPATHY, WITHOUT LONG-TERM CURRENT USE OF INSULIN: ICD-10-CM

## 2023-11-13 NOTE — TELEPHONE ENCOUNTER
Care Due:                  Date            Visit Type   Department     Provider  --------------------------------------------------------------------------------                                EP -                              PRIMARY      [facility]  Last Visit: 06-      [facility]  Next Visit: None Scheduled  None         None Found                                                            Last  Test          Frequency    Reason                     Performed    Due Date  --------------------------------------------------------------------------------    Office Visit  15 months..  furosemide, tirzepatide,   06- 09-                             traZODone................    HBA1C.......  6 months...  tirzepatide..............  07- 01-    [facility] Embedded Care Due Messages. Reference number: [id number].   11/13/2023 2:08:09 PM CST

## 2023-11-14 RX ORDER — ATORVASTATIN CALCIUM 80 MG/1
80 TABLET, FILM COATED ORAL DAILY
Qty: 90 TABLET | Refills: 3 | Status: SHIPPED | OUTPATIENT
Start: 2023-11-14

## 2023-11-21 ENCOUNTER — PATIENT MESSAGE (OUTPATIENT)
Dept: CARDIOLOGY | Facility: CLINIC | Age: 58
End: 2023-11-21
Payer: COMMERCIAL

## 2023-12-08 DIAGNOSIS — E11.42 TYPE 2 DIABETES MELLITUS WITH DIABETIC POLYNEUROPATHY, WITHOUT LONG-TERM CURRENT USE OF INSULIN: Chronic | ICD-10-CM

## 2023-12-08 NOTE — TELEPHONE ENCOUNTER
No care due was identified.  [facility] Embedded Care Due Messages. Reference number: [id number].   12/08/2023 10:30:29 AM CST

## 2023-12-11 RX ORDER — TIRZEPATIDE 5 MG/.5ML
5 INJECTION, SOLUTION SUBCUTANEOUS
Qty: 4 PEN | Refills: 0 | Status: SHIPPED | OUTPATIENT
Start: 2023-12-11 | End: 2023-12-28 | Stop reason: SDUPTHER

## 2023-12-28 DIAGNOSIS — E11.42 TYPE 2 DIABETES MELLITUS WITH DIABETIC POLYNEUROPATHY, WITHOUT LONG-TERM CURRENT USE OF INSULIN: Chronic | ICD-10-CM

## 2023-12-28 DIAGNOSIS — F41.9 ANXIETY: ICD-10-CM

## 2023-12-28 RX ORDER — TIRZEPATIDE 5 MG/.5ML
5 INJECTION, SOLUTION SUBCUTANEOUS
Qty: 4 PEN | Refills: 0 | Status: SHIPPED | OUTPATIENT
Start: 2023-12-28 | End: 2024-02-27 | Stop reason: SDUPTHER

## 2023-12-28 RX ORDER — ALPRAZOLAM 0.25 MG/1
0.25 TABLET ORAL DAILY PRN
Qty: 30 TABLET | Refills: 0 | Status: SHIPPED | OUTPATIENT
Start: 2023-12-28 | End: 2024-03-28

## 2023-12-28 NOTE — TELEPHONE ENCOUNTER
No care due was identified.  [facility] Embedded Care Due Messages. Reference number: [id number].   12/28/2023 9:06:10 AM CST

## 2024-01-01 ENCOUNTER — PATIENT MESSAGE (OUTPATIENT)
Dept: GASTROENTEROLOGY | Facility: CLINIC | Age: 59
End: 2024-01-01
Payer: COMMERCIAL

## 2024-01-02 ENCOUNTER — PATIENT MESSAGE (OUTPATIENT)
Dept: ADMINISTRATIVE | Facility: OTHER | Age: 59
End: 2024-01-02
Payer: COMMERCIAL

## 2024-01-02 RX ORDER — ONDANSETRON 4 MG/1
4 TABLET, ORALLY DISINTEGRATING ORAL 2 TIMES DAILY PRN
Qty: 60 TABLET | Refills: 5 | Status: SHIPPED | OUTPATIENT
Start: 2024-01-02 | End: 2024-02-05

## 2024-01-03 ENCOUNTER — PATIENT MESSAGE (OUTPATIENT)
Dept: FAMILY MEDICINE | Facility: CLINIC | Age: 59
End: 2024-01-03
Payer: COMMERCIAL

## 2024-01-17 ENCOUNTER — OFFICE VISIT (OUTPATIENT)
Dept: FAMILY MEDICINE | Facility: CLINIC | Age: 59
End: 2024-01-17
Payer: COMMERCIAL

## 2024-01-17 VITALS
HEIGHT: 60 IN | SYSTOLIC BLOOD PRESSURE: 118 MMHG | HEART RATE: 73 BPM | DIASTOLIC BLOOD PRESSURE: 76 MMHG | OXYGEN SATURATION: 98 % | WEIGHT: 183.06 LBS | BODY MASS INDEX: 35.94 KG/M2

## 2024-01-17 DIAGNOSIS — E87.5 HYPERKALEMIA: ICD-10-CM

## 2024-01-17 DIAGNOSIS — I25.118 CORONARY ARTERY DISEASE OF NATIVE ARTERY OF NATIVE HEART WITH STABLE ANGINA PECTORIS: ICD-10-CM

## 2024-01-17 DIAGNOSIS — R22.2 PALPABLE MASS OF LOWER BACK: Primary | ICD-10-CM

## 2024-01-17 DIAGNOSIS — I50.32 CHRONIC DIASTOLIC CHF (CONGESTIVE HEART FAILURE): ICD-10-CM

## 2024-01-17 DIAGNOSIS — J45.50 SEVERE PERSISTENT ASTHMA WITHOUT COMPLICATION: ICD-10-CM

## 2024-01-17 DIAGNOSIS — I10 ESSENTIAL HYPERTENSION: ICD-10-CM

## 2024-01-17 DIAGNOSIS — I70.0 AORTIC ATHEROSCLEROSIS: ICD-10-CM

## 2024-01-17 DIAGNOSIS — E11.42 TYPE 2 DIABETES MELLITUS WITH DIABETIC POLYNEUROPATHY, WITHOUT LONG-TERM CURRENT USE OF INSULIN: ICD-10-CM

## 2024-01-17 DIAGNOSIS — J43.9 PULMONARY EMPHYSEMA, UNSPECIFIED EMPHYSEMA TYPE: ICD-10-CM

## 2024-01-17 PROCEDURE — 3044F HG A1C LEVEL LT 7.0%: CPT | Mod: CPTII,S$GLB,, | Performed by: FAMILY MEDICINE

## 2024-01-17 PROCEDURE — 3008F BODY MASS INDEX DOCD: CPT | Mod: CPTII,S$GLB,, | Performed by: FAMILY MEDICINE

## 2024-01-17 PROCEDURE — 1159F MED LIST DOCD IN RCRD: CPT | Mod: CPTII,S$GLB,, | Performed by: FAMILY MEDICINE

## 2024-01-17 PROCEDURE — 3078F DIAST BP <80 MM HG: CPT | Mod: CPTII,S$GLB,, | Performed by: FAMILY MEDICINE

## 2024-01-17 PROCEDURE — 99999 PR PBB SHADOW E&M-EST. PATIENT-LVL V: CPT | Mod: PBBFAC,,, | Performed by: FAMILY MEDICINE

## 2024-01-17 PROCEDURE — 3072F LOW RISK FOR RETINOPATHY: CPT | Mod: CPTII,S$GLB,, | Performed by: FAMILY MEDICINE

## 2024-01-17 PROCEDURE — 99214 OFFICE O/P EST MOD 30 MIN: CPT | Mod: S$GLB,,, | Performed by: FAMILY MEDICINE

## 2024-01-17 PROCEDURE — 1160F RVW MEDS BY RX/DR IN RCRD: CPT | Mod: CPTII,S$GLB,, | Performed by: FAMILY MEDICINE

## 2024-01-17 PROCEDURE — 3074F SYST BP LT 130 MM HG: CPT | Mod: CPTII,S$GLB,, | Performed by: FAMILY MEDICINE

## 2024-01-17 RX ORDER — OXYBUTYNIN CHLORIDE 15 MG/1
30 TABLET, EXTENDED RELEASE ORAL DAILY
Qty: 180 TABLET | Refills: 3 | Status: SHIPPED | OUTPATIENT
Start: 2024-01-17 | End: 2025-01-16

## 2024-01-17 RX ORDER — CYCLOBENZAPRINE HCL 10 MG
10 TABLET ORAL 3 TIMES DAILY PRN
Qty: 60 TABLET | Refills: 0 | Status: SHIPPED | OUTPATIENT
Start: 2024-01-17

## 2024-01-17 NOTE — PROGRESS NOTES
"PATIENT VISIT FAMILY MEDICINE    CC:   Chief Complaint   Patient presents with    Mass     Lower L4 L5 right side . Noticed a year ago        HPI: Loi Cordova  is a 58 y.o. female:    Patient is known to me.  Patient presents acute visit    Lump on back - has had this a "long time". Has burning/tingling sensation. Feels like there is fluid underneath skin. Has a warm sensation     PMHX:   Past Medical History:   Diagnosis Date    Allergy     Asthma     Chronic diastolic heart failure     Coronary artery disease     GERD (gastroesophageal reflux disease)     History of back surgery 02/20/2018    Hyperlipidemia     Hypertension     Pulmonary emphysema 8/11/2023    Type 2 diabetes mellitus with diabetic polyneuropathy, without long-term current use of insulin 02/08/2019       PSHX:   Past Surgical History:   Procedure Laterality Date    BILATERAL OOPHORECTOMY Bilateral 2000    BREAST BIOPSY Left 2/14/2020    Procedure: BIOPSY, BREAST-Left medial breast palpation guided;  Surgeon: Paulino Espinoza MD;  Location: [facility] OR [address] [place] [address];  Service: General;  Laterality: Left;    CARDIAC CATHETERIZATION      7 stents    CHOLECYSTECTOMY      COLONOSCOPY N/A 7/16/2019    Procedure: COLONOSCOPY;  Surgeon: Sarah Gamez MD;  Location: [facility];  Service: Endoscopy;  Laterality: N/A;    ESOPHAGOGASTRODUODENOSCOPY N/A 1/9/2020    Procedure: EGD (ESOPHAGOGASTRODUODENOSCOPY);  Surgeon: Sarah Gamez MD;  Location: [facility];  Service: Endoscopy;  Laterality: N/A;    HYSTERECTOMY      PARTIAL HYSTERECTOMY N/A 1990    Uterus taken out    SHOULDER SURGERY      TOTAL KNEE ARTHROPLASTY         FAMHX:   Family History   Problem Relation Age of Onset    Heart disease Mother     Hyperlipidemia Mother     Hypertension Mother     Diabetes Mother     Heart disease Father     Hyperlipidemia Father     Cancer Father     Diabetes Maternal Aunt     Prostate cancer Neg Hx     Kidney disease Neg Hx        SOCHX:   Social History "     Socioeconomic History    Marital status:    Tobacco Use    Smoking status: Every Day     Current packs/day: 0.50     Average packs/day: 0.5 packs/day for 34.0 years (17.0 ttl pk-yrs)     Types: Cigarettes    Smokeless tobacco: Never    Tobacco comments:     in smoking program 1/25/21   Substance and Sexual Activity    Alcohol use: Not Currently     Comment: 6 months    Drug use: No    Sexual activity: Yes     Partners: Male     Social Determinants of Health     Financial Resource Strain: Low Risk  (7/19/2023)    Overall Financial Resource Strain (CARDIA)     Difficulty of Paying Living Expenses: Not very hard   Food Insecurity: No Food Insecurity (7/19/2023)    Hunger Vital Sign     Worried About Running Out of Food in the Last Year: Never true     Ran Out of Food in the Last Year: Never true   Transportation Needs: No Transportation Needs (7/19/2023)    PRAPARE - Transportation     Lack of Transportation (Medical): No     Lack of Transportation (Non-Medical): No   Physical Activity: Inactive (7/19/2023)    Exercise Vital Sign     Days of Exercise per Week: 0 days     Minutes of Exercise per Session: 0 min   Stress: No Stress Concern Present (7/19/2023)    [employer] - Occupational Stress Questionnaire     Feeling of Stress : Not at all   Social Connections: Unknown (7/19/2023)    Social Connection and Isolation Panel [NHANES]     Frequency of Communication with Friends and Family: Three times a week     Frequency of Social Gatherings with Friends and Family: Once a week     Active Member of Clubs or Organizations: No     Attends Club or Organization Meetings: Never     Marital Status:    Housing Stability: Unknown (7/19/2023)    Housing Stability Vital Sign     Unable to Pay for Housing in the Last Year: No     Unstable Housing in the Last Year: No       ALLERGIES:   Review of patient's allergies indicates:   Allergen Reactions    Crayfish Anaphylaxis     (crawfish  only)    Rocephin [ceftriaxone] Rash       MEDS:   Current Outpatient Medications:     albuterol (PROVENTIL HFA) 90 mcg/actuation inhaler, Inhale 2 puffs into the lungs every 6 (six) hours as needed for Wheezing or Shortness of Breath. Rescue, Disp: 18 g, Rfl: 6    albuterol (PROVENTIL) 2.5 mg /3 mL (0.083 %) nebulizer solution, Use one vial (3 mL) (2.5 mg total) by nebulization every 6 (six) hours as needed for Wheezing. Rescue, Disp: 270 mL, Rfl: 11    ALPRAZolam (XANAX) 0.25 MG tablet, Take 1 tablet (0.25 mg total) by mouth daily as needed for Anxiety., Disp: 30 tablet, Rfl: 0    amLODIPine (NORVASC) 5 MG tablet, Take 1 tablet (5 mg total) by mouth every evening., Disp: , Rfl:     aspirin 81 MG Chew, Take 81 mg by mouth every evening., Disp: , Rfl:     atorvastatin (LIPITOR) 80 MG tablet, Take 1 tablet (80 mg total) by mouth once daily., Disp: 90 tablet, Rfl: 3    azelastine (ASTELIN) 137 mcg (0.1 %) nasal spray, 1 spray (137 mcg total) in each nostril route 2 (two) times daily., Disp: 30 mL, Rfl: 0    blood sugar diagnostic Strp, Use 1 strip to check blood sugar once daily, Disp: 100 each, Rfl: 0    blood-glucose meter Misc, USE ONCE DAILY TO MONITOR GLUCOSE, Disp: 1 each, Rfl: 0    diphenhydrAMINE (BENADRYL) 25 mg capsule, Take 1 capsule (25 mg total) by mouth every 6 (six) hours as needed for Itching., Disp: 30 capsule, Rfl: 0    ezetimibe (ZETIA) 10 mg tablet, Take 1 tablet (10 mg total) by mouth every evening., Disp: , Rfl:     fenofibrate 160 MG Tab, Take 1 tablet (160 mg total) by mouth once daily. (Patient taking differently: Take 160 mg by mouth every evening.), Disp: 90 tablet, Rfl: 3    fluticasone furoate-vilanteroL (BREO) 100-25 mcg/dose diskus inhaler, Inhale 1 puff into the lungs once daily. Controller, Disp: 60 each, Rfl: 6    furosemide (LASIX) 40 MG tablet, Take 1 tablet (40 mg total) by mouth daily as needed (edema)., Disp: 90 tablet, Rfl: 1    ibuprofen (ADVIL,MOTRIN) 600 MG tablet, Take 1  tablet (600 mg total) by mouth every 6 (six) hours as needed for Pain., Disp: 20 tablet, Rfl: 0    lancets 33 gauge Misc, USE ONCE DAILY TO MONITOR GLUCOSE, Disp: 100 each, Rfl: 3    lisinopriL (PRINIVIL,ZESTRIL) 5 MG tablet, Take 1 tablet (5 mg total) by mouth once daily. (Patient taking differently: Take 5 mg by mouth every evening.), Disp: 90 tablet, Rfl: 3    mepolizumab 100 mg/mL AtIn, Inject 1 mL (100 mg total) into the skin every 28 days., Disp: 1 mL, Rfl: 11    metoprolol succinate (TOPROL-XL) 100 MG 24 hr tablet, Take 1 tablet (100 mg total) by mouth once daily. (Patient taking differently: Take 100 mg by mouth every evening.), Disp: 90 tablet, Rfl: 3    nitroGLYCERIN (NITROSTAT) 0.4 MG SL tablet, Place 1 tablet under the tongue once every 5 minutes for 3 doses for chest pain, if pain continues call 911, Disp: 25 tablet, Rfl: 3    omeprazole (PRILOSEC) 40 MG capsule, Take 1 capsule (40 mg total) by mouth 2 (two) times a day., Disp: 180 capsule, Rfl: 3    ondansetron (ZOFRAN-ODT) 4 MG TbDL, Dissolve 1 tablet (4 mg total) by mouth 2 (two) times daily as needed for nausea/vomiting, Disp: 60 tablet, Rfl: 5    potassium chloride (K-TAB) 20 mEq, Take 1 tablet (20 mEq total) by mouth daily as needed (edema). Take with lasix if needed for edema, Disp: 90 tablet, Rfl: 1    ranolazine (RANEXA) 1,000 mg Tb12, Take 1 tablet (1,000 mg total) by mouth 2 (two) times daily., Disp: 180 tablet, Rfl: 3    rOPINIRole (REQUIP) 0.5 MG tablet, Take 1 tablet (0.5 mg total) by mouth every evening., Disp: 90 tablet, Rfl: 3    tirzepatide (MOUNJARO) 5 mg/0.5 mL PnIj, Inject 5 mg into the skin every 7 days., Disp: 4 pen , Rfl: 0    traZODone (DESYREL) 100 MG tablet, Take 1 tablet (100 mg total) by mouth every evening., Disp: 90 tablet, Rfl: 3    atorvastatin (LIPITOR) 80 MG tablet, Take 1 tablet (80 mg total) by mouth every evening. (Patient not taking: Reported on 1/17/2024), Disp: , Rfl:     azithromycin (Z-IGNACIA) 250 MG tablet, Take  2 tablets by mouth on day 1 then 1 tablet by mouth on days 2-5 (Patient not taking: Reported on 1/17/2024), Disp: 6 tablet, Rfl: 0    blood-glucose meter,continuous (DEXCOM G6 ) Misc, 1 each by Misc.(Non-Drug; Combo Route) route once. for 1 dose (Patient not taking: Reported on 1/17/2024), Disp: 1 each, Rfl: 0    blood-glucose sensor (DEXCOM G6 SENSOR) Jeannine, 1 each by Misc.(Non-Drug; Combo Route) route every 10 days. (Patient not taking: Reported on 1/17/2024), Disp: 3 each, Rfl: 11    blood-glucose transmitter (DEXCOM G6 TRANSMITTER) Jeannine, 1 each by Misc.(Non-Drug; Combo Route) route once. for 1 dose (Patient not taking: Reported on 1/17/2024), Disp: 1 each, Rfl: 0    cyclobenzaprine (FLEXERIL) 10 MG tablet, Take 1 tablet (10 mg total) by mouth 3 (three) times daily as needed for Muscle spasms., Disp: 60 tablet, Rfl: 0    fluticasone furoate-vilanteroL (BREO) 100-25 mcg/dose diskus inhaler, Inhale 1 puff into the lungs once daily. Controller (Patient not taking: Reported on 1/17/2024), Disp: 60 each, Rfl: 0    oxybutynin (DITROPAN XL) 15 MG TR24, Take 2 tablets (30 mg total) by mouth once daily. For overactive bladder, Disp: 180 tablet, Rfl: 3  No current facility-administered medications for this visit.    Facility-Administered Medications Ordered in Other Visits:     0.9%  NaCl infusion, , Intravenous, Continuous, Sarah Gamez MD, Stopped at 01/09/20 0925    0.9%  NaCl infusion, , Intravenous, Continuous, Sarah Gamez MD, Stopped at 01/09/20 1026    0.9%  NaCl infusion, , Intravenous, Continuous, Angélica De La Cruz MD    ceFAZolin injection 2 g, 2 g, Intravenous, On Call Procedure, Angélica De La Cruz MD    lidocaine (PF) 10 mg/ml (1%) injection 10 mg, 1 mL, Intradermal, Once, Angélica De La Cruz MD    sodium chloride 0.9% flush 10 mL, 10 mL, Intravenous, PRN, Sarah Gamez MD    sodium chloride 0.9% flush 10 mL, 10 mL, Intravenous, PRN, Sarah Gamez MD    OBJECTIVE:   Vitals:     01/17/24 1306   BP: 118/76   Pulse: 73   SpO2: 98%   Weight: 83 kg (183 lb 1.5 oz)   Height: 5' (1.524 m)     Body mass index is 35.76 kg/m².      Physical Exam  Vitals and nursing note reviewed.   Constitutional:       Appearance: Normal appearance.   HENT:      Head: Normocephalic.   Eyes:      General:         Right eye: No discharge.         Left eye: No discharge.      Extraocular Movements: Extraocular movements intact.   Cardiovascular:      Rate and Rhythm: Normal rate and regular rhythm.      Heart sounds: Normal heart sounds.   Pulmonary:      Effort: Pulmonary effort is normal.      Breath sounds: Normal breath sounds.   Musculoskeletal:      Comments: Mobile 3-4 cm firm mass of right lumbar area   Skin:     Comments: No obvious rash on exposed skin   Neurological:      Mental Status: She is alert.   Psychiatric:         Behavior: Behavior normal.           LABS:   A1C:  Recent Labs   Lab 01/17/24  1402   Hemoglobin A1C 6.7 H     CBC:  Recent Labs   Lab 07/25/23  0606   WBC 6.98   RBC 3.91 L   Hemoglobin 11.8 L   Hematocrit 34.7 L   Platelets 338   MCV 89   MCH 30.2   MCHC 34.0     CMP:  Recent Labs   Lab 01/17/24  1402   Glucose 153 H   Calcium 9.8   Albumin 4.5   Total Protein 7.2   Sodium 143   Potassium 5.6 H   CO2 28   Chloride 105   BUN 19 H   Creatinine 1.09   Alkaline Phosphatase 66   ALT 27   AST 23   Total Bilirubin 0.5     LIPIDS:  Recent Labs   Lab 06/09/22  0705 06/22/23  0635 08/08/23  0733   TSH 1.550  --   --    HDL  --    < > 56   Cholesterol  --    < > 171   Triglycerides  --    < > 134   LDL Cholesterol  --    < > 88.2   HDL/Cholesterol Ratio  --    < > 32.7   Non-HDL Cholesterol  --    < > 115   Total Cholesterol/HDL Ratio  --    < > 3.1    < > = values in this interval not displayed.     TSH:  Recent Labs   Lab 06/09/22  0705   TSH 1.550         ASSESSMENT & PLAN:    Problem List Items Addressed This Visit          Pulmonary    Pulmonary emphysema    Overview     Stable. Continue  current therapy. Followed by Pulm         Severe persistent asthma without complication    Overview     Not in exacerbation. Continue current medications. Followed by Pulm            Cardiac/Vascular    Essential hypertension (Chronic)    Overview     Well controlled. Continue current regimen         Aortic atherosclerosis    Overview     -noted on CT from 05/2023: Atherosclerotic calcification is also present within the thoracic aorta   Continue current medications         Chronic diastolic CHF (congestive heart failure)    Overview     Compensated. Continue current medical therapy.     Echo    Result Date: 5/22/2023  · The left ventricle is normal in size with normal systolic function.  · The estimated ejection fraction is 65%.  · Normal left ventricular diastolic function.  · Normal right ventricular size with normal right ventricular systolic   function.  · Normal central venous pressure (3 mmHg).  · The estimated PA systolic pressure is 18 mmHg.                 Coronary artery disease of native artery of native heart with stable angina pectoris    Overview     Stable. Continue medical therapy. Followed by Cardiology            Endocrine    Type 2 diabetes mellitus with diabetic polyneuropathy, without long-term current use of insulin (Chronic)    Overview     Well controlled. Continue current regimen           Relevant Orders    Hemoglobin A1C (Completed)    Comprehensive Metabolic Panel (Completed)     Other Visit Diagnoses       Palpable mass of lower back    -  painful, will check imaging.     Relevant Orders    MRI Lumbar Spine Without Contrast    MRI Sacroiliac Joints Without Contrast              Follow up in about 3 months (around 4/17/2024) for diabetes.      RTC/ED precautions discussed where applicable.   Encouraged patient to let me know if there are any further questions/concerns.     Advise patient/caretaker to check with insurance regarding orders to avoid unexpected fees/costs.     The  patient/caretaker indicates understanding of these issues and agrees with the plan.    Dr. Hari Francis MD  Family Medicine

## 2024-01-21 PROBLEM — E66.01 CLASS 2 SEVERE OBESITY DUE TO EXCESS CALORIES WITH SERIOUS COMORBIDITY AND BODY MASS INDEX (BMI) OF 36.0 TO 36.9 IN ADULT: Status: RESOLVED | Noted: 2018-01-16 | Resolved: 2024-01-21

## 2024-01-21 PROBLEM — E66.812 CLASS 2 SEVERE OBESITY DUE TO EXCESS CALORIES WITH SERIOUS COMORBIDITY AND BODY MASS INDEX (BMI) OF 36.0 TO 36.9 IN ADULT: Status: RESOLVED | Noted: 2018-01-16 | Resolved: 2024-01-21

## 2024-01-21 PROBLEM — J45.50 SEVERE PERSISTENT ASTHMA WITHOUT COMPLICATION: Status: ACTIVE | Noted: 2023-04-13

## 2024-02-05 ENCOUNTER — PATIENT MESSAGE (OUTPATIENT)
Dept: GASTROENTEROLOGY | Facility: CLINIC | Age: 59
End: 2024-02-05
Payer: COMMERCIAL

## 2024-02-05 ENCOUNTER — OFFICE VISIT (OUTPATIENT)
Dept: URGENT CARE | Facility: CLINIC | Age: 59
End: 2024-02-05
Payer: COMMERCIAL

## 2024-02-05 VITALS
RESPIRATION RATE: 18 BRPM | DIASTOLIC BLOOD PRESSURE: 70 MMHG | HEART RATE: 83 BPM | SYSTOLIC BLOOD PRESSURE: 132 MMHG | WEIGHT: 183 LBS | HEIGHT: 60 IN | OXYGEN SATURATION: 99 % | BODY MASS INDEX: 35.93 KG/M2 | TEMPERATURE: 98 F

## 2024-02-05 DIAGNOSIS — R05.9 COUGH, UNSPECIFIED TYPE: ICD-10-CM

## 2024-02-05 DIAGNOSIS — U07.1 COVID-19: Primary | ICD-10-CM

## 2024-02-05 DIAGNOSIS — R09.81 NASAL CONGESTION: ICD-10-CM

## 2024-02-05 DIAGNOSIS — R11.2 NAUSEA AND VOMITING, UNSPECIFIED VOMITING TYPE: Primary | ICD-10-CM

## 2024-02-05 LAB
CTP QC/QA: YES
SARS-COV-2 AG RESP QL IA.RAPID: POSITIVE

## 2024-02-05 PROCEDURE — 99213 OFFICE O/P EST LOW 20 MIN: CPT | Mod: S$GLB,,, | Performed by: NURSE PRACTITIONER

## 2024-02-05 PROCEDURE — 87811 SARS-COV-2 COVID19 W/OPTIC: CPT | Mod: QW,S$GLB,, | Performed by: NURSE PRACTITIONER

## 2024-02-05 RX ORDER — PROMETHAZINE HYDROCHLORIDE AND DEXTROMETHORPHAN HYDROBROMIDE 6.25; 15 MG/5ML; MG/5ML
5 SYRUP ORAL EVERY 8 HOURS PRN
Qty: 100 ML | Refills: 0 | Status: SHIPPED | OUTPATIENT
Start: 2024-02-05 | End: 2024-02-16

## 2024-02-05 RX ORDER — NIRMATRELVIR AND RITONAVIR 150-100 MG
150 KIT ORAL 2 TIMES DAILY
Qty: 30 TABLET | Refills: 0 | Status: SHIPPED | OUTPATIENT
Start: 2024-02-05 | End: 2024-02-10

## 2024-02-05 RX ORDER — NIRMATRELVIR AND RITONAVIR 300-100 MG
KIT ORAL
Qty: 30 TABLET | Refills: 0 | Status: CANCELLED | OUTPATIENT
Start: 2024-02-05 | End: 2024-02-10

## 2024-02-05 NOTE — PATIENT INSTRUCTIONS
POSITIVE COVID TEST    You have tested positive for COVID-19 today.           ISOLATION    If you tested positive and do not have symptoms, you must isolate for 5 days starting on the day of the positive test. I         If you tested positive and have symptoms, you must isolate for 5 days starting on the day of the first symptoms,  not the day of the positive test.         This is the most important part, both the CDC and [facility] emphasize that you do not test out of isolation.         After 5 days, if your symptoms have improved and you have not had fever on day 5, you can return to the community on day 6- NO TESTING REQUIRED!          In fact, we do not retest if you were positive in the last 90 days.         After your 5 days of isolation are completed, the CDC recommends strict mask use for the first 5 days that you come out of isolation.      When to Seek Emergency Medical Attention  Look for emergency warning signs* for COVID-19. If someone is showing any of these signs, seek emergency medical care immediately:    Trouble breathing  Persistent pain or pressure in the chest  New confusion  Inability to wake or stay awake  Pale, gray, or blue-colored skin, lips, or nail beds, depending on skin tone  *This list is not all possible symptoms. Please call your medical provider for any other symptoms that are severe or concerning to you.    Call 911 or call ahead to your local emergency facility: Notify the  that you are seeking care for someone who has or may have COVID-19.       For up to date guidelines please visit www.cdc.gov  If you have any questions you may call [facility] line [phone]    What advice should be given to patients with known or presumed COVID-19 managed at home?    For most patients with COVID-19 who are managed at home, we advise the following:    ?Supportive care with antipyretics/analgesics (eg, acetaminophen) and hydration    ?Close contact with their health care  provider    ?Monitoring for clinical worsening, particularly the development of new or worsening dyspnea, which should prompt clinical evaluation and possible hospitalization    ?Separation from other household members, including pets (eg, staying in a separate room when possible and wearing a mask when in the same room)    ?Frequent hand washing for all family members    ?Frequent disinfection of commonly touched surfaces    Some patients with cough or dyspnea may experience symptomatic improvement with self-proning (resting in the prone rather than the supine position)    All other care is generally supportive, similar to that advised for other acute viral illnesses:    ?We advise that patients stay well hydrated, particularly those patients with sustained or higher fevers, in whom insensible fluid losses may be significant.    ?Cough that is persistent, interferes with sleep, or causes discomfort can be managed with an over-the-counter cough medication (eg, dextromethorphan) or prescription benzonatate, 100 to 200 mg orally three times daily as needed.    ?We advise rest as needed during the acute illness; for patients without hypoxia, frequent repositioning and ambulation is encouraged. In addition, we encourage all patients to advance activity as soon as tolerated during recovery.      Additional instructions:  Separate yourself from other people and animals in your home.  Call ahead before visiting your doctor.  Wear a facemask when around others.  Cover your coughs and sneezes.  Wash your hands often with soap and water; hand  can be used, too.  Avoid sharing personal household items.  Wipe down surfaces used daily.  Monitor your symptoms. Seek prompt medical attention if your illness is worsening (e.g., difficulty breathing).   Before seeking care, call your healthcare provider.  If you have a medical emergency and need to call 911, notify the dispatch personnel that you have, or are being evaluated  for COVID-19. If possible, put on a facemask before emergency medical services arrive.      Contact Tracing for patients who have been vaccinated and agree to sequencing.    As one of the next steps, you will receive a call or text from the [place] Department of [facility] ([facility]) COVID-19 Tracing Team. See the contact information below so you know not to ignore the health departments call. It is important that you contact them back immediately so they can help.      Contact Tracer Number:  [phone]  Caller ID for most carriers: [facility]     What is contact tracing?  Contact tracing is a process that helps identify everyone who has been in close contact with an infected person. Contact tracers let those people know they may have been exposed and guide them on next steps. Confidentiality is important for everyone; no one will be told who may have exposed them to the virus.  Your involvement is important. The more we know about where and how this virus is spreading, the better chance we have at stopping it from spreading further.  What does exposure mean?  Exposure means you have been within 6 feet for more than 15 minutes with a person who has or had COVID-19.  What kind of questions do the contact tracers ask?  A contact tracer will confirm your basic contact information including name, address, phone number, and next of kin, as well as asking about any symptoms you may have had. Theyll also ask you how you think you may have gotten sick, such as places where you may have been exposed to the virus, and people you were with. Those names will never be shared with anyone outside of that call, and will only be used to help trace and stop the spread of the virus.   I have privacy concerns. How will the state use my information?  Your privacy about your health is important. All calls are completed using call centers that use the appropriate health privacy protection measures (HIPAA compliance), meaning that your  patient information is safe. No one will ever ask you any questions related to immigration status. Your health comes first.   Do I have to participate?  You do not have to participate, but we strongly encourage you to. Contact tracing can help us catch and control new outbreaks as theyre developing to keep your friends and family safe.   What if I dont hear from anyone?  If you dont receive a call within 24 hours, you can call the number above right away to inquire about your status. That line is open from 8:00 am - 8:00 p.m., 7 days a week.  Contact tracing saves lives! Together, we have the power to beat this virus and keep our loved ones and neighbors safe.    For more information see CDC link below.      https://www.cdc.gov/coronavirus/2019-ncov/hcp/guidance-prevent-spread.html#precautions        Sources:  [facility] [place][note 2], [place] [facility] and [facility]   Paxlovid is an emergency release (EUA) medication for the treatment of COVID. Paxlovid is an antiviral medication used to treat COVID-19 in people with mild to moderate illness, and who are at risk for severe disease. The medication--which actually consists of three separate pills, all taken at the same time--is meant to be taken twice a day for five days. It must be taken before day 5 of symptoms. According to the CDC, a COVID-19 rebound is a recurrence of COVID-19 symptoms or a new positive viral test (after previously testing negative), within two to eight days after recovery and stopping Paxlovid. This can happen regardless of vaccination status. If COVID-19 symptoms recur following Paxlovid treatment, they're typically mild and resolve in about three days, the CDC said.  In a recent preprint case report, researchers described the COVID-19 relapse symptoms of eight non-immunocompromised people ages 31-71. The relapse of COVID-19 symptoms--which usually occurred on days nine through 12 of their illness--were described as cold symptoms (runny nose, sore  throat), as well as fatigue and headache. People who experience COVID-19 rebounds should still re-start their isolation period once their symptoms recur or they receive another positive test, according to the CDC. Relapsing COVID-19 patients can end their second isolation period after five days if their symptoms are improving and they haven't had a fever in 24 hours--but they should still continue to wear a mask around others for five additional days, to complete the CDC-recommended 10 days of isolation and masking.     ----------------------- DOSAGE AND ADMINISTRATION -----------------------  PAXLOVID is nirmatrelvir tablets co-packaged with ritonavir tablets.  (2.1)  Nirmatrelvir must be co-administered with ritonavir. (2.1)   Initiate PAXLOVID treatment as soon as possible after diagnosis of  COVID-19 and within 5 days of symptom onset. (2.1)   Administer orally with or without food. (2.1)   Dosage: 300 mg nirmatrelvir (two 150 mg tablets) with 100 mg  ritonavir (one 100 mg tablet), with all three tablets taken together  twice daily for 5 days. (2.1)    Dose reduction for moderate renal impairment (eGFR ?30 to  <60 mL/min): 150 mg nirmatrelvir (one 150 mg tablet) with 100 mg  ritonavir (one 100 mg tablet), with both tablets taken together twice  daily for 5 days. (2.2)   PAXLOVID is not recommended in patients with severe renal  impairment (eGFR <30 mL/min). (2.2, 8.6)   PAXLOVID is not recommend in patients with severe hepatic  impairment (Child-Reyes Class C). (2.3, 8.7)  --------------------- DOSAGE FORMS AND STRENGTHS ---------------------   Tablets: nirmatrelvir 150 mg (3)   Tablets: ritonavir 100 mg (3)  -------------------------------CONTRAINDICATIONS-------------------------------   History of clinically significant hypersensitivity reactions to the active  ingredients (nirmatrelvir or ritonavir) or any other components. (4)   Co-administration with drugs highly dependent on CYP3A for  clearance and for  which elevated concentrations are associated with  serious and/or life-threatening reactions. (4, 7.3)   Co-administration with potent CYP3A inducers where significantly  reduced nirmatrelvir or ritonavir plasma concentrations may be  associated with the potential for loss of virologic response and  possible resistance. (4)  ----------------------- WARNINGS AND PRECAUTIONS -----------------------   The concomitant use of PAXLOVID and certain other drugs may  result in potentially significant drug interactions. Consult the full  prescribing information prior to and during treatment for potential  drug interactions. (5.1, 7)   Hepatotoxicity: Hepatic transaminase elevations, clinical hepatitis,  and jaundice have occurred in patients receiving ritonavir. (5.2)   HIV-1 Drug Resistance: PAXLOVID use may lead to a risk of HIV-1  developing resistance to HIV protease inhibitors in individuals with  uncontrolled or undiagnosed HIV-1 infection. (5.3)    Do not take any of the below medications while taking paxlovid.  PAXLOVID is contraindicated with drugs that are highly dependent on CYP3A for clearance and for  which elevated concentrations are associated with serious and/or life-threatening reactions [see Drug  Interactions (7.3)]:   Alpha1-adrenoreceptor antagonist: alfuzosin   Analgesics: pethidine, propoxyphene   Antianginal: ranolazine   Antiarrhythmic: amiodarone, dronedarone, flecainide, propafenone, quinidine   Anti-gout: colchicine   Antipsychotics: lurasidone, pimozide, clozapine   Ergot derivatives: dihydroergotamine, ergotamine, methylergonovine   HMG-CoA reductase inhibitors: lovastatin, simvastatin   PDE5 inhibitor: sildenafil (Revatio®) when used for pulmonary arterial hypertension (PAH)   Sedative/hypnotics: triazolam, oral midazolam  PAXLOVID is contraindicated with drugs that are potent CYP3A inducers where significantly reduced  nirmatrelvir or ritonavir plasma concentrations may be associated with the  potential for loss of  virologic response and possible resistance. PAXLOVID cannot be started immediately after  discontinuation of any of the following medications due to the delayed offset of the recently  discontinued CYP3A inducer [see Drug Interactions (7.3)]:   Anticancer drugs: apalutamide   Anticonvulsant: carbamazepine, phenobarbital, phenytoin   Antimycobacterials: rifampin   Herbal products: Jammies Wort (hypericum perforatum)      FACT SHEET FOR PATIENTS, PARENTS, AND CAREGIVERS  EMERGENCY USE AUTHORIZATION (EUA) OF PAXLOVID  FOR CORONAVIRUS DISEASE 2019 (COVID-19)    You are being given this Fact Sheet because your healthcare provider believes it is  necessary to provide you with PAXLOVID for the treatment of mild-to-moderate  coronavirus disease (COVID-19) caused by the SARS-CoV-2 virus. This Fact Sheet  contains information to help you understand the risks and benefits of taking the  PAXLOVID you have received or may receive.  The U[place]S. Food and Drug Administration (FDA) has issued an Emergency Use  Authorization (EUA) to make PAXLOVID available during the COVID-19 pandemic (for  more details about an EUA please see What is an Emergency Use Authorization?  at the end of this document). PAXLOVID is not an FDA-approved medicine in the  [place]. Read this Fact Sheet for information about PAXLOVID. Talk to your  healthcare provider about your options or if you have any questions. It is your choice to  take PAXLOVID.  What is COVID-19?  COVID-19 is caused by a virus called a coronavirus. You can get COVID-19 through  close contact with another person who has the virus.  COVID-19 illnesses have ranged from very mild-to-severe, including illness resulting in  death. While information so far suggests that most COVID-19 illness is mild, serious  illness can happen and may cause some of your other medical conditions to become  worse. Older people and people of all ages with severe, long lasting  (chronic) medical  conditions like heart disease, lung disease, and diabetes, for example seem to be at  higher risk of being hospitalized for COVID-19.  What is PAXLOVID?  PAXLOVID is an investigational medicine used to treat mild-to-moderate COVID-19 in  adults and children [12 years of age and older weighing at least 88 pounds (40 kg)] with  positive results of direct SARS-CoV-2 viral testing, and who are at high risk for  progression to severe COVID-19, including hospitalization or death. PAXLOVID is  investigational because it is still being studied. There is limited information about the  safety and effectiveness of using PAXLOVID to treat people with mild-to-moderate  COVID-19.  The FDA has authorized the emergency use of PAXLOVID for the treatment of mild-tomoderate COVID-19 in adults and children [12 years of age and older weighing at least  88 pounds (40 kg)] with a positive test for the virus that causes COVID-19, and who are  at high risk for progression to severe COVID-19, including hospitalization or death,  under an EUA.  1 Revised: 18 March 2022    What should I tell my healthcare provider before I take PAXLOVID?  Tell your healthcare provider if you:  ? Have any allergies  ? Have liver or kidney disease  ? Are pregnant or plan to become pregnant  ? Are breastfeeding a child  ? Have any serious illnesses  Tell your healthcare provider about all the medicines you take, including  prescription and over-the-counter medicines, vitamins, and herbal supplements.  Some medicines may interact with PAXLOVID and may cause serious side effects.  Keep a list of your medicines to show your healthcare provider and pharmacist when  you get a new medicine.  You can ask your healthcare provider or pharmacist for a list of medicines that interact  with PAXLOVID. Do not start taking a new medicine without telling your  healthcare provider. Your healthcare provider can tell you if it is safe to take  PAXLOVID with other  medicines.  Tell your healthcare provider if you are taking combined hormonal contraceptive.  PAXLOVID may affect how your birth control pills work. Females who are able to  become pregnant should use another effective alternative form of contraception or an  additional barrier method of contraception. Talk to your healthcare provider if you have  any questions about contraceptive methods that might be right for you.  How do I take PAXLOVID?  ? PAXLOVID consists of 2 medicines: nirmatrelvir and ritonavir.  o Take 2 pink tablets of nirmatrelvir with 1 white tablet of ritonavir by mouth  2 times each day (in the morning and in the evening) for 5 days. For each  dose, take all 3 tablets at the same time.  o If you have kidney disease, talk to your healthcare provider. You  may need a different dose.  ? Swallow the tablets whole. Do not chew, break, or crush the tablets.  ? Take PAXLOVID with or without food.  ? Do not stop taking PAXLOVID without talking to your healthcare provider, even if  you feel better.  ? If you miss a dose of PAXLOVID within 8 hours of the time it is usually taken,  take it as soon as you remember. If you miss a dose by more than 8 hours, skip  the missed dose and take the next dose at your regular time. Do not take  2 doses of PAXLOVID at the same time.  ? If you take too much PAXLOVID, call your healthcare provider or go to the  nearest hospital emergency room right away.  ? If you are taking a ritonavir- or cobicistat-containing medicine to treat hepatitis C  or Human Immunodeficiency Virus (HIV), you should continue to take your  medicine as prescribed by your healthcare provider.  2 Revised: 18 March 20  Talk to your healthcare provider if you do not feel better or if you feel worse after  5 days.  Who should generally not take PAXLOVID?  Do not take PAXLOVID if:  ? You are allergic to nirmatrelvir, ritonavir, or any of the ingredients in PAXLOVID.  ? You are taking any of the following  medicines:  o Alfuzosin  o Pethidine, propoxyphene  o Ranolazine  o Amiodarone, dronedarone, flecainide, propafenone, quinidine  o Colchicine  o Lurasidone, pimozide, clozapine  o Dihydroergotamine, ergotamine, methylergonovine  o Lovastatin, simvastatin  o Sildenafil (Revatio®) for pulmonary arterial hypertension (PAH)  o Triazolam, oral midazolam  o Apalutamide  o Carbamazepine, phenobarbital, phenytoin  o Rifampin  o Jammies Wort (hypericum perforatum)  Taking PAXLOVID with these medicines may cause serious or life-threatening side  effects or affect how PAXLOVID works.  These are not the only medicines that may cause serious side effects if taken with  PAXLOVID. PAXLOVID may increase or decrease the levels of multiple other  medicines. It is very important to tell your healthcare provider about all of the medicines  you are taking because additional laboratory tests or changes in the dose of your other  medicines may be necessary while you are taking PAXLOVID. Your healthcare provider  may also tell you about specific symptoms to watch out for that may indicate that you  need to stop or decrease the dose of some of your other medicines.  What are the important possible side effects of PAXLOVID?  Possible side effects of PAXLOVID are:  ? Allergic Reactions. Allergic reactions can happen in people taking  PAXLOVID, even after only 1 dose. Stop taking PAXLOVID and call your  healthcare provider right away if you get any of the following symptoms of an  allergic reaction:  o hives  o trouble swallowing or breathing  o swelling of the mouth, lips, or face  o throat tightness  o hoarseness  3 Revised: 18 March 2022    o skin rash  ? Liver Problems. Tell your healthcare provider right away if you have any of  these signs and symptoms of liver problems: loss of appetite, yellowing of your  skin and the whites of eyes (jaundice), dark-colored urine, pale colored stools  and itchy skin, stomach area (abdominal)  pain.  ? Resistance to HIV Medicines. If you have untreated HIV infection, PAXLOVID  may lead to some HIV medicines not working as well in the future.  ? Other possible side effects include:  o altered sense of taste  o diarrhea  o high blood pressure  o muscle aches  These are not all the possible side effects of PAXLOVID. Not many people have taken  PAXLOVID. Serious and unexpected side effects may happen. PAXLOVID is still being  studied, so it is possible that all of the risks are not known at this time.  What other treatment choices are there?  Veklury (remdesivir) is FDA-approved for the treatment of mild-to-moderate COVID-19  in certain adults and children. Talk with your doctor to see if Veklury is appropriate for  you.  Like PAXLOVID, FDA may also allow for the emergency use of other medicines to treat  people with COVID-19. Go to https://www.fda.gov/emergency-preparedness-andresponse/mcm-legal-regulatory-and-policy-framework/emergency-use-authorization for  information on the emergency use of other medicines that are authorized by FDA to  treat people with COVID-19. Your healthcare provider may talk with you about clinical  trials for which you may be eligible.  It is your choice to be treated or not to be treated with PAXLOVID. Should you decide  not to receive it or for your child not to receive it, it will not change your standard  medical care.  What if I am pregnant or breastfeeding?  There is no experience treating pregnant women or breastfeeding mothers with  PAXLOVID. For a mother and unborn baby, the benefit of taking PAXLOVID may be  greater than the risk from the treatment. If you are pregnant, discuss your options and  specific situation with your healthcare provider.  It is recommended that you use effective barrier contraception or do not have sexual  activity while taking PAXLOVID.  If you are breastfeeding, discuss your options and specific situation with your  healthcare provider.  4  Revised: 18 March 2022    How do I report side effects with PAXLOVID?  Contact your healthcare provider if you have any side effects that bother you or do not  go away.  Report side effects to FDA MedWatch at www.fda.gov/medwatch or call 3-645-MMD7184 or you can report side effects to [employer] at the contact information provided  below.  Website Fax number Telephone number  [employer] [phone]  How should I store PAXLOVID?  Store PAXLOVID tablets at room temperature, between 68?F to 77?F (20?C to 25?C).  How can I learn more about COVID-19?  ? Ask your healthcare provider.  ? Visit https://www.cdc.gov/COVID19.  ? Contact your local or state public health department.  What is an Emergency Use Authorization (EUA)?  The [place] FDA has made PAXLOVID available under an emergency access  mechanism called an Emergency Use Authorization (EUA). The EUA is supported by a   of Health and Human Service (HHS) declaration that circumstances exist to  justify the emergency use of drugs and biological products during the COVID-19  pandemic.  PAXLOVID for the treatment of mild-to-moderate COVID-19 in adults and children  [12 years of age and older weighing at least 88 pounds (40 kg)] with positive results of  direct SARS-CoV-2 viral testing, and who are at high risk for progression to severe  COVID-19, including hospitalization or death, has not undergone the same type of  review as an FDA-approved product. In issuing an EUA under the COVID-19 public  health emergency, the FDA has determined, among other things, that based on the  total amount of scientific evidence available including data from adequate and  well-controlled clinical trials, if available, it is reasonable to believe that the product may  be effective for diagnosing, treating, or preventing COVID-19, or a serious or  life-threatening disease or condition caused by COVID-19; that the known and  potential  benefits of the product, when used to diagnose, treat, or prevent such disease or  condition, outweigh the known and potential risks of such product; and that there are no  adequate, approved, and available alternatives.  All of these criteria must be met to allow for the product to be used in the treatment of  patients during the COVID-19 pandemic. The EUA for PAXLOVID is in effect for the  duration of the COVID-19 declaration justifying emergency use of this product, unless  terminated or revoked (after which the products may no longer be used under the  E[employer]).  5 Revised: 18 March 2022          Additional Information  For general questions, visit the website or call the telephone number provided below.  Website Telephone number  [email or web address]  [phone]  ([phone])  You can also go to [email or web address] or call [phone] for more  information.  [id number]  Revised: 18 March 202     You must understand that you've received an Urgent Care treatment only and that you may be released before all your medical problems are known or treated. You, the patient, will arrange for follow up care as instructed.  Follow up with your PCP or specialty clinic as directed in the next 1-2 weeks if not improved or as needed.  You can call [phone] to schedule an appointment with the appropriate provider.  If your condition worsens we recommend that you receive another evaluation at the emergency room immediately or contact your primary medical clinics after hours call service to discuss your concerns.  Please return here or go to the Emergency Department for any concerns or worsening of condition.    If you were prescribed a narcotic or controlled medication, do not drive or operate heavy equipment or machinery while taking these medications.    Thank you for choosing [facility]!    Our goal in the Urgent Care is to always provide outstanding medical care. You may receive a survey  by mail or e-mail in the next week regarding your experience today. We would greatly appreciate you completing and returning the survey. Your feedback provides us with a way to recognize our staff who provide very good care, and it helps us learn how to improve when your experience was below our aspiration of excellence.      We appreciate you trusting us with your medical care. We hope you feel better soon. We will be happy to take care of you for all of your future medical needs.   This note was prepared using voice-recognition software.  Although efforts are made to proofread the note, some errors may persist in the final document.     Sincerely,    Abilio Corey DNP, FNP-C   You must understand that you've received an Urgent Care treatment only and that you may be released before all your medical problems are known or treated. You, the patient, will arrange for follow up care as instructed.  Follow up with your PCP or specialty clinic as directed in the next 1-2 weeks if not improved or as needed.  You can call [phone] to schedule an appointment with the appropriate provider.  If your condition worsens we recommend that you receive another evaluation at the emergency room immediately or contact your primary medical clinics after hours call service to discuss your concerns.  Please return here or go to the Emergency Department for any concerns or worsening of condition.    If you were prescribed a narcotic or controlled medication, do not drive or operate heavy equipment or machinery while taking these medications.    Thank you for choosing [facility]!    Our goal in the Urgent Care is to always provide outstanding medical care. You may receive a survey by mail or e-mail in the next week regarding your experience today. We would greatly appreciate you completing and returning the survey. Your feedback provides us with a way to recognize our staff who provide very good care, and it helps us  learn how to improve when your experience was below our aspiration of excellence.      We appreciate you trusting us with your medical care. We hope you feel better soon. We will be happy to take care of you for all of your future medical needs.   This note was prepared using voice-recognition software.  Although efforts are made to proofread the note, some errors may persist in the final document.     Sincerely,    Abilio Corey DNP, FNP-C

## 2024-02-05 NOTE — PROGRESS NOTES
Subjective:      Patient ID: Loi Cordova is a 58 y.o. female.    Vitals:  height is 5' (1.524 m) and weight is 83 kg (183 lb). Her oral temperature is 98.3 °F (36.8 °C). Her blood pressure is 132/70 and her pulse is 83. Her respiration is 18 and oxygen saturation is 99%.     Chief Complaint: Nasal Congestion    57 y/o female presents with a complaint of a productive cough, nasal congestion, sneezing.  Onset of symptoms, 4 days.   Reports Covid exposure.  Denies history of fever, chills, SOB, or chest pain.  Reports positive home Covid test on yesterday.    Sinus Problem  This is a new problem. Episode onset: 4 days ago. The problem is unchanged. There has been no fever. Her pain is at a severity of 3/10. The pain is moderate. Associated symptoms include congestion, coughing, a hoarse voice, sinus pressure and sneezing. Pertinent negatives include no chills, diaphoresis, ear pain, headaches, neck pain, shortness of breath, sore throat or swollen glands. Treatments tried: ibuprofen. The treatment provided mild relief.       Constitution: Negative for chills and sweating.   HENT:  Positive for congestion and sinus pressure. Negative for ear pain, postnasal drip, sinus pain and sore throat.    Neck: Negative for neck pain.   Cardiovascular:  Negative for chest pain and palpitations.   Respiratory:  Positive for cough. Negative for chest tightness, sputum production, bloody sputum, COPD, shortness of breath, stridor, wheezing and asthma.    Skin:  Negative for rash.   Allergic/Immunologic: Positive for sneezing. Negative for asthma.   Neurological:  Negative for headaches.      Objective:     Physical Exam   Constitutional: She is oriented to person, place, and time. She appears well-developed. She is cooperative.  Non-toxic appearance. She does not appear ill. No distress.   HENT:   Head: Normocephalic and atraumatic.   Ears:   Right Ear: Hearing, tympanic membrane, external ear and ear canal normal. impacted  cerumen  Left Ear: Hearing, tympanic membrane, external ear and ear canal normal. impacted cerumen  Nose: Rhinorrhea and congestion present. No mucosal edema or nasal deformity. No epistaxis. Right sinus exhibits no maxillary sinus tenderness and no frontal sinus tenderness. Left sinus exhibits no maxillary sinus tenderness and no frontal sinus tenderness.   Mouth/Throat: Uvula is midline, oropharynx is clear and moist and mucous membranes are normal. No trismus in the jaw. Normal dentition. No uvula swelling. No oropharyngeal exudate, posterior oropharyngeal edema or posterior oropharyngeal erythema.   Eyes: Conjunctivae and lids are normal. No scleral icterus.   Neck: Trachea normal and phonation normal. Neck supple. No edema present. No erythema present. No neck rigidity present.   Cardiovascular: Normal rate, regular rhythm, normal heart sounds and normal pulses.   Pulmonary/Chest: Effort normal and breath sounds normal. No stridor. No respiratory distress. She has no decreased breath sounds. She has no wheezes. She has no rhonchi. She has no rales. She exhibits no tenderness.   Abdominal: Normal appearance.   Musculoskeletal: Normal range of motion.         General: No deformity. Normal range of motion.   Neurological: She is alert and oriented to person, place, and time. She exhibits normal muscle tone. Coordination normal.   Skin: Skin is warm, dry, intact, not diaphoretic and not pale.   Psychiatric: Her speech is normal and behavior is normal. Judgment and thought content normal.   Nursing note and vitals reviewed.      Assessment:     1. COVID-19    2. Nasal congestion    3. Cough, unspecified type      Plan:   COVID-19  -     nirmatrelvir-ritonavir (PAXLOVID) 150-100 mg DsPk; Take 150 mg by mouth 2 (two) times daily. for 5 days  Dispense: 30 tablet; Refill: 0    Nasal congestion  -     SARS Coronavirus 2 Antigen, POCT Manual Read    Cough, unspecified type  -     promethazine-dextromethorphan  (PROMETHAZINE-DM) 6.25-15 mg/5 mL Syrp; Take 5 mLs by mouth every 8 (eight) hours as needed (cough).  Dispense: 100 mL; Refill: 0      POSITIVE COVID TEST    You have tested positive for COVID-19 today.           ISOLATION    If you tested positive and do not have symptoms, you must isolate for 5 days starting on the day of the positive test. I         If you tested positive and have symptoms, you must isolate for 5 days starting on the day of the first symptoms,  not the day of the positive test.         This is the most important part, both the CDC and the [facility] emphasize that you do not test out of isolation.         After 5 days, if your symptoms have improved and you have not had fever on day 5, you can return to the community on day 6- NO TESTING REQUIRED!          In fact, we do not retest if you were positive in the last 90 days.         After your 5 days of isolation are completed, the CDC recommends strict mask use for the first 5 days that you come out of isolation.      When to Seek Emergency Medical Attention  Look for emergency warning signs* for COVID-19. If someone is showing any of these signs, seek emergency medical care immediately:    Trouble breathing  Persistent pain or pressure in the chest  New confusion  Inability to wake or stay awake  Pale, gray, or blue-colored skin, lips, or nail beds, depending on skin tone  *This list is not all possible symptoms. Please call your medical provider for any other symptoms that are severe or concerning to you.    Call 911 or call ahead to your local emergency facility: Notify the  that you are seeking care for someone who has or may have COVID-19.       For up to date guidelines please visit www.cdc.gov  If you have any questions you may call [facility] line [phone]    What advice should be given to patients with known or presumed COVID-19 managed at home?    For most patients with COVID-19 who are managed at home, we advise the  following:    ?Supportive care with antipyretics/analgesics (eg, acetaminophen) and hydration    ?Close contact with their health care provider    ?Monitoring for clinical worsening, particularly the development of new or worsening dyspnea, which should prompt clinical evaluation and possible hospitalization    ?Separation from other household members, including pets (eg, staying in a separate room when possible and wearing a mask when in the same room)    ?Frequent hand washing for all family members    ?Frequent disinfection of commonly touched surfaces    Some patients with cough or dyspnea may experience symptomatic improvement with self-proning (resting in the prone rather than the supine position)    All other care is generally supportive, similar to that advised for other acute viral illnesses:    ?We advise that patients stay well hydrated, particularly those patients with sustained or higher fevers, in whom insensible fluid losses may be significant.    ?Cough that is persistent, interferes with sleep, or causes discomfort can be managed with an over-the-counter cough medication (eg, dextromethorphan) or prescription benzonatate, 100 to 200 mg orally three times daily as needed.    ?We advise rest as needed during the acute illness; for patients without hypoxia, frequent repositioning and ambulation is encouraged. In addition, we encourage all patients to advance activity as soon as tolerated during recovery.      Additional instructions:  Separate yourself from other people and animals in your home.  Call ahead before visiting your doctor.  Wear a facemask when around others.  Cover your coughs and sneezes.  Wash your hands often with soap and water; hand  can be used, too.  Avoid sharing personal household items.  Wipe down surfaces used daily.  Monitor your symptoms. Seek prompt medical attention if your illness is worsening (e.g., difficulty breathing).   Before seeking care, call your  healthcare provider.  If you have a medical emergency and need to call 911, notify the dispatch personnel that you have, or are being evaluated for COVID-19. If possible, put on a facemask before emergency medical services arrive.      Contact Tracing for patients who have been vaccinated and agree to sequencing.    As one of the next steps, you will receive a call or text from the [place] Department of Health ([facility]) COVID-19 Tracing Team. See the contact information below so you know not to ignore the health departments call. It is important that you contact them back immediately so they can help.      Contact Tracer Number:  [phone]  Caller ID for most carriers: [place] Dept Health     What is contact tracing?  Contact tracing is a process that helps identify everyone who has been in close contact with an infected person. Contact tracers let those people know they may have been exposed and guide them on next steps. Confidentiality is important for everyone; no one will be told who may have exposed them to the virus.  Your involvement is important. The more we know about where and how this virus is spreading, the better chance we have at stopping it from spreading further.  What does exposure mean?  Exposure means you have been within 6 feet for more than 15 minutes with a person who has or had COVID-19.  What kind of questions do the contact tracers ask?  A contact tracer will confirm your basic contact information including name, address, phone number, and next of kin, as well as asking about any symptoms you may have had. Theyll also ask you how you think you may have gotten sick, such as places where you may have been exposed to the virus, and people you were with. Those names will never be shared with anyone outside of that call, and will only be used to help trace and stop the spread of the virus.   I have privacy concerns. How will the state use my information?  Your privacy about your health is  important. All calls are completed using call centers that use the appropriate health privacy protection measures (HIPAA compliance), meaning that your patient information is safe. No one will ever ask you any questions related to immigration status. Your health comes first.   Do I have to participate?  You do not have to participate, but we strongly encourage you to. Contact tracing can help us catch and control new outbreaks as theyre developing to keep your friends and family safe.   What if I dont hear from anyone?  If you dont receive a call within 24 hours, you can call the number above right away to inquire about your status. That line is open from 8:00 am - 8:00 p.m., 7 days a week.  Contact tracing saves lives! Together, we have the power to beat this virus and keep our loved ones and neighbors safe.    For more information see CDC link below.      https://www.cdc.gov/coronavirus/2019-ncov/hcp/guidance-prevent-spread.html#precautions        Sources:  [facility] [place] [facility] [place] and [facility]        Additional MDM:     Heart Failure Score:   COPD = No

## 2024-02-06 ENCOUNTER — PATIENT MESSAGE (OUTPATIENT)
Dept: FAMILY MEDICINE | Facility: CLINIC | Age: 59
End: 2024-02-06
Payer: COMMERCIAL

## 2024-02-06 RX ORDER — ONDANSETRON 4 MG/1
4 TABLET, ORALLY DISINTEGRATING ORAL EVERY 12 HOURS PRN
Qty: 30 TABLET | Refills: 7 | Status: SHIPPED | OUTPATIENT
Start: 2024-02-06 | End: 2024-04-23 | Stop reason: SDUPTHER

## 2024-02-09 RX ORDER — LISINOPRIL 5 MG/1
5 TABLET ORAL DAILY
Qty: 90 TABLET | Refills: 3 | Status: SHIPPED | OUTPATIENT
Start: 2024-02-09 | End: 2025-02-08

## 2024-02-27 DIAGNOSIS — E11.42 TYPE 2 DIABETES MELLITUS WITH DIABETIC POLYNEUROPATHY, WITHOUT LONG-TERM CURRENT USE OF INSULIN: Chronic | ICD-10-CM

## 2024-02-27 RX ORDER — TIRZEPATIDE 5 MG/.5ML
5 INJECTION, SOLUTION SUBCUTANEOUS
Qty: 12 PEN | Refills: 1 | Status: SHIPPED | OUTPATIENT
Start: 2024-02-27 | End: 2024-03-28 | Stop reason: DRUGHIGH

## 2024-02-27 NOTE — TELEPHONE ENCOUNTER
Refill Decision Note   Loi Cordova  is requesting a refill authorization.  Brief Assessment and Rationale for Refill:  Approve     Medication Therapy Plan:         Comments:     Note composed:12:25 PM 02/27/2024

## 2024-02-27 NOTE — TELEPHONE ENCOUNTER
No care due was identified.  [facility] Embedded Care Due Messages. Reference number: [id number].   2/27/2024 11:52:17 AM CST

## 2024-03-24 ENCOUNTER — PATIENT MESSAGE (OUTPATIENT)
Dept: OTHER | Facility: OTHER | Age: 59
End: 2024-03-24
Payer: COMMERCIAL

## 2024-03-28 ENCOUNTER — OFFICE VISIT (OUTPATIENT)
Dept: FAMILY MEDICINE | Facility: CLINIC | Age: 59
End: 2024-03-28
Payer: COMMERCIAL

## 2024-03-28 VITALS
OXYGEN SATURATION: 96 % | HEIGHT: 60 IN | BODY MASS INDEX: 35.93 KG/M2 | SYSTOLIC BLOOD PRESSURE: 130 MMHG | WEIGHT: 183 LBS | DIASTOLIC BLOOD PRESSURE: 70 MMHG | HEART RATE: 73 BPM

## 2024-03-28 DIAGNOSIS — L03.311 CELLULITIS OF ABDOMINAL WALL: ICD-10-CM

## 2024-03-28 DIAGNOSIS — S31.109A OPEN WOUND OF ABDOMINAL WALL, INITIAL ENCOUNTER: Primary | ICD-10-CM

## 2024-03-28 DIAGNOSIS — Z98.61 CAD S/P PERCUTANEOUS CORONARY ANGIOPLASTY: Chronic | ICD-10-CM

## 2024-03-28 DIAGNOSIS — I10 ESSENTIAL HYPERTENSION: Chronic | ICD-10-CM

## 2024-03-28 DIAGNOSIS — I25.10 CAD S/P PERCUTANEOUS CORONARY ANGIOPLASTY: Chronic | ICD-10-CM

## 2024-03-28 DIAGNOSIS — J43.9 PULMONARY EMPHYSEMA, UNSPECIFIED EMPHYSEMA TYPE: ICD-10-CM

## 2024-03-28 DIAGNOSIS — E11.42 TYPE 2 DIABETES MELLITUS WITH DIABETIC POLYNEUROPATHY, WITHOUT LONG-TERM CURRENT USE OF INSULIN: Chronic | ICD-10-CM

## 2024-03-28 DIAGNOSIS — I50.32 CHRONIC DIASTOLIC CHF (CONGESTIVE HEART FAILURE): ICD-10-CM

## 2024-03-28 DIAGNOSIS — I70.0 AORTIC ATHEROSCLEROSIS: ICD-10-CM

## 2024-03-28 PROBLEM — N30.01 ACUTE CYSTITIS WITH HEMATURIA: Status: RESOLVED | Noted: 2023-07-23 | Resolved: 2024-03-28

## 2024-03-28 PROCEDURE — 99214 OFFICE O/P EST MOD 30 MIN: CPT | Mod: S$GLB,,, | Performed by: FAMILY MEDICINE

## 2024-03-28 PROCEDURE — 3075F SYST BP GE 130 - 139MM HG: CPT | Mod: CPTII,S$GLB,, | Performed by: FAMILY MEDICINE

## 2024-03-28 PROCEDURE — 3008F BODY MASS INDEX DOCD: CPT | Mod: CPTII,S$GLB,, | Performed by: FAMILY MEDICINE

## 2024-03-28 PROCEDURE — 1159F MED LIST DOCD IN RCRD: CPT | Mod: CPTII,S$GLB,, | Performed by: FAMILY MEDICINE

## 2024-03-28 PROCEDURE — 3078F DIAST BP <80 MM HG: CPT | Mod: CPTII,S$GLB,, | Performed by: FAMILY MEDICINE

## 2024-03-28 PROCEDURE — 1160F RVW MEDS BY RX/DR IN RCRD: CPT | Mod: CPTII,S$GLB,, | Performed by: FAMILY MEDICINE

## 2024-03-28 PROCEDURE — 3072F LOW RISK FOR RETINOPATHY: CPT | Mod: CPTII,S$GLB,, | Performed by: FAMILY MEDICINE

## 2024-03-28 PROCEDURE — 3044F HG A1C LEVEL LT 7.0%: CPT | Mod: CPTII,S$GLB,, | Performed by: FAMILY MEDICINE

## 2024-03-28 PROCEDURE — 99999 PR PBB SHADOW E&M-EST. PATIENT-LVL V: CPT | Mod: PBBFAC,,, | Performed by: FAMILY MEDICINE

## 2024-03-28 PROCEDURE — 4010F ACE/ARB THERAPY RXD/TAKEN: CPT | Mod: CPTII,S$GLB,, | Performed by: FAMILY MEDICINE

## 2024-03-28 RX ORDER — SULFAMETHOXAZOLE AND TRIMETHOPRIM 800; 160 MG/1; MG/1
1 TABLET ORAL 2 TIMES DAILY
Qty: 14 TABLET | Refills: 0 | Status: SHIPPED | OUTPATIENT
Start: 2024-03-28

## 2024-03-28 NOTE — PROGRESS NOTES
PATIENT VISIT FAMILY MEDICINE    CC:   Chief Complaint   Patient presents with    Follow-up     Cyst on abdomen Lt side started Sunday        HPI: Loi Cordova  is a 58 y.o. female:    Patient is known to me.  Patient presents acute visit    Noted it started on Sunday. Developed into an abscess and she popped it with a needle at home. Notes purulent drainage and surround erythema.     PMHX:   Past Medical History:   Diagnosis Date    Acute cystitis with hematuria 07/23/2023    Allergy     Asthma     Chronic diastolic heart failure     Coronary artery disease     GERD (gastroesophageal reflux disease)     History of back surgery 02/20/2018    Hyperlipidemia     Hypertension     Pulmonary emphysema 08/11/2023    Type 2 diabetes mellitus with diabetic polyneuropathy, without long-term current use of insulin 02/08/2019       PSHX:   Past Surgical History:   Procedure Laterality Date    BILATERAL OOPHORECTOMY Bilateral 2000    BREAST BIOPSY Left 2/14/2020    Procedure: BIOPSY, BREAST-Left medial breast palpation guided;  Surgeon: Paulino Espinoza MD;  Location: [facility] OR [address] [place] [address];  Service: General;  Laterality: Left;    CARDIAC CATHETERIZATION      7 stents    CHOLECYSTECTOMY      COLONOSCOPY N/A 7/16/2019    Procedure: COLONOSCOPY;  Surgeon: Sarah Gamez MD;  Location: [facility];  Service: Endoscopy;  Laterality: N/A;    ESOPHAGOGASTRODUODENOSCOPY N/A 1/9/2020    Procedure: EGD (ESOPHAGOGASTRODUODENOSCOPY);  Surgeon: Sarah Gamez MD;  Location: [facility];  Service: Endoscopy;  Laterality: N/A;    HYSTERECTOMY      PARTIAL HYSTERECTOMY N/A 1990    Uterus taken out    SHOULDER SURGERY      TOTAL KNEE ARTHROPLASTY         FAMHX:   Family History   Problem Relation Age of Onset    Heart disease Mother     Hyperlipidemia Mother     Hypertension Mother     Diabetes Mother     Heart disease Father     Hyperlipidemia Father     Cancer Father     Diabetes Maternal Aunt     Prostate cancer Neg Hx     Kidney disease  Neg Hx        SOCHX:   Social History     Socioeconomic History    Marital status:    Tobacco Use    Smoking status: Every Day     Current packs/day: 0.50     Average packs/day: 0.5 packs/day for 34.0 years (17.0 ttl pk-yrs)     Types: Cigarettes     Passive exposure: Never    Smokeless tobacco: Never    Tobacco comments:     in smoking program 1/25/21   Substance and Sexual Activity    Alcohol use: Not Currently     Comment: 6 months    Drug use: No    Sexual activity: Yes     Partners: Male     Social Determinants of Health     Financial Resource Strain: Low Risk  (3/28/2024)    Overall Financial Resource Strain (CARDIA)     Difficulty of Paying Living Expenses: Not hard at all   Food Insecurity: No Food Insecurity (3/28/2024)    Hunger Vital Sign     Worried About Running Out of Food in the Last Year: Never true     Ran Out of Food in the Last Year: Never true   Transportation Needs: No Transportation Needs (3/28/2024)    PRAPARE - Transportation     Lack of Transportation (Medical): No     Lack of Transportation (Non-Medical): No   Physical Activity: Inactive (3/28/2024)    Exercise Vital Sign     Days of Exercise per Week: 0 days     Minutes of Exercise per Session: 0 min   Stress: Stress Concern Present (3/28/2024)    [employer] [place] [employer] - Occupational Stress Questionnaire     Feeling of Stress : To some extent   Social Connections: Unknown (3/28/2024)    Social Connection and Isolation Panel [NHANES]     Frequency of Communication with Friends and Family: More than three times a week     Frequency of Social Gatherings with Friends and Family: Once a week     Active Member of Clubs or Organizations: No     Attends Club or Organization Meetings: Never     Marital Status:    Housing Stability: Low Risk  (3/28/2024)    Housing Stability Vital Sign     Unable to Pay for Housing in the Last Year: No     Number of Places Lived in the Last Year: 1     Unstable Housing in the Last  Year: No       ALLERGIES:   Review of patient's allergies indicates:   Allergen Reactions    Crayfish Anaphylaxis     (crawfish only)    Rocephin [ceftriaxone] Rash       MEDS:   Current Outpatient Medications:     albuterol (PROVENTIL HFA) 90 mcg/actuation inhaler, Inhale 2 puffs into the lungs every 6 (six) hours as needed for Wheezing or Shortness of Breath. Rescue, Disp: 18 g, Rfl: 6    albuterol (PROVENTIL) 2.5 mg /3 mL (0.083 %) nebulizer solution, Use one vial (3 mL) (2.5 mg total) by nebulization every 6 (six) hours as needed for Wheezing. Rescue, Disp: 270 mL, Rfl: 11    ALPRAZolam (XANAX) 0.25 MG tablet, Take 1 tablet (0.25 mg total) by mouth daily as needed for Anxiety., Disp: 30 tablet, Rfl: 0    amLODIPine (NORVASC) 5 MG tablet, Take 1 tablet (5 mg total) by mouth every evening., Disp: , Rfl:     aspirin 81 MG Chew, Take 81 mg by mouth every evening., Disp: , Rfl:     atorvastatin (LIPITOR) 80 MG tablet, Take 1 tablet (80 mg total) by mouth every evening., Disp: , Rfl:     azelastine (ASTELIN) 137 mcg (0.1 %) nasal spray, 1 spray (137 mcg total) in each nostril route 2 (two) times daily., Disp: 30 mL, Rfl: 0    cyclobenzaprine (FLEXERIL) 10 MG tablet, Take 1 tablet (10 mg total) by mouth 3 (three) times daily as needed for Muscle spasms., Disp: 60 tablet, Rfl: 0    diphenhydrAMINE (BENADRYL) 25 mg capsule, Take 1 capsule (25 mg total) by mouth every 6 (six) hours as needed for Itching., Disp: 30 capsule, Rfl: 0    ezetimibe (ZETIA) 10 mg tablet, Take 1 tablet (10 mg total) by mouth every evening., Disp: , Rfl:     fenofibrate 160 MG Tab, Take 1 tablet (160 mg total) by mouth once daily. (Patient taking differently: Take 160 mg by mouth every evening.), Disp: 90 tablet, Rfl: 3    fluticasone furoate-vilanteroL (BREO) 100-25 mcg/dose diskus inhaler, Inhale 1 puff into the lungs once daily. Controller, Disp: 60 each, Rfl: 0    furosemide (LASIX) 40 MG tablet, Take 1 tablet (40 mg total) by mouth daily as  needed (edema)., Disp: 90 tablet, Rfl: 1    ibuprofen (ADVIL,MOTRIN) 600 MG tablet, Take 1 tablet (600 mg total) by mouth every 6 (six) hours as needed for Pain., Disp: 20 tablet, Rfl: 0    lisinopriL (PRINIVIL,ZESTRIL) 5 MG tablet, Take 1 tablet (5 mg total) by mouth once daily., Disp: 90 tablet, Rfl: 3    mepolizumab 100 mg/mL AtIn, Inject 1 mL (100 mg total) into the skin every 28 days., Disp: 1 mL, Rfl: 11    metoprolol succinate (TOPROL-XL) 100 MG 24 hr tablet, Take 1 tablet (100 mg total) by mouth once daily. (Patient taking differently: Take 100 mg by mouth every evening.), Disp: 90 tablet, Rfl: 3    nitroGLYCERIN (NITROSTAT) 0.4 MG SL tablet, Place 1 tablet under the tongue once every 5 minutes for 3 doses for chest pain, if pain continues call 911, Disp: 25 tablet, Rfl: 3    omeprazole (PRILOSEC) 40 MG capsule, Take 1 capsule (40 mg total) by mouth 2 (two) times a day., Disp: 180 capsule, Rfl: 3    ondansetron (ZOFRAN-ODT) 4 MG TbDL, Take 1 tablet (4 mg total) by mouth every 12 (twelve) hours as needed (n/v)., Disp: 30 tablet, Rfl: 7    oxybutynin (DITROPAN XL) 15 MG TR24, Take 2 tablets (30 mg total) by mouth once daily. For overactive bladder, Disp: 180 tablet, Rfl: 3    potassium chloride (K-TAB) 20 mEq, Take 1 tablet (20 mEq total) by mouth daily as needed (edema). Take with lasix if needed for edema, Disp: 90 tablet, Rfl: 1    ranolazine (RANEXA) 1,000 mg Tb12, Take 1 tablet (1,000 mg total) by mouth 2 (two) times daily., Disp: 180 tablet, Rfl: 3    rOPINIRole (REQUIP) 0.5 MG tablet, Take 1 tablet (0.5 mg total) by mouth every evening., Disp: 90 tablet, Rfl: 3    traZODone (DESYREL) 100 MG tablet, Take 1 tablet (100 mg total) by mouth every evening., Disp: 90 tablet, Rfl: 3    atorvastatin (LIPITOR) 80 MG tablet, Take 1 tablet (80 mg total) by mouth once daily. (Patient not taking: Reported on 3/28/2024), Disp: 90 tablet, Rfl: 3    blood sugar diagnostic Strp, Use 1 strip to check blood sugar once  daily, Disp: 100 each, Rfl: 0    blood-glucose meter Misc, USE ONCE DAILY TO MONITOR GLUCOSE, Disp: 1 each, Rfl: 0    blood-glucose meter,continuous (DEXCOM G6 ) Misc, 1 each by Misc.(Non-Drug; Combo Route) route once. for 1 dose (Patient not taking: Reported on 1/17/2024), Disp: 1 each, Rfl: 0    blood-glucose sensor (DEXCOM G6 SENSOR) Jeannine, 1 each by Misc.(Non-Drug; Combo Route) route every 10 days., Disp: 3 each, Rfl: 11    blood-glucose transmitter (DEXCOM G6 TRANSMITTER) Jeannine, 1 each by Misc.(Non-Drug; Combo Route) route once. for 1 dose (Patient not taking: Reported on 1/17/2024), Disp: 1 each, Rfl: 0    fluticasone furoate-vilanteroL (BREO) 100-25 mcg/dose diskus inhaler, Inhale 1 puff into the lungs once daily. Controller (Patient not taking: Reported on 3/28/2024), Disp: 60 each, Rfl: 6    lancets 33 gauge Misc, USE ONCE DAILY TO MONITOR GLUCOSE, Disp: 100 each, Rfl: 3    sulfamethoxazole-trimethoprim 800-160mg (BACTRIM DS) 800-160 mg Tab, Take 1 tablet by mouth 2 (two) times daily., Disp: 14 tablet, Rfl: 0    tirzepatide 7.5 mg/0.5 mL PnIj, Inject 7.5 mg into the skin every 7 days., Disp: 4 Pen, Rfl: 1  No current facility-administered medications for this visit.    Facility-Administered Medications Ordered in Other Visits:     0.9%  NaCl infusion, , Intravenous, Continuous, Sarah Gamez MD, Stopped at 01/09/20 0925    0.9%  NaCl infusion, , Intravenous, Continuous, Sarah Gamez MD, Stopped at 01/09/20 1026    0.9%  NaCl infusion, , Intravenous, Continuous, Angélica De La Cruz MD    ceFAZolin injection 2 g, 2 g, Intravenous, On Call Procedure, Angélica De La Cruz MD    lidocaine (PF) 10 mg/ml (1%) injection 10 mg, 1 mL, Intradermal, Once, Angélica De La Cruz MD    sodium chloride 0.9% flush 10 mL, 10 mL, Intravenous, PRN, Sarah Gamez MD    sodium chloride 0.9% flush 10 mL, 10 mL, Intravenous, PRN, Sarah Gamez MD    OBJECTIVE:   Vitals:    03/28/24 1331   BP: 130/70    BP Location: Left arm   Patient Position: Sitting   BP Method: X-Large (Manual)   Pulse: 73   SpO2: 96%   Weight: 83 kg (182 lb 15.7 oz)   Height: 5' (1.524 m)     Body mass index is 35.74 kg/m².      Physical Exam  Vitals and nursing note reviewed.   Constitutional:       Appearance: Normal appearance.   HENT:      Head: Normocephalic and atraumatic.   Eyes:      General: No scleral icterus.     Extraocular Movements: Extraocular movements intact.   Pulmonary:      Effort: Pulmonary effort is normal.   Neurological:      Mental Status: She is alert.       Abdominal wall - left lower abdomen with ~1.5cm open wound about 1cm deep when probed with sterile Q tip. Wound surrounded by erythema and induration. Wound was packed with sterile packing soaked in saline. Patient tolerated well.     LABS:   A1C:  Recent Labs   Lab 01/17/24  1402   Hemoglobin A1C 6.7 H     CBC:  Recent Labs   Lab 07/25/23  0606   WBC 6.98   RBC 3.91 L   Hemoglobin 11.8 L   Hematocrit 34.7 L   Platelets 338   MCV 89   MCH 30.2   MCHC 34.0     CMP:  Recent Labs   Lab 01/17/24  1402 01/22/24  1151   Glucose 153 H  --    Calcium 9.8  --    Albumin 4.5  --    Total Protein 7.2  --    Sodium 143  --    Potassium 5.6 H 5.1   CO2 28  --    Chloride 105  --    BUN 19 H  --    Creatinine 1.09  --    Alkaline Phosphatase 66  --    ALT 27  --    AST 23  --    Total Bilirubin 0.5  --      LIPIDS:  Recent Labs   Lab 06/09/22  0705 06/22/23  0635 08/08/23  0733   TSH 1.550  --   --    HDL  --    < > 56   Cholesterol  --    < > 171   Triglycerides  --    < > 134   LDL Cholesterol  --    < > 88.2   HDL/Cholesterol Ratio  --    < > 32.7   Non-HDL Cholesterol  --    < > 115   Total Cholesterol/HDL Ratio  --    < > 3.1    < > = values in this interval not displayed.     TSH:  Recent Labs   Lab 06/09/22  0705   TSH 1.550         ASSESSMENT & PLAN:    Problem List Items Addressed This Visit          Pulmonary    Pulmonary emphysema    Overview     Stable. Continue  current therapy. Followed by Pulm            Cardiac/Vascular    CAD S/P percutaneous coronary angioplasty (Chronic)    Overview     Followed by Cardiology. Continue current medical therapy         Essential hypertension (Chronic)    Overview     Well controlled. Continue current regimen         Aortic atherosclerosis    Overview     -noted on CT from 05/2023: Atherosclerotic calcification is also present within the thoracic aorta   Continue current medications         Chronic diastolic CHF (congestive heart failure)    Overview     Compensated. Continue current medical therapy.     Echo    Result Date: 5/22/2023  · The left ventricle is normal in size with normal systolic function.  · The estimated ejection fraction is 65%.  · Normal left ventricular diastolic function.  · Normal right ventricular size with normal right ventricular systolic   function.  · Normal central venous pressure (3 mmHg).  · The estimated PA systolic pressure is 18 mmHg.                    ID    Cellulitis of abdominal wall    Overview     Recurrent. Question if arising from injection sites. Did not tolerate doxycyline and failed keflex with previous infection. Start bactrim. Close f/u        Relevant Medications    sulfamethoxazole-trimethoprim 800-160mg (BACTRIM DS) 800-160 mg Tab       Endocrine    Type 2 diabetes mellitus with diabetic polyneuropathy, without long-term current use of insulin (Chronic)- Last A1c is 6.7 on 01/17/2024  Increase mounjaro. She is noticing hyperglycemia at home.     Overview        Open wound of abdominal wall, initial encounter    -  wound packed today. Wound care instructions given. Close f/u. See cellulitis plan.     Relevant Medications    sulfamethoxazole-trimethoprim 800-160mg (BACTRIM DS) 800-160 mg Tab              Follow up in about 1 week (around 4/4/2024) for abdominal wound/cellulitis.      RTC/ED precautions discussed where applicable.   Encouraged patient to let me know if there are any further  questions/concerns.     Advise patient/caretaker to check with insurance regarding orders to avoid unexpected fees/costs.     The patient/caretaker indicates understanding of these issues and agrees with the plan.    Dr. Hari Francis MD  Family Medicine

## 2024-04-23 ENCOUNTER — PATIENT OUTREACH (OUTPATIENT)
Dept: FAMILY MEDICINE | Facility: CLINIC | Age: 59
End: 2024-04-23
Payer: COMMERCIAL

## 2024-04-23 ENCOUNTER — OFFICE VISIT (OUTPATIENT)
Dept: FAMILY MEDICINE | Facility: CLINIC | Age: 59
End: 2024-04-23
Payer: COMMERCIAL

## 2024-04-23 ENCOUNTER — TELEPHONE (OUTPATIENT)
Dept: OPTOMETRY | Facility: CLINIC | Age: 59
End: 2024-04-23
Payer: COMMERCIAL

## 2024-04-23 VITALS
DIASTOLIC BLOOD PRESSURE: 82 MMHG | WEIGHT: 179.25 LBS | BODY MASS INDEX: 35.19 KG/M2 | SYSTOLIC BLOOD PRESSURE: 116 MMHG | HEIGHT: 60 IN | OXYGEN SATURATION: 95 % | HEART RATE: 78 BPM

## 2024-04-23 DIAGNOSIS — E11.42 TYPE 2 DIABETES MELLITUS WITH DIABETIC POLYNEUROPATHY, WITHOUT LONG-TERM CURRENT USE OF INSULIN: Primary | ICD-10-CM

## 2024-04-23 DIAGNOSIS — F41.9 ANXIETY: Chronic | ICD-10-CM

## 2024-04-23 DIAGNOSIS — E66.01 CLASS 2 SEVERE OBESITY DUE TO EXCESS CALORIES WITH SERIOUS COMORBIDITY AND BODY MASS INDEX (BMI) OF 35.0 TO 35.9 IN ADULT: ICD-10-CM

## 2024-04-23 DIAGNOSIS — I50.32 CHRONIC DIASTOLIC CHF (CONGESTIVE HEART FAILURE): ICD-10-CM

## 2024-04-23 DIAGNOSIS — R11.2 NAUSEA AND VOMITING, UNSPECIFIED VOMITING TYPE: ICD-10-CM

## 2024-04-23 DIAGNOSIS — I25.10 CAD S/P PERCUTANEOUS CORONARY ANGIOPLASTY: Chronic | ICD-10-CM

## 2024-04-23 DIAGNOSIS — L73.9 FOLLICULITIS: ICD-10-CM

## 2024-04-23 DIAGNOSIS — I25.118 CORONARY ARTERY DISEASE OF NATIVE ARTERY OF NATIVE HEART WITH STABLE ANGINA PECTORIS: ICD-10-CM

## 2024-04-23 DIAGNOSIS — Z12.31 BREAST CANCER SCREENING BY MAMMOGRAM: ICD-10-CM

## 2024-04-23 DIAGNOSIS — I10 ESSENTIAL HYPERTENSION: Chronic | ICD-10-CM

## 2024-04-23 DIAGNOSIS — Z98.61 CAD S/P PERCUTANEOUS CORONARY ANGIOPLASTY: Chronic | ICD-10-CM

## 2024-04-23 DIAGNOSIS — F17.210 HEAVY CIGARETTE SMOKER: ICD-10-CM

## 2024-04-23 PROCEDURE — 99999 PR PBB SHADOW E&M-EST. PATIENT-LVL V: CPT | Mod: PBBFAC,,, | Performed by: FAMILY MEDICINE

## 2024-04-23 PROCEDURE — 3072F LOW RISK FOR RETINOPATHY: CPT | Mod: CPTII,S$GLB,, | Performed by: FAMILY MEDICINE

## 2024-04-23 PROCEDURE — 3008F BODY MASS INDEX DOCD: CPT | Mod: CPTII,S$GLB,, | Performed by: FAMILY MEDICINE

## 2024-04-23 PROCEDURE — 99214 OFFICE O/P EST MOD 30 MIN: CPT | Mod: S$GLB,,, | Performed by: FAMILY MEDICINE

## 2024-04-23 PROCEDURE — 1159F MED LIST DOCD IN RCRD: CPT | Mod: CPTII,S$GLB,, | Performed by: FAMILY MEDICINE

## 2024-04-23 PROCEDURE — 3079F DIAST BP 80-89 MM HG: CPT | Mod: CPTII,S$GLB,, | Performed by: FAMILY MEDICINE

## 2024-04-23 PROCEDURE — 1160F RVW MEDS BY RX/DR IN RCRD: CPT | Mod: CPTII,S$GLB,, | Performed by: FAMILY MEDICINE

## 2024-04-23 PROCEDURE — 3044F HG A1C LEVEL LT 7.0%: CPT | Mod: CPTII,S$GLB,, | Performed by: FAMILY MEDICINE

## 2024-04-23 PROCEDURE — 3074F SYST BP LT 130 MM HG: CPT | Mod: CPTII,S$GLB,, | Performed by: FAMILY MEDICINE

## 2024-04-23 PROCEDURE — 4010F ACE/ARB THERAPY RXD/TAKEN: CPT | Mod: CPTII,S$GLB,, | Performed by: FAMILY MEDICINE

## 2024-04-23 RX ORDER — ONDANSETRON 8 MG/1
8 TABLET, ORALLY DISINTEGRATING ORAL EVERY 12 HOURS PRN
Qty: 90 TABLET | Refills: 3 | Status: SHIPPED | OUTPATIENT
Start: 2024-04-23

## 2024-04-23 RX ORDER — MUPIROCIN 20 MG/G
OINTMENT TOPICAL 3 TIMES DAILY
Qty: 30 G | Refills: 0 | Status: SHIPPED | OUTPATIENT
Start: 2024-04-23

## 2024-04-23 NOTE — PROGRESS NOTES
PATIENT VISIT FAMILY MEDICINE    CC:   Chief Complaint   Patient presents with    Follow-up    Diabetes       HPI: Loi Cordova  is a 58 y.o. female:    Patient is known to me.  Patient presents routine follow up on chronic conditions    Having recurrent boils on abdomen. Most recently notes 2 which are well below area of mounjaro injections. Notes she does sweat in the area and sometimes notes odor. Otherwise doing well, taking meds as prescribed       PMHX:   Past Medical History:   Diagnosis Date    Acute cystitis with hematuria 07/23/2023    Allergy     Asthma     Chronic diastolic heart failure     Coronary artery disease     GERD (gastroesophageal reflux disease)     History of back surgery 02/20/2018    Hyperlipidemia     Hypertension     Pulmonary emphysema 08/11/2023    Type 2 diabetes mellitus with diabetic polyneuropathy, without long-term current use of insulin 02/08/2019       PSHX:   Past Surgical History:   Procedure Laterality Date    BILATERAL OOPHORECTOMY Bilateral 2000    BREAST BIOPSY Left 2/14/2020    Procedure: BIOPSY, BREAST-Left medial breast palpation guided;  Surgeon: Paulino Espinoza MD;  Location: [facility] OR [address] [place] [address];  Service: General;  Laterality: Left;    CARDIAC CATHETERIZATION      7 stents    CHOLECYSTECTOMY      COLONOSCOPY N/A 7/16/2019    Procedure: COLONOSCOPY;  Surgeon: Sarah Gamez MD;  Location: [facility];  Service: Endoscopy;  Laterality: N/A;    ESOPHAGOGASTRODUODENOSCOPY N/A 1/9/2020    Procedure: EGD (ESOPHAGOGASTRODUODENOSCOPY);  Surgeon: Sarah Gamez MD;  Location: [facility];  Service: Endoscopy;  Laterality: N/A;    HYSTERECTOMY      PARTIAL HYSTERECTOMY N/A 1990    Uterus taken out    SHOULDER SURGERY      TOTAL KNEE ARTHROPLASTY         FAMHX:   Family History   Problem Relation Name Age of Onset    Heart disease Mother      Hyperlipidemia Mother      Hypertension Mother      Diabetes Mother      Heart disease Father      Hyperlipidemia Father      Cancer  Father      Diabetes Maternal Aunt      Prostate cancer Neg Hx      Kidney disease Neg Hx         SOCHX:   Social History     Socioeconomic History    Marital status:    Tobacco Use    Smoking status: Every Day     Current packs/day: 0.50     Average packs/day: 0.5 packs/day for 34.0 years (17.0 ttl pk-yrs)     Types: Cigarettes     Passive exposure: Never    Smokeless tobacco: Never    Tobacco comments:     in smoking program 1/25/21   Substance and Sexual Activity    Alcohol use: Not Currently     Comment: 6 months    Drug use: No    Sexual activity: Yes     Partners: Male     Social Determinants of Health     Financial Resource Strain: Low Risk  (3/28/2024)    Overall Financial Resource Strain (CARDIA)     Difficulty of Paying Living Expenses: Not hard at all   Food Insecurity: No Food Insecurity (3/28/2024)    Hunger Vital Sign     Worried About Running Out of Food in the Last Year: Never true     Ran Out of Food in the Last Year: Never true   Transportation Needs: No Transportation Needs (3/28/2024)    PRAPARE - Transportation     Lack of Transportation (Medical): No     Lack of Transportation (Non-Medical): No   Physical Activity: Inactive (3/28/2024)    Exercise Vital Sign     Days of Exercise per Week: 0 days     Minutes of Exercise per Session: 0 min   Stress: Stress Concern Present (3/28/2024)    [employer] - Occupational Stress Questionnaire     Feeling of Stress : To some extent   Housing Stability: Low Risk  (3/28/2024)    Housing Stability Vital Sign     Unable to Pay for Housing in the Last Year: No     Number of Places Lived in the Last Year: 1     Unstable Housing in the Last Year: No       ALLERGIES:   Review of patient's allergies indicates:   Allergen Reactions    Crayfish Anaphylaxis     (crawfish only)    Rocephin [ceftriaxone] Rash       MEDS:   Current Outpatient Medications:     albuterol (PROVENTIL HFA) 90 mcg/actuation inhaler, Inhale 2 puffs into the  lungs every 6 (six) hours as needed for Wheezing or Shortness of Breath. Rescue, Disp: 18 g, Rfl: 6    albuterol (PROVENTIL) 2.5 mg /3 mL (0.083 %) nebulizer solution, Use one vial (3 mL) (2.5 mg total) by nebulization every 6 (six) hours as needed for Wheezing. Rescue, Disp: 270 mL, Rfl: 11    amLODIPine (NORVASC) 5 MG tablet, Take 1 tablet (5 mg total) by mouth every evening., Disp: , Rfl:     aspirin 81 MG Chew, Take 81 mg by mouth every evening., Disp: , Rfl:     atorvastatin (LIPITOR) 80 MG tablet, Take 1 tablet (80 mg total) by mouth every evening., Disp: , Rfl:     azelastine (ASTELIN) 137 mcg (0.1 %) nasal spray, 1 spray (137 mcg total) in each nostril route 2 (two) times daily., Disp: 30 mL, Rfl: 0    blood sugar diagnostic Strp, Use 1 strip to check blood sugar once daily, Disp: 100 each, Rfl: 0    blood-glucose meter Misc, USE ONCE DAILY TO MONITOR GLUCOSE, Disp: 1 each, Rfl: 0    cyclobenzaprine (FLEXERIL) 10 MG tablet, Take 1 tablet (10 mg total) by mouth 3 (three) times daily as needed for Muscle spasms., Disp: 60 tablet, Rfl: 0    fenofibrate 160 MG Tab, Take 1 tablet (160 mg total) by mouth once daily. (Patient taking differently: Take 160 mg by mouth every evening.), Disp: 90 tablet, Rfl: 3    fluticasone furoate-vilanteroL (BREO) 100-25 mcg/dose diskus inhaler, Inhale 1 puff into the lungs once daily. Controller, Disp: 60 each, Rfl: 0    fluticasone furoate-vilanteroL (BREO) 100-25 mcg/dose diskus inhaler, Inhale 1 puff into the lungs once daily. Controller, Disp: 60 each, Rfl: 6    furosemide (LASIX) 40 MG tablet, Take 1 tablet (40 mg total) by mouth daily as needed (edema)., Disp: 90 tablet, Rfl: 1    lancets 33 gauge Misc, USE ONCE DAILY TO MONITOR GLUCOSE, Disp: 100 each, Rfl: 3    lisinopriL (PRINIVIL,ZESTRIL) 5 MG tablet, Take 1 tablet (5 mg total) by mouth once daily., Disp: 90 tablet, Rfl: 3    mepolizumab 100 mg/mL AtIn, Inject 1 mL (100 mg total) into the skin every 28 days., Disp: 1 mL,  Rfl: 11    ALPRAZolam (XANAX) 0.25 MG tablet, Take 1 tablet (0.25 mg total) by mouth daily as needed for Anxiety., Disp: 30 tablet, Rfl: 0    atorvastatin (LIPITOR) 80 MG tablet, Take 1 tablet (80 mg total) by mouth once daily., Disp: 90 tablet, Rfl: 3    blood-glucose meter,continuous (DEXCOM G6 ) Misc, 1 each by Misc.(Non-Drug; Combo Route) route once. for 1 dose (Patient not taking: Reported on 1/17/2024), Disp: 1 each, Rfl: 0    blood-glucose sensor (DEXCOM G6 SENSOR) Jeannine, 1 each by Misc.(Non-Drug; Combo Route) route every 10 days. (Patient not taking: Reported on 4/23/2024), Disp: 3 each, Rfl: 11    blood-glucose transmitter (DEXCOM G6 TRANSMITTER) Jeannine, 1 each by Misc.(Non-Drug; Combo Route) route once. for 1 dose (Patient not taking: Reported on 1/17/2024), Disp: 1 each, Rfl: 0    diphenhydrAMINE (BENADRYL) 25 mg capsule, Take 1 capsule (25 mg total) by mouth every 6 (six) hours as needed for Itching. (Patient not taking: Reported on 4/23/2024), Disp: 30 capsule, Rfl: 0    ezetimibe (ZETIA) 10 mg tablet, Take 1 tablet (10 mg total) by mouth every evening. (Patient not taking: Reported on 4/23/2024), Disp: , Rfl:     ibuprofen (ADVIL,MOTRIN) 600 MG tablet, Take 1 tablet (600 mg total) by mouth every 6 (six) hours as needed for Pain. (Patient not taking: Reported on 4/23/2024), Disp: 20 tablet, Rfl: 0    metoprolol succinate (TOPROL-XL) 100 MG 24 hr tablet, Take 1 tablet (100 mg total) by mouth once daily. (Patient taking differently: Take 100 mg by mouth every evening.), Disp: 90 tablet, Rfl: 3    mupirocin (BACTROBAN) 2 % ointment, Apply topically 3 (three) times daily., Disp: 30 g, Rfl: 0    nitroGLYCERIN (NITROSTAT) 0.4 MG SL tablet, Place 1 tablet under the tongue once every 5 minutes for 3 doses for chest pain, if pain continues call 911, Disp: 25 tablet, Rfl: 3    omeprazole (PRILOSEC) 40 MG capsule, Take 1 capsule (40 mg total) by mouth 2 (two) times a day., Disp: 180 capsule, Rfl: 3     ondansetron (ZOFRAN-ODT) 8 MG TbDL, Take 1 tablet (8 mg total) by mouth every 12 (twelve) hours as needed (n/v)., Disp: 90 tablet, Rfl: 3    oxybutynin (DITROPAN XL) 15 MG TR24, Take 2 tablets (30 mg total) by mouth once daily. For overactive bladder, Disp: 180 tablet, Rfl: 3    potassium chloride (K-TAB) 20 mEq, Take 1 tablet (20 mEq total) by mouth daily as needed (edema). Take with lasix if needed for edema, Disp: 90 tablet, Rfl: 1    ranolazine (RANEXA) 1,000 mg Tb12, Take 1 tablet (1,000 mg total) by mouth 2 (two) times daily., Disp: 180 tablet, Rfl: 3    rOPINIRole (REQUIP) 0.5 MG tablet, Take 1 tablet (0.5 mg total) by mouth every evening., Disp: 90 tablet, Rfl: 3    sulfamethoxazole-trimethoprim 800-160mg (BACTRIM DS) 800-160 mg Tab, Take 1 tablet by mouth 2 (two) times daily., Disp: 14 tablet, Rfl: 0    tirzepatide 7.5 mg/0.5 mL PnIj, Inject 7.5 mg into the skin every 7 days., Disp: 4 Pen, Rfl: 1    traZODone (DESYREL) 100 MG tablet, Take 1 tablet (100 mg total) by mouth every evening., Disp: 90 tablet, Rfl: 3  No current facility-administered medications for this visit.    Facility-Administered Medications Ordered in Other Visits:     0.9%  NaCl infusion, , Intravenous, Continuous, Sarah Gamez MD, Stopped at 01/09/20 0925    0.9%  NaCl infusion, , Intravenous, Continuous, Sarah Gamez MD, Stopped at 01/09/20 1026    0.9%  NaCl infusion, , Intravenous, Continuous, Angélica De La Cruz MD    ceFAZolin injection 2 g, 2 g, Intravenous, On Call Procedure, Angélica De La Cruz MD    lidocaine (PF) 10 mg/ml (1%) injection 10 mg, 1 mL, Intradermal, Once, Angélica De La Cruz MD    sodium chloride 0.9% flush 10 mL, 10 mL, Intravenous, PRN, Sarah Gamez MD    sodium chloride 0.9% flush 10 mL, 10 mL, Intravenous, PRN, Sarah Gamez MD    OBJECTIVE:   Vitals:    04/23/24 1016   BP: 116/82   BP Location: Left arm   Patient Position: Sitting   BP Method: Small (Manual)   Pulse: 78   SpO2: 95%    Weight: 81.3 kg (179 lb 4.1 oz)   Height: 5' (1.524 m)     Body mass index is 35.01 kg/m².      Physical Exam  Vitals and nursing note reviewed.   Constitutional:       Appearance: Normal appearance.   HENT:      Head: Normocephalic and atraumatic.   Eyes:      General: No scleral icterus.     Extraocular Movements: Extraocular movements intact.   Pulmonary:      Effort: Pulmonary effort is normal.   Skin:     Comments: 2 erythematous nodules on lower abdomen without punctum/drainage. No surrounding erythema/induration   Neurological:      Mental Status: She is alert.           LABS:   A1C:  Recent Labs   Lab 01/17/24  1402   Hemoglobin A1C 6.7 H     CBC:  Recent Labs   Lab 07/25/23  0606   WBC 6.98   RBC 3.91 L   Hemoglobin 11.8 L   Hematocrit 34.7 L   Platelets 338   MCV 89   MCH 30.2   MCHC 34.0     CMP:  Recent Labs   Lab 01/17/24  1402 01/22/24  1151   Glucose 153 H  --    Calcium 9.8  --    Albumin 4.5  --    Total Protein 7.2  --    Sodium 143  --    Potassium 5.6 H 5.1   CO2 28  --    Chloride 105  --    BUN 19 H  --    Creatinine 1.09  --    Alkaline Phosphatase 66  --    ALT 27  --    AST 23  --    Total Bilirubin 0.5  --      LIPIDS:  Recent Labs   Lab 06/09/22  0705 06/22/23  0635 08/08/23  0733   TSH 1.550  --   --    HDL  --    < > 56   Cholesterol  --    < > 171   Triglycerides  --    < > 134   LDL Cholesterol  --    < > 88.2   HDL/Cholesterol Ratio  --    < > 32.7   Non-HDL Cholesterol  --    < > 115   Total Cholesterol/HDL Ratio  --    < > 3.1    < > = values in this interval not displayed.     TSH:  Recent Labs   Lab 06/09/22  0705   TSH 1.550         ASSESSMENT & PLAN:    Problem List Items Addressed This Visit          Psychiatric    Anxiety (Chronic)    Overview     Uncontrolled. SI with SSRI in the past. Xanax PRN not intended for daily use. Okay to fill 30 tabs every 2-6 months. Can consider buspar in future if needed.  reviewed and as expected.             Cardiac/Vascular    CAD S/P  percutaneous coronary angioplasty (Chronic)    Overview     Followed by Cardiology. Continue current medical therapy         Chronic diastolic CHF (congestive heart failure) (Chronic)    Overview     Compensated. Continue current medical therapy.     Echo    Result Date: 5/22/2023  · The left ventricle is normal in size with normal systolic function.  · The estimated ejection fraction is 65%.  · Normal left ventricular diastolic function.  · Normal right ventricular size with normal right ventricular systolic   function.  · Normal central venous pressure (3 mmHg).  · The estimated PA systolic pressure is 18 mmHg.                 Coronary artery disease of native artery of native heart with stable angina pectoris (Chronic)    Overview     Stable. Continue medical therapy. Followed by Cardiology         Essential hypertension (Chronic)    Overview     Well controlled. Continue current regimen            Endocrine    Class 2 severe obesity due to excess calories with serious comorbidity and body mass index (BMI) of 35.0 to 35.9 in adult (Chronic)    Overview     Body mass index is 35.01 kg/m².  The patient is asked to make an attempt to improve diet and exercise patterns to aid in medical management of this problem.           Type 2 diabetes mellitus with diabetic polyneuropathy, without long-term current use of insulin - Primary (Chronic)    Overview     Last A1c is 6.7 on 01/17/2024  Lately notes hyperglycemia. Recommending increasing Mounjaro. New RX sent.            Relevant Orders    Hemoglobin A1C       Other    Heavy cigarette smoker (Chronic)    Overview     Has cut back. Advised to pick quit date and start nicotine patch. Declined smoking cessation program           Other Visit Diagnoses       Breast cancer screening by mammogram        Relevant Orders    Mammo Digital Screening Bilat w/ Jatin    Nausea and vomiting, unspecified vomiting type        Relevant Medications    ondansetron (ZOFRAN-ODT) 8 MG TbDL     Folliculitis        Relevant Medications    mupirocin (BACTROBAN) 2 % ointment    Other Relevant Orders    Ambulatory referral/consult to Dermatology              Follow up in about 6 months (around 10/23/2024) for blood pressure, diabetes.      RTC/ED precautions discussed where applicable.   Encouraged patient to let me know if there are any further questions/concerns.     Advise patient/caretaker to check with insurance regarding orders to avoid unexpected fees/costs.     The patient/caretaker indicates understanding of these issues and agrees with the plan.    Dr. Hari Francis MD  Family Medicine

## 2024-04-23 NOTE — TELEPHONE ENCOUNTER
----- Message from Hari Francis MD sent at 4/23/2024 10:51 AM CDT -----  Regarding: diabetes eye exam  Hi team, patient overdue for eye exam, please schedule with next available provider    Sincerely,     Dr Francis

## 2024-05-07 PROBLEM — I70.0 AORTIC ATHEROSCLEROSIS: Chronic | Status: ACTIVE | Noted: 2023-08-11

## 2024-05-07 PROBLEM — E66.812 CLASS 2 SEVERE OBESITY DUE TO EXCESS CALORIES WITH SERIOUS COMORBIDITY AND BODY MASS INDEX (BMI) OF 35.0 TO 35.9 IN ADULT: Chronic | Status: ACTIVE | Noted: 2018-01-16

## 2024-05-07 PROBLEM — E66.01 CLASS 2 SEVERE OBESITY DUE TO EXCESS CALORIES WITH SERIOUS COMORBIDITY AND BODY MASS INDEX (BMI) OF 35.0 TO 35.9 IN ADULT: Chronic | Status: ACTIVE | Noted: 2018-01-16

## 2024-05-07 PROBLEM — I50.32 CHRONIC DIASTOLIC CHF (CONGESTIVE HEART FAILURE): Chronic | Status: ACTIVE | Noted: 2017-12-07

## 2024-05-15 ENCOUNTER — HOSPITAL ENCOUNTER (EMERGENCY)
Facility: HOSPITAL | Age: 59
Discharge: HOME | End: 2024-05-15
Attending: EMERGENCY MEDICINE
Payer: COMMERCIAL

## 2024-05-15 VITALS
BODY MASS INDEX: 34.16 KG/M2 | HEART RATE: 83 BPM | HEIGHT: 60 IN | OXYGEN SATURATION: 96 % | SYSTOLIC BLOOD PRESSURE: 145 MMHG | WEIGHT: 174 LBS | TEMPERATURE: 98 F | DIASTOLIC BLOOD PRESSURE: 89 MMHG | RESPIRATION RATE: 20 BRPM

## 2024-05-15 DIAGNOSIS — K52.9 GASTROENTERITIS: Primary | ICD-10-CM

## 2024-05-15 LAB
ALBUMIN SERPL BCP-MCNC: 4.6 G/DL (ref 3.5–5.2)
ALP SERPL-CCNC: 64 U/L (ref 38–126)
ALT SERPL W/O P-5'-P-CCNC: 35 U/L (ref 10–44)
ANION GAP SERPL CALC-SCNC: 11 MMOL/L (ref 8–16)
AST SERPL-CCNC: 36 U/L (ref 15–46)
BASOPHILS # BLD AUTO: 0.04 K/UL (ref 0–0.2)
BASOPHILS NFR BLD: 0.5 % (ref 0–1.9)
BILIRUB SERPL-MCNC: 0.7 MG/DL (ref 0.1–1)
BILIRUB UR QL STRIP: NEGATIVE
CALCIUM SERPL-MCNC: 9.6 MG/DL (ref 8.7–10.5)
CHLORIDE SERPL-SCNC: 107 MMOL/L (ref 95–110)
CLARITY UR REFRACT.AUTO: CLEAR
CO2 SERPL-SCNC: 20 MMOL/L (ref 23–29)
COLOR UR AUTO: YELLOW
CREAT SERPL-MCNC: 0.86 MG/DL (ref 0.5–1.4)
DIFFERENTIAL METHOD BLD: ABNORMAL
EOSINOPHIL # BLD AUTO: 0.1 K/UL (ref 0–0.5)
EOSINOPHIL NFR BLD: 0.7 % (ref 0–8)
ERYTHROCYTE [DISTWIDTH] IN BLOOD BY AUTOMATED COUNT: 13.4 % (ref 11.5–14.5)
EST. GFR  (NO RACE VARIABLE): >60 ML/MIN/1.73 M^2
GLUCOSE SERPL-MCNC: 149 MG/DL (ref 70–110)
GLUCOSE UR QL STRIP: NEGATIVE
HCT VFR BLD AUTO: 41.3 % (ref 37–48.5)
HGB BLD-MCNC: 13.5 G/DL (ref 12–16)
HGB UR QL STRIP: NEGATIVE
IMM GRANULOCYTES # BLD AUTO: 0.02 K/UL (ref 0–0.04)
IMM GRANULOCYTES NFR BLD AUTO: 0.2 % (ref 0–0.5)
KETONES UR QL STRIP: NEGATIVE
LEUKOCYTE ESTERASE UR QL STRIP: NEGATIVE
LIPASE SERPL-CCNC: 54 U/L (ref 23–300)
LYMPHOCYTES # BLD AUTO: 2.1 K/UL (ref 1–4.8)
LYMPHOCYTES NFR BLD: 25.8 % (ref 18–48)
MAGNESIUM SERPL-MCNC: 2.1 MG/DL (ref 1.6–2.6)
MCH RBC QN AUTO: 29.6 PG (ref 27–31)
MCHC RBC AUTO-ENTMCNC: 32.7 G/DL (ref 32–36)
MCV RBC AUTO: 91 FL (ref 82–98)
MONOCYTES # BLD AUTO: 0.4 K/UL (ref 0.3–1)
MONOCYTES NFR BLD: 5.2 % (ref 4–15)
NEUTROPHILS # BLD AUTO: 5.5 K/UL (ref 1.8–7.7)
NEUTROPHILS NFR BLD: 67.6 % (ref 38–73)
NITRITE UR QL STRIP: NEGATIVE
NRBC BLD-RTO: 0 /100 WBC
OB PNL STL: NEGATIVE
PH UR STRIP: 6 [PH] (ref 5–8)
PLATELET # BLD AUTO: 501 K/UL (ref 150–450)
PMV BLD AUTO: 8.9 FL (ref 9.2–12.9)
POTASSIUM SERPL-SCNC: 4.5 MMOL/L (ref 3.5–5.1)
PROT SERPL-MCNC: 7.6 G/DL (ref 6–8.4)
PROT UR QL STRIP: NEGATIVE
RBC # BLD AUTO: 4.56 M/UL (ref 4–5.4)
SODIUM SERPL-SCNC: 138 MMOL/L (ref 136–145)
SP GR UR STRIP: >=1.03 (ref 1–1.03)
URN SPEC COLLECT METH UR: ABNORMAL
UROBILINOGEN UR STRIP-ACNC: NEGATIVE EU/DL
UUN UR-MCNC: 15 MG/DL (ref 7–17)
WBC # BLD AUTO: 8.11 K/UL (ref 3.9–12.7)

## 2024-05-15 PROCEDURE — 80053 COMPREHEN METABOLIC PANEL: CPT | Mod: ER

## 2024-05-15 PROCEDURE — 25500020 PHARM REV CODE 255: Mod: ER | Performed by: EMERGENCY MEDICINE

## 2024-05-15 PROCEDURE — 83690 ASSAY OF LIPASE: CPT | Mod: ER

## 2024-05-15 PROCEDURE — 81003 URINALYSIS AUTO W/O SCOPE: CPT | Mod: ER

## 2024-05-15 PROCEDURE — 96375 TX/PRO/DX INJ NEW DRUG ADDON: CPT | Mod: ER

## 2024-05-15 PROCEDURE — 99285 EMERGENCY DEPT VISIT HI MDM: CPT | Mod: 25,ER

## 2024-05-15 PROCEDURE — 83735 ASSAY OF MAGNESIUM: CPT | Mod: ER

## 2024-05-15 PROCEDURE — 96374 THER/PROPH/DIAG INJ IV PUSH: CPT | Mod: ER

## 2024-05-15 PROCEDURE — 85025 COMPLETE CBC W/AUTO DIFF WBC: CPT | Mod: ER

## 2024-05-15 PROCEDURE — 63600175 PHARM REV CODE 636 W HCPCS: Mod: ER

## 2024-05-15 PROCEDURE — 82272 OCCULT BLD FECES 1-3 TESTS: CPT | Mod: ER

## 2024-05-15 PROCEDURE — 25000003 PHARM REV CODE 250: Mod: ER

## 2024-05-15 PROCEDURE — 96361 HYDRATE IV INFUSION ADD-ON: CPT | Mod: ER

## 2024-05-15 RX ORDER — PROCHLORPERAZINE MALEATE 10 MG
10 TABLET ORAL EVERY 6 HOURS PRN
Qty: 15 TABLET | Refills: 0 | Status: SHIPPED | OUTPATIENT
Start: 2024-05-15

## 2024-05-15 RX ORDER — PROCHLORPERAZINE EDISYLATE 5 MG/ML
10 INJECTION INTRAMUSCULAR; INTRAVENOUS
Status: COMPLETED | OUTPATIENT
Start: 2024-05-15 | End: 2024-05-15

## 2024-05-15 RX ORDER — MORPHINE SULFATE 4 MG/ML
4 INJECTION, SOLUTION INTRAMUSCULAR; INTRAVENOUS
Status: COMPLETED | OUTPATIENT
Start: 2024-05-15 | End: 2024-05-15

## 2024-05-15 RX ADMIN — SODIUM CHLORIDE 1000 ML: 9 INJECTION, SOLUTION INTRAVENOUS at 11:05

## 2024-05-15 RX ADMIN — MORPHINE SULFATE 4 MG: 4 INJECTION INTRAVENOUS at 11:05

## 2024-05-15 RX ADMIN — IOHEXOL 100 ML: 350 INJECTION, SOLUTION INTRAVENOUS at 12:05

## 2024-05-15 RX ADMIN — PROCHLORPERAZINE EDISYLATE 10 MG: 5 INJECTION INTRAMUSCULAR; INTRAVENOUS at 11:05

## 2024-05-15 NOTE — ED PROVIDER NOTES
Encounter Date: 5/15/2024       History     Chief Complaint   Patient presents with    Vomiting     Pt C/O N/V/D with intermittent ABD pain X 4 days.      Loi Cordova is a 58 y.o. female  has a past medical history of Acute cystitis with hematuria, Allergy, Asthma, Chronic diastolic heart failure, Coronary artery disease, GERD (gastroesophageal reflux disease), History of back surgery, Hyperlipidemia, Hypertension, Pulmonary emphysema, and Type 2 diabetes mellitus with diabetic polyneuropathy, without long-term current use of insulin. presenting to the Emergency Department for nausea, vomiting, diarrhea, abdominal pain x3 days.  Patient reports 2 days ago she had approximately 5-6 episodes of watery black diarrhea.  Reports 1 episode of black stool yesterday and this morning.  Second episode of diarrhea this morning stool was normal color.  Patient states she has been taking Zofran that she was prescribed but patient has not tried to eat or drink anything because she still felt nauseous.  No fevers or chills.  No upper respiratory symptoms shortness of breath or chest pain.  Reports constant crampy abdominal pain that intermittently gets worse.  No urinary symptoms.  Reporting right flank pain that started yesterday.  Patient has not been taking iron pills or Pepto-Bismol.  Patient took Imodium without relief of her diarrhea symptoms.        The history is provided by the patient.     Review of patient's allergies indicates:   Allergen Reactions    Crayfish Anaphylaxis     (crawfish only)    Rocephin [ceftriaxone] Rash     Past Medical History:   Diagnosis Date    Acute cystitis with hematuria 07/23/2023    Allergy     Asthma     Chronic diastolic heart failure     Coronary artery disease     GERD (gastroesophageal reflux disease)     History of back surgery 02/20/2018    Hyperlipidemia     Hypertension     Pulmonary emphysema 08/11/2023    Type 2 diabetes mellitus with diabetic polyneuropathy, without long-term  current use of insulin 02/08/2019     Past Surgical History:   Procedure Laterality Date    BILATERAL OOPHORECTOMY Bilateral 2000    BREAST BIOPSY Left 2/14/2020    Procedure: BIOPSY, BREAST-Left medial breast palpation guided;  Surgeon: Paulino Espinoza MD;  Location: [address];  Service: General;  Laterality: Left;    CARDIAC CATHETERIZATION      7 stents    CHOLECYSTECTOMY      COLONOSCOPY N/A 7/16/2019    Procedure: COLONOSCOPY;  Surgeon: Sarah Gamez MD;  Location: [facility] ENDO;  Service: Endoscopy;  Laterality: N/A;    ESOPHAGOGASTRODUODENOSCOPY N/A 1/9/2020    Procedure: EGD (ESOPHAGOGASTRODUODENOSCOPY);  Surgeon: Sarah Gamez MD;  Location: [facility] ENDO;  Service: Endoscopy;  Laterality: N/A;    HYSTERECTOMY      PARTIAL HYSTERECTOMY N/A 1990    Uterus taken out    SHOULDER SURGERY      TOTAL KNEE ARTHROPLASTY       Family History   Problem Relation Name Age of Onset    Heart disease Mother      Hyperlipidemia Mother      Hypertension Mother      Diabetes Mother      Heart disease Father      Hyperlipidemia Father      Cancer Father      Diabetes Maternal Aunt      Prostate cancer Neg Hx      Kidney disease Neg Hx       Social History     Tobacco Use    Smoking status: Every Day     Current packs/day: 0.50     Average packs/day: 0.5 packs/day for 34.0 years (17.0 ttl pk-yrs)     Types: Cigarettes     Passive exposure: Never    Smokeless tobacco: Never    Tobacco comments:     in smoking program 1/25/21   Substance Use Topics    Alcohol use: Not Currently     Comment: 6 months    Drug use: No     Review of Systems   Constitutional:  Negative for chills and fever.   HENT:  Negative for congestion, rhinorrhea and sore throat.    Respiratory:  Negative for cough and shortness of breath.    Cardiovascular:  Negative for chest pain.   Gastrointestinal:  Positive for abdominal pain, diarrhea, nausea and vomiting.   Genitourinary:  Positive for flank pain. Negative for dysuria, frequency, hematuria  and urgency.   Musculoskeletal:  Negative for back pain and myalgias.   Skin:  Negative for rash.   Neurological:  Negative for weakness.   Hematological:  Does not bruise/bleed easily.   All other systems reviewed and are negative.      Physical Exam     Initial Vitals [05/15/24 1047]   BP Pulse Resp Temp SpO2   (!) 145/89 83 18 98.2 °F (36.8 °C) 96 %      MAP       --         Physical Exam    Nursing note and vitals reviewed.  Constitutional: She appears well-developed and well-nourished. She is not diaphoretic. No distress.   HENT:   Head: Normocephalic.   Right Ear: Hearing, tympanic membrane and external ear normal.   Left Ear: Hearing, tympanic membrane and external ear normal.   Nose: Nose normal.   Mouth/Throat: Oropharynx is clear and moist.   Eyes: Conjunctivae, EOM and lids are normal. Pupils are equal, round, and reactive to light.   Neck: Neck supple.   Normal range of motion.  Cardiovascular:  Normal rate, regular rhythm and normal heart sounds.           Pulmonary/Chest: Breath sounds normal. No respiratory distress. She has no wheezes. She has no rhonchi.   Abdominal: Abdomen is soft. Bowel sounds are normal. There is abdominal tenderness in the right upper quadrant and right lower quadrant. There is tenderness at McBurney's point. There is no rebound and no guarding. negative obturator sign and negative psoas sign  Musculoskeletal:         General: Normal range of motion.      Cervical back: Normal range of motion and neck supple. No rigidity.     Lymphadenopathy:     She has no cervical adenopathy.   Neurological: She is alert and oriented to person, place, and time. She has normal strength. GCS score is 15. GCS eye subscore is 4. GCS verbal subscore is 5. GCS motor subscore is 6.   Skin: Skin is warm and dry. Capillary refill takes less than 2 seconds. No rash noted.   Psychiatric: She has a normal mood and affect. Her behavior is normal. Judgment and thought content normal.         ED Course    Procedures  Labs Reviewed   CBC W/ AUTO DIFFERENTIAL - Abnormal; Notable for the following components:       Result Value    Platelets 501 (*)     MPV 8.9 (*)     All other components within normal limits   COMPREHENSIVE METABOLIC PANEL - Abnormal; Notable for the following components:    CO2 20 (*)     Glucose 149 (*)     All other components within normal limits   URINALYSIS, REFLEX TO URINE CULTURE - Abnormal; Notable for the following components:    Specific Gravity, UA >=1.030 (*)     All other components within normal limits    Narrative:     Preferred Collection Type->Urine, Clean Catch  Specimen Source->Urine   LIPASE   MAGNESIUM   OCCULT BLOOD X 1, STOOL          Imaging Results              CT Abdomen Pelvis With IV Contrast NO Oral Contrast (Final result)  Result time 05/15/24 12:36:46      Final result by Antoni Winters MD (05/15/24 12:36:46)                   Impression:      1.  Negative for acute process involving the abdomen or pelvis.  Negative for inflammatory changes.  Normal appendix.  Negative for renal stone disease or hydronephrosis.  Negative for free air.    2. Numerous stable findings as noted above.    All CT scans at this facility are performed  using dose modulation techniques as appropriate to performed exam including the following:  automated exposure control; adjustment of mA and/or kV according to the patients size (this includes techniques or standardized protocols for targeted exams where dose is matched to indication/reason for exam: i.e. extremities or head);  iterative reconstruction technique.      Electronically signed by: Antoni Winters MD  Date:    05/15/2024  Time:    12:36               Narrative:    EXAMINATION:  CT ABDOMEN PELVIS WITH IV CONTRAST, multiplanar reconstructions    CLINICAL HISTORY:  RLQ abdominal pain (Age >= 14y);    TECHNIQUE:  Axial images through the abdomen and pelvis were obtained with the use of IV contrast.  Sagittal and coronal reconstructions  are provided for review.  Oral contrast was not utilized.    COMPARISON:  July 23, 2023    FINDINGS:  LUNG BASES: Mild dependent atelectasis in the lung bases.  Lung bases are otherwise clear.  Negative for pleural or pericardial effusions. The distal esophagus is normal.  Coronary artery calcifications again seen.    ABDOMEN: Cholecystectomy clips.  Fatty infiltration of the liver.  The mild bilateral extrarenal pelvis on the lung right again seen.  The liver and spleen otherwise appear normal.  The pancreas is unremarkable.  Kidneys and adrenal glands are normal.    Negative for adenopathy, ascites or inflammatory change noted within the abdomen or pelvis.  Vascular calcifications are present without aneurysmal changes. Portal vein is patent.    Normal appendix.  Appropriate stool.  The large and small bowel loops are otherwise normal.  The stomach is normal.  Negative for free air.    PELVIS: The urinary bladder is unremarkable.  The uterus is absent.  The rest of the female pelvic organs are normal. There are pelvic phleboliths.    The abdominal wall is intact.    No significant osseous abnormality is identified.  Negative for significant spinal canal stenosis. Stable osteopenia and mild multilevel marginal spondylosis.  Convex left curvature of the lumbar spine.  Age-appropriate degenerative changes of the pelvis and hips.    Negative for groin adenopathy.                                       Medications   sodium chloride 0.9% bolus 1,000 mL 1,000 mL (0 mLs Intravenous Stopped 5/15/24 1323)   prochlorperazine injection Soln 10 mg (10 mg Intravenous Given 5/15/24 1130)   morphine injection 4 mg (4 mg Intravenous Given 5/15/24 1130)   iohexoL (OMNIPAQUE 350) injection 100 mL (100 mLs Intravenous Given 5/15/24 1211)     Medical Decision Making  This is an emergent evaluation of 58 y.o. female in the ED presenting for diffuse abdominal pain, N/V/D. Physical exam reveals a non-toxic, afebrile, and well-appearing  female in no apparent respiratory distress. Pertinent physical exam findings above. Vital signs stable. If available, previous records reviewed.    My overall impression is gastroenteritis. Differential Diagnosis: including but not limited to acute constipation, early appendicitis, colitis, diverticulitis, volvulus, small bowel obstruction, pancreatitis, mesenteric ischemia, gastroenteritis, peritonititis, AAA, large bowel obstruction/volvulus, IBS, DKA, hernia, kidney stone, intra-abdominal infection vs abscess, abdominal perforation, nephrolithiasis     Discharge Meds/Instructions: compazine. Hydration. BRAT diet. PCP f/u.    There does not appear to be any indication for further emergent testing, observation, or hospitalization at this time. A mutual shared decision making discussion was had with the patient. Patient appears stable for and is comfortable with discharge home. The diagnosis, treatment plan, instructions for follow-up as well as ED return precautions were discussed. Advised to follow-up with PCP for outpatient follow-up in 2-3 days. Signs and symptoms that would warrant immediate return to ED were reviewed prior to discharge. All questions and concerns were asked, answered, and addressed. Patient expressed understanding and agreement with the plan.     This case was discussed with my attending, Dr. Salazar who is in agreement with my assessment and plan.        Amount and/or Complexity of Data Reviewed  Labs: ordered. Decision-making details documented in ED Course.  Radiology: ordered. Decision-making details documented in ED Course.    Risk  OTC drugs.  Prescription drug management.               ED Course as of 05/15/24 1436   Wed May 15, 2024   1153 CBC W/ AUTO DIFFERENTIAL(!)  No leukocytosis.  H&H appear stable and improved from previous labs. [LH]   1154 CO2(!): 20 [LH]   1154 Glucose(!): 149 [LH]   1154 Urinalysis, Reflex to Urine Culture Urine, Clean Catch(!)  No evidence of urinary  tract infection. []   1154 Magnesium : 2.1 [LH]   1154 Lipase Result: 54 [LH]   1248 Occult Blood: Negative [LH]   1248 CT Abdomen Pelvis With IV Contrast NO Oral Contrast  Impression:     1.  Negative for acute process involving the abdomen or pelvis.  Negative for inflammatory changes.  Normal appendix.  Negative for renal stone disease or hydronephrosis.  Negative for free air.     2. Numerous stable findings as noted above.   [LH]      ED Course User Index  [] Danette Mcfarland PA-C                           Clinical Impression:  Final diagnoses:  [K52.9] Gastroenteritis (Primary)          ED Disposition Condition    Discharge Stable          ED Prescriptions       Medication Sig Dispense Start Date End Date Auth. Provider    prochlorperazine (COMPAZINE) 10 MG tablet Take 1 tablet (10 mg total) by mouth every 6 (six) hours as needed. 15 tablet 5/15/2024 -- Danette Mcfarland PA-C          Follow-up Information       Follow up With Specialties Details Why Contact Info    Hari Francis MD Family Medicine Schedule an appointment as soon as possible for a visit in 3 days  [address] [place] [id number]  [phone]               Danette Mcfarland PA-C  05/15/24 6847

## 2024-05-15 NOTE — ED NOTES
"PT presents to the ED with C/O generalized abdominal pain that is worse to the RLQ x Sunday. +N/V/D. Reports black stool last 2 days. "It lightened up this morning" C/O right sided flank pain.  Denies any urinary symptoms. VSS. AAOx4.  "

## 2024-05-15 NOTE — Clinical Note
"Loi Cedillocynthia Cordova was seen and treated in our emergency department on 5/15/2024.  She may return to work on 05/18/2024.       If you have any questions or concerns, please don't hesitate to call.      Danette Mcfarland PA-C"

## 2024-05-15 NOTE — DISCHARGE INSTRUCTIONS
Follow a bland diet today, including bread/rice/bananas/oatmeal; avoid heavy/greasy/fatty foods for the next 24 hours.   Drink plenty of fluids to stay hydrated.  You may take nausea medication as prescribed.   Follow-up with your primary care provider as soon as possible.  Return to the ED for severe nausea/vomiting and dehydration, high fever, severe abdominal pain, or any other concerns.    Please return to the Emergency Department for any new or worsening symptoms including: worsening abdominal pain, dark\black\bloody bowel movements, vomiting blood, hard abdomen, fever, chest pain, shortness of breath, loss of consciousness or any other concerns.     Please follow up with your Primary Care Provider within in the week. If you do not have a Primary Care Provider, you may contact the one listed on your discharge paperwork or you may also call the [facility] Appointment Desk at [phone] to schedule an appointment with a Primary Care Provider.

## 2024-05-22 ENCOUNTER — PATIENT MESSAGE (OUTPATIENT)
Dept: FAMILY MEDICINE | Facility: CLINIC | Age: 59
End: 2024-05-22
Payer: COMMERCIAL

## 2024-05-22 DIAGNOSIS — E11.42 TYPE 2 DIABETES MELLITUS WITH DIABETIC POLYNEUROPATHY, WITHOUT LONG-TERM CURRENT USE OF INSULIN: ICD-10-CM

## 2024-05-23 RX ORDER — ATORVASTATIN CALCIUM 80 MG/1
80 TABLET, FILM COATED ORAL NIGHTLY
Qty: 90 TABLET | Refills: 3 | Status: SHIPPED | OUTPATIENT
Start: 2024-05-23

## 2024-06-05 ENCOUNTER — OFFICE VISIT (OUTPATIENT)
Dept: OPTOMETRY | Facility: CLINIC | Age: 59
End: 2024-06-05
Payer: COMMERCIAL

## 2024-06-05 DIAGNOSIS — H52.7 REFRACTIVE ERROR: ICD-10-CM

## 2024-06-05 DIAGNOSIS — H04.123 DRY EYE SYNDROME OF BOTH EYES: ICD-10-CM

## 2024-06-05 DIAGNOSIS — H25.13 NUCLEAR SCLEROSIS OF BOTH EYES: ICD-10-CM

## 2024-06-05 DIAGNOSIS — H35.033 HYPERTENSIVE RETINOPATHY OF BOTH EYES: ICD-10-CM

## 2024-06-05 DIAGNOSIS — E11.9 TYPE 2 DIABETES MELLITUS WITHOUT RETINOPATHY: Primary | ICD-10-CM

## 2024-06-05 PROCEDURE — 99999 PR PBB SHADOW E&M-EST. PATIENT-LVL III: CPT | Mod: PBBFAC,,, | Performed by: OPTOMETRIST

## 2024-06-05 PROCEDURE — 1159F MED LIST DOCD IN RCRD: CPT | Mod: CPTII,S$GLB,, | Performed by: OPTOMETRIST

## 2024-06-05 PROCEDURE — 3044F HG A1C LEVEL LT 7.0%: CPT | Mod: CPTII,S$GLB,, | Performed by: OPTOMETRIST

## 2024-06-05 PROCEDURE — 2023F DILAT RTA XM W/O RTNOPTHY: CPT | Mod: CPTII,S$GLB,, | Performed by: OPTOMETRIST

## 2024-06-05 PROCEDURE — 92015 DETERMINE REFRACTIVE STATE: CPT | Mod: S$GLB,,, | Performed by: OPTOMETRIST

## 2024-06-05 PROCEDURE — 92014 COMPRE OPH EXAM EST PT 1/>: CPT | Mod: S$GLB,,, | Performed by: OPTOMETRIST

## 2024-06-05 PROCEDURE — 4010F ACE/ARB THERAPY RXD/TAKEN: CPT | Mod: CPTII,S$GLB,, | Performed by: OPTOMETRIST

## 2024-06-05 NOTE — PROGRESS NOTES
HPI    CC: Pt presents today for diabetic eye exam. Pt states vision changes. She   would like to hold off on a contact lens fitting today.    NATY: 3/8/2023, Dr. Ansari    (+) Changes in vision   (-) Pain  (-) Irritation   (+) Itching, due to allergies  (-) Flashes  (+) Floaters, occasional; longstanding  (+) Glasses wearer  (+) CL wearer  (-) Uses eye gtts    Does patient want a refraction today? Yes    (-) Eye injury  (-) Eye surgery   (-)POHx  (+)FOHx, Glaucoma    (+)DM  Hemoglobin A1C       Date                     Value               Ref Range             Status                01/17/2024               6.7 (H)             4.0 - 5.6 %           Final                  07/20/2023               7.0 (H)             4.0 - 5.6 %           Final                  06/22/2023               7.2 (H)             4.0 - 5.6 %           Final                Last edited by Zulay Ansari, OD on 6/5/2024  3:13 PM.            Assessment /Plan     For exam results, see Encounter Report.    Type 2 diabetes mellitus without retinopathy    Hypertensive retinopathy of both eyes    Dry eye syndrome of both eyes    Nuclear sclerosis of both eyes    Refractive error      No retinopathy noted, OU. Continue proper BS control and annual diabetic eye exams. Monitor yearly.      2. Vessel changes, OU. Stable since NATY (03/2023). Discussed importance of BP control, taking medications as directed, and following-up with primary care. If changes in vision noted, RTC. Monitor yearly unless changes noted sooner.      3. Educated pt on findings. Recommended ATs TID-QID for added lubrication and comfort. If symptoms worsen or dont improve, RTC. Monitor.      4. Educated pt on findings. Not visually significant. No need for removal at this time. Monitor yearly.      5. Updated SRx. Mild change from habitual. Monitor yearly.        RTC in 1 year for annual DM eye exam or sooner if needed.

## 2024-06-10 ENCOUNTER — PATIENT MESSAGE (OUTPATIENT)
Dept: ADMINISTRATIVE | Facility: OTHER | Age: 59
End: 2024-06-10
Payer: COMMERCIAL

## 2024-06-23 ENCOUNTER — PATIENT MESSAGE (OUTPATIENT)
Dept: OTHER | Facility: OTHER | Age: 59
End: 2024-06-23
Payer: COMMERCIAL

## 2024-07-03 DIAGNOSIS — K21.9 GASTROESOPHAGEAL REFLUX DISEASE, UNSPECIFIED WHETHER ESOPHAGITIS PRESENT: ICD-10-CM

## 2024-07-03 RX ORDER — OMEPRAZOLE 40 MG/1
40 CAPSULE, DELAYED RELEASE ORAL 2 TIMES DAILY
Qty: 180 CAPSULE | Refills: 3 | Status: SHIPPED | OUTPATIENT
Start: 2024-07-03

## 2024-07-05 DIAGNOSIS — J45.51 SEVERE PERSISTENT ASTHMA WITH ACUTE EXACERBATION: ICD-10-CM

## 2024-07-08 ENCOUNTER — TELEPHONE (OUTPATIENT)
Dept: FAMILY MEDICINE | Facility: CLINIC | Age: 59
End: 2024-07-08
Payer: COMMERCIAL

## 2024-07-08 RX ORDER — MEPOLIZUMAB 100 MG/ML
100 INJECTION, SOLUTION SUBCUTANEOUS
Qty: 1 ML | Refills: 2 | Status: ACTIVE | OUTPATIENT
Start: 2024-07-08

## 2024-07-08 NOTE — TELEPHONE ENCOUNTER
Please let patient know: this is not a medication I prescribe. I did refill it for her but she needs to see a Pulmonologist to get further refills. She can schedule an appointment with Pulmonary via the [facility].

## 2024-07-15 ENCOUNTER — E-VISIT (OUTPATIENT)
Dept: FAMILY MEDICINE | Facility: CLINIC | Age: 59
End: 2024-07-15
Payer: COMMERCIAL

## 2024-07-15 ENCOUNTER — PATIENT MESSAGE (OUTPATIENT)
Dept: FAMILY MEDICINE | Facility: CLINIC | Age: 59
End: 2024-07-15

## 2024-07-15 ENCOUNTER — TELEPHONE (OUTPATIENT)
Dept: FAMILY MEDICINE | Facility: CLINIC | Age: 59
End: 2024-07-15
Payer: COMMERCIAL

## 2024-07-15 DIAGNOSIS — R30.0 DYSURIA: Primary | ICD-10-CM

## 2024-07-15 DIAGNOSIS — N39.0 COMPLICATED UTI (URINARY TRACT INFECTION): Primary | ICD-10-CM

## 2024-07-15 DIAGNOSIS — E11.42 TYPE 2 DIABETES MELLITUS WITH DIABETIC POLYNEUROPATHY, WITHOUT LONG-TERM CURRENT USE OF INSULIN: Chronic | ICD-10-CM

## 2024-07-15 PROCEDURE — 99422 OL DIG E/M SVC 11-20 MIN: CPT | Mod: ,,, | Performed by: FAMILY MEDICINE

## 2024-07-16 RX ORDER — CIPROFLOXACIN 500 MG/1
500 TABLET ORAL EVERY 12 HOURS
Qty: 14 TABLET | Refills: 0 | Status: SHIPPED | OUTPATIENT
Start: 2024-07-16 | End: 2024-07-23

## 2024-07-16 NOTE — PROGRESS NOTES
Patient ID: Loi Cordova is a 58 y.o. female.    Chief Complaint: Urinary Tract Infection (Entered automatically based on patient selection in [employer].)    The patient initiated a request through [employer] on 7/15/2024 for evaluation and management with a chief complaint of Urinary Tract Infection (Entered automatically based on patient selection in [employer].)     I evaluated the questionnaire submission on 7/16/2024.    [facility] Peq Evisit Uti Questionnaire    7/15/2024  3:42 PM CDT - Filed by Patient   Do you agree to participate in an E-Visit? Yes   If you have any of the following symptoms, please present to your local emergency room or call 911:  I acknowledge   What is the main issue you would like addressed today? Pain when urinating, blood in urine   What symptoms do you currently have? Pain while passing urine   When did your symptoms first appear? 7/14/2024   List what you have done or taken to help your symptoms. N/A   Please indicate whether you have had the following symptoms during the past 24 hours     Urgent urination (a sudden and uncontrollable urge to urinate) Severe   Frequent urination of small amounts of urine (going to the toilet very often) Mild   Burning pain when urinating None   Incomplete bladder emptying (still feel like you need to urinate again after urination) Severe   Pain not associated with urination in the lower abdomen below the belly button) None   What does your urine look like? Clear   Blood seen in the urine Mild   Flank pain (pain in one or both sides of the lower back) Moderate   Abnormal Vaginal Discharge (abnormal amount, color and/or odor) None   Discharge from the urethra (urinary opening) without urination None   High body temperature/fever? None-<99.5   Please rate how much discomfort you have experience because of the symptoms in the past 24 hours: Moderate   Please indicate how the symptoms have interfered with your every day activities/work in the past 24 hours: Mild    Please indicate how these symptoms have interfered with your social activities (visiting people, meeting with friends, etc.) in the past 24 hours? None   Are you a diabetic? Yes   Please indicate whether you have the following at the time of completion of this questionnaire: None of the above   Provide any additional information you feel is important. Pain in back both sides   Please attach any relevant images or files (if you have performed a home test for UTI, please submit a photo of results)    Are you able to take your vital signs? No         Encounter Diagnoses   Name Primary?    Complicated UTI (urinary tract infection) Yes    Type 2 diabetes mellitus with diabetic polyneuropathy, without long-term current use of insulin    Reporting flank pain. UA with small blood and leuk. Ucx pending.   Last A1c is 6.7 on 01/17/2024      No orders of the defined types were placed in this encounter.     Medications Ordered This Encounter   Medications    ciprofloxacin HCl (CIPRO) 500 MG tablet     Sig: Take 1 tablet (500 mg total) by mouth every 12 (twelve) hours. for 7 days     Dispense:  14 tablet     Refill:  0        No follow-ups on file.      E-Visit Time Tracking:    Day 1 Time (in minutes): 11    Total Time (in minutes): 11

## 2024-08-03 DIAGNOSIS — G25.81 RESTLESS LEG SYNDROME: ICD-10-CM

## 2024-08-06 RX ORDER — ROPINIROLE 0.5 MG/1
0.5 TABLET, FILM COATED ORAL NIGHTLY
Qty: 90 TABLET | Refills: 3 | Status: SHIPPED | OUTPATIENT
Start: 2024-08-06

## 2024-08-21 ENCOUNTER — OFFICE VISIT (OUTPATIENT)
Dept: FAMILY MEDICINE | Facility: CLINIC | Age: 59
End: 2024-08-21
Payer: COMMERCIAL

## 2024-08-21 VITALS
HEIGHT: 60 IN | WEIGHT: 189.38 LBS | BODY MASS INDEX: 37.18 KG/M2 | SYSTOLIC BLOOD PRESSURE: 160 MMHG | OXYGEN SATURATION: 98 % | HEART RATE: 82 BPM | DIASTOLIC BLOOD PRESSURE: 82 MMHG

## 2024-08-21 DIAGNOSIS — I50.32 CHRONIC DIASTOLIC CHF (CONGESTIVE HEART FAILURE): Primary | Chronic | ICD-10-CM

## 2024-08-21 DIAGNOSIS — E66.01 CLASS 2 SEVERE OBESITY DUE TO EXCESS CALORIES WITH SERIOUS COMORBIDITY AND BODY MASS INDEX (BMI) OF 36.0 TO 36.9 IN ADULT: ICD-10-CM

## 2024-08-21 DIAGNOSIS — F41.9 ANXIETY: ICD-10-CM

## 2024-08-21 DIAGNOSIS — I10 ESSENTIAL HYPERTENSION: ICD-10-CM

## 2024-08-21 DIAGNOSIS — R42 DIZZINESS: ICD-10-CM

## 2024-08-21 DIAGNOSIS — E11.42 TYPE 2 DIABETES MELLITUS WITH DIABETIC POLYNEUROPATHY, WITHOUT LONG-TERM CURRENT USE OF INSULIN: ICD-10-CM

## 2024-08-21 PROCEDURE — 1160F RVW MEDS BY RX/DR IN RCRD: CPT | Mod: CPTII,S$GLB,, | Performed by: FAMILY MEDICINE

## 2024-08-21 PROCEDURE — 99999 PR PBB SHADOW E&M-EST. PATIENT-LVL IV: CPT | Mod: PBBFAC,,, | Performed by: FAMILY MEDICINE

## 2024-08-21 PROCEDURE — 99214 OFFICE O/P EST MOD 30 MIN: CPT | Mod: S$GLB,,, | Performed by: FAMILY MEDICINE

## 2024-08-21 PROCEDURE — 3079F DIAST BP 80-89 MM HG: CPT | Mod: CPTII,S$GLB,, | Performed by: FAMILY MEDICINE

## 2024-08-21 PROCEDURE — 3051F HG A1C>EQUAL 7.0%<8.0%: CPT | Mod: CPTII,S$GLB,, | Performed by: FAMILY MEDICINE

## 2024-08-21 PROCEDURE — 4010F ACE/ARB THERAPY RXD/TAKEN: CPT | Mod: CPTII,S$GLB,, | Performed by: FAMILY MEDICINE

## 2024-08-21 PROCEDURE — 3008F BODY MASS INDEX DOCD: CPT | Mod: CPTII,S$GLB,, | Performed by: FAMILY MEDICINE

## 2024-08-21 PROCEDURE — 3077F SYST BP >= 140 MM HG: CPT | Mod: CPTII,S$GLB,, | Performed by: FAMILY MEDICINE

## 2024-08-21 PROCEDURE — 1159F MED LIST DOCD IN RCRD: CPT | Mod: CPTII,S$GLB,, | Performed by: FAMILY MEDICINE

## 2024-08-21 PROCEDURE — G2211 COMPLEX E/M VISIT ADD ON: HCPCS | Mod: S$GLB,,, | Performed by: FAMILY MEDICINE

## 2024-08-21 RX ORDER — POTASSIUM CHLORIDE 1500 MG/1
20 TABLET, EXTENDED RELEASE ORAL DAILY PRN
Qty: 90 TABLET | Refills: 1 | Status: SHIPPED | OUTPATIENT
Start: 2024-08-21

## 2024-08-21 RX ORDER — FUROSEMIDE 40 MG/1
40 TABLET ORAL DAILY PRN
Qty: 90 TABLET | Refills: 1 | Status: SHIPPED | OUTPATIENT
Start: 2024-08-21

## 2024-08-21 RX ORDER — ALPRAZOLAM 0.25 MG/1
0.25 TABLET ORAL DAILY PRN
Qty: 30 TABLET | Refills: 0 | Status: SHIPPED | OUTPATIENT
Start: 2024-08-21 | End: 2024-09-20

## 2024-08-21 RX ORDER — METOPROLOL SUCCINATE 100 MG/1
100 TABLET, EXTENDED RELEASE ORAL DAILY
Qty: 90 TABLET | Refills: 3 | Status: SHIPPED | OUTPATIENT
Start: 2024-08-21

## 2024-08-21 RX ORDER — LISINOPRIL 5 MG/1
5 TABLET ORAL DAILY
Qty: 90 TABLET | Refills: 3 | Status: SHIPPED | OUTPATIENT
Start: 2024-08-21 | End: 2025-08-21

## 2024-08-21 RX ORDER — AMLODIPINE BESYLATE 5 MG/1
5 TABLET ORAL NIGHTLY
Qty: 90 TABLET | Refills: 3 | Status: SHIPPED | OUTPATIENT
Start: 2024-08-21

## 2024-08-21 RX ORDER — FENOFIBRATE 160 MG/1
160 TABLET ORAL DAILY
Qty: 90 TABLET | Refills: 3 | Status: SHIPPED | OUTPATIENT
Start: 2024-08-21 | End: 2025-08-21

## 2024-08-21 NOTE — PROGRESS NOTES
PATIENT VISIT FAMILY MEDICINE    CC:   Chief Complaint   Patient presents with    Follow-up    Diabetes    Hypertension       HPI: Loi Cordova  is a 58 y.o. female:    Patient is known to me.  Patient presents routine follow up on chronic conditions    Patient reports she is out of metoprolol and mounjaro. Ran out of xanax, evenings and twice a day on the weekend.     Headaches often since running out of metoprolol. Sometimes lightheaded and dizzy. 1+ pitting edema up to the legs. Has taken fluid pills in the past but is currently out. Reports SOB as well.      PMHX:   Past Medical History:   Diagnosis Date    Acute cystitis with hematuria 07/23/2023    Allergy     Asthma     Chronic diastolic heart failure     Coronary artery disease     GERD (gastroesophageal reflux disease)     History of back surgery 02/20/2018    Hyperlipidemia     Hypertension     Pulmonary emphysema 08/11/2023    Type 2 diabetes mellitus with diabetic polyneuropathy, without long-term current use of insulin 02/08/2019       PSHX:   Past Surgical History:   Procedure Laterality Date    BILATERAL OOPHORECTOMY Bilateral 2000    BREAST BIOPSY Left 2/14/2020    Procedure: BIOPSY, BREAST-Left medial breast palpation guided;  Surgeon: Paulino Espinoza MD;  Location: [address];  Service: General;  Laterality: Left;    CARDIAC CATHETERIZATION      7 stents    CHOLECYSTECTOMY      COLONOSCOPY N/A 7/16/2019    Procedure: COLONOSCOPY;  Surgeon: Sarah Gamez MD;  Location: [facility];  Service: Endoscopy;  Laterality: N/A;    ESOPHAGOGASTRODUODENOSCOPY N/A 1/9/2020    Procedure: EGD (ESOPHAGOGASTRODUODENOSCOPY);  Surgeon: Sarah Gamez MD;  Location: [facility];  Service: Endoscopy;  Laterality: N/A;    HYSTERECTOMY      PARTIAL HYSTERECTOMY N/A 1990    Uterus taken out    SHOULDER SURGERY      TOTAL KNEE ARTHROPLASTY         FAMHX:   Family History   Problem Relation Name Age of Onset    Heart disease Mother      Hyperlipidemia Mother       Hypertension Mother      Diabetes Mother      Heart disease Father      Hyperlipidemia Father      Cancer Father      Diabetes Maternal Aunt      Prostate cancer Neg Hx      Kidney disease Neg Hx         SOCHX:   Social History     Socioeconomic History    Marital status:    Tobacco Use    Smoking status: Every Day     Current packs/day: 0.50     Average packs/day: 0.5 packs/day for 34.0 years (17.0 ttl pk-yrs)     Types: Cigarettes     Passive exposure: Never    Smokeless tobacco: Never    Tobacco comments:     in smoking program 1/25/21   Substance and Sexual Activity    Alcohol use: Not Currently     Comment: 6 months    Drug use: No    Sexual activity: Yes     Partners: Male     Social Determinants of Health     Financial Resource Strain: Low Risk  (3/28/2024)    Overall Financial Resource Strain (CARDIA)     Difficulty of Paying Living Expenses: Not hard at all   Food Insecurity: Unknown (3/28/2024)    Hunger Vital Sign     Worried About Running Out of Food in the Last Year: Never true   Transportation Needs: No Transportation Needs (3/28/2024)    PRAPARE - Transportation     Lack of Transportation (Medical): No     Lack of Transportation (Non-Medical): No   Physical Activity: Unknown (3/28/2024)    Exercise Vital Sign     Days of Exercise per Week: 0 days   Recent Concern: Physical Activity - Inactive (3/28/2024)    Exercise Vital Sign     Days of Exercise per Week: 0 days     Minutes of Exercise per Session: 0 min   Stress: Stress Concern Present (3/28/2024)    [employer] - Occupational Stress Questionnaire     Feeling of Stress : To some extent   Housing Stability: Low Risk  (3/28/2024)    Housing Stability Vital Sign     Unable to Pay for Housing in the Last Year: No     Number of Places Lived in the Last Year: 1     Unstable Housing in the Last Year: No       ALLERGIES:   Review of patient's allergies indicates:   Allergen Reactions    Crayfish Anaphylaxis      (crawfish only)    Rocephin [ceftriaxone] Rash       MEDS:   Current Outpatient Medications:     albuterol (PROVENTIL HFA) 90 mcg/actuation inhaler, Inhale 2 puffs into the lungs every 6 (six) hours as needed for Wheezing or Shortness of Breath. Rescue, Disp: 18 g, Rfl: 6    albuterol (PROVENTIL) 2.5 mg /3 mL (0.083 %) nebulizer solution, Use one vial (3 mL) (2.5 mg total) by nebulization every 6 (six) hours as needed for Wheezing. Rescue, Disp: 270 mL, Rfl: 11    atorvastatin (LIPITOR) 80 MG tablet, Take 1 tablet (80 mg total) by mouth every evening., Disp: 90 tablet, Rfl: 3    azelastine (ASTELIN) 137 mcg (0.1 %) nasal spray, 1 spray (137 mcg total) in each nostril route 2 (two) times daily., Disp: 30 mL, Rfl: 0    cyclobenzaprine (FLEXERIL) 10 MG tablet, Take 1 tablet (10 mg total) by mouth 3 (three) times daily as needed for Muscle spasms., Disp: 60 tablet, Rfl: 0    fluticasone furoate-vilanteroL (BREO) 100-25 mcg/dose diskus inhaler, Inhale 1 puff into the lungs once daily. Controller, Disp: 60 each, Rfl: 0    ibuprofen (ADVIL,MOTRIN) 600 MG tablet, Take 1 tablet (600 mg total) by mouth every 6 (six) hours as needed for Pain., Disp: 20 tablet, Rfl: 0    mepolizumab 100 mg/mL AtIn, Inject 1 mL (100 mg total) into the skin every 28 days., Disp: 1 mL, Rfl: 2    nitroGLYCERIN (NITROSTAT) 0.4 MG SL tablet, Place 1 tablet under the tongue once every 5 minutes for 3 doses for chest pain, if pain continues call 911, Disp: 25 tablet, Rfl: 3    omeprazole (PRILOSEC) 40 MG capsule, Take 1 capsule (40 mg total) by mouth 2 (two) times a day., Disp: 180 capsule, Rfl: 3    ondansetron (ZOFRAN-ODT) 8 MG TbDL, Take 1 tablet (8 mg total) by mouth every 12 (twelve) hours as needed (n/v)., Disp: 90 tablet, Rfl: 3    oxybutynin (DITROPAN XL) 15 MG TR24, Take 2 tablets (30 mg total) by mouth once daily. For overactive bladder, Disp: 180 tablet, Rfl: 3    rOPINIRole (REQUIP) 0.5 MG tablet, Take 1 tablet (0.5 mg total) by mouth every  evening., Disp: 90 tablet, Rfl: 3    tirzepatide (MOUNJARO) 7.5 mg/0.5 mL PnIj, Inject 7.5 mg into the skin every 7 days., Disp: 4 Pen, Rfl: 0    traZODone (DESYREL) 100 MG tablet, Take 1 tablet (100 mg total) by mouth every evening., Disp: 90 tablet, Rfl: 3    ALPRAZolam (XANAX) 0.25 MG tablet, Take 1 tablet (0.25 mg total) by mouth daily as needed for Anxiety., Disp: 30 tablet, Rfl: 0    amLODIPine (NORVASC) 5 MG tablet, Take 1 tablet (5 mg total) by mouth every evening., Disp: 90 tablet, Rfl: 3    aspirin 81 MG Chew, Take 81 mg by mouth every evening., Disp: , Rfl:     blood sugar diagnostic Strp, Use 1 strip to check blood sugar once daily, Disp: 100 each, Rfl: 0    blood-glucose meter Misc, USE ONCE DAILY TO MONITOR GLUCOSE, Disp: 1 each, Rfl: 0    blood-glucose meter,continuous (DEXCOM G6 ) Misc, 1 each by Misc.(Non-Drug; Combo Route) route once. for 1 dose (Patient not taking: Reported on 1/17/2024), Disp: 1 each, Rfl: 0    blood-glucose sensor (DEXCOM G6 SENSOR) Jeannine, 1 each by Misc.(Non-Drug; Combo Route) route every 10 days., Disp: 3 each, Rfl: 11    blood-glucose transmitter (DEXCOM G6 TRANSMITTER) Jeannine, 1 each by Misc.(Non-Drug; Combo Route) route once. for 1 dose (Patient not taking: Reported on 1/17/2024), Disp: 1 each, Rfl: 0    fenofibrate 160 MG Tab, Take 1 tablet (160 mg total) by mouth once daily., Disp: 90 tablet, Rfl: 3    furosemide (LASIX) 40 MG tablet, Take 1 tablet (40 mg total) by mouth daily as needed (edema)., Disp: 90 tablet, Rfl: 1    lancets 33 gauge Misc, USE ONCE DAILY TO MONITOR GLUCOSE, Disp: 100 each, Rfl: 3    lisinopriL (PRINIVIL,ZESTRIL) 5 MG tablet, Take 1 tablet (5 mg total) by mouth once daily., Disp: 90 tablet, Rfl: 3    metoprolol succinate (TOPROL-XL) 100 MG 24 hr tablet, Take 1 tablet (100 mg total) by mouth once daily., Disp: 90 tablet, Rfl: 3    mupirocin (BACTROBAN) 2 % ointment, Apply topically 3 (three) times daily., Disp: 30 g, Rfl: 0    potassium chloride  (K-TAB) 20 mEq, Take 1 tablet (20 mEq total) by mouth daily as needed (edema). Take with lasix if needed for edema, Disp: 90 tablet, Rfl: 1    prochlorperazine (COMPAZINE) 10 MG tablet, Take 1 tablet (10 mg total) by mouth every 6 (six) hours as needed., Disp: 15 tablet, Rfl: 0    ranolazine (RANEXA) 1,000 mg Tb12, Take 1 tablet (1,000 mg total) by mouth 2 (two) times daily. (Patient not taking: Reported on 8/21/2024), Disp: 180 tablet, Rfl: 3  No current facility-administered medications for this visit.    Facility-Administered Medications Ordered in Other Visits:     0.9%  NaCl infusion, , Intravenous, Continuous, Sarah Gamez MD, Stopped at 01/09/20 0925    0.9%  NaCl infusion, , Intravenous, Continuous, Sarah Gamez MD, Stopped at 01/09/20 1026    0.9%  NaCl infusion, , Intravenous, Continuous, Angélica De La Cruz MD    ceFAZolin injection 2 g, 2 g, Intravenous, On Call Procedure, Angélica De La Cruz MD    lidocaine (PF) 10 mg/ml (1%) injection 10 mg, 1 mL, Intradermal, Once, Angélica De La Cruz MD    sodium chloride 0.9% flush 10 mL, 10 mL, Intravenous, PRN, Sarah Gamez MD    sodium chloride 0.9% flush 10 mL, 10 mL, Intravenous, PRN, Sarah Gamez MD    OBJECTIVE:   Vitals:    08/21/24 1058   BP: (!) 160/82   BP Location: Left arm   Patient Position: Sitting   BP Method: Large (Manual)   Pulse: 82   SpO2: 98%   Weight: 85.9 kg (189 lb 6 oz)   Height: 5' (1.524 m)     Body mass index is 36.98 kg/m².      Physical Exam      LABS:   A1C:  Recent Labs   Lab 08/20/24  1529   Hemoglobin A1C 7.0 H     CBC:  Recent Labs   Lab 05/15/24  1123   WBC 8.11   RBC 4.56   Hemoglobin 13.5   Hematocrit 41.3   Platelets 501 H   MCV 91   MCH 29.6   MCHC 32.7     CMP:  Recent Labs   Lab 05/15/24  1123 08/20/24  1529   Glucose 149 H 164 H   Calcium 9.6 9.2   Albumin 4.6  --    Total Protein 7.6  --    Sodium 138 139   Potassium 4.5 3.4 L   CO2 20 L 22 L   Chloride 107 108   BUN 15 10   Creatinine 0.86  1.1   Alkaline Phosphatase 64  --    ALT 35  --    AST 36  --    Total Bilirubin 0.7  --      LIPIDS:  Recent Labs   Lab 06/09/22  0705 06/22/23  0635 08/20/24  1529   TSH 1.550  --   --    HDL  --    < > 62   Cholesterol  --    < > 176   Triglycerides  --    < > 228 H   LDL Cholesterol  --    < > 68.4   HDL/Cholesterol Ratio  --    < > 35.2   Non-HDL Cholesterol  --    < > 114   Total Cholesterol/HDL Ratio  --    < > 2.8    < > = values in this interval not displayed.     TSH:  Recent Labs   Lab 06/09/22  0705   TSH 1.550         ASSESSMENT & PLAN:    Problem List Items Addressed This Visit          Psychiatric    Anxiety (Chronic)    Overview     Stable. SI with SSRI in the past. Xanax PRN not intended for daily use. Okay to fill 30 tabs every 2-6 months. Can consider buspar in future if needed.  reviewed and as expected.          Relevant Medications    ALPRAZolam (XANAX) 0.25 MG tablet       Cardiac/Vascular    Chronic diastolic CHF (congestive heart failure) - Primary (Chronic)    Overview     +10lbs, mild dyspnea, rales on exam; start lasix with potassium for 3-5 days, close f/u with BMP recheck next week. Continue rest of medications  Echo    Result Date: 5/22/2023  · The left ventricle is normal in size with normal systolic function.  · The estimated ejection fraction is 65%.  · Normal left ventricular diastolic function.  · Normal right ventricular size with normal right ventricular systolic   function.  · Normal central venous pressure (3 mmHg).  · The estimated PA systolic pressure is 18 mmHg.                 Relevant Medications    furosemide (LASIX) 40 MG tablet    potassium chloride (K-TAB) 20 mEq    Other Relevant Orders    Basic Metabolic Panel    X-Ray Chest PA And Lateral    Essential hypertension (Chronic)    Overview     Well controlled. Continue current regimen         Relevant Medications    metoprolol succinate (TOPROL-XL) 100 MG 24 hr tablet    furosemide (LASIX) 40 MG tablet    amLODIPine  (NORVASC) 5 MG tablet    lisinopriL (PRINIVIL,ZESTRIL) 5 MG tablet       Endocrine    Type 2 diabetes mellitus with diabetic polyneuropathy, without long-term current use of insulin (Chronic)    Overview     Last A1c is 6.7 on 01/17/2024  Lately notes hyperglycemia. Recommending increasing Mounjaro. New RX sent.            Relevant Medications    fenofibrate 160 MG Tab    Other Relevant Orders    Basic Metabolic Panel    Class 2 severe obesity due to excess calories with serious comorbidity and body mass index (BMI) of 36.0 to 36.9 in adult    Overview     Body mass index is 36.98 kg/m².  The patient is asked to make an attempt to improve diet and exercise patterns to aid in medical management of this problem.              Other    Dizziness see CHF plan         Follow up in about 1 week (around 8/28/2024) for chf f/u.      RTC/ED precautions discussed where applicable.   Encouraged patient to let me know if there are any further questions/concerns.     Advise patient/caretaker to check with insurance regarding orders to avoid unexpected fees/costs.     The patient/caretaker indicates understanding of these issues and agrees with the plan.    James Pete, MS4  Family Medicine    Visit today included increased complexity associated with the care of the episodic problem uncontrolled chf, dizziness addressed and managing the longitudinal care of the patient due to the serious and/or complex managed problem(s) as documented.      I hereby acknowledge that I am relying upon documentation authored by a medical student working under my supervision and further I hereby attest that I have verified the student documentation or findings by personally re-performing the physical exam and medical decision making activities of the Evaluation and Management service to be billed.  Hari Francis

## 2024-08-27 DIAGNOSIS — E11.42 TYPE 2 DIABETES MELLITUS WITH DIABETIC POLYNEUROPATHY, WITHOUT LONG-TERM CURRENT USE OF INSULIN: Chronic | ICD-10-CM

## 2024-08-27 RX ORDER — BLOOD-GLUCOSE TRANSMITTER
1 EACH MISCELLANEOUS ONCE
Qty: 1 EACH | Refills: 0 | Status: SHIPPED | OUTPATIENT
Start: 2024-08-27 | End: 2024-08-27

## 2024-08-27 RX ORDER — BLOOD-GLUCOSE SENSOR
1 EACH MISCELLANEOUS
Qty: 3 EACH | Refills: 11 | Status: SHIPPED | OUTPATIENT
Start: 2024-08-27 | End: 2025-08-27

## 2024-08-27 RX ORDER — BLOOD-GLUCOSE,RECEIVER,CONT
1 EACH MISCELLANEOUS ONCE
Qty: 1 EACH | Refills: 0 | Status: SHIPPED | OUTPATIENT
Start: 2024-08-27 | End: 2024-08-27

## 2024-08-27 NOTE — TELEPHONE ENCOUNTER
No care due was identified.  Health [facility] Embedded Care Due Messages. Reference number: [id number].   8/27/2024 9:40:15 AM CDT

## 2024-08-29 ENCOUNTER — PATIENT MESSAGE (OUTPATIENT)
Dept: FAMILY MEDICINE | Facility: CLINIC | Age: 59
End: 2024-08-29
Payer: COMMERCIAL

## 2024-09-26 DIAGNOSIS — J45.51 SEVERE PERSISTENT ASTHMA WITH ACUTE EXACERBATION: ICD-10-CM

## 2024-09-26 RX ORDER — MEPOLIZUMAB 100 MG/ML
100 INJECTION, SOLUTION SUBCUTANEOUS
Qty: 1 ML | Refills: 2 | Status: SHIPPED | OUTPATIENT
Start: 2024-09-26

## 2024-09-26 NOTE — TELEPHONE ENCOUNTER
Spoke with patient about refill. Since it has not been 1 year I can have Dr Gamez fill enough until she is able to be seen by him. Appt scheduled to establish care.

## 2024-09-30 DIAGNOSIS — J45.51 SEVERE PERSISTENT ASTHMA WITH ACUTE EXACERBATION: Primary | ICD-10-CM

## 2024-09-30 DIAGNOSIS — J45.51 SEVERE PERSISTENT ASTHMA WITH ACUTE EXACERBATION: ICD-10-CM

## 2024-09-30 RX ORDER — MEPOLIZUMAB 100 MG/ML
100 INJECTION, SOLUTION SUBCUTANEOUS
Qty: 1 ML | Refills: 2 | OUTPATIENT
Start: 2024-09-30

## 2024-09-30 NOTE — TELEPHONE ENCOUNTER
Spoke with patient and she said that they pharmacy told her that her RX was set to print and they need it to be resent over.  Advised patient I will send back to Dr Gamez to approve rx for OSP.

## 2024-10-01 RX ORDER — MEPOLIZUMAB 100 MG/ML
100 INJECTION, SOLUTION SUBCUTANEOUS
Qty: 1 ML | Refills: 2 | Status: ACTIVE | OUTPATIENT
Start: 2024-10-01

## 2024-10-02 ENCOUNTER — OFFICE VISIT (OUTPATIENT)
Dept: FAMILY MEDICINE | Facility: CLINIC | Age: 59
End: 2024-10-02
Payer: COMMERCIAL

## 2024-10-02 VITALS
SYSTOLIC BLOOD PRESSURE: 120 MMHG | HEIGHT: 60 IN | DIASTOLIC BLOOD PRESSURE: 80 MMHG | BODY MASS INDEX: 36.33 KG/M2 | HEART RATE: 61 BPM | WEIGHT: 185.06 LBS

## 2024-10-02 DIAGNOSIS — K59.00 CONSTIPATION, UNSPECIFIED CONSTIPATION TYPE: ICD-10-CM

## 2024-10-02 DIAGNOSIS — R35.0 URINARY FREQUENCY: Primary | ICD-10-CM

## 2024-10-02 DIAGNOSIS — M54.50 ACUTE BILATERAL LOW BACK PAIN WITHOUT SCIATICA: ICD-10-CM

## 2024-10-02 PROCEDURE — 3051F HG A1C>EQUAL 7.0%<8.0%: CPT | Mod: CPTII,S$GLB,,

## 2024-10-02 PROCEDURE — 3008F BODY MASS INDEX DOCD: CPT | Mod: CPTII,S$GLB,,

## 2024-10-02 PROCEDURE — 1160F RVW MEDS BY RX/DR IN RCRD: CPT | Mod: CPTII,S$GLB,,

## 2024-10-02 PROCEDURE — 99214 OFFICE O/P EST MOD 30 MIN: CPT | Mod: S$GLB,,,

## 2024-10-02 PROCEDURE — 3074F SYST BP LT 130 MM HG: CPT | Mod: CPTII,S$GLB,,

## 2024-10-02 PROCEDURE — 4010F ACE/ARB THERAPY RXD/TAKEN: CPT | Mod: CPTII,S$GLB,,

## 2024-10-02 PROCEDURE — 99999 PR PBB SHADOW E&M-EST. PATIENT-LVL V: CPT | Mod: PBBFAC,,,

## 2024-10-02 PROCEDURE — 1159F MED LIST DOCD IN RCRD: CPT | Mod: CPTII,S$GLB,,

## 2024-10-02 PROCEDURE — 3079F DIAST BP 80-89 MM HG: CPT | Mod: CPTII,S$GLB,,

## 2024-10-02 NOTE — PROGRESS NOTES
Subjective:              Chief Complaint: Urinary Tract Infection  HPI   Loi Cordova is a 59 y.o. female, patient of Hari Francis MD with   unknown to me, presents today with complaints of Urinary Tract Infection      Presents today with c/o Bilat lower back pain 8/10. Sometimes pain extend to the right side. Denies burning, itching, pain. Urge to pee 5 to 6 times, but only produce a little urination. Symptoms started on Monday. Reported possible fever yesterday, but did not take temp. Took 500mg of Ibuprofen and it helped.   Reports seeing blood when wiping after urination (started today).   Denies trying any medications. Reports UTI a few months with the same symptoms of blood in urine.Report taking Cipro in the past and it worked. Denies Sob, denies changes in appetite.Reports some constipation when taking Monjuro, but is taking Lizness and it helps.     Past Medical History:   Diagnosis Date    Acute cystitis with hematuria 07/23/2023    Allergy     Asthma     Chronic diastolic heart failure     Coronary artery disease     GERD (gastroesophageal reflux disease)     History of back surgery 02/20/2018    Hyperlipidemia     Hypertension     Pulmonary emphysema 08/11/2023    Type 2 diabetes mellitus with diabetic polyneuropathy, without long-term current use of insulin 02/08/2019       Past Surgical History:   Procedure Laterality Date    BILATERAL OOPHORECTOMY Bilateral 2000    BREAST BIOPSY Left 2/14/2020    Procedure: BIOPSY, BREAST-Left medial breast palpation guided;  Surgeon: Paulino Espinoza MD;  Location: [address];  Service: General;  Laterality: Left;    CARDIAC CATHETERIZATION      7 stents    CHOLECYSTECTOMY      COLONOSCOPY N/A 7/16/2019    Procedure: COLONOSCOPY;  Surgeon: Sarah Gamez MD;  Location: [facility];  Service: Endoscopy;  Laterality: N/A;    ESOPHAGOGASTRODUODENOSCOPY N/A 1/9/2020    Procedure: EGD (ESOPHAGOGASTRODUODENOSCOPY);  Surgeon: Sarah Gamez MD;  Location:  [facility];  Service: Endoscopy;  Laterality: N/A;    HYSTERECTOMY      PARTIAL HYSTERECTOMY N/A 1990    Uterus taken out    SHOULDER SURGERY      TOTAL KNEE ARTHROPLASTY         Family History   Problem Relation Name Age of Onset    Heart disease Mother      Hyperlipidemia Mother      Hypertension Mother      Diabetes Mother      Heart disease Father      Hyperlipidemia Father      Cancer Father      Diabetes Maternal Aunt      Prostate cancer Neg Hx      Kidney disease Neg Hx         Social History     Socioeconomic History    Marital status:    Tobacco Use    Smoking status: Former     Current packs/day: 0.50     Average packs/day: 0.5 packs/day for 34.0 years (17.0 ttl pk-yrs)     Types: Cigarettes, Vaping w/o nicotine     Passive exposure: Never    Smokeless tobacco: Never    Tobacco comments:     in smoking program 1/25/21 now smokes vapes without nicotine   Substance and Sexual Activity    Alcohol use: Not Currently     Comment: 6 months    Drug use: No    Sexual activity: Yes     Partners: Male     Social Drivers of Health     Financial Resource Strain: Low Risk  (3/28/2024)    Overall Financial Resource Strain (CARDIA)     Difficulty of Paying Living Expenses: Not hard at all   Food Insecurity: Unknown (3/28/2024)    Hunger Vital Sign     Worried About Running Out of Food in the Last Year: Never true   Transportation Needs: No Transportation Needs (3/28/2024)    PRAPARE - Transportation     Lack of Transportation (Medical): No     Lack of Transportation (Non-Medical): No   Physical Activity: Unknown (3/28/2024)    Exercise Vital Sign     Days of Exercise per Week: 0 days   Recent Concern: Physical Activity - Inactive (3/28/2024)    Exercise Vital Sign     Days of Exercise per Week: 0 days     Minutes of Exercise per Session: 0 min   Stress: Stress Concern Present (3/28/2024)    [employer] - Occupational Stress Questionnaire     Feeling of Stress : To some extent   Housing  "Stability: Low Risk  (3/28/2024)    Housing Stability Vital Sign     Unable to Pay for Housing in the Last Year: No     Number of Places Lived in the Last Year: 1     Unstable Housing in the Last Year: No       Review of Systems   Constitutional: Negative.    Respiratory: Negative.     Cardiovascular: Negative.    Genitourinary:  Positive for frequency, hematuria and pelvic pain.         Objective:     Vitals:    10/02/24 1313   BP: 120/80   BP Location: Left arm   Patient Position: Sitting   Pulse: 61   Weight: 84 kg (185 lb 1.2 oz)   Height: 5' (1.524 m)          Physical Exam  Vitals reviewed.   Constitutional:       Appearance: Normal appearance. She is well-developed and well-groomed.   HENT:      Head: Normocephalic and atraumatic.   Cardiovascular:      Rate and Rhythm: Normal rate and regular rhythm.      Heart sounds: Normal heart sounds.   Pulmonary:      Effort: Pulmonary effort is normal.      Breath sounds: Normal breath sounds. No wheezing, rhonchi or rales.   Abdominal:      General: Bowel sounds are normal.      Tenderness: There is abdominal tenderness in the left upper quadrant. There is no right CVA tenderness or left CVA tenderness.   Musculoskeletal:      Right lower leg: No edema.      Left lower leg: No edema.   Skin:     General: Skin is warm and dry.      Capillary Refill: Capillary refill takes less than 2 seconds.   Neurological:      General: No focal deficit present.      Mental Status: She is alert and oriented to person, place, and time.   Psychiatric:         Behavior: Behavior is cooperative.           Laboratory:  CBC:  No results for input(s): "WBC", "RBC", "HGB", "HCT", "PLT", "MCV", "MCH", "MCHC" in the last 2160 hours.  CMP:  Recent Labs   Lab Result Units 08/20/24  1529   Glucose mg/dL 164*   Calcium mg/dL 9.2   Sodium mmol/L 139   Potassium mmol/L 3.4*   CO2 mmol/L 22*   Chloride mmol/L 108   BUN mg/dL 10     URINALYSIS:  Recent Labs   Lab Result Units 07/15/24  1330   Color, " "UA  Yellow   Specific Gravity, UA  1.025   pH, UA  6.0   Protein, UA  Negative   Bacteria /hpf Moderate*   Nitrite, UA  Negative   Leukocytes, UA  1+*   Urobilinogen, UA EU/dL Negative      LIPIDS:  Recent Labs   Lab Result Units 08/20/24  1529   HDL mg/dL 62   Cholesterol mg/dL 176   Triglycerides mg/dL 228*   LDL Cholesterol mg/dL 68.4   HDL/Cholesterol Ratio % 35.2   Non-HDL Cholesterol mg/dL 114   Total Cholesterol/HDL Ratio  2.8     TSH:  No results for input(s): "TSH" in the last 2160 hours.  A1C:  Recent Labs   Lab Result Units 08/20/24  1529   Hemoglobin A1C % 7.0*       Assessment:         ICD-10-CM ICD-9-CM   1. Urinary frequency  R35.0 788.41   2. Acute bilateral low back pain without sciatica  M54.50 724.2     338.19   3. Constipation, unspecified constipation type  K59.00 564.00       Plan:       Urinary frequency  Acute bilateral low back pain without sciatica  -     Urine culture; Future; Expected date: 10/02/2024  -     Urinalysis; Future; Expected date: 10/02/2024  Patient with UTI treated with Cipro in July. Recurrent symptoms. Urine today. Will notify via my chart results.   Patient instructed on the following:  -Discussed increase water intake, try to drink at least 4-16 oz bottles of water per day  -Discussed pre and postcoital voids, wiping front to back  -urinate as soon as the urge is felt    Constipation, unspecified constipation type  Continue with Linzess as prescribed.     If symptoms worsen, go to ER.  If symptoms do not improve, return to clinic.   Keep appointments with all specialists.     Patient verbalizes understanding and agrees with current treatment plan.      Follow up if symptoms worsen or fail to improve.     Patient's Medications   New Prescriptions    No medications on file   Previous Medications    ALBUTEROL (PROVENTIL HFA) 90 MCG/ACTUATION INHALER    Inhale 2 puffs into the lungs every 6 (six) hours as needed for Wheezing or Shortness of Breath. Rescue    ALBUTEROL " (PROVENTIL) 2.5 MG /3 ML (0.083 %) NEBULIZER SOLUTION    Use one vial (3 mL) (2.5 mg total) by nebulization every 6 (six) hours as needed for Wheezing. Rescue    ALPRAZOLAM (XANAX) 0.25 MG TABLET    Take 1 tablet (0.25 mg total) by mouth daily as needed for Anxiety.    AMLODIPINE (NORVASC) 5 MG TABLET    Take 1 tablet (5 mg total) by mouth every evening.    ASPIRIN 81 MG CHEW    Take 81 mg by mouth every evening.    ATORVASTATIN (LIPITOR) 80 MG TABLET    Take 1 tablet (80 mg total) by mouth every evening.    AZELASTINE (ASTELIN) 137 MCG (0.1 %) NASAL SPRAY    1 spray (137 mcg total) in each nostril route 2 (two) times daily.    BLOOD SUGAR DIAGNOSTIC STRP    Use 1 strip to check blood sugar once daily    BLOOD-GLUCOSE METER MISC    USE ONCE DAILY TO MONITOR GLUCOSE    BLOOD-GLUCOSE METER,CONTINUOUS (DEXCOM G6 ) MISC    1 each by Misc.(Non-Drug; Combo Route) route once. for 1 dose    BLOOD-GLUCOSE SENSOR (DEXCOM G6 SENSOR) PERFECTO    1 each by Misc.(Non-Drug; Combo Route) route every 10 days.    BLOOD-GLUCOSE TRANSMITTER (DEXCOM G6 TRANSMITTER) PERFECTO    1 each by Misc.(Non-Drug; Combo Route) route once. for 1 dose    CYCLOBENZAPRINE (FLEXERIL) 10 MG TABLET    Take 1 tablet (10 mg total) by mouth 3 (three) times daily as needed for Muscle spasms.    FENOFIBRATE 160 MG TAB    Take 1 tablet (160 mg total) by mouth once daily.    FLUTICASONE FUROATE-VILANTEROL (BREO) 100-25 MCG/DOSE DISKUS INHALER    Inhale 1 puff into the lungs once daily. Controller    FUROSEMIDE (LASIX) 40 MG TABLET    Take 1 tablet (40 mg total) by mouth daily as needed (edema).    IBUPROFEN (ADVIL,MOTRIN) 600 MG TABLET    Take 1 tablet (600 mg total) by mouth every 6 (six) hours as needed for Pain.    LANCETS 33 GAUGE MISC    USE ONCE DAILY TO MONITOR GLUCOSE    LISINOPRIL (PRINIVIL,ZESTRIL) 5 MG TABLET    Take 1 tablet (5 mg total) by mouth once daily.    MEPOLIZUMAB 100 MG/ML ATIN    Inject 1 mL (100 mg total) into the skin every 28 days.     METOPROLOL SUCCINATE (TOPROL-XL) 100 MG 24 HR TABLET    Take 1 tablet (100 mg total) by mouth once daily.    MUPIROCIN (BACTROBAN) 2 % OINTMENT    Apply topically 3 (three) times daily.    NITROGLYCERIN (NITROSTAT) 0.4 MG SL TABLET    Place 1 tablet under the tongue once every 5 minutes for 3 doses for chest pain, if pain continues call 911    OMEPRAZOLE (PRILOSEC) 40 MG CAPSULE    Take 1 capsule (40 mg total) by mouth 2 (two) times a day.    ONDANSETRON (ZOFRAN-ODT) 8 MG TBDL    Take 1 tablet (8 mg total) by mouth every 12 (twelve) hours as needed (n/v).    OXYBUTYNIN (DITROPAN XL) 15 MG TR24    Take 2 tablets (30 mg total) by mouth once daily. For overactive bladder    POTASSIUM CHLORIDE (K-TAB) 20 MEQ    Take 1 tablet (20 mEq total) by mouth daily as needed (edema). Take with lasix if needed for edema    PROCHLORPERAZINE (COMPAZINE) 10 MG TABLET    Take 1 tablet (10 mg total) by mouth every 6 (six) hours as needed.    RANOLAZINE (RANEXA) 1,000 MG TB12    Take 1 tablet (1,000 mg total) by mouth 2 (two) times daily.    ROPINIROLE (REQUIP) 0.5 MG TABLET    Take 1 tablet (0.5 mg total) by mouth every evening.    TIRZEPATIDE (MOUNJARO) 7.5 MG/0.5 ML PNIJ    Inject 7.5 mg into the skin every 7 days.    TRAZODONE (DESYREL) 100 MG TABLET    Take 1 tablet (100 mg total) by mouth every evening.   Modified Medications    No medications on file   Discontinued Medications    No medications on file     I hereby acknowledge that I am relying upon documentation authored by a Nurse Practitioner student working under my supervision and further I hereby attest that I have verified the student documentation or findings by personally re-performing the physical exam and medical decision making activities of the Evaluation and Management service to be billed.            Betsey Conde NP

## 2024-10-03 ENCOUNTER — PATIENT MESSAGE (OUTPATIENT)
Dept: FAMILY MEDICINE | Facility: CLINIC | Age: 59
End: 2024-10-03
Payer: COMMERCIAL

## 2024-10-03 DIAGNOSIS — G47.00 INSOMNIA, UNSPECIFIED TYPE: ICD-10-CM

## 2024-10-03 DIAGNOSIS — N39.0 URINARY TRACT INFECTION WITH HEMATURIA, SITE UNSPECIFIED: Primary | ICD-10-CM

## 2024-10-03 DIAGNOSIS — N39.0 RECURRENT UTI (URINARY TRACT INFECTION): ICD-10-CM

## 2024-10-03 DIAGNOSIS — R31.9 URINARY TRACT INFECTION WITH HEMATURIA, SITE UNSPECIFIED: Primary | ICD-10-CM

## 2024-10-03 RX ORDER — CIPROFLOXACIN 500 MG/1
500 TABLET ORAL 2 TIMES DAILY
Qty: 6 TABLET | Refills: 0 | Status: SHIPPED | OUTPATIENT
Start: 2024-10-03 | End: 2024-10-06

## 2024-10-03 RX ORDER — TRAZODONE HYDROCHLORIDE 100 MG/1
100 TABLET ORAL NIGHTLY
Qty: 90 TABLET | Refills: 3 | Status: SHIPPED | OUTPATIENT
Start: 2024-10-03

## 2024-10-03 NOTE — TELEPHONE ENCOUNTER
Urine shows mild UTI. Will treat with Cipro BID for 3 days. If symptoms persist/reoccur, will refer to urology for further evaluation of recurrent UTIs.

## 2024-10-03 NOTE — TELEPHONE ENCOUNTER
Refill Decision Note   Loi Cordova  is requesting a refill authorization.  Brief Assessment and Rationale for Refill:  Approve     Medication Therapy Plan:        Comments:     Note composed:5:40 PM 10/03/2024

## 2024-10-03 NOTE — TELEPHONE ENCOUNTER
No care due was identified.  [facility] Embedded Care Due Messages. Reference number: [id number].   10/03/2024 9:22:39 AM CDT

## 2024-10-07 ENCOUNTER — HOSPITAL ENCOUNTER (EMERGENCY)
Facility: HOSPITAL | Age: 59
Discharge: HOME | End: 2024-10-07
Attending: EMERGENCY MEDICINE
Payer: COMMERCIAL

## 2024-10-07 VITALS
BODY MASS INDEX: 35.73 KG/M2 | SYSTOLIC BLOOD PRESSURE: 133 MMHG | WEIGHT: 182 LBS | OXYGEN SATURATION: 95 % | HEART RATE: 71 BPM | RESPIRATION RATE: 20 BRPM | HEIGHT: 60 IN | DIASTOLIC BLOOD PRESSURE: 76 MMHG | TEMPERATURE: 98 F

## 2024-10-07 DIAGNOSIS — R35.0 URINARY FREQUENCY: Primary | ICD-10-CM

## 2024-10-07 DIAGNOSIS — R10.9 FLANK PAIN: ICD-10-CM

## 2024-10-07 DIAGNOSIS — R07.9 CHEST PAIN: ICD-10-CM

## 2024-10-07 LAB
ALBUMIN SERPL BCP-MCNC: 4.3 G/DL (ref 3.5–5.2)
ALP SERPL-CCNC: 57 U/L (ref 38–126)
ALT SERPL W/O P-5'-P-CCNC: 41 U/L (ref 10–44)
ANION GAP SERPL CALC-SCNC: 8 MMOL/L (ref 8–16)
AST SERPL-CCNC: 33 U/L (ref 15–46)
BASOPHILS # BLD AUTO: 0.04 K/UL (ref 0–0.2)
BASOPHILS NFR BLD: 0.6 % (ref 0–1.9)
BILIRUB SERPL-MCNC: 0.3 MG/DL (ref 0.1–1)
BILIRUB UR QL STRIP: NEGATIVE
CALCIUM SERPL-MCNC: 9.4 MG/DL (ref 8.7–10.5)
CHLORIDE SERPL-SCNC: 102 MMOL/L (ref 95–110)
CLARITY UR REFRACT.AUTO: CLEAR
CO2 SERPL-SCNC: 24 MMOL/L (ref 23–29)
COLOR UR AUTO: YELLOW
CREAT SERPL-MCNC: 0.95 MG/DL (ref 0.5–1.4)
DIFFERENTIAL METHOD BLD: ABNORMAL
EOSINOPHIL # BLD AUTO: 0.1 K/UL (ref 0–0.5)
EOSINOPHIL NFR BLD: 1.1 % (ref 0–8)
ERYTHROCYTE [DISTWIDTH] IN BLOOD BY AUTOMATED COUNT: 12.7 % (ref 11.5–14.5)
EST. GFR  (NO RACE VARIABLE): >60 ML/MIN/1.73 M^2
GLUCOSE SERPL-MCNC: 208 MG/DL (ref 70–110)
GLUCOSE UR QL STRIP: ABNORMAL
HCT VFR BLD AUTO: 36.2 % (ref 37–48.5)
HGB BLD-MCNC: 12.2 G/DL (ref 12–16)
HGB UR QL STRIP: NEGATIVE
IMM GRANULOCYTES # BLD AUTO: 0.02 K/UL (ref 0–0.04)
IMM GRANULOCYTES NFR BLD AUTO: 0.3 % (ref 0–0.5)
KETONES UR QL STRIP: NEGATIVE
LACTATE SERPL-SCNC: 1.4 MMOL/L (ref 0.5–2.2)
LEUKOCYTE ESTERASE UR QL STRIP: NEGATIVE
LIPASE SERPL-CCNC: 128 U/L (ref 23–300)
LYMPHOCYTES # BLD AUTO: 2.7 K/UL (ref 1–4.8)
LYMPHOCYTES NFR BLD: 37.9 % (ref 18–48)
MCH RBC QN AUTO: 29.9 PG (ref 27–31)
MCHC RBC AUTO-ENTMCNC: 33.7 G/DL (ref 32–36)
MCV RBC AUTO: 89 FL (ref 82–98)
MONOCYTES # BLD AUTO: 0.5 K/UL (ref 0.3–1)
MONOCYTES NFR BLD: 6.4 % (ref 4–15)
NEUTROPHILS # BLD AUTO: 3.8 K/UL (ref 1.8–7.7)
NEUTROPHILS NFR BLD: 53.7 % (ref 38–73)
NITRITE UR QL STRIP: NEGATIVE
NRBC BLD-RTO: 0 /100 WBC
PH UR STRIP: 6 [PH] (ref 5–8)
PLATELET # BLD AUTO: 446 K/UL (ref 150–450)
PMV BLD AUTO: 8.9 FL (ref 9.2–12.9)
POTASSIUM SERPL-SCNC: 3.9 MMOL/L (ref 3.5–5.1)
PROT SERPL-MCNC: 7.1 G/DL (ref 6–8.4)
PROT UR QL STRIP: NEGATIVE
RBC # BLD AUTO: 4.08 M/UL (ref 4–5.4)
SODIUM SERPL-SCNC: 134 MMOL/L (ref 136–145)
SP GR UR STRIP: 1.02 (ref 1–1.03)
TROPONIN I SERPL-MCNC: <0.012 NG/ML (ref 0.01–0.03)
URN SPEC COLLECT METH UR: ABNORMAL
UROBILINOGEN UR STRIP-ACNC: NEGATIVE EU/DL
UUN UR-MCNC: 25 MG/DL (ref 7–17)
WBC # BLD AUTO: 7.15 K/UL (ref 3.9–12.7)

## 2024-10-07 PROCEDURE — 93005 ELECTROCARDIOGRAM TRACING: CPT | Mod: ER

## 2024-10-07 PROCEDURE — 83690 ASSAY OF LIPASE: CPT | Mod: ER | Performed by: EMERGENCY MEDICINE

## 2024-10-07 PROCEDURE — 93010 ELECTROCARDIOGRAM REPORT: CPT | Mod: ,,, | Performed by: INTERNAL MEDICINE

## 2024-10-07 PROCEDURE — 99285 EMERGENCY DEPT VISIT HI MDM: CPT | Mod: 25,ER

## 2024-10-07 PROCEDURE — 84484 ASSAY OF TROPONIN QUANT: CPT | Mod: ER | Performed by: EMERGENCY MEDICINE

## 2024-10-07 PROCEDURE — 80053 COMPREHEN METABOLIC PANEL: CPT | Mod: ER | Performed by: EMERGENCY MEDICINE

## 2024-10-07 PROCEDURE — 96375 TX/PRO/DX INJ NEW DRUG ADDON: CPT | Mod: ER

## 2024-10-07 PROCEDURE — 81003 URINALYSIS AUTO W/O SCOPE: CPT | Mod: ER | Performed by: EMERGENCY MEDICINE

## 2024-10-07 PROCEDURE — 63600175 PHARM REV CODE 636 W HCPCS: Mod: ER | Performed by: EMERGENCY MEDICINE

## 2024-10-07 PROCEDURE — 96374 THER/PROPH/DIAG INJ IV PUSH: CPT | Mod: ER

## 2024-10-07 PROCEDURE — 85025 COMPLETE CBC W/AUTO DIFF WBC: CPT | Mod: ER | Performed by: EMERGENCY MEDICINE

## 2024-10-07 PROCEDURE — 83605 ASSAY OF LACTIC ACID: CPT | Mod: ER | Performed by: EMERGENCY MEDICINE

## 2024-10-07 RX ORDER — ONDANSETRON HYDROCHLORIDE 2 MG/ML
4 INJECTION, SOLUTION INTRAVENOUS
Status: COMPLETED | OUTPATIENT
Start: 2024-10-07 | End: 2024-10-07

## 2024-10-07 RX ORDER — CYCLOBENZAPRINE HCL 10 MG
10 TABLET ORAL 3 TIMES DAILY PRN
Qty: 15 TABLET | Refills: 0 | Status: SHIPPED | OUTPATIENT
Start: 2024-10-07 | End: 2024-10-12

## 2024-10-07 RX ORDER — KETOROLAC TROMETHAMINE 30 MG/ML
15 INJECTION, SOLUTION INTRAMUSCULAR; INTRAVENOUS
Status: COMPLETED | OUTPATIENT
Start: 2024-10-07 | End: 2024-10-07

## 2024-10-07 RX ORDER — LIDOCAINE 50 MG/G
1 PATCH TOPICAL DAILY
Qty: 10 PATCH | Refills: 0 | Status: SHIPPED | OUTPATIENT
Start: 2024-10-07

## 2024-10-07 RX ORDER — DICLOFENAC SODIUM 75 MG/1
75 TABLET, DELAYED RELEASE ORAL 2 TIMES DAILY
Qty: 60 TABLET | Refills: 0 | Status: SHIPPED | OUTPATIENT
Start: 2024-10-07

## 2024-10-07 RX ADMIN — ONDANSETRON 4 MG: 2 INJECTION INTRAMUSCULAR; INTRAVENOUS at 10:10

## 2024-10-07 RX ADMIN — KETOROLAC TROMETHAMINE 15 MG: 30 INJECTION, SOLUTION INTRAMUSCULAR at 10:10

## 2024-10-08 ENCOUNTER — OFFICE VISIT (OUTPATIENT)
Dept: UROLOGY | Facility: CLINIC | Age: 59
End: 2024-10-08
Payer: COMMERCIAL

## 2024-10-08 VITALS
DIASTOLIC BLOOD PRESSURE: 70 MMHG | HEIGHT: 60 IN | HEART RATE: 67 BPM | WEIGHT: 186.06 LBS | SYSTOLIC BLOOD PRESSURE: 104 MMHG | BODY MASS INDEX: 36.53 KG/M2

## 2024-10-08 DIAGNOSIS — N39.0 RECURRENT UTI (URINARY TRACT INFECTION): ICD-10-CM

## 2024-10-08 DIAGNOSIS — R31.0 GROSS HEMATURIA: Primary | ICD-10-CM

## 2024-10-08 DIAGNOSIS — R39.15 URGENCY OF URINATION: ICD-10-CM

## 2024-10-08 LAB
BILIRUB SERPL-MCNC: NEGATIVE MG/DL
BLOOD URINE, POC: NEGATIVE
CLARITY, POC UA: CLEAR
COLOR, POC UA: YELLOW
GLUCOSE UR QL STRIP: NEGATIVE
KETONES UR QL STRIP: NEGATIVE
LEUKOCYTE ESTERASE URINE, POC: NEGATIVE
NITRITE, POC UA: NEGATIVE
OHS QRS DURATION: 84 MS
OHS QTC CALCULATION: 450 MS
PH, POC UA: 5
POC RESIDUAL URINE VOLUME: 0 ML (ref 0–100)
PROTEIN, POC: ABNORMAL
SPECIFIC GRAVITY, POC UA: 1030
UROBILINOGEN, POC UA: NORMAL

## 2024-10-08 PROCEDURE — 99999 PR PBB SHADOW E&M-EST. PATIENT-LVL V: CPT | Mod: PBBFAC,,, | Performed by: UROLOGY

## 2024-10-08 PROCEDURE — 88112 CYTOPATH CELL ENHANCE TECH: CPT | Performed by: STUDENT IN AN ORGANIZED HEALTH CARE EDUCATION/TRAINING PROGRAM

## 2024-10-08 PROCEDURE — 99204 OFFICE O/P NEW MOD 45 MIN: CPT | Mod: S$GLB,,, | Performed by: UROLOGY

## 2024-10-08 PROCEDURE — 3008F BODY MASS INDEX DOCD: CPT | Mod: CPTII,S$GLB,, | Performed by: UROLOGY

## 2024-10-08 PROCEDURE — 3051F HG A1C>EQUAL 7.0%<8.0%: CPT | Mod: CPTII,S$GLB,, | Performed by: UROLOGY

## 2024-10-08 PROCEDURE — 3074F SYST BP LT 130 MM HG: CPT | Mod: CPTII,S$GLB,, | Performed by: UROLOGY

## 2024-10-08 PROCEDURE — 1160F RVW MEDS BY RX/DR IN RCRD: CPT | Mod: CPTII,S$GLB,, | Performed by: UROLOGY

## 2024-10-08 PROCEDURE — 3078F DIAST BP <80 MM HG: CPT | Mod: CPTII,S$GLB,, | Performed by: UROLOGY

## 2024-10-08 PROCEDURE — 51798 US URINE CAPACITY MEASURE: CPT | Mod: S$GLB,,, | Performed by: UROLOGY

## 2024-10-08 PROCEDURE — 1159F MED LIST DOCD IN RCRD: CPT | Mod: CPTII,S$GLB,, | Performed by: UROLOGY

## 2024-10-08 PROCEDURE — 4010F ACE/ARB THERAPY RXD/TAKEN: CPT | Mod: CPTII,S$GLB,, | Performed by: UROLOGY

## 2024-10-08 PROCEDURE — 87086 URINE CULTURE/COLONY COUNT: CPT | Performed by: UROLOGY

## 2024-10-08 PROCEDURE — 81002 URINALYSIS NONAUTO W/O SCOPE: CPT | Mod: S$GLB,,, | Performed by: UROLOGY

## 2024-10-08 RX ORDER — MIRABEGRON 25 MG/1
25 TABLET, FILM COATED, EXTENDED RELEASE ORAL DAILY
Qty: 30 TABLET | Refills: 11 | Status: SHIPPED | OUTPATIENT
Start: 2024-10-08 | End: 2025-10-08

## 2024-10-08 NOTE — PROGRESS NOTES
Subjective:       Patient ID: Loi Cordova is a 59 y.o. female.    Chief Complaint: sudden urges     This is a 59 y.o.  female patient that is new to me.  The patient was referred to me by ED for UTI and back pain.  UTI 10/2024 with klebsiella given cipro, UTI  7/2024 with E Coli. Symptoms with UTI was some blood on wiping after voiding.    Back pain prompting ER visit 10/7/24, CT scan with no stones or hydro, no acute findings.  Had urgency of urination, does have UUI and wears 2-3 ppd.  Does have KAYLA as well. UUI >KAYLA per patient.  Persistent microhematuria on UA's in past.     No current symptoms of UTI.  Has tried anticholinergic but had dry mouth.          Lab Results   Component Value Date    CREATININE 0.95 10/07/2024       ---  PMH/PSH/Medications/Allergies/Social history reviewed and as in chart.    Review of Systems   Constitutional:  Negative for activity change, chills, fatigue and fever.   Respiratory:  Negative for cough, chest tightness and shortness of breath.    Cardiovascular:  Negative for chest pain.   Gastrointestinal:  Negative for abdominal distention, abdominal pain, nausea and vomiting.   Genitourinary:  Positive for frequency, hematuria and urgency. Negative for difficulty urinating, flank pain and pelvic pain.   Musculoskeletal:  Negative for back pain and gait problem.       Objective:      Physical Exam  HENT:      Head: Normocephalic.   Pulmonary:      Effort: Pulmonary effort is normal.   Abdominal:      General: Abdomen is flat.   Musculoskeletal:      Cervical back: Normal range of motion.   Skin:     General: Skin is warm.   Neurological:      General: No focal deficit present.      Mental Status: She is alert.         Assessment:     Problem Noted   Recurrent Uti (Urinary Tract Infection) 10/8/2024   Urgency of Urination 10/8/2024   Gross Hematuria 1/29/2019       Plan:     CTU, cytology, cystoscopy for hematuria  Myrbetriq for OAB/urgency/UUI, discussed KAYLA treatment primarily  KAYLA  Urine culture.      Carl Chao MD    The above referenced imaging and interpretations were personally reviewed.  Disclaimer: This note has been generated using voice-recognition software. There may be typographical errors that have been missed during proof-reading.

## 2024-10-08 NOTE — ED NOTES
Assumed care of pt who came in with multiple concerns. She currently reports bilat lower back / flank pain now 8/10 that radiates to the suprapubic area. She was recently on Abx for 3 day for a UTI. She denies any urinary complaints outside of frequency and denies fever. She also reports mid-sternal chest pain that started this AM and has been intermittent. She denies any current chest pain. She is sitting quietly in no resp distress and appears uncomfortable as she reports being unable to get comfortable.

## 2024-10-08 NOTE — ED PROVIDER NOTES
Encounter Date: 10/7/2024       History     Chief Complaint   Patient presents with    Urinary Frequency     Pt stated having flank pain and was recently dx with UTI and completed the antibiotic. Pt has appointment with her doctor     HPI  59 y.o.   Recent treatment for UTI with cipro, culture proven  Co bilateral flank radiating to front pain more recently  No hematuria  No falls inj  Occ gets fleeting chest pain  No leg pain not swelling  No exac/remitting factors  Mod     Review of patient's allergies indicates:   Allergen Reactions    Crayfish Anaphylaxis     (crawfish only)    Rocephin [ceftriaxone] Rash     Past Medical History:   Diagnosis Date    Acute cystitis with hematuria 07/23/2023    Allergy     Asthma     Chronic diastolic heart failure     Coronary artery disease     GERD (gastroesophageal reflux disease)     History of back surgery 02/20/2018    Hyperlipidemia     Hypertension     Pulmonary emphysema 08/11/2023    Type 2 diabetes mellitus with diabetic polyneuropathy, without long-term current use of insulin 02/08/2019     Past Surgical History:   Procedure Laterality Date    BILATERAL OOPHORECTOMY Bilateral 2000    BREAST BIOPSY Left 2/14/2020    Procedure: BIOPSY, BREAST-Left medial breast palpation guided;  Surgeon: Paulino Espinoza MD;  Location: [facility] OR [address] [place] [address];  Service: General;  Laterality: Left;    CARDIAC CATHETERIZATION      7 stents    CHOLECYSTECTOMY      COLONOSCOPY N/A 7/16/2019    Procedure: COLONOSCOPY;  Surgeon: Sarah Gamez MD;  Location: [facility];  Service: Endoscopy;  Laterality: N/A;    ESOPHAGOGASTRODUODENOSCOPY N/A 1/9/2020    Procedure: EGD (ESOPHAGOGASTRODUODENOSCOPY);  Surgeon: Sarah Gamez MD;  Location: [facility];  Service: Endoscopy;  Laterality: N/A;    HYSTERECTOMY      PARTIAL HYSTERECTOMY N/A 1990    Uterus taken out    SHOULDER SURGERY      TOTAL KNEE ARTHROPLASTY       Family History   Problem Relation Name Age of Onset    Heart disease Mother       Hyperlipidemia Mother      Hypertension Mother      Diabetes Mother      Heart disease Father      Hyperlipidemia Father      Cancer Father      Diabetes Maternal Aunt      Prostate cancer Neg Hx      Kidney disease Neg Hx       Social History     Tobacco Use    Smoking status: Former     Current packs/day: 0.50     Average packs/day: 0.5 packs/day for 34.0 years (17.0 ttl pk-yrs)     Types: Cigarettes, Vaping w/o nicotine     Passive exposure: Never    Smokeless tobacco: Never    Tobacco comments:     in smoking program 1/25/21 now smokes vapes without nicotine   Substance Use Topics    Alcohol use: Not Currently     Comment: 6 months    Drug use: No     Review of Systems  All systems were reviewed/examined and were negative except as noted in the HPI.    Physical Exam     Initial Vitals [10/07/24 2044]   BP Pulse Resp Temp SpO2   (!) 186/75 80 18 97.8 °F (36.6 °C) 97 %      MAP       --         Physical Exam    General: the patient is awake, alert, and in no apparent distress.  Head: normocephalic and atraumatic, sclera are clear  Neck: supple without meningismus  Chest:  no respiratory distress  Heart: regular rate and rhythm  ABD soft, nontender, nondistended, no peritoneal signs  Back nt in the midline  Extremities: warm and well perfused  Skin: warm and dry  Psych conversant  Neuro: awake, alert, moving all extremities    ED Course   Procedures  Labs Reviewed   CBC W/ AUTO DIFFERENTIAL - Abnormal       Result Value    WBC 7.15      RBC 4.08      Hemoglobin 12.2      Hematocrit 36.2 (*)     MCV 89      MCH 29.9      MCHC 33.7      RDW 12.7      Platelets 446      MPV 8.9 (*)     Immature Granulocytes 0.3      Gran # (ANC) 3.8      Immature Grans (Abs) 0.02      Lymph # 2.7      Mono # 0.5      Eos # 0.1      Baso # 0.04      nRBC 0      Gran % 53.7      Lymph % 37.9      Mono % 6.4      Eosinophil % 1.1      Basophil % 0.6      Differential Method Automated     COMPREHENSIVE METABOLIC PANEL - Abnormal     Sodium 134 (*)     Potassium 3.9      Chloride 102      CO2 24      Glucose 208 (*)     BUN 25 (*)     Creatinine 0.95      Calcium 9.4      Total Protein 7.1      Albumin 4.3      Total Bilirubin 0.3      Alkaline Phosphatase 57      AST 33      ALT 41      Anion Gap 8      eGFR >60.0     URINALYSIS, REFLEX TO URINE CULTURE - Abnormal    Specimen UA Urine, Clean Catch      Color, UA Yellow      Appearance, UA Clear      pH, UA 6.0      Specific Gravity, UA 1.025      Protein, UA Negative      Glucose, UA Trace (*)     Ketones, UA Negative      Bilirubin (UA) Negative      Occult Blood UA Negative      Nitrite, UA Negative      Urobilinogen, UA Negative      Leukocytes, UA Negative      Narrative:     Preferred Collection Type->Urine, Clean Catch  Specimen Source->Urine   LIPASE    Lipase Result 128     TROPONIN I    Troponin I <0.012     LACTIC ACID, PLASMA    Lactate (Lactic Acid) 1.4          ECG Results              EKG 12-lead (Final result)        Collection Time Result Time QRS Duration [facility] QTC Calculation    10/07/24 21:27:43 10/08/24 12:55:40 84 450                     Final result by Interface, Lab In [facility] (10/08/24 12:55:46)                   Narrative:    Test Reason : R07.9,    Vent. Rate : 074 BPM     Atrial Rate : 074 BPM     P-R Int : 154 ms          QRS Dur : 084 ms      QT Int : 406 ms       P-R-T Axes : 046 032 051 degrees     QTc Int : 450 ms    Normal sinus rhythm  Normal ECG  When compared with ECG of 23-JUL-2023 17:42,  Vent. rate has decreased BY  56 BPM  QRS duration has increased  Confirmed by Josh HENRY, Cj ([id number]) on 10/8/2024 12:55:38 PM    Referred By: [email or web address]   SELF           Confirmed By:Cj Moreno MD                                  Imaging Results              X-Ray Chest AP Portable (Final result)  Result time 10/07/24 21:50:17      Final result by Garett Heck MD (10/07/24 21:50:17)                   Impression:      No acute abnormality.      Electronically signed  by: Garett Heck  Date:    10/07/2024  Time:    21:50               Narrative:    EXAMINATION:  XR CHEST AP PORTABLE    CLINICAL HISTORY:  Chest pain, unspecified    TECHNIQUE:  Single frontal view of the chest was performed.    COMPARISON:  Multiple    FINDINGS:  The lungs are clear, with normal appearance of pulmonary vasculature and no pleural effusion or pneumothorax.    The cardiac silhouette is normal in size. The hilar and mediastinal contours are unremarkable.    Bones are intact.                                       CT Abdomen Pelvis  Without Contrast (Final result)  Result time 10/07/24 22:09:35      Final result by Garett Heck MD (10/07/24 22:09:35)                   Impression:      No acute abnormality identified.  Complete findings as above.    All CT scans at this facility are performed  using dose modulation techniques as appropriate to performed exam including the following:  automated exposure control; adjustment of mA and/or kV according to the patients size (this includes techniques or standardized protocols for targeted exams where dose is matched to indication/reason for exam: i.e. extremities or head);  iterative reconstruction technique.      Electronically signed by: Garett Heck  Date:    10/07/2024  Time:    22:09               Narrative:    EXAMINATION:  CT ABDOMEN PELVIS WITHOUT CONTRAST    CLINICAL HISTORY:  Abdominal pain, acute, nonlocalized;    TECHNIQUE:  Low dose axial images, sagittal and coronal reformations were obtained from the lung bases to the pubic symphysis.  Contrast was not administered.    COMPARISON:  Multiple    FINDINGS:  Heart: Normal in size. No pericardial effusion.    Lung Bases: Well aerated, without consolidation or pleural fluid.    Liver: Normal in size and attenuation, with no focal hepatic lesions.    Gallbladder: Surgically absent.    Bile Ducts: No evidence of dilated ducts.    Pancreas: No mass or peripancreatic fat stranding.    Spleen:  Unremarkable.    Adrenals: Unremarkable.    Kidneys/ Ureters: Unremarkable.    Bladder: No evidence of wall thickening.    Reproductive organs: Uterus is surgically absent.    GI Tract/Mesentery: No evidence of bowel obstruction or inflammation.  No evidence of appendicitis.    Peritoneal Space: No ascites. No free air.    Retroperitoneum: No significant adenopathy.    Abdominal wall: Unremarkable.    Vasculature: Moderate aortoiliac atherosclerosis.  No aneurysm.    Bones: No acute fracture.  No osseous degenerative changes.                                       Medications   ketorolac injection 15 mg (15 mg Intravenous Given 10/7/24 2219)   ondansetron injection 4 mg (4 mg Intravenous Given 10/7/24 2218)     Medical Decision Making     Medical Decision Making:    This is an emergent evaluation of a patient presenting to the ED.  Nursing notes were reviewed.  I personally reviewed, read, and interpreted the ECG and any monitoring strips.  ECG: normal EKG, normal sinus rhythm, unchanged from previous tracings   There are no critical interval prolongations nor critical ST-segment ischemic changes.  Compared with prior if available.  Read and interpreted by me independently.    I reviewed radiology images personally along with interpretations.  Ct abd nad  I personally reviewed and interpreted the laboratory results.  Non critical    I decided to obtain and review old medical records, which showed: recent tx for uti    Evaluation for Emergency Medical Condition  The patient received a medical screening exam and within a reasonable degree of clinical confidence an emergency medical condition has not been identified.  The patient is instructed on proper follow up and return precautions to the ED.      John Mcclure MD, DANE                                 Clinical Impression:  Final diagnoses:  [R07.9] Chest pain  [R35.0] Urinary frequency (Primary)  [R10.9] Flank pain          ED Disposition Condition    Discharge Stable           ED Prescriptions       Medication Sig Dispense Start Date End Date Auth. Provider    cyclobenzaprine (FLEXERIL) 10 MG tablet Take 1 tablet (10 mg total) by mouth 3 (three) times daily as needed for Muscle spasms. 15 tablet 10/7/2024 10/12/2024 Jaden Mcclure MD    LIDOcaine (LIDODERM) 5 % Place 1 patch onto the skin once daily. Remove & Discard patch within 12 hours or as directed by MD 10 patch 10/7/2024 -- Jaden Mcclure MD    diclofenac (VOLTAREN) 75 MG EC tablet Take 1 tablet (75 mg total) by mouth 2 (two) times daily. 60 tablet 10/7/2024 -- Jaden Mcclure MD          Follow-up Information       Follow up With Specialties Details Why Contact Info    Hari Francis MD Family Medicine Schedule an appointment as soon as possible for a visit   [address] [place] [id number]  [phone]      Your urologist  Schedule an appointment as soon as possible for a visit             Discharged to home in stable condition, return to ED warnings given, follow up and patient care instructions given.      John Mcclure MD, DANE, FACEP  Department of Emergency Medicine       Jaden Mcclure MD  10/09/24 [id number]

## 2024-10-09 LAB
BACTERIA UR CULT: NORMAL
FINAL PATHOLOGIC DIAGNOSIS: NORMAL
Lab: NORMAL
MICROSCOPIC EXAM: NORMAL

## 2024-10-14 ENCOUNTER — HOSPITAL ENCOUNTER (OUTPATIENT)
Dept: RADIOLOGY | Facility: HOSPITAL | Age: 59
Discharge: HOME | End: 2024-10-14
Attending: UROLOGY
Payer: COMMERCIAL

## 2024-10-14 DIAGNOSIS — R31.0 GROSS HEMATURIA: ICD-10-CM

## 2024-10-14 PROCEDURE — 25500020 PHARM REV CODE 255: Mod: PN | Performed by: UROLOGY

## 2024-10-14 PROCEDURE — 74178 CT ABD&PLV WO CNTR FLWD CNTR: CPT | Mod: TC,PN

## 2024-10-14 PROCEDURE — 74178 CT ABD&PLV WO CNTR FLWD CNTR: CPT | Mod: 26,,, | Performed by: RADIOLOGY

## 2024-10-14 RX ADMIN — IOHEXOL 100 ML: 350 INJECTION, SOLUTION INTRAVENOUS at 02:10

## 2024-10-15 NOTE — PROGRESS NOTES
Subjective:       Patient ID: Loi Cordova is a 59 y.o. female.    Chief Complaint: Procedure (cysto)     This is a 59 y.o.  female patient that is new to me.  The patient was referred to me by ED for UTI and back pain.  UTI 10/2024 with klebsiella given cipro, UTI  7/2024 with E Coli. Symptoms with UTI was some blood on wiping after voiding.    Back pain prompting ER visit 10/7/24, CT scan with no stones or hydro, no acute findings.  Had urgency of urination, does have UUI and wears 2-3 ppd.  Does have KAYLA as well. UUI >KAYLA per patient.  Persistent microhematuria on UA's in past.     No current symptoms of UTI.  Has tried anticholinergic but had dry mouth.      CTU 10/2024: negative  Cystoscopy 10/16/24:  no significant findings.         Lab Results   Component Value Date    CREATININE 0.95 10/07/2024       ---  PMH/PSH/Medications/Allergies/Social history reviewed and as in chart.    Review of Systems   Constitutional:  Negative for activity change, chills, fatigue and fever.   Respiratory:  Negative for cough, chest tightness and shortness of breath.    Cardiovascular:  Negative for chest pain.   Gastrointestinal:  Negative for abdominal distention, abdominal pain, nausea and vomiting.   Genitourinary:  Positive for frequency. Negative for difficulty urinating, flank pain, hematuria, pelvic pain and urgency.   Musculoskeletal:  Negative for back pain and gait problem.       Objective:      Physical Exam  HENT:      Head: Normocephalic.   Pulmonary:      Effort: Pulmonary effort is normal.   Abdominal:      General: Abdomen is flat.   Musculoskeletal:      Cervical back: Normal range of motion.   Skin:     General: Skin is warm.   Neurological:      General: No focal deficit present.      Mental Status: She is alert.         CTU 10/14/24:       Impression:     1. No finding to suggest a source of patient's hematuria.  2. Hepatomegaly and hepatic steatosis.       Assessment:     Problem Noted   Recurrent Uti  (Urinary Tract Infection) 10/8/2024   Urgency of Urination 10/8/2024   Gross Hematuria 1/29/2019       Plan:     Cystoscopy done and reviewed.  Separate from procedure discussed findings from CT urogram, no acute findings.  Also discussed findings from cystoscopy, no significant findings.  This point we will observe her for further symptoms of urinary tract infection and hematuria.  Cytology was negative.  No concern for lesions on cystoscopy or CT urogram.  She notes improvement with Myrbetriq for her urgency and frequency.  Continue medication.  Follow up with nurse practitioner in 6 months.  The above was discussed separate from procedure.   I spent an additional 15 minutes over the procedure time on the day of the visit.  his includes face to face time and non-face to face time preparing to see the patient (eg, review of tests and imaging), obtaining and/or reviewing separately obtained history, documenting clinical information in the electronic or other health record, independently interpreting results and communicating results to the patient/family/caregiver, or care coordinator.     Carl Chao MD    The above referenced imaging and interpretations were personally reviewed.  Disclaimer: This note has been generated using voice-recognition software. There may be typographical errors that have been missed during proof-reading.

## 2024-10-16 ENCOUNTER — PROCEDURE VISIT (OUTPATIENT)
Dept: UROLOGY | Facility: CLINIC | Age: 59
End: 2024-10-16
Payer: COMMERCIAL

## 2024-10-16 VITALS
HEART RATE: 67 BPM | SYSTOLIC BLOOD PRESSURE: 124 MMHG | BODY MASS INDEX: 36.79 KG/M2 | HEIGHT: 60 IN | DIASTOLIC BLOOD PRESSURE: 75 MMHG | WEIGHT: 187.38 LBS

## 2024-10-16 DIAGNOSIS — R31.0 GROSS HEMATURIA: ICD-10-CM

## 2024-10-16 PROCEDURE — 99213 OFFICE O/P EST LOW 20 MIN: CPT | Mod: 25,S$GLB,, | Performed by: UROLOGY

## 2024-10-16 PROCEDURE — 52000 CYSTOURETHROSCOPY: CPT | Mod: S$GLB,,, | Performed by: UROLOGY

## 2024-10-16 NOTE — PROCEDURES
Cystoscopy    Date/Time: 10/16/2024 10:00 AM    Performed by: Carl Chao MD  Authorized by: Carl Chao MD    Consent Done?:  Yes (Written)  Timeout: prior to procedure the correct patient, procedure, and site was verified    Prep: patient was prepped and draped in usual sterile fashion    Local anesthesia used?: Yes    Anesthesia:  Lidocaine jelly  Indications: hematuria    Position:  Supine  Anesthesia:  Lidocaine jelly  Preparation: Patient was prepped and draped in usual sterile fashion    Scope type:  Flexible cystoscope  External exam normal: Yes    Digital exam performed: No    Urethra normal: Yes    Bladder neck normal: Yes    Bladder normal: Yes     patient tolerated the procedure well with no immediate complications     DATE OF PROCEDURE:  09/25/2019    PREOPERATIVE DIAGNOSIS:  Dislocated intraocular lens to the left eye.    POSTOPERATIVE DIAGNOSIS:  Dislocated intraocular lens to the left eye.    PROCEDURE PERFORMED:  A 25-gauge pars plana vitrectomy with intraocular lens   removal and scleral fixated intraocular lens to the left eye.  Intraocular lens   parameters were [employer]ch and Lomb Akreos AO60, serial #[id number].    ATTENDING SURGEON:  YADI Lane M.D.    ASSISTANT SURGEON:  Dr. Christiano Butler.    ANESTHESIA:  LMA with a retrobulbar injection of 4 mL mixture of 0.75% Marcaine   and 2% Xylocaine.    ESTIMATED BLOOD LOSS:  Minimal.    COMPLICATIONS:  None.    DISPOSITION:  Stable to recovery.    INDICATIONS FOR SURGERY:  This is a 62-year-old male with a history of blurred   vision in both eyes.  The patient was found to have dislocated intraocular   lenses in both above without a history of known connective tissue disease   associated with lens dislocation or trauma.  Plan is to do the left eye first as   there was more significant dislocation.  Risks, benefits and alternatives of   surgery were discussed in detail with risks including loss of vision, loss of   eye, infection, retinal detachment, hemorrhage, lens dislocation, glaucoma,   hypotony, ptosis and diplopia.  The patient voiced understanding and wished to   proceed with the procedure.    DESCRIPTION OF PROCEDURE:  After proper informed consent was obtained, the   patient was brought to the operative suite at [facility], where LMA   was induced.  Retrobulbar injection was provided as above without complication.    The patient was prepped and draped in normal sterile fashion for ophthalmic   surgery.  Lid speculum was placed in the left eye.  Two conjunctival peritomies   were created nasally and temporally with blunt Alexi scissors and   conjunctival forceps.  Cautery was used to maintain hemostasis.  Infusion   cannula was inserted 3.5 mm  posterior to the limbus inferiorly.  Superotemporal   and superonasal sclerotomies were created, 2.5 mm posterior to the limbus and   2.5 mm superior to the midline.  The vitrector and light pipe were introduced in   the vitreous cavity and a core vitrectomy was performed.  The intraocular lens   was entrapped in some of the anterior vitreous.  Care was taken not to cause any   retinal tears or breaks as we were removing the vitreous from around the IOL   and allowed to fall gently back to the posterior pole.  Scleral depression was   performed 360 degrees.  No identifiable retinal breaks were found.  After a   complete vitrectomy, a paracentesis port was created and Viscoat was used to   inflate the anterior chamber.  A keratome blade was used to create a 4.5 mm   clear corneal incision.  The lens was retrieved from the posterior segment with   MaxGrip forceps.  Lens cutting forceps was used to create an incision in the IOL   and that was pulled out Pac-Man style through the corneal wound.  Additional   sclerotomies were created 5 mm inferiorly to the supratemporal and supranasal   trocars respectively.  An Akreos lens was secured with 8-0 Phoenix-Tyler suture and   the Phoenix-Tyler sutures were removed from all sclerotomies and then the lens was   inserted into the eye and secured to the sclera.  It was well centered following   this.  A 10-0 nylon was used to close the corneal wound and all corneal wounds   were stromally hydrated.  All the edges of the Phoenix-Tyler sutures were trimmed and   the knot was buried into the eye through the sclerotomy.  The conjunctiva was   brought forth and closed with 6-0 plain gut sutures.  The infusion trocars were   removed from the eye not leaking after gentle massage and all wounds were closed   and there was no leakage.  Subconjunctival injections of vancomycin and   Decadron were given to the patient.  The drapes were removed from the patient.    He was washed free of Betadine prep  solution and Maxitrol ointment was placed in   the left eye.  The eye was patch shielded.  LMA was reversed.  He was brought   to the recovery room in stable condition, tolerating the procedure well.    Dr. Lane was present for the entire case.      DARIEL  dd: 09/25/2019 17:16:11 (CDT)  td: 09/25/2019 21:33:26 (CDT)  Doc ID   #[id number]  Job ID #[id number]    CC:

## 2024-10-30 ENCOUNTER — TELEPHONE (OUTPATIENT)
Dept: FAMILY MEDICINE | Facility: CLINIC | Age: 59
End: 2024-10-30
Payer: COMMERCIAL

## 2024-10-30 ENCOUNTER — OFFICE VISIT (OUTPATIENT)
Dept: FAMILY MEDICINE | Facility: CLINIC | Age: 59
End: 2024-10-30
Payer: COMMERCIAL

## 2024-10-30 VITALS
DIASTOLIC BLOOD PRESSURE: 76 MMHG | HEART RATE: 72 BPM | HEIGHT: 60 IN | BODY MASS INDEX: 36.76 KG/M2 | OXYGEN SATURATION: 98 % | WEIGHT: 187.25 LBS | SYSTOLIC BLOOD PRESSURE: 118 MMHG

## 2024-10-30 DIAGNOSIS — I50.32 CHRONIC DIASTOLIC CHF (CONGESTIVE HEART FAILURE): Chronic | ICD-10-CM

## 2024-10-30 DIAGNOSIS — I25.118 CORONARY ARTERY DISEASE OF NATIVE ARTERY OF NATIVE HEART WITH STABLE ANGINA PECTORIS: Chronic | ICD-10-CM

## 2024-10-30 DIAGNOSIS — Z91.148 NON COMPLIANCE W MEDICATION REGIMEN: ICD-10-CM

## 2024-10-30 DIAGNOSIS — E11.65 TYPE 2 DIABETES MELLITUS WITH HYPERGLYCEMIA, WITHOUT LONG-TERM CURRENT USE OF INSULIN: Primary | ICD-10-CM

## 2024-10-30 DIAGNOSIS — I70.0 AORTIC ATHEROSCLEROSIS: Chronic | ICD-10-CM

## 2024-10-30 DIAGNOSIS — E66.812 CLASS 2 SEVERE OBESITY DUE TO EXCESS CALORIES WITH SERIOUS COMORBIDITY AND BODY MASS INDEX (BMI) OF 36.0 TO 36.9 IN ADULT: ICD-10-CM

## 2024-10-30 DIAGNOSIS — E66.01 CLASS 2 SEVERE OBESITY DUE TO EXCESS CALORIES WITH SERIOUS COMORBIDITY AND BODY MASS INDEX (BMI) OF 36.0 TO 36.9 IN ADULT: ICD-10-CM

## 2024-10-30 DIAGNOSIS — E11.42 TYPE 2 DIABETES MELLITUS WITH DIABETIC POLYNEUROPATHY, WITHOUT LONG-TERM CURRENT USE OF INSULIN: Chronic | ICD-10-CM

## 2024-10-30 PROCEDURE — 3074F SYST BP LT 130 MM HG: CPT | Mod: CPTII,S$GLB,, | Performed by: FAMILY MEDICINE

## 2024-10-30 PROCEDURE — 1160F RVW MEDS BY RX/DR IN RCRD: CPT | Mod: CPTII,S$GLB,, | Performed by: FAMILY MEDICINE

## 2024-10-30 PROCEDURE — 99999 PR PBB SHADOW E&M-EST. PATIENT-LVL V: CPT | Mod: PBBFAC,,, | Performed by: FAMILY MEDICINE

## 2024-10-30 PROCEDURE — 99214 OFFICE O/P EST MOD 30 MIN: CPT | Mod: S$GLB,,, | Performed by: FAMILY MEDICINE

## 2024-10-30 PROCEDURE — 1159F MED LIST DOCD IN RCRD: CPT | Mod: CPTII,S$GLB,, | Performed by: FAMILY MEDICINE

## 2024-10-30 PROCEDURE — 4010F ACE/ARB THERAPY RXD/TAKEN: CPT | Mod: CPTII,S$GLB,, | Performed by: FAMILY MEDICINE

## 2024-10-30 PROCEDURE — 3051F HG A1C>EQUAL 7.0%<8.0%: CPT | Mod: CPTII,S$GLB,, | Performed by: FAMILY MEDICINE

## 2024-10-30 PROCEDURE — 3078F DIAST BP <80 MM HG: CPT | Mod: CPTII,S$GLB,, | Performed by: FAMILY MEDICINE

## 2024-10-30 PROCEDURE — 3008F BODY MASS INDEX DOCD: CPT | Mod: CPTII,S$GLB,, | Performed by: FAMILY MEDICINE

## 2024-11-15 DIAGNOSIS — F41.9 ANXIETY: ICD-10-CM

## 2024-11-15 RX ORDER — ALPRAZOLAM 0.25 MG/1
0.25 TABLET ORAL DAILY PRN
Qty: 30 TABLET | Refills: 0 | Status: SHIPPED | OUTPATIENT
Start: 2024-11-15 | End: 2024-12-15

## 2024-11-15 NOTE — TELEPHONE ENCOUNTER
No care due was identified.  [facility] Embedded Care Due Messages. Reference number: [id number].   11/15/2024 11:25:31 AM CST

## 2024-11-15 NOTE — TELEPHONE ENCOUNTER
Refill Routing Note   Medication(s) are not appropriate for processing by [facility] for the following reason(s):        Outside of protocol    [facility] action(s):  Route             Appointments  past 12m or future 3m with PCP    Date Provider   Last Visit   10/30/2024 Hari Francis MD   Next Visit   2/5/2025 Hari Francis MD   ED visits in past 90 days: 1        Note composed:11:35 AM 11/15/2024

## 2024-12-11 ENCOUNTER — E-VISIT (OUTPATIENT)
Dept: FAMILY MEDICINE | Facility: CLINIC | Age: 59
End: 2024-12-11
Payer: COMMERCIAL

## 2024-12-11 DIAGNOSIS — I50.32 CHRONIC DIASTOLIC CHF (CONGESTIVE HEART FAILURE): Chronic | ICD-10-CM

## 2024-12-11 DIAGNOSIS — N39.0 COMPLICATED UTI (URINARY TRACT INFECTION): Primary | ICD-10-CM

## 2024-12-11 DIAGNOSIS — E11.42 TYPE 2 DIABETES MELLITUS WITH DIABETIC POLYNEUROPATHY, WITHOUT LONG-TERM CURRENT USE OF INSULIN: Chronic | ICD-10-CM

## 2024-12-11 RX ORDER — CIPROFLOXACIN 500 MG/1
500 TABLET ORAL EVERY 12 HOURS
Qty: 10 TABLET | Refills: 0 | Status: SHIPPED | OUTPATIENT
Start: 2024-12-11 | End: 2024-12-16

## 2024-12-11 NOTE — PROGRESS NOTES
Patient ID: Loi Cordova is a 59 y.o. female.    Chief Complaint: General Illness (Entered automatically based on patient selection in [employer].)    The patient initiated a request through [employer] on 12/11/2024 for evaluation and management with a chief complaint of General Illness (Entered automatically based on patient selection in [employer].)     I evaluated the questionnaire submission on 12/11/2024.    [facility]-Common Problems    12/11/2024  8:51 AM CST - Filed by Patient   What do you need help with? Urinary Symptoms   Do you agree to participate in an E-Visit? Yes   If you have any of the following symptoms, please present to your local emergency room or call 911:  I acknowledge   What is the main issue you would like addressed today? Blood in urine   What symptoms do you currently have? Pain while passing urine   When did your symptoms first appear? 12/9/2024   List what you have done or taken to help your symptoms. N/A drinking plenty of water   Please indicate whether you have had the following symptoms during the past 24 hours     Urgent urination (a sudden and uncontrollable urge to urinate) Severe   Frequent urination of small amounts of urine (going to the toilet very often) Severe   Burning pain when urinating Mild   Incomplete bladder emptying (still feel like you need to urinate again after urination) Severe   Pain not associated with urination in the lower abdomen below the belly button) None   What does your urine look like? Cloudy   Blood seen in the urine Moderate   Flank pain (pain in one or both sides of the lower back) Mild   Abnormal Vaginal Discharge (abnormal amount, color and/or odor) None   Discharge from the urethra (urinary opening) without urination None   High body temperature/fever? None-<99.5   Please rate how much discomfort you have experience because of the symptoms in the past 24 hours: Severe   Please indicate how the symptoms have interfered with your every day  activities/work in the past 24 hours: Mild   Please indicate how these symptoms have interfered with your social activities (visiting people, meeting with friends, etc.) in the past 24 hours? None   Are you a diabetic? Yes   Please indicate whether you have the following at the time of completion of this questionnaire: None of the above   Provide any additional information you feel is important. Having sever pain when urine is releasing. I started having blood in urine and after I finish urinated today 12/11/24. Was wanting to see about getting an order for urine test   Please attach any relevant images or files (if you have performed a home test for UTI, please submit a photo of results)    Are you able to take your vital signs? No         Encounter Diagnoses   Name Primary?    Complicated UTI (urinary tract infection) Yes    Chronic diastolic CHF (congestive heart failure)     Type 2 diabetes mellitus with diabetic polyneuropathy, without long-term current use of insulin     Last A1c is 7 on 08/20/2024  S/sx consistent with complicated cystitis. Recommend patient complete urine testing prior to starting antibiotic. Notified via portal       Orders Placed This Encounter   Procedures    Urine culture     Standing Status:   Future     Standing Expiration Date:   1/11/2025     Order Specific Question:   Send normal result to authorizing provider's In Basket if patient is active on MyChart:     Answer:   Yes    Urinalysis     Standing Status:   Future     Standing Expiration Date:   2/9/2026     Order Specific Question:   Collection Type     Answer:   Urine, Clean Catch      Medications Ordered This Encounter   Medications    ciprofloxacin HCl (CIPRO) 500 MG tablet     Sig: Take 1 tablet (500 mg total) by mouth every 12 (twelve) hours for 5 days     Dispense:  10 tablet     Refill:  0        F/u if sx worsen or fail to improve     E-Visit Time Tracking:    Day 1 Time (in minutes): 12    Total Time (in minutes):  12

## 2024-12-12 ENCOUNTER — OFFICE VISIT (OUTPATIENT)
Dept: PULMONOLOGY | Facility: CLINIC | Age: 59
End: 2024-12-12
Payer: COMMERCIAL

## 2024-12-12 VITALS
HEIGHT: 60 IN | WEIGHT: 187.81 LBS | DIASTOLIC BLOOD PRESSURE: 84 MMHG | HEART RATE: 74 BPM | RESPIRATION RATE: 18 BRPM | SYSTOLIC BLOOD PRESSURE: 126 MMHG | BODY MASS INDEX: 36.87 KG/M2 | OXYGEN SATURATION: 98 %

## 2024-12-12 DIAGNOSIS — J45.50 SEVERE PERSISTENT ASTHMA WITHOUT COMPLICATION: Primary | ICD-10-CM

## 2024-12-12 PROCEDURE — 3051F HG A1C>EQUAL 7.0%<8.0%: CPT | Mod: CPTII,S$GLB,, | Performed by: INTERNAL MEDICINE

## 2024-12-12 PROCEDURE — 99999 PR PBB SHADOW E&M-EST. PATIENT-LVL III: CPT | Mod: PBBFAC,,, | Performed by: INTERNAL MEDICINE

## 2024-12-12 PROCEDURE — 3079F DIAST BP 80-89 MM HG: CPT | Mod: CPTII,S$GLB,, | Performed by: INTERNAL MEDICINE

## 2024-12-12 PROCEDURE — 3008F BODY MASS INDEX DOCD: CPT | Mod: CPTII,S$GLB,, | Performed by: INTERNAL MEDICINE

## 2024-12-12 PROCEDURE — 4010F ACE/ARB THERAPY RXD/TAKEN: CPT | Mod: CPTII,S$GLB,, | Performed by: INTERNAL MEDICINE

## 2024-12-12 PROCEDURE — 99213 OFFICE O/P EST LOW 20 MIN: CPT | Mod: S$GLB,,, | Performed by: INTERNAL MEDICINE

## 2024-12-12 PROCEDURE — 3074F SYST BP LT 130 MM HG: CPT | Mod: CPTII,S$GLB,, | Performed by: INTERNAL MEDICINE

## 2024-12-12 PROCEDURE — 1159F MED LIST DOCD IN RCRD: CPT | Mod: CPTII,S$GLB,, | Performed by: INTERNAL MEDICINE

## 2024-12-12 PROCEDURE — 1160F RVW MEDS BY RX/DR IN RCRD: CPT | Mod: CPTII,S$GLB,, | Performed by: INTERNAL MEDICINE

## 2024-12-12 RX ORDER — FLUTICASONE FUROATE AND VILANTEROL 200; 25 UG/1; UG/1
1 POWDER RESPIRATORY (INHALATION) DAILY
Qty: 60 EACH | Refills: 3 | Status: SHIPPED | OUTPATIENT
Start: 2024-12-12

## 2024-12-12 NOTE — ASSESSMENT & PLAN NOTE
Breo and Nucala   Griseofulvin Counseling:  I discussed with the patient the risks of griseofulvin including but not limited to photosensitivity, cytopenia, liver damage, nausea/vomiting and severe allergy.  The patient understands that this medication is best absorbed when taken with a fatty meal (e.g., ice cream or french fries). Siliq Counseling:  I discussed with the patient the risks of Siliq including but not limited to new or worsening depression, suicidal thoughts and behavior, immunosuppression, malignancy, posterior leukoencephalopathy syndrome, and serious infections.  The patient understands that monitoring is required including a PPD at baseline and must alert us or the primary physician if symptoms of infection or other concerning signs are noted. There is also a special program designed to monitor depression which is required with Siliq. Elidel Counseling: Patient may experience a mild burning sensation during topical application. Elidel is not approved in children less than 2 years of age. There have been case reports of hematologic and skin malignancies in patients using topical calcineurin inhibitors although causality is questionable. Olanzapine Counseling- I discussed with the patient the common side effects of olanzapine including but are not limited to: lack of energy, dry mouth, increased appetite, sleepiness, tremor, constipation, dizziness, changes in behavior, or restlessness.  Explained that teenagers are more likely to experience headaches, abdominal pain, pain in the arms or legs, tiredness, and sleepiness.  Serious side effects include but are not limited: increased risk of death in elderly patients who are confused, have memory loss, or dementia-related psychosis; hyperglycemia; increased cholesterol and triglycerides; and weight gain. Azathioprine Pregnancy And Lactation Text: This medication is Pregnancy Category D and isn't considered safe during pregnancy. It is unknown if this medication is excreted in breast milk. Erythromycin Counseling:  I discussed with the patient the risks of erythromycin including but not limited to GI upset, allergic reaction, drug rash, diarrhea, increase in liver enzymes, and yeast infections. Imiquimod Pregnancy And Lactation Text: This medication is Pregnancy Category C. It is unknown if this medication is excreted in breast milk. Topical Ketoconazole Pregnancy And Lactation Text: This medication is Pregnancy Category B and is considered safe during pregnancy. It is unknown if it is excreted in breast milk. Azithromycin Pregnancy And Lactation Text: This medication is considered safe during pregnancy and is also secreted in breast milk. Cimetidine Pregnancy And Lactation Text: This medication is Pregnancy Category B and is considered safe during pregnancy. It is also excreted in breast milk and breast feeding isn't recommended. Soolantra Counseling: I discussed with the patients the risks of topial Soolantra. This is a medicine which decreases the number of mites and inflammation in the skin. You experience burning, stinging, eye irritation or allergic reactions.  Please call our office if you develop any problems from using this medication. Benzoyl Peroxide Pregnancy And Lactation Text: This medication is Pregnancy Category C. It is unknown if benzoyl peroxide is excreted in breast milk. Tremfya Counseling: I discussed with the patient the risks of guselkumab including but not limited to immunosuppression, serious infections, and drug reactions.  The patient understands that monitoring is required including a PPD at baseline and must alert us or the primary physician if symptoms of infection or other concerning signs are noted. Gabapentin Pregnancy And Lactation Text: This medication is Pregnancy Category C and isn't considered safe during pregnancy. It is excreted in breast milk. Odomzo Pregnancy And Lactation Text: This medication is Pregnancy Category X and is absolutely contraindicated during pregnancy. It is unknown if it is excreted in breast milk. Enbrel Pregnancy And Lactation Text: This medication is Pregnancy Category B and is considered safe during pregnancy. It is unknown if this medication is excreted in breast milk. SSKI Counseling:  I discussed with the patient the risks of SSKI including but not limited to thyroid abnormalities, metallic taste, GI upset, fever, headache, acne, arthralgias, paraesthesias, lymphadenopathy, easy bleeding, arrhythmias, and allergic reaction. Sotyktu Counseling:  I discussed the most common side effects of Sotyktu including: common cold, sore throat, sinus infections, cold sores, canker sores, folliculitis, and acne.? I also discussed more serious side effects of Sotyktu including but not limited to: serious allergic reactions; increased risk for infections such as TB; cancers such as lymphomas; rhabdomyolysis and elevated CPK; and elevated triglycerides and liver enzymes.? Winlevi Pregnancy And Lactation Text: This medication is considered safe during pregnancy and breastfeeding. Prednisone Pregnancy And Lactation Text: This medication is Pregnancy Category C and it isn't know if it is safe during pregnancy. This medication is excreted in breast milk. Olanzapine Pregnancy And Lactation Text: This medication is pregnancy category C.   There are no adequate and well controlled trials with olanzapine in pregnant females.  Olanzapine should be used during pregnancy only if the potential benefit justifies the potential risk to the fetus.   In a study in lactating healthy women, olanzapine was excreted in breast milk.  It is recommended that women taking olanzapine should not breast feed. Topical Metronidazole Counseling: Metronidazole is a topical antibiotic medication. You may experience burning, stinging, redness, or allergic reactions.  Please call our office if you develop any problems from using this medication. Isotretinoin Pregnancy And Lactation Text: This medication is Pregnancy Category X and is considered extremely dangerous during pregnancy. It is unknown if it is excreted in breast milk. Siliq Pregnancy And Lactation Text: The risk during pregnancy and breastfeeding is uncertain with this medication. Dutasteride Pregnancy And Lactation Text: This medication is absolutely contraindicated in women, especially during pregnancy and breast feeding. Feminization of male fetuses is possible if taking while pregnant. Klisyri Counseling:  I discussed with the patient the risks of Klisyri including but not limited to erythema, scaling, itching, weeping, crusting, and pain. Adbry Counseling: I discussed with the patient the risks of tralokinumab including but not limited to eye infection and irritation, cold sores, injection site reactions, worsening of asthma, allergic reactions and increased risk of parasitic infection.  Live vaccines should be avoided while taking tralokinumab. The patient understands that monitoring is required and they must alert us or the primary physician if symptoms of infection or other concerning signs are noted. Doxepin Counseling:  Patient advised that the medication is sedating and not to drive a car after taking this medication. Patient informed of potential adverse effects including but not limited to dry mouth, urinary retention, and blurry vision.  The patient verbalized understanding of the proper use and possible adverse effects of doxepin.  All of the patient's questions and concerns were addressed. Cellcept Counseling:  I discussed with the patient the risks of mycophenolate mofetil including but not limited to infection/immunosuppression, GI upset, hypokalemia, hypercholesterolemia, bone marrow suppression, lymphoproliferative disorders, malignancy, GI ulceration/bleed/perforation, colitis, interstitial lung disease, kidney failure, progressive multifocal leukoencephalopathy, and birth defects.  The patient understands that monitoring is required including a baseline creatinine and regular CBC testing. In addition, patient must alert us immediately if symptoms of infection or other concerning signs are noted. Erythromycin Pregnancy And Lactation Text: This medication is Pregnancy Category B and is considered safe during pregnancy. It is also excreted in breast milk. Bactrim Counseling:  I discussed with the patient the risks of sulfa antibiotics including but not limited to GI upset, allergic reaction, drug rash, diarrhea, dizziness, photosensitivity, and yeast infections.  Rarely, more serious reactions can occur including but not limited to aplastic anemia, agranulocytosis, methemoglobinemia, blood dyscrasias, liver or kidney failure, lung infiltrates or desquamative/blistering drug rashes. Arava Counseling:  Patient counseled regarding adverse effects of Arava including but not limited to nausea, vomiting, abnormalities in liver function tests. Patients may develop mouth sores, rash, diarrhea, and abnormalities in blood counts. The patient understands that monitoring is required including LFTs and blood counts.  There is a rare possibility of scarring of the liver and lung problems that can occur when taking methotrexate. Persistent nausea, loss of appetite, pale stools, dark urine, cough, and shortness of breath should be reported immediately. Patient advised to discontinue Arava treatment and consult with a physician prior to attempting conception. The patient will have to undergo a treatment to eliminate Arava from the body prior to conception. Griseofulvin Pregnancy And Lactation Text: This medication is Pregnancy Category X and is known to cause serious birth defects. It is unknown if this medication is excreted in breast milk but breast feeding should be avoided. VTAMA Counseling: I discussed with the patient that VTAMA is not for use in the eyes, mouth or mouth. They should call the office if they develop any signs of allergic reactions to VTAMA. The patient verbalized understanding of the proper use and possible adverse effects of VTAMA.  All of the patient's questions and concerns were addressed. Carac Counseling:  I discussed with the patient the risks of Carac including but not limited to erythema, scaling, itching, weeping, crusting, and pain. Soolantra Pregnancy And Lactation Text: This medication is Pregnancy Category C. This medication is considered safe during breast feeding. Humira Counseling:  I discussed with the patient the risks of adalimumab including but not limited to myelosuppression, immunosuppression, autoimmune hepatitis, demyelinating diseases, lymphoma, and serious infections.  The patient understands that monitoring is required including a PPD at baseline and must alert us or the primary physician if symptoms of infection or other concerning signs are noted. High Dose Vitamin A Counseling: Side effects reviewed, pt to contact office should one occur. Protopic Counseling: Patient may experience a mild burning sensation during topical application. Protopic is not approved in children less than 2 years of age. There have been case reports of hematologic and skin malignancies in patients using topical calcineurin inhibitors although causality is questionable. Adbry Pregnancy And Lactation Text: It is unknown if this medication will adversely affect pregnancy or breast feeding. Sski Pregnancy And Lactation Text: This medication is Pregnancy Category D and isn't considered safe during pregnancy. It is excreted in breast milk. Sotyktu Pregnancy And Lactation Text: There is insufficient data to evaluate whether or not Sotyktu is safe to use during pregnancy.? ?It is not known if Sotyktu passes into breast milk and whether or not it is safe to use when breastfeeding.?? Glycopyrrolate Counseling:  I discussed with the patient the risks of glycopyrrolate including but not limited to skin rash, drowsiness, dry mouth, difficulty urinating, and blurred vision. Metronidazole Counseling:  I discussed with the patient the risks of metronidazole including but not limited to seizures, nausea/vomiting, a metallic taste in the mouth, nausea/vomiting and severe allergy. Finasteride Male Counseling: Finasteride Counseling:  I discussed with the patient the risks of use of finasteride including but not limited to decreased libido, decreased ejaculate volume, gynecomastia, and depression. Women should not handle medication.  All of the patient's questions and concerns were addressed. Topical Metronidazole Pregnancy And Lactation Text: This medication is Pregnancy Category B and considered safe during pregnancy.  It is also considered safe to use while breastfeeding. Itraconazole Counseling:  I discussed with the patient the risks of itraconazole including but not limited to liver damage, nausea/vomiting, neuropathy, and severe allergy.  The patient understands that this medication is best absorbed when taken with acidic beverages such as non-diet cola or ginger ale.  The patient understands that monitoring is required including baseline LFTs and repeat LFTs at intervals.  The patient understands that they are to contact us or the primary physician if concerning signs are noted. Simponi Counseling:  I discussed with the patient the risks of golimumab including but not limited to myelosuppression, immunosuppression, autoimmune hepatitis, demyelinating diseases, lymphoma, and serious infections.  The patient understands that monitoring is required including a PPD at baseline and must alert us or the primary physician if symptoms of infection or other concerning signs are noted. Eucrisa Counseling: Patient may experience a mild burning sensation during topical application. Eucrisa is not approved in children less than 3 months of age. Oral Minoxidil Counseling- I discussed with the patient the risks of oral minoxidil including but not limited to shortness of breath, swelling of the feet or ankles, dizziness, lightheadedness, unwanted hair growth and allergic reaction.  The patient verbalized understanding of the proper use and possible adverse effects of oral minoxidil.  All of the patient's questions and concerns were addressed. Cibinqo Counseling: I discussed with the patient the risks of Cibinqo therapy including but not limited to common cold, nausea, headache, cold sores, increased blood CPK levels, dizziness, UTIs, fatigue, acne, and vomitting. Live vaccines should be avoided.  This medication has been linked to serious infections; higher rate of mortality; malignancy and lymphoproliferative disorders; major adverse cardiovascular events; thrombosis; thrombocytopenia and lymphopenia; lipid elevations; and retinal detachment. Topical Retinoid counseling:  Patient advised to apply a pea-sized amount only at bedtime and wait 30 minutes after washing their face before applying.  If too drying, patient may add a non-comedogenic moisturizer. The patient verbalized understanding of the proper use and possible adverse effects of retinoids.  All of the patient's questions and concerns were addressed. Klisyri Pregnancy And Lactation Text: It is unknown if this medication can harm a developing fetus or if it is excreted in breast milk. Doxepin Pregnancy And Lactation Text: This medication is Pregnancy Category C and it isn't known if it is safe during pregnancy. It is also excreted in breast milk and breast feeding isn't recommended. Bactrim Pregnancy And Lactation Text: This medication is Pregnancy Category D and is known to cause fetal risk.  It is also excreted in breast milk. Glycopyrrolate Pregnancy And Lactation Text: This medication is Pregnancy Category B and is considered safe during pregnancy. It is unknown if it is excreted breast milk. Thalidomide Counseling: I discussed with the patient the risks of thalidomide including but not limited to birth defects, anxiety, weakness, chest pain, dizziness, cough and severe allergy. Carac Pregnancy And Lactation Text: This medication is Pregnancy Category X and contraindicated in pregnancy and in women who may become pregnant. It is unknown if this medication is excreted in breast milk. Protopic Pregnancy And Lactation Text: This medication is Pregnancy Category C. It is unknown if this medication is excreted in breast milk when applied topically. Xolair Counseling:  Patient informed of potential adverse effects including but not limited to fever, muscle aches, rash and allergic reactions.  The patient verbalized understanding of the proper use and possible adverse effects of Xolair.  All of the patient's questions and concerns were addressed. Vtama Pregnancy And Lactation Text: It is unknown if this medication can cause problems during pregnancy and breastfeeding. High Dose Vitamin A Pregnancy And Lactation Text: High dose vitamin A therapy is contraindicated during pregnancy and breast feeding. Xeljanz Counseling: I discussed with the patient the risks of Xeljanz therapy including increased risk of infection, liver issues, headache, diarrhea, or cold symptoms. Live vaccines should be avoided. They were instructed to call if they have any problems. Finasteride Pregnancy And Lactation Text: This medication is absolutely contraindicated during pregnancy. It is unknown if it is excreted in breast milk. Clofazimine Counseling:  I discussed with the patient the risks of clofazimine including but not limited to skin and eye pigmentation, liver damage, nausea/vomiting, gastrointestinal bleeding and allergy. Sarecycline Counseling: Patient advised regarding possible photosensitivity and discoloration of the teeth, skin, lips, tongue and gums.  Patient instructed to avoid sunlight, if possible.  When exposed to sunlight, patients should wear protective clothing, sunglasses, and sunscreen.  The patient was instructed to call the office immediately if the following severe adverse effects occur:  hearing changes, easy bruising/bleeding, severe headache, or vision changes.  The patient verbalized understanding of the proper use and possible adverse effects of sarecycline.  All of the patient's questions and concerns were addressed. Cimzia Counseling:  I discussed with the patient the risks of Cimzia including but not limited to immunosuppression, allergic reactions and infections.  The patient understands that monitoring is required including a PPD at baseline and must alert us or the primary physician if symptoms of infection or other concerning signs are noted. Oral Minoxidil Pregnancy And Lactation Text: This medication should only be used when clearly needed if you are pregnant, attempting to become pregnant or breast feeding. Cibinqo Pregnancy And Lactation Text: It is unknown if this medication will adversely affect pregnancy or breast feeding.  You should not take this medication if you are currently pregnant or planning a pregnancy or while breastfeeding. Metronidazole Pregnancy And Lactation Text: This medication is Pregnancy Category B and considered safe during pregnancy.  It is also excreted in breast milk. Cyclophosphamide Counseling:  I discussed with the patient the risks of cyclophosphamide including but not limited to hair loss, hormonal abnormalities, decreased fertility, abdominal pain, diarrhea, nausea and vomiting, bone marrow suppression and infection. The patient understands that monitoring is required while taking this medication. Topical Steroids Counseling: I discussed with the patient that prolonged use of topical steroids can result in the increased appearance of superficial blood vessels (telangiectasias), lightening (hypopigmentation) and thinning of the skin (atrophy).  Patient understands to avoid using high potency steroids in skin folds, the groin or the face.  The patient verbalized understanding of the proper use and possible adverse effects of topical steroids.  All of the patient's questions and concerns were addressed. Minoxidil Counseling: Minoxidil is a topical medication which can increase blood flow where it is applied. It is uncertain how this medication increases hair growth. Side effects are uncommon and include stinging and allergic reactions. Itraconazole Pregnancy And Lactation Text: This medication is Pregnancy Category C and it isn't know if it is safe during pregnancy. It is also excreted in breast milk. Ilumya Counseling: I discussed with the patient the risks of tildrakizumab including but not limited to immunosuppression, malignancy, posterior leukoencephalopathy syndrome, and serious infections.  The patient understands that monitoring is required including a PPD at baseline and must alert us or the primary physician if symptoms of infection or other concerning signs are noted. Eucrisa Pregnancy And Lactation Text: This medication has not been assigned a Pregnancy Risk Category but animal studies failed to show danger with the topical medication. It is unknown if the medication is excreted in breast milk. Hydroxyzine Counseling: Patient advised that the medication is sedating and not to drive a car after taking this medication.  Patient informed of potential adverse effects including but not limited to dry mouth, urinary retention, and blurry vision.  The patient verbalized understanding of the proper use and possible adverse effects of hydroxyzine.  All of the patient's questions and concerns were addressed. Calcipotriene Counseling:  I discussed with the patient the risks of calcipotriene including but not limited to erythema, scaling, itching, and irritation. Cephalexin Counseling: I counseled the patient regarding use of cephalexin as an antibiotic for prophylactic and/or therapeutic purposes. Cephalexin (commonly prescribed under brand name Keflex) is a cephalosporin antibiotic which is active against numerous classes of bacteria, including most skin bacteria. Side effects may include nausea, diarrhea, gastrointestinal upset, rash, hives, yeast infections, and in rare cases, hepatitis, kidney disease, seizures, fever, confusion, neurologic symptoms, and others. Patients with severe allergies to penicillin medications are cautioned that there is about a 10% incidence of cross-reactivity with cephalosporins. When possible, patients with penicillin allergies should use alternatives to cephalosporins for antibiotic therapy. Hydroxychloroquine Counseling:  I discussed with the patient that a baseline ophthalmologic exam is needed at the start of therapy and every year thereafter while on therapy. A CBC may also be warranted for monitoring.  The side effects of this medication were discussed with the patient, including but not limited to agranulocytosis, aplastic anemia, seizures, rashes, retinopathy, and liver toxicity. Patient instructed to call the office should any adverse effect occur.  The patient verbalized understanding of the proper use and possible adverse effects of Plaquenil.  All the patient's questions and concerns were addressed. Xelcriseldaz Pregnancy And Lactation Text: This medication is Pregnancy Category D and is not considered safe during pregnancy.  The risk during breast feeding is also uncertain. Zoryve Counseling:  I discussed with the patient that Zoryve is not for use in the eyes, mouth or vagina. The most commonly reported side effects include diarrhea, headache, insomnia, application site pain, upper respiratory tract infections, and urinary tract infections.  All of the patient's questions and concerns were addressed. Xolair Pregnancy And Lactation Text: This medication is Pregnancy Category B and is considered safe during pregnancy. This medication is excreted in breast milk. Topical Steroids Applications Pregnancy And Lactation Text: Most topical steroids are considered safe to use during pregnancy and lactation.  Any topical steroid applied to the breast or nipple should be washed off before breastfeeding. Skyrizi Counseling: I discussed with the patient the risks of risankizumab-rzaa including but not limited to immunosuppression, and serious infections.  The patient understands that monitoring is required including a PPD at baseline and must alert us or the primary physician if symptoms of infection or other concerning signs are noted. Birth Control Pills Counseling: Birth Control Pill Counseling: I discussed with the patient the potential side effects of OCPs including but not limited to increased risk of stroke, heart attack, thrombophlebitis, deep venous thrombosis, hepatic adenomas, breast changes, GI upset, headaches, and depression.  The patient verbalized understanding of the proper use and possible adverse effects of OCPs. All of the patient's questions and concerns were addressed. Qbrexza Counseling:  I discussed with the patient the risks of Qbrexza including but not limited to headache, mydriasis, blurred vision, dry eyes, nasal dryness, dry mouth, dry throat, dry skin, urinary hesitation, and constipation.  Local skin reactions including erythema, burning, stinging, and itching can also occur. Cimzia Pregnancy And Lactation Text: This medication crosses the placenta but can be considered safe in certain situations. Cimzia may be excreted in breast milk. Hydroquinone Counseling:  Patient advised that medication may result in skin irritation, lightening (hypopigmentation), dryness, and burning.  In the event of skin irritation, the patient was advised to reduce the amount of the drug applied or use it less frequently.  Rarely, spots that are treated with hydroquinone can become darker (pseudoochronosis).  Should this occur, patient instructed to stop medication and call the office. The patient verbalized understanding of the proper use and possible adverse effects of hydroquinone.  All of the patient's questions and concerns were addressed. Litfulo Counseling: I discussed with the patient the risks of Litfulo therapy including but not limited to upper respiratory tract infections, shingles, cold sores, and nausea. Live vaccines should be avoided.  This medication has been linked to serious infections; higher rate of mortality; malignancy and lymphoproliferative disorders; major adverse cardiovascular events; thrombosis; gastrointestinal perforations; neutropenia; lymphopenia; anemia; liver enzyme elevations; and lipid elevations. Sarecycline Pregnancy And Lactation Text: This medication is Pregnancy Category D and not consider safe during pregnancy. It is also excreted in breast milk. Hydroxyzine Pregnancy And Lactation Text: This medication is not safe during pregnancy and should not be taken. It is also excreted in breast milk and breast feeding isn't recommended. Calcipotriene Pregnancy And Lactation Text: The use of this medication during pregnancy or lactation is not recommended as there is insufficient data. Cyclophosphamide Pregnancy And Lactation Text: This medication is Pregnancy Category D and it isn't considered safe during pregnancy. This medication is excreted in breast milk. Otezla Counseling: The side effects of Otezla were discussed with the patient, including but not limited to worsening or new depression, weight loss, diarrhea, nausea, upper respiratory tract infection, and headache. Patient instructed to call the office should any adverse effect occur.  The patient verbalized understanding of the proper use and possible adverse effects of Otezla.  All the patient's questions and concerns were addressed. Minocycline Counseling: Patient advised regarding possible photosensitivity and discoloration of the teeth, skin, lips, tongue and gums.  Patient instructed to avoid sunlight, if possible.  When exposed to sunlight, patients should wear protective clothing, sunglasses, and sunscreen.  The patient was instructed to call the office immediately if the following severe adverse effects occur:  hearing changes, easy bruising/bleeding, severe headache, or vision changes.  The patient verbalized understanding of the proper use and possible adverse effects of minocycline.  All of the patient's questions and concerns were addressed. Ketoconazole Counseling:   Patient counseled regarding improving absorption with orange juice.  Adverse effects include but are not limited to breast enlargement, headache, diarrhea, nausea, upset stomach, liver function test abnormalities, taste disturbance, and stomach pain.  There is a rare possibility of liver failure that can occur when taking ketoconazole. The patient understands that monitoring of LFTs may be required, especially at baseline. The patient verbalized understanding of the proper use and possible adverse effects of ketoconazole.  All of the patient's questions and concerns were addressed. Cephalexin Pregnancy And Lactation Text: This medication is Pregnancy Category B and considered safe during pregnancy.  It is also excreted in breast milk but can be used safely for shorter doses. Cantharidin Counseling:  I discussed with the patient the risks of Cantharidin including but not limited to pain, redness, burning, itching, and blistering. Tazorac Counseling:  Patient advised that medication is irritating and drying.  Patient may need to apply sparingly and wash off after an hour before eventually leaving it on overnight.  The patient verbalized understanding of the proper use and possible adverse effects of tazorac.  All of the patient's questions and concerns were addressed. Qbrexza Pregnancy And Lactation Text: There is no available data on Qbrexza use in pregnant women.  There is no available data on Qbrexza use in lactation. Cosentyx Counseling:  I discussed with the patient the risks of Cosentyx including but not limited to worsening of Crohn's disease, immunosuppression, allergic reactions and infections.  The patient understands that monitoring is required including a PPD at baseline and must alert us or the primary physician if symptoms of infection or other concerning signs are noted. Tranexamic Acid Counseling:  Patient advised of the small risk of bleeding problems with tranexamic acid. They were also instructed to call if they developed any nausea, vomiting or diarrhea. All of the patient's questions and concerns were addressed. Detail Level: Simple Hydroxychloroquine Pregnancy And Lactation Text: This medication has been shown to cause fetal harm but it isn't assigned a Pregnancy Risk Category. There are small amounts excreted in breast milk. Cyclosporine Counseling:  I discussed with the patient the risks of cyclosporine including but not limited to hypertension, gingival hyperplasia,myelosuppression, immunosuppression, liver damage, kidney damage, neurotoxicity, lymphoma, and serious infections. The patient understands that monitoring is required including baseline blood pressure, CBC, CMP, lipid panel and uric acid, and then 1-2 times monthly CMP and blood pressure. Cantharidin Pregnancy And Lactation Text: This medication has not been proven safe during pregnancy. It is unknown if this medication is excreted in breast milk. Colchicine Counseling:  Patient counseled regarding adverse effects including but not limited to stomach upset (nausea, vomiting, stomach pain, or diarrhea).  Patient instructed to limit alcohol consumption while taking this medication.  Colchicine may reduce blood counts especially with prolonged use.  The patient understands that monitoring of kidney function and blood counts may be required, especially at baseline. The patient verbalized understanding of the proper use and possible adverse effects of colchicine.  All of the patient's questions and concerns were addressed. Otezla Pregnancy And Lactation Text: This medication is Pregnancy Category C and it isn't known if it is safe during pregnancy. It is unknown if it is excreted in breast milk. Aklief counseling:  Patient advised to apply a pea-sized amount only at bedtime and wait 30 minutes after washing their face before applying.  If too drying, patient may add a non-comedogenic moisturizer.  The most commonly reported side effects including irritation, redness, scaling, dryness, stinging, burning, itching, and increased risk of sunburn.  The patient verbalized understanding of the proper use and possible adverse effects of retinoids.  All of the patient's questions and concerns were addressed. Birth Control Pills Pregnancy And Lactation Text: This medication should be avoided if pregnant and for the first 30 days post-partum. Mirvaso Counseling: Mirvaso is a topical medication which can decrease superficial blood flow where applied. Side effects are uncommon and include stinging, redness and allergic reactions. Topical Sulfur Applications Counseling: Topical Sulfur Counseling: Patient counseled that this medication may cause skin irritation or allergic reactions.  In the event of skin irritation, the patient was advised to reduce the amount of the drug applied or use it less frequently.   The patient verbalized understanding of the proper use and possible adverse effects of topical sulfur application.  All of the patient's questions and concerns were addressed. Ketoconazole Pregnancy And Lactation Text: This medication is Pregnancy Category C and it isn't know if it is safe during pregnancy. It is also excreted in breast milk and breast feeding isn't recommended. Litfulo Pregnancy And Lactation Text: Based on animal studies, Lifulo may cause embryo-fetal harm when administered to pregnant women.  The medication should not be used in pregnancy.  Breastfeeding is not recommended during treatment. Tetracycline Counseling: Patient counseled regarding possible photosensitivity and increased risk for sunburn.  Patient instructed to avoid sunlight, if possible.  When exposed to sunlight, patients should wear protective clothing, sunglasses, and sunscreen.  The patient was instructed to call the office immediately if the following severe adverse effects occur:  hearing changes, easy bruising/bleeding, severe headache, or vision changes.  The patient verbalized understanding of the proper use and possible adverse effects of tetracycline.  All of the patient's questions and concerns were addressed. Patient understands to avoid pregnancy while on therapy due to potential birth defects. Infliximab Counseling:  I discussed with the patient the risks of infliximab including but not limited to myelosuppression, immunosuppression, autoimmune hepatitis, demyelinating diseases, lymphoma, and serious infections.  The patient understands that monitoring is required including a PPD at baseline and must alert us or the primary physician if symptoms of infection or other concerning signs are noted. Zyclara Counseling:  I discussed with the patient the risks of imiquimod including but not limited to erythema, scaling, itching, weeping, crusting, and pain.  Patient understands that the inflammatory response to imiquimod is variable from person to person and was educated regarded proper titration schedule.  If flu-like symptoms develop, patient knows to discontinue the medication and contact us. Erivedge Counseling- I discussed with the patient the risks of Erivedge including but not limited to nausea, vomiting, diarrhea, constipation, weight loss, changes in the sense of taste, decreased appetite, muscle spasms, and hair loss.  The patient verbalized understanding of the proper use and possible adverse effects of Erivedge.  All of the patient's questions and concerns were addressed. Niacinamide Counseling: I recommended taking niacin or niacinamide, also know as vitamin B3, twice daily. Recent evidence suggests that taking vitamin B3 (500 mg twice daily) can reduce the risk of actinic keratoses and non-melanoma skin cancers. Side effects of vitamin B3 include flushing and headache. Clindamycin Counseling: I counseled the patient regarding use of clindamycin as an antibiotic for prophylactic and/or therapeutic purposes. Clindamycin is active against numerous classes of bacteria, including skin bacteria. Side effects may include nausea, diarrhea, gastrointestinal upset, rash, hives, yeast infections, and in rare cases, colitis. Acitretin Counseling:  I discussed with the patient the risks of acitretin including but not limited to hair loss, dry lips/skin/eyes, liver damage, hyperlipidemia, depression/suicidal ideation, photosensitivity.  Serious rare side effects can include but are not limited to pancreatitis, pseudotumor cerebri, bony changes, clot formation/stroke/heart attack.  Patient understands that alcohol is contraindicated since it can result in liver toxicity and significantly prolong the elimination of the drug by many years. Tazorac Pregnancy And Lactation Text: This medication is not safe during pregnancy. It is unknown if this medication is excreted in breast milk. Tranexamic Acid Pregnancy And Lactation Text: It is unknown if this medication is safe during pregnancy or breast feeding. Include Pregnancy/Lactation Warning?: No Albendazole Counseling:  I discussed with the patient the risks of albendazole including but not limited to cytopenia, kidney damage, nausea/vomiting and severe allergy.  The patient understands that this medication is being used in an off-label manner. Rhofade Counseling: Rhofade is a topical medication which can decrease superficial blood flow where applied. Side effects are uncommon and include stinging, redness and allergic reactions. 5-Fu Counseling: 5-Fluorouracil Counseling:  I discussed with the patient the risks of 5-fluorouracil including but not limited to erythema, scaling, itching, weeping, crusting, and pain. Spironolactone Counseling: Patient advised regarding risks of diarrhea, abdominal pain, hyperkalemia, birth defects (for female patients), liver toxicity and renal toxicity. The patient may need blood work to monitor liver and kidney function and potassium levels while on therapy. The patient verbalized understanding of the proper use and possible adverse effects of spironolactone.  All of the patient's questions and concerns were addressed. Oxybutynin Counseling:  I discussed with the patient the risks of oxybutynin including but not limited to skin rash, drowsiness, dry mouth, difficulty urinating, and blurred vision. Low Dose Naltrexone Counseling- I discussed with the patient the potential risks and side effects of low dose naltrexone including but not limited to: more vivid dreams, headaches, nausea, vomiting, abdominal pain, fatigue, dizziness, and anxiety. Olumiant Counseling: I discussed with the patient the risks of Olumiant therapy including but not limited to upper respiratory tract infections, shingles, cold sores, and nausea. Live vaccines should be avoided.  This medication has been linked to serious infections; higher rate of mortality; malignancy and lymphoproliferative disorders; major adverse cardiovascular events; thrombosis; gastrointestinal perforations; neutropenia; lymphopenia; anemia; liver enzyme elevations; and lipid elevations. Topical Sulfur Applications Pregnancy And Lactation Text: This medication is considered safe during pregnancy and breast feeding secondary to limited systemic absorption. Quinolones Counseling:  I discussed with the patient the risks of fluoroquinolones including but not limited to GI upset, allergic reaction, drug rash, diarrhea, dizziness, photosensitivity, yeast infections, liver function test abnormalities, tendonitis/tendon rupture. Aklief Pregnancy And Lactation Text: It is unknown if this medication is safe to use during pregnancy.  It is unknown if this medication is excreted in breast milk.  Breastfeeding women should use the topical cream on the smallest area of the skin for the shortest time needed while breastfeeding.  Do not apply to nipple and areola. Mirvaso Pregnancy And Lactation Text: This medication has not been assigned a Pregnancy Risk Category. It is unknown if the medication is excreted in breast milk. Topical Clindamycin Counseling: Patient counseled that this medication may cause skin irritation or allergic reactions.  In the event of skin irritation, the patient was advised to reduce the amount of the drug applied or use it less frequently.   The patient verbalized understanding of the proper use and possible adverse effects of clindamycin.  All of the patient's questions and concerns were addressed. Imiquimod Counseling:  I discussed with the patient the risks of imiquimod including but not limited to erythema, scaling, itching, weeping, crusting, and pain.  Patient understands that the inflammatory response to imiquimod is variable from person to person and was educated regarded proper titration schedule.  If flu-like symptoms develop, patient knows to discontinue the medication and contact us. Acitretin Pregnancy And Lactation Text: This medication is Pregnancy Category X and should not be given to women who are pregnant or may become pregnant in the future. This medication is excreted in breast milk. Terbinafine Counseling: Patient counseling regarding adverse effects of terbinafine including but not limited to headache, diarrhea, rash, upset stomach, liver function test abnormalities, itching, taste/smell disturbance, nausea, abdominal pain, and flatulence.  There is a rare possibility of liver failure that can occur when taking terbinafine.  The patient understands that a baseline LFT and kidney function test may be required. The patient verbalized understanding of the proper use and possible adverse effects of terbinafine.  All of the patient's questions and concerns were addressed. Stelara Counseling:  I discussed with the patient the risks of ustekinumab including but not limited to immunosuppression, malignancy, posterior leukoencephalopathy syndrome, and serious infections.  The patient understands that monitoring is required including a PPD at baseline and must alert us or the primary physician if symptoms of infection or other concerning signs are noted. Clindamycin Pregnancy And Lactation Text: This medication can be used in pregnancy if certain situations. Clindamycin is also present in breast milk. Niacinamide Pregnancy And Lactation Text: These medications are considered safe during pregnancy. Valtrex Counseling: I discussed with the patient the risks of valacyclovir including but not limited to kidney damage, nausea, vomiting and severe allergy.  The patient understands that if the infection seems to be worsening or is not improving, they are to call. Low Dose Naltrexone Pregnancy And Lactation Text: Naltrexone is pregnancy category C.  There have been no adequate and well-controlled studies in pregnant women.  It should be used in pregnancy only if the potential benefit justifies the potential risk to the fetus.   Limited data indicates that naltrexone is minimally excreted into breastmilk. Wartpeel Counseling:  I discussed with the patient the risks of Wartpeel including but not limited to erythema, scaling, itching, weeping, crusting, and pain. Albendazole Pregnancy And Lactation Text: This medication is Pregnancy Category C and it isn't known if it is safe during pregnancy. It is also excreted in breast milk. Methotrexate Counseling:  Patient counseled regarding adverse effects of methotrexate including but not limited to nausea, vomiting, abnormalities in liver function tests. Patients may develop mouth sores, rash, diarrhea, and abnormalities in blood counts. The patient understands that monitoring is required including LFT's and blood counts.  There is a rare possibility of scarring of the liver and lung problems that can occur when taking methotrexate. Persistent nausea, loss of appetite, pale stools, dark urine, cough, and shortness of breath should be reported immediately. Patient advised to discontinue methotrexate treatment at least three months before attempting to become pregnant.  I discussed the need for folate supplements while taking methotrexate.  These supplements can decrease side effects during methotrexate treatment. The patient verbalized understanding of the proper use and possible adverse effects of methotrexate.  All of the patient's questions and concerns were addressed. Azelaic Acid Counseling: Patient counseled that medicine may cause skin irritation and to avoid applying near the eyes.  In the event of skin irritation, the patient was advised to reduce the amount of the drug applied or use it less frequently.   The patient verbalized understanding of the proper use and possible adverse effects of azelaic acid.  All of the patient's questions and concerns were addressed. Dupixent Counseling: I discussed with the patient the risks of dupilumab including but not limited to eye infection and irritation, cold sores, injection site reactions, worsening of asthma, allergic reactions and increased risk of parasitic infection.  Live vaccines should be avoided while taking dupilumab. Dupilumab will also interact with certain medications such as warfarin and cyclosporine. The patient understands that monitoring is required and they must alert us or the primary physician if symptoms of infection or other concerning signs are noted. Spironolactone Pregnancy And Lactation Text: This medication can cause feminization of the male fetus and should be avoided during pregnancy. The active metabolite is also found in breast milk. Dapsone Counseling: I discussed with the patient the risks of dapsone including but not limited to hemolytic anemia, agranulocytosis, rashes, methemoglobinemia, kidney failure, peripheral neuropathy, headaches, GI upset, and liver toxicity.  Patients who start dapsone require monitoring including baseline LFTs and weekly CBCs for the first month, then every month thereafter.  The patient verbalized understanding of the proper use and possible adverse effects of dapsone.  All of the patient's questions and concerns were addressed. Libtayo Counseling- I discussed with the patient the risks of Libtayo including but not limited to nausea, vomiting, diarrhea, and bone or muscle pain.  The patient verbalized understanding of the proper use and possible adverse effects of Libtayo.  All of the patient's questions and concerns were addressed. Olumiant Pregnancy And Lactation Text: Based on animal studies, Olumiant may cause embryo-fetal harm when administered to pregnant women.  The medication should not be used in pregnancy.  Breastfeeding is not recommended during treatment. Opzelura Counseling:  I discussed with the patient the risks of Opzelura including but not limited to nasopharngitis, bronchitis, ear infection, eosinophila, hives, diarrhea, folliculitis, tonsillitis, and rhinorrhea.  Taken orally, this medication has been linked to serious infections; higher rate of mortality; malignancy and lymphoproliferative disorders; major adverse cardiovascular events; thrombosis; thrombocytopenia, anemia, and neutropenia; and lipid elevations. Doxycycline Counseling:  Patient counseled regarding possible photosensitivity and increased risk for sunburn.  Patient instructed to avoid sunlight, if possible.  When exposed to sunlight, patients should wear protective clothing, sunglasses, and sunscreen.  The patient was instructed to call the office immediately if the following severe adverse effects occur:  hearing changes, easy bruising/bleeding, severe headache, or vision changes.  The patient verbalized understanding of the proper use and possible adverse effects of doxycycline.  All of the patient's questions and concerns were addressed. Nsaids Counseling: NSAID Counseling: I discussed with the patient that NSAIDs should be taken with food. Prolonged use of NSAIDs can result in the development of stomach ulcers.  Patient advised to stop taking NSAIDs if abdominal pain occurs.  The patient verbalized understanding of the proper use and possible adverse effects of NSAIDs.  All of the patient's questions and concerns were addressed. Bexarotene Counseling:  I discussed with the patient the risks of bexarotene including but not limited to hair loss, dry lips/skin/eyes, liver abnormalities, hyperlipidemia, pancreatitis, depression/suicidal ideation, photosensitivity, drug rash/allergic reactions, hypothyroidism, anemia, leukopenia, infection, cataracts, and teratogenicity.  Patient understands that they will need regular blood tests to check lipid profile, liver function tests, white blood cell count, thyroid function tests and pregnancy test if applicable. Rituxan Counseling:  I discussed with the patient the risks of Rituxan infusions. Side effects can include infusion reactions, severe drug rashes including mucocutaneous reactions, reactivation of latent hepatitis and other infections and rarely progressive multifocal leukoencephalopathy.  All of the patient's questions and concerns were addressed. Fluconazole Counseling:  Patient counseled regarding adverse effects of fluconazole including but not limited to headache, diarrhea, nausea, upset stomach, liver function test abnormalities, taste disturbance, and stomach pain.  There is a rare possibility of liver failure that can occur when taking fluconazole.  The patient understands that monitoring of LFTs and kidney function test may be required, especially at baseline. The patient verbalized understanding of the proper use and possible adverse effects of fluconazole.  All of the patient's questions and concerns were addressed. Valtrex Pregnancy And Lactation Text: this medication is Pregnancy Category B and is considered safe during pregnancy. This medication is not directly found in breast milk but it's metabolite acyclovir is present. Drysol Counseling:  I discussed with the patient the risks of drysol/aluminum chloride including but not limited to skin rash, itching, irritation, burning. Ivermectin Counseling:  Patient instructed to take medication on an empty stomach with a full glass of water.  Patient informed of potential adverse effects including but not limited to nausea, diarrhea, dizziness, itching, and swelling of the extremities or lymph nodes.  The patient verbalized understanding of the proper use and possible adverse effects of ivermectin.  All of the patient's questions and concerns were addressed. Solaraze Counseling:  I discussed with the patient the risks of Solaraze including but not limited to erythema, scaling, itching, weeping, crusting, and pain. Rifampin Counseling: I discussed with the patient the risks of rifampin including but not limited to liver damage, kidney damage, red-orange body fluids, nausea/vomiting and severe allergy. Propranolol Counseling:  I discussed with the patient the risks of propranolol including but not limited to low heart rate, low blood pressure, low blood sugar, restlessness and increased cold sensitivity. They should call the office if they experience any of these side effects. Azelaic Acid Pregnancy And Lactation Text: This medication is considered safe during pregnancy and breast feeding. Taltz Counseling: I discussed with the patient the risks of ixekizumab including but not limited to immunosuppression, serious infections, worsening of inflammatory bowel disease and drug reactions.  The patient understands that monitoring is required including a PPD at baseline and must alert us or the primary physician if symptoms of infection or other concerning signs are noted. Opzelura Pregnancy And Lactation Text: There is insufficient data to evaluate drug-associated risk for major birth defects, miscarriage, or other adverse maternal or fetal outcomes.  There is a pregnancy registry that monitors pregnancy outcomes in pregnant persons exposed to the medication during pregnancy.  It is unknown if this medication is excreted in breast milk.  Do not breastfeed during treatment and for about 4 weeks after the last dose. Dupixent Pregnancy And Lactation Text: This medication likely crosses the placenta but the risk for the fetus is uncertain. This medication is excreted in breast milk. Rinvoq Counseling: I discussed with the patient the risks of Rinvoq therapy including but not limited to upper respiratory tract infections, shingles, cold sores, bronchitis, nausea, cough, fever, acne, and headache. Live vaccines should be avoided.  This medication has been linked to serious infections; higher rate of mortality; malignancy and lymphoproliferative disorders; major adverse cardiovascular events; thrombosis; thrombocytopenia, anemia, and neutropenia; lipid elevations; liver enzyme elevations; and gastrointestinal perforations. Methotrexate Pregnancy And Lactation Text: This medication is Pregnancy Category X and is known to cause fetal harm. This medication is excreted in breast milk. Libtayo Pregnancy And Lactation Text: This medication is contraindicated in pregnancy and when breast feeding. Nsaids Pregnancy And Lactation Text: These medications are considered safe up to 30 weeks gestation. It is excreted in breast milk. Opioid Counseling: I discussed with the patient the potential side effects of opioids including but not limited to addiction, altered mental status, and depression. I stressed avoiding alcohol, benzodiazepines, muscle relaxants and sleep aids unless specifically okayed by a physician. The patient verbalized understanding of the proper use and possible adverse effects of opioids. All of the patient's questions and concerns were addressed. They were instructed to flush the remaining pills down the toilet if they did not need them for pain. Doxycycline Pregnancy And Lactation Text: This medication is Pregnancy Category D and not consider safe during pregnancy. It is also excreted in breast milk but is considered safe for shorter treatment courses. Azathioprine Counseling:  I discussed with the patient the risks of azathioprine including but not limited to myelosuppression, immunosuppression, hepatotoxicity, lymphoma, and infections.  The patient understands that monitoring is required including baseline LFTs, Creatinine, possible TPMP genotyping and weekly CBCs for the first month and then every 2 weeks thereafter.  The patient verbalized understanding of the proper use and possible adverse effects of azathioprine.  All of the patient's questions and concerns were addressed. Dapsone Pregnancy And Lactation Text: This medication is Pregnancy Category C and is not considered safe during pregnancy or breast feeding. Bexarotene Pregnancy And Lactation Text: This medication is Pregnancy Category X and should not be given to women who are pregnant or may become pregnant. This medication should not be used if you are breast feeding. Topical Ketoconazole Counseling: Patient counseled that this medication may cause skin irritation or allergic reactions.  In the event of skin irritation, the patient was advised to reduce the amount of the drug applied or use it less frequently.   The patient verbalized understanding of the proper use and possible adverse effects of ketoconazole.  All of the patient's questions and concerns were addressed. Solaraze Pregnancy And Lactation Text: This medication is Pregnancy Category B and is considered safe. There is some data to suggest avoiding during the third trimester. It is unknown if this medication is excreted in breast milk. Rituxan Pregnancy And Lactation Text: This medication is Pregnancy Category C and it isn't know if it is safe during pregnancy. It is unknown if this medication is excreted in breast milk but similar antibodies are known to be excreted. Cimetidine Counseling:  I discussed with the patient the risks of Cimetidine including but not limited to gynecomastia, headache, diarrhea, nausea, drowsiness, arrhythmias, pancreatitis, skin rashes, psychosis, bone marrow suppression and kidney toxicity. Benzoyl Peroxide Counseling: Patient counseled that medicine may cause skin irritation and bleach clothing.  In the event of skin irritation, the patient was advised to reduce the amount of the drug applied or use it less frequently.   The patient verbalized understanding of the proper use and possible adverse effects of benzoyl peroxide.  All of the patient's questions and concerns were addressed. Enbrel Counseling:  I discussed with the patient the risks of etanercept including but not limited to myelosuppression, immunosuppression, autoimmune hepatitis, demyelinating diseases, lymphoma, and infections.  The patient understands that monitoring is required including a PPD at baseline and must alert us or the primary physician if symptoms of infection or other concerning signs are noted. Azithromycin Counseling:  I discussed with the patient the risks of azithromycin including but not limited to GI upset, allergic reaction, drug rash, diarrhea, and yeast infections. Propranolol Pregnancy And Lactation Text: This medication is Pregnancy Category C and it isn't known if it is safe during pregnancy. It is excreted in breast milk. Prednisone Counseling:  I discussed with the patient the risks of prolonged use of prednisone including but not limited to weight gain, insomnia, osteoporosis, mood changes, diabetes, susceptibility to infection, glaucoma and high blood pressure.  In cases where prednisone use is prolonged, patients should be monitored with blood pressure checks, serum glucose levels and an eye exam.  Additionally, the patient may need to be placed on GI prophylaxis, PCP prophylaxis, and calcium and vitamin D supplementation and/or a bisphosphonate.  The patient verbalized understanding of the proper use and the possible adverse effects of prednisone.  All of the patient's questions and concerns were addressed. Rifampin Pregnancy And Lactation Text: This medication is Pregnancy Category C and it isn't know if it is safe during pregnancy. It is also excreted in breast milk and should not be used if you are breast feeding. Gabapentin Counseling: I discussed with the patient the risks of gabapentin including but not limited to dizziness, somnolence, fatigue and ataxia. Picato Counseling:  I discussed with the patient the risks of Picato including but not limited to erythema, scaling, itching, weeping, crusting, and pain. Winlevi Counseling:  I discussed with the patient the risks of topical clascoterone including but not limited to erythema, scaling, itching, and stinging. Patient voiced their understanding. Isotretinoin Counseling: Patient should get monthly blood tests, not donate blood, not drive at night if vision affected, not share medication, and not undergo elective surgery for 6 months after tx completed. Side effects reviewed, pt to contact office should one occur. Rinvoq Pregnancy And Lactation Text: Based on animal studies, Rinvoq may cause embryo-fetal harm when administered to pregnant women.  The medication should not be used in pregnancy.  Breastfeeding is not recommended during treatment and for 6 days after the last dose. Opioid Pregnancy And Lactation Text: These medications can lead to premature delivery and should be avoided during pregnancy. These medications are also present in breast milk in small amounts. Odomzo Counseling- I discussed with the patient the risks of Odomzo including but not limited to nausea, vomiting, diarrhea, constipation, weight loss, changes in the sense of taste, decreased appetite, muscle spasms, and hair loss.  The patient verbalized understanding of the proper use and possible adverse effects of Odomzo.  All of the patient's questions and concerns were addressed. Dutasteride Male Counseling: Dustasteride Counseling:  I discussed with the patient the risks of use of dutasteride including but not limited to decreased libido, decreased ejaculate volume, and gynecomastia. Women who can become pregnant should not handle medication.  All of the patient's questions and concerns were addressed.

## 2024-12-12 NOTE — PROGRESS NOTES
Subjective:      Patient ID: Loi Cordova is a 59 y.o. female.    Chief Complaint: No chief complaint on file.    Loi Cordova is a 58 y.o. female who presents in office for evaluation of asthma. Has had atleast 2 exacerbations within last year requiring urgent care visits. Daily productive cough, from white to green mucous, worse at night, with nocturnal arousals. Associated with occasional chest tightness, shortness of breath, wheezing, headaches, lightheadedness, LE edema, malaise, fatigue. Using albuterol inhaler 2-3 times daily. Has not used nebulizer in 6 years. Has daily controller Breo.      + GERD symptoms, follows GI, treated with omeprazole, + sinuses, post nasal drip, allergies, takes OTC sinus medications.     Urgent care visit 4/3/23 for asthma: received steroid injection, promethazine DM.   Urgent care visit 2/1/23 for URI and asthma exacerbation: discharged with tessalon perles, doxycycline, prednisone therapy     Steroid therapy hx: April 2023, Feb 2023, Dec 2020. Finished prednisone therapy, noticed some improvement in symptoms. Requires recurrent systemic steroid therapy in addition to daily inhaled steroid therapy. Eosinophils artificially reduced due to recurrent systemic steroid therapy.     Interval hx: On nucala. Significant improvement with Nucala.   Smoking hx- current every day smoker, 40 years smoking hx, 1ppd      Review of Systems   Respiratory:  Negative for cough, shortness of breath and dyspnea on extertion.      Objective:     Physical Exam   Constitutional: She is oriented to person, place, and time. She appears well-developed and well-nourished.   HENT:   Head: Normocephalic.   Nose: Nose normal.   Cardiovascular: Normal rate.   Pulmonary/Chest: Normal expansion and symmetric chest wall expansion. She has no rales.   Neurological: She is alert and oriented to person, place, and time.   Skin: Skin is warm and dry.   Nursing note and vitals reviewed.    Personal Diagnostic  Review  none pertinent      12/12/2024     1:49 PM 10/30/2024     1:38 PM 10/16/2024    10:05 AM 10/8/2024     8:01 AM 10/7/2024    10:34 PM 10/7/2024    10:33 PM 10/7/2024     8:44 PM   Pulmonary Function Tests   SpO2 98 % 98 %   95 % 96 % 97 %   Height 5' (1.524 m) 5' (1.524 m) 5' (1.524 m) 5' (1.524 m)   5' (1.524 m)   Weight 85.2 kg (187 lb 13.3 oz) 84.9 kg (187 lb 4.5 oz) 85 kg (187 lb 6.3 oz) 84.4 kg (186 lb 1.1 oz)   82.6 kg (182 lb)   BMI (Calculated) 36.7 36.6 36.6 36.3   35.5        Assessment:     No diagnosis found.     Outpatient Encounter Medications as of 12/12/2024   Medication Sig Dispense Refill    albuterol (PROVENTIL HFA) 90 mcg/actuation inhaler Inhale 2 puffs into the lungs every 6 (six) hours as needed for Wheezing or Shortness of Breath. Rescue 18 g 6    ALPRAZolam (XANAX) 0.25 MG tablet Take 1 tablet (0.25 mg total) by mouth daily as needed for Anxiety. 30 tablet 0    amLODIPine (NORVASC) 5 MG tablet Take 1 tablet (5 mg total) by mouth every evening. 90 tablet 3    aspirin 81 MG Chew Take 81 mg by mouth every evening.      atorvastatin (LIPITOR) 80 MG tablet Take 1 tablet (80 mg total) by mouth every evening. 90 tablet 3    azelastine (ASTELIN) 137 mcg (0.1 %) nasal spray 1 spray (137 mcg total) in each nostril route 2 (two) times daily. 30 mL 0    blood sugar diagnostic Strp Use 1 strip to check blood sugar once daily 100 each 0    blood-glucose meter Misc USE ONCE DAILY TO MONITOR GLUCOSE 1 each 0    ciprofloxacin HCl (CIPRO) 500 MG tablet Take 1 tablet (500 mg total) by mouth every 12 (twelve) hours for 5 days 10 tablet 0    cyclobenzaprine (FLEXERIL) 10 MG tablet Take 1 tablet (10 mg total) by mouth 3 (three) times daily as needed for Muscle spasms. 60 tablet 0    diclofenac (VOLTAREN) 75 MG EC tablet Take 1 tablet (75 mg total) by mouth 2 (two) times daily. 60 tablet 0    fenofibrate 160 MG Tab Take 1 tablet (160 mg total) by mouth once daily. 90 tablet 3    fluticasone  furoate-vilanteroL (BREO) 100-25 mcg/dose diskus inhaler Inhale 1 puff into the lungs once daily. Controller 60 each 0    furosemide (LASIX) 40 MG tablet Take 1 tablet (40 mg total) by mouth daily as needed (edema). 90 tablet 1    ibuprofen (ADVIL,MOTRIN) 600 MG tablet Take 1 tablet (600 mg total) by mouth every 6 (six) hours as needed for Pain. 20 tablet 0    lancets 33 gauge Misc USE ONCE DAILY TO MONITOR GLUCOSE 100 each 3    LIDOcaine (LIDODERM) 5 % Place 1 patch onto the skin once daily. Remove & Discard patch within 12 hours or as directed by MD 10 patch 0    lisinopriL (PRINIVIL,ZESTRIL) 5 MG tablet Take 1 tablet (5 mg total) by mouth once daily. 90 tablet 3    mepolizumab 100 mg/mL AtIn Inject 1 mL (100 mg total) into the skin every 28 days. 1 mL 2    metoprolol succinate (TOPROL-XL) 100 MG 24 hr tablet Take 1 tablet (100 mg total) by mouth once daily. 90 tablet 3    mirabegron (MYRBETRIQ) 25 mg Tb24 ER tablet Take 1 tablet (25 mg total) by mouth once daily. 30 tablet 11    mupirocin (BACTROBAN) 2 % ointment Apply topically 3 (three) times daily. 30 g 0    nitroGLYCERIN (NITROSTAT) 0.4 MG SL tablet Place 1 tablet under the tongue once every 5 minutes for 3 doses for chest pain, if pain continues call 911 25 tablet 3    omeprazole (PRILOSEC) 40 MG capsule Take 1 capsule (40 mg total) by mouth 2 (two) times a day. 180 capsule 3    ondansetron (ZOFRAN-ODT) 8 MG TbDL Take 1 tablet (8 mg total) by mouth every 12 (twelve) hours as needed (n/v). 90 tablet 3    potassium chloride (K-TAB) 20 mEq Take 1 tablet (20 mEq total) by mouth daily as needed (edema). Take with lasix if needed for edema 90 tablet 1    prochlorperazine (COMPAZINE) 10 MG tablet Take 1 tablet (10 mg total) by mouth every 6 (six) hours as needed. 15 tablet 0    rOPINIRole (REQUIP) 0.5 MG tablet Take 1 tablet (0.5 mg total) by mouth every evening. 90 tablet 3    tirzepatide 7.5 mg/0.5 mL PnIj Inject 7.5 mg into the skin every 7 days. 2 mL 2     traZODone (DESYREL) 100 MG tablet Take 1 tablet (100 mg total) by mouth every evening. 90 tablet 3    [DISCONTINUED] ALPRAZolam (XANAX) 0.25 MG tablet Take 1 tablet (0.25 mg total) by mouth daily as needed for Anxiety. 30 tablet 0     Facility-Administered Encounter Medications as of 12/12/2024   Medication Dose Route Frequency Provider Last Rate Last Admin    0.9%  NaCl infusion   Intravenous Continuous Sarah Gamez MD   Stopped at 01/09/20 0925    0.9%  NaCl infusion   Intravenous Continuous Sarah Gamez MD   Stopped at 01/09/20 1026    0.9%  NaCl infusion   Intravenous Continuous Angélica De La Cruz MD        ceFAZolin injection 2 g  2 g Intravenous On Call Procedure Angélica De La Cruz MD        lidocaine (PF) 10 mg/ml (1%) injection 10 mg  1 mL Intradermal Once Angélica De La Cruz MD        sodium chloride 0.9% flush 10 mL  10 mL Intravenous PRN Sarah Gamez MD        sodium chloride 0.9% flush 10 mL  10 mL Intravenous PRN Sarah Gamez MD         No orders of the defined types were placed in this encounter.      Plan:      1. Severe persistent asthma without complication  Overview:  Not in exacerbation. Continue current medications. Followed by Pulm    Assessment & Plan:  Carly    Orders:  -     CT Chest Lung Screening Low Dose; Future; Expected date: 12/12/2024    Other orders  -     fluticasone furoate-vilanteroL (BREO ELLIPTA) 200-25 mcg/dose DsDv diskus inhaler; Inhale 1 puff into the lungs once daily. Controller  Dispense: 60 each; Refill: 3

## 2024-12-17 DIAGNOSIS — J45.51 SEVERE PERSISTENT ASTHMA WITH ACUTE EXACERBATION: ICD-10-CM

## 2024-12-19 RX ORDER — MEPOLIZUMAB 100 MG/ML
100 INJECTION, SOLUTION SUBCUTANEOUS
Qty: 1 ML | Refills: 2 | Status: ACTIVE | OUTPATIENT
Start: 2024-12-19

## 2025-02-05 ENCOUNTER — OFFICE VISIT (OUTPATIENT)
Dept: FAMILY MEDICINE | Facility: CLINIC | Age: 60
End: 2025-02-05
Payer: COMMERCIAL

## 2025-02-05 VITALS
SYSTOLIC BLOOD PRESSURE: 119 MMHG | DIASTOLIC BLOOD PRESSURE: 70 MMHG | WEIGHT: 191.38 LBS | HEART RATE: 80 BPM | HEIGHT: 60 IN | BODY MASS INDEX: 37.57 KG/M2 | OXYGEN SATURATION: 97 %

## 2025-02-05 DIAGNOSIS — I50.32 CHRONIC DIASTOLIC CHF (CONGESTIVE HEART FAILURE): ICD-10-CM

## 2025-02-05 DIAGNOSIS — Z23 NEED FOR VIRAL IMMUNIZATION: Primary | ICD-10-CM

## 2025-02-05 DIAGNOSIS — I73.9 PAD (PERIPHERAL ARTERY DISEASE): Chronic | ICD-10-CM

## 2025-02-05 DIAGNOSIS — I25.118 CORONARY ARTERY DISEASE OF NATIVE ARTERY OF NATIVE HEART WITH STABLE ANGINA PECTORIS: Chronic | ICD-10-CM

## 2025-02-05 DIAGNOSIS — I10 ESSENTIAL HYPERTENSION: Chronic | ICD-10-CM

## 2025-02-05 DIAGNOSIS — Z98.61 CAD S/P PERCUTANEOUS CORONARY ANGIOPLASTY: Chronic | ICD-10-CM

## 2025-02-05 DIAGNOSIS — I25.10 CAD S/P PERCUTANEOUS CORONARY ANGIOPLASTY: Chronic | ICD-10-CM

## 2025-02-05 DIAGNOSIS — E66.812 CLASS 2 SEVERE OBESITY DUE TO EXCESS CALORIES WITH SERIOUS COMORBIDITY AND BODY MASS INDEX (BMI) OF 37.0 TO 37.9 IN ADULT: ICD-10-CM

## 2025-02-05 DIAGNOSIS — E66.01 CLASS 2 SEVERE OBESITY DUE TO EXCESS CALORIES WITH SERIOUS COMORBIDITY AND BODY MASS INDEX (BMI) OF 37.0 TO 37.9 IN ADULT: ICD-10-CM

## 2025-02-05 DIAGNOSIS — F17.210 HEAVY CIGARETTE SMOKER: Chronic | ICD-10-CM

## 2025-02-05 DIAGNOSIS — M54.9 MID BACK PAIN ON RIGHT SIDE: ICD-10-CM

## 2025-02-05 DIAGNOSIS — E11.42 TYPE 2 DIABETES MELLITUS WITH DIABETIC POLYNEUROPATHY, WITHOUT LONG-TERM CURRENT USE OF INSULIN: ICD-10-CM

## 2025-02-05 DIAGNOSIS — J45.50 SEVERE PERSISTENT ASTHMA WITHOUT COMPLICATION: ICD-10-CM

## 2025-02-05 PROCEDURE — 1159F MED LIST DOCD IN RCRD: CPT | Mod: CPTII,S$GLB,, | Performed by: FAMILY MEDICINE

## 2025-02-05 PROCEDURE — 3008F BODY MASS INDEX DOCD: CPT | Mod: CPTII,S$GLB,, | Performed by: FAMILY MEDICINE

## 2025-02-05 PROCEDURE — 3072F LOW RISK FOR RETINOPATHY: CPT | Mod: CPTII,S$GLB,, | Performed by: FAMILY MEDICINE

## 2025-02-05 PROCEDURE — 3074F SYST BP LT 130 MM HG: CPT | Mod: CPTII,S$GLB,, | Performed by: FAMILY MEDICINE

## 2025-02-05 PROCEDURE — 99999 PR PBB SHADOW E&M-EST. PATIENT-LVL V: CPT | Mod: PBBFAC,,, | Performed by: FAMILY MEDICINE

## 2025-02-05 PROCEDURE — 3078F DIAST BP <80 MM HG: CPT | Mod: CPTII,S$GLB,, | Performed by: FAMILY MEDICINE

## 2025-02-05 PROCEDURE — 99214 OFFICE O/P EST MOD 30 MIN: CPT | Mod: S$GLB,,, | Performed by: FAMILY MEDICINE

## 2025-02-05 PROCEDURE — 3046F HEMOGLOBIN A1C LEVEL >9.0%: CPT | Mod: CPTII,S$GLB,, | Performed by: FAMILY MEDICINE

## 2025-02-05 PROCEDURE — 1160F RVW MEDS BY RX/DR IN RCRD: CPT | Mod: CPTII,S$GLB,, | Performed by: FAMILY MEDICINE

## 2025-02-05 PROCEDURE — G2211 COMPLEX E/M VISIT ADD ON: HCPCS | Mod: S$GLB,,, | Performed by: FAMILY MEDICINE

## 2025-02-05 PROCEDURE — 90471 IMMUNIZATION ADMIN: CPT | Mod: S$GLB,,, | Performed by: FAMILY MEDICINE

## 2025-02-05 PROCEDURE — 90677 PCV20 VACCINE IM: CPT | Mod: S$GLB,,, | Performed by: FAMILY MEDICINE

## 2025-02-05 RX ORDER — TIRZEPATIDE 5 MG/.5ML
5 INJECTION, SOLUTION SUBCUTANEOUS
Qty: 4 PEN | Refills: 0 | Status: SHIPPED | OUTPATIENT
Start: 2025-02-05 | End: 2025-02-05

## 2025-02-05 RX ORDER — TIRZEPATIDE 5 MG/.5ML
5 INJECTION, SOLUTION SUBCUTANEOUS
Qty: 4 PEN | Refills: 5 | Status: SHIPPED | OUTPATIENT
Start: 2025-02-05

## 2025-02-05 NOTE — PROGRESS NOTES
PATIENT VISIT FAMILY MEDICINE    CC:   Chief Complaint   Patient presents with    Follow-up     Wants to address sugar levels       HPI:    History of Present Illness    CHIEF COMPLAINT:  Loi presents today for diabetes medication refill    DIABETES MANAGEMENT:  She ran out of diabetes medication in January and reports inconsistent adherence, taking it approximately every other week due to side effects. She experiences severe morning nausea and vomiting with the 7mg dose, requiring anti-nausea medication before meals. She notes less nausea with the 5mg dose. A1C was elevated.    CURRENT PAIN:  She reports internal pain in the rib area that wraps around, described as throbbing and burning sensation. 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 Symptoms improve with ibuprofen. No rash.     CURRENT MEDICATIONS:  She receives monthly asthma injections, with the most recent injection on the 16th of last month. She takes medication for overactive bladder.    ENT SYMPTOMS:  She reports stuffy ears and has been taking OTC sinus medication from [facility]. Previously used Claritin and Flonase but discontinued Flonase.          MEDS:   Current Outpatient Medications:     albuterol (PROVENTIL HFA) 90 mcg/actuation inhaler, Inhale 2 puffs into the lungs every 6 (six) hours as needed for Wheezing or Shortness of Breath. Rescue, Disp: 18 g, Rfl: 6    ALPRAZolam (XANAX) 0.25 MG tablet, Take 1 tablet (0.25 mg total) by mouth daily as needed for Anxiety., Disp: 30 tablet, Rfl: 0    amLODIPine (NORVASC) 5 MG tablet, Take 1 tablet (5 mg total) by mouth every evening., Disp: 90 tablet, Rfl: 3    aspirin 81 MG Chew, Take 81 mg by mouth every evening., Disp: , Rfl:     atorvastatin (LIPITOR) 80 MG tablet, Take 1 tablet (80 mg total) by mouth every evening., Disp: 90 tablet, Rfl: 3    azelastine (ASTELIN) 137 mcg (0.1 %) nasal spray, 1 spray (137 mcg total) in each nostril route 2 (two) times  daily., Disp: 30 mL, Rfl: 0    blood sugar diagnostic Strp, Use 1 strip to check blood sugar once daily, Disp: 100 each, Rfl: 0    blood-glucose meter Misc, USE ONCE DAILY TO MONITOR GLUCOSE, Disp: 1 each, Rfl: 0    cyclobenzaprine (FLEXERIL) 10 MG tablet, Take 1 tablet (10 mg total) by mouth 3 (three) times daily as needed for Muscle spasms., Disp: 60 tablet, Rfl: 0    fenofibrate 160 MG Tab, Take 1 tablet (160 mg total) by mouth once daily., Disp: 90 tablet, Rfl: 3    fluticasone furoate-vilanteroL (BREO ELLIPTA) 200-25 mcg/dose DsDv diskus inhaler, Inhale 1 puff into the lungs once daily. Controller, Disp: 60 each, Rfl: 3    furosemide (LASIX) 40 MG tablet, Take 1 tablet (40 mg total) by mouth daily as needed (edema)., Disp: 90 tablet, Rfl: 1    ibuprofen (ADVIL,MOTRIN) 600 MG tablet, Take 1 tablet (600 mg total) by mouth every 6 (six) hours as needed for Pain., Disp: 20 tablet, Rfl: 0    lancets 33 gauge Misc, USE ONCE DAILY TO MONITOR GLUCOSE, Disp: 100 each, Rfl: 3    lisinopriL (PRINIVIL,ZESTRIL) 5 MG tablet, Take 1 tablet (5 mg total) by mouth once daily., Disp: 90 tablet, Rfl: 3    metoprolol succinate (TOPROL-XL) 100 MG 24 hr tablet, Take 1 tablet (100 mg total) by mouth once daily., Disp: 90 tablet, Rfl: 3    nitroGLYCERIN (NITROSTAT) 0.4 MG SL tablet, Place 1 tablet under the tongue once every 5 minutes for 3 doses for chest pain, if pain continues call 911, Disp: 25 tablet, Rfl: 3    omeprazole (PRILOSEC) 40 MG capsule, Take 1 capsule (40 mg total) by mouth 2 (two) times a day., Disp: 180 capsule, Rfl: 3    ondansetron (ZOFRAN-ODT) 8 MG TbDL, Take 1 tablet (8 mg total) by mouth every 12 (twelve) hours as needed (n/v)., Disp: 90 tablet, Rfl: 3    potassium chloride (K-TAB) 20 mEq, Take 1 tablet (20 mEq total) by mouth daily as needed (edema). Take with lasix if needed for edema, Disp: 90 tablet, Rfl: 1    prochlorperazine (COMPAZINE) 10 MG tablet, Take 1 tablet (10 mg total) by mouth every 6 (six) hours  as needed., Disp: 15 tablet, Rfl: 0    rOPINIRole (REQUIP) 0.5 MG tablet, Take 1 tablet (0.5 mg total) by mouth every evening., Disp: 90 tablet, Rfl: 3    traZODone (DESYREL) 100 MG tablet, Take 1 tablet (100 mg total) by mouth every evening., Disp: 90 tablet, Rfl: 3    mepolizumab 100 mg/mL AtIn, Inject 1 mL (100 mg total) into the skin every 28 days. (Patient not taking: Reported on 2/5/2025), Disp: 1 mL, Rfl: 2    mirabegron (MYRBETRIQ) 25 mg Tb24 ER tablet, Take 1 tablet (25 mg total) by mouth once daily. (Patient not taking: Reported on 2/5/2025), Disp: 30 tablet, Rfl: 11    tirzepatide (MOUNJARO) 5 mg/0.5 mL PnIj, Inject 5 mg into the skin every 7 days., Disp: 4 Pen, Rfl: 5  No current facility-administered medications for this visit.    Facility-Administered Medications Ordered in Other Visits:     0.9%  NaCl infusion, , Intravenous, Continuous, Sarah Gamez MD, Stopped at 01/09/20 0925    0.9%  NaCl infusion, , Intravenous, Continuous, Sarah Gamez MD, Stopped at 01/09/20 1026    0.9%  NaCl infusion, , Intravenous, Continuous, Angélica De La Cruz MD    ceFAZolin injection 2 g, 2 g, Intravenous, On Call Procedure, Angélica De La Cruz MD    lidocaine (PF) 10 mg/ml (1%) injection 10 mg, 1 mL, Intradermal, Once, Angélica De La Cruz MD    sodium chloride 0.9% flush 10 mL, 10 mL, Intravenous, PRN, Sarah Gamez MD    sodium chloride 0.9% flush 10 mL, 10 mL, Intravenous, PRN, Sarah Gamez MD    OBJECTIVE:   Vitals:    02/05/25 1436   BP: 119/70   BP Location: Left arm   Patient Position: Sitting   Pulse: 80   SpO2: 97%   Weight: 86.8 kg (191 lb 5.8 oz)   Height: 5' (1.524 m)     Body mass index is 37.37 kg/m².      Physical Exam    Vitals: Blood pressure: 119/70.  General: No acute distress. Well-developed. Well-nourished.  Eyes: EOMI. Sclerae anicteric.  HENT: Normocephalic. Atraumatic. Nares patent. Moist oral mucosa.  Ears: Fluid behind the eardrums. Bilateral EACs  clear.  Cardiovascular: Regular rate. Regular rhythm. + murmur.   Respiratory: Normal respiratory effort. Clear to auscultation bilaterally. No rales. No rhonchi. No wheezing.  Abdomen: Soft. Non-tender. Non-distended. Normoactive bowel sounds.  Musculoskeletal: No  obvious deformity.  Extremities: No lower extremity edema.  Neurological: Alert & oriented x3. No slurred speech. Normal gait.  Psychiatric: Normal mood. Normal affect. Good insight. Good judgment.  Skin: Warm. Dry. No rash.          LABS:   A1C:  Recent Labs   Lab 02/04/25  0658   Hemoglobin A1C 9.1 H     CBC:  Recent Labs   Lab 10/07/24  2117   WBC 7.15   RBC 4.08   Hemoglobin 12.2   Hematocrit 36.2 L   Platelets 446   MCV 89   MCH 29.9   MCHC 33.7     CMP:  Recent Labs   Lab 10/07/24  2117 10/30/24  1439   Glucose 208 H 352 H   Calcium 9.4 10.5   Albumin 4.3  --    Total Protein 7.1  --    Sodium 134 L 137   Potassium 3.9 4.7   CO2 24 25   Chloride 102 100   BUN 25 H 20   Creatinine 0.95 1.2   Alkaline Phosphatase 57  --    ALT 41  --    AST 33  --    Total Bilirubin 0.3  --      LIPIDS:  Recent Labs   Lab 06/09/22  0705 06/22/23  0635 08/20/24  1529   TSH 1.550  --   --    HDL  --    < > 62   Cholesterol  --    < > 176   Triglycerides  --    < > 228 H   LDL Cholesterol  --    < > 68.4   HDL/Cholesterol Ratio  --    < > 35.2   Non-HDL Cholesterol  --    < > 114   Total Cholesterol/HDL Ratio  --    < > 2.8    < > = values in this interval not displayed.     TSH:  Recent Labs   Lab 06/09/22  0705   TSH 1.550         ASSESSMENT & PLAN:    Problem List Items Addressed This Visit          Pulmonary    Severe persistent asthma without complication    Overview     Not in exacerbation. Continue current medications. Followed by Pulm            Cardiac/Vascular    CAD S/P percutaneous coronary angioplasty (Chronic)    Overview     Followed by Cardiology. Continue current medical therapy         Chronic diastolic CHF (congestive heart failure) (Chronic)     Overview     Compensated. Continue current regimen         Coronary artery disease of native artery of native heart with stable angina pectoris (Chronic)    Overview     Stable. Continue medical therapy. Followed by Cardiology         Essential hypertension (Chronic)    Overview     Well controlled. Continue current regimen         PAD (peripheral artery disease) (Chronic)    Overview     Stable. Continue current medical therapy              Endocrine    Type 2 diabetes mellitus with diabetic polyneuropathy, without long-term current use of insulin (Chronic)    Overview     Last A1c is 9.1 on 02/04/2025           Relevant Orders    Lipid Panel    TSH    Comprehensive Metabolic Panel    CBC Auto Differential    Hemoglobin A1C    Microalbumin/Creatinine Ratio, Urine    Class 2 severe obesity due to excess calories with serious comorbidity and body mass index (BMI) of 37.0 to 37.9 in adult    Overview     Body mass index is 37.37 kg/m².  The patient is asked to make an attempt to improve diet and exercise patterns to aid in medical management of this problem.              Other    Heavy cigarette smoker (Chronic)    Overview     Advised to maintain cessation          Other Visit Diagnoses       Need for viral immunization    -  Primary    Relevant Medications    pneumoc 20-andres conj-dip cr(PF) (PREVNAR-20 (PF)) injection Syrg 0.5 mL (Completed)    Mid back pain on right side                Assessment & Plan    IMPRESSION:  - Decreased Mounjaro dose from 7mg to 5mg weekly due to severe nausea and vomiting, impacting medication adherence and likely contributing to elevated A1C  - Assessed ear discomfort, noting fluid behind eardrums without significant earwax accumulation  - Evaluated thoracic pain, suspecting nerve-related issue possibly stemming from poor posture or spinal arthritis    DIABETES:  - Decreased Mounjaro to 5mg weekly to reduce side effects and improve adherence.  - Noted that the patient's A1C is high due  to inconsistent medication use.  - Plan to repeat A1C test in 3 months.  - Consider adding other medications if needed to supplement glycemic control.    Mid back pain right side  - Assessed intermittent thoracic pain as likely nerve-related  - Advised the patient to monitor thoracic pain and report if it worsens.  - Noted that the patient took ibuprofen for pain relief.    Follow up in 3 months with labs prior. Patient to let me know how she is tolerating lower dose of mounjaro; may need to add oral agent to improve BG control    Visit today included increased complexity associated with the care of the episodic problem mid back pain right side addressed and managing the longitudinal care of the patient due to the serious and/or complex managed problem(s) as documented above      RTC/ED precautions discussed where applicable.   Encouraged patient to let me know if there are any further questions/concerns.     Advise patient/caretaker to check with insurance regarding orders to avoid unexpected fees/costs.     The patient/caretaker indicates understanding of these issues and agrees with the plan.    This note was generated with the assistance of ambient listening technology. I attest to having reviewed and edited the generated note for accuracy, though some syntax or spelling errors may persist. Please contact the author of this note for any clarification.      Dr. Hari Francis MD  Family Medicine

## 2025-03-07 DIAGNOSIS — F41.9 ANXIETY: ICD-10-CM

## 2025-03-07 NOTE — TELEPHONE ENCOUNTER
No care due was identified.  [facility] Embedded Care Due Messages. Reference number: [id number].   3/07/2025 12:24:27 PM CST

## 2025-03-08 RX ORDER — ALPRAZOLAM 0.25 MG/1
0.25 TABLET ORAL DAILY PRN
Qty: 30 TABLET | Refills: 0 | Status: SHIPPED | OUTPATIENT
Start: 2025-03-08 | End: 2025-04-11

## 2025-03-11 ENCOUNTER — PATIENT MESSAGE (OUTPATIENT)
Dept: ADMINISTRATIVE | Facility: OTHER | Age: 60
End: 2025-03-11
Payer: COMMERCIAL

## 2025-03-11 ENCOUNTER — OFFICE VISIT (OUTPATIENT)
Dept: URGENT CARE | Facility: CLINIC | Age: 60
End: 2025-03-11
Payer: COMMERCIAL

## 2025-03-11 ENCOUNTER — OCCUPATIONAL HEALTH (OUTPATIENT)
Dept: URGENT CARE | Facility: CLINIC | Age: 60
End: 2025-03-11

## 2025-03-11 VITALS
HEART RATE: 74 BPM | HEIGHT: 60 IN | WEIGHT: 191 LBS | TEMPERATURE: 98 F | SYSTOLIC BLOOD PRESSURE: 130 MMHG | BODY MASS INDEX: 37.5 KG/M2 | DIASTOLIC BLOOD PRESSURE: 80 MMHG | RESPIRATION RATE: 20 BRPM | OXYGEN SATURATION: 99 %

## 2025-03-11 DIAGNOSIS — T14.90XA INJURY: ICD-10-CM

## 2025-03-11 DIAGNOSIS — S93.402A SPRAIN OF LEFT ANKLE, UNSPECIFIED LIGAMENT, INITIAL ENCOUNTER: Primary | ICD-10-CM

## 2025-03-11 DIAGNOSIS — J45.51 SEVERE PERSISTENT ASTHMA WITH ACUTE EXACERBATION: ICD-10-CM

## 2025-03-11 DIAGNOSIS — Z13.9 ENCOUNTER FOR SCREENING: Primary | ICD-10-CM

## 2025-03-11 LAB
BREATH ALCOHOL: 0
CTP QC/QA: YES
POC 10 PANEL DRUG SCREEN: NEGATIVE

## 2025-03-11 PROCEDURE — 99215 OFFICE O/P EST HI 40 MIN: CPT | Mod: S$GLB,,, | Performed by: STUDENT IN AN ORGANIZED HEALTH CARE EDUCATION/TRAINING PROGRAM

## 2025-03-11 PROCEDURE — 73610 X-RAY EXAM OF ANKLE: CPT | Mod: FY,LT,S$GLB, | Performed by: RADIOLOGY

## 2025-03-11 NOTE — PROGRESS NOTES
"Subjective:      Patient ID: Loi Cordova is a 59 y.o. female.    Chief Complaint: Ankle Injury    Patient's place of employment -  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 Begin MD note:  Yesterday, she was stepping onto the treadmill with her left foot and the belt moved causing her left foot to come off the back edge. She fell forward onto the treadmill and her left ankle bent but she is unsure how. She has pain under her lateral ankle bone. She has noticed swelling in the area. No lacerations or abrasions. She also hit her shin and has been having intermittent mild pain there.  Now she has throbbing and burning pain to the lateral ankle noticed at rest and increased with standing and walking. No numbness or tingling.     She took ibuprofen and applied ice after yesterday's injury. She says this morning she had slight improvement but after getting to work and standing more the pain has increased. She reported the incident yesterday and this is her first visit since then.     Of note, prior injury involving torn ATFL in left ankle from WC injury in 2021. Needed surgery but was awaiting her hgA1C to be in an acceptable range to be cleared for the surgery. She says the surgeon left and she never had the surgery. She says she was lost to follow and was not placed with a new doctor once the surgeon she had left. Her chronic left ankle pain had improved to where she only had pain intermittently with certain activities, prolonged walking, pivoting motions etc that improved on their own with supportive " care. She denies taking chronic pain medication for her ankle.       Ankle Injury   The incident occurred 12 to 24 hours ago. The incident occurred at work. The injury mechanism was a twisting injury. Pain location: left ankle. The pain is at a severity of 8/10. The pain is moderate. The pain has been Constant since onset. Associated symptoms comments: Pain when she ambulates.     ROS  Objective:     Physical Exam  Vitals and nursing note reviewed.   Constitutional:       General: She is not in acute distress.     Appearance: She is not ill-appearing.   HENT:      Head: Normocephalic.   Eyes:      Conjunctiva/sclera: Conjunctivae normal.   Pulmonary:      Effort: No respiratory distress.   Musculoskeletal:      Left ankle: No swelling, deformity or ecchymosis. Tenderness present over the lateral malleolus, ATF ligament and AITF ligament. No CF ligament, posterior TF ligament or base of 5th metatarsal tenderness. Decreased range of motion. Normal pulse.      Left Achilles Tendon: Normal.        Feet:       Comments: Increased pain reported with resisted inversion, eversion and dorsiflexion. Unable to perform active dorsiflexion and limited plantar flexion   Skin:     General: Skin is warm and dry.   Neurological:      Mental Status: She is alert and oriented to person, place, and time.      Gait: Gait abnormal.   Psychiatric:         Attention and Perception: Attention normal.         Mood and Affect: Mood normal.         Behavior: Behavior normal.        Assessment:      1. Sprain of left ankle, unspecified ligament, initial encounter    2. Injury        EXAMINATION:  XR ANKLE COMPLETE 3 VIEW LEFT     CLINICAL HISTORY:  Injury, unspecified, initial encounter.     TECHNIQUE:  AP, lateral and oblique views of the left ankle were performed.     COMPARISON:  12/16/2021.     FINDINGS:  No acute displaced fracture.  No dislocation.  Chronic appearing calcification adjacent to the lateral malleolus.  Soft tissues are  symmetric.  No unexpected radiopaque foreign body.     Impression:     No acute displaced fracture.        Electronically signed by:Sanya Ruiz MD  Date:                                            03/11/2025  Time:                                           14:09    Plan:     Reviewed MRI report from 03/2022 with torn ATFL. Consistent with site of injury today also. Recommended wear of walking boot, ibuprofen prn, work limitations, and return for re-eval in 2 weeks. If symptoms are improving, plan to continue with conservative management. However if deficits remain on exam and/or no improvement, will have low threshold for considering MRI to evaluate for acute change in comparison to previous. May also consider PT. Discussed POC with patient who was in agreement and did not have any questions /concerns prior to discharge.        Patient Instructions: Attention not to aggravate affected area, Daily home exercises/warm soaks   Restrictions: No Prolonged standing/walking  Follow up in about 22 days (around 4/2/2025).    I spent a total of 40 minutes on the day of the visit. This includes face to face time and non-face to face time preparing to see the patient (eg, review of tests, prior records/notes), obtaining and/or reviewing separately obtained history, documenting clinical information in the electronic or other health record, independently interpreting results and communicating results to the patient.

## 2025-03-11 NOTE — LETTER
[facility] - [place] [address]  Phone: [phone]  Fax: [phone]  [facility]: [id number]    Pt Name: Loi Cordova  Injury Date: 03/10/2025   Employee ID: [id number] Date of First Treatment: 03/11/2025   Company: [employer]      Appointment Time: 11:15 AM Arrived: 11:40 AM   Provider: Karla Loev MD Time Out: 1:55 PM     Office Treatment:   1. Sprain of left ankle, unspecified ligament, initial encounter    2. Injury          Patient Instructions: Attention not to aggravate affected area, Daily home exercises/warm soaks        Restrictions: No Prolonged standing/walking     Return Appointment: 4/2/2025 at 11:30 AM    [facility]

## 2025-03-12 ENCOUNTER — TELEPHONE (OUTPATIENT)
Dept: URGENT CARE | Facility: CLINIC | Age: 60
End: 2025-03-12
Payer: COMMERCIAL

## 2025-03-12 RX ORDER — MEPOLIZUMAB 100 MG/ML
100 INJECTION, SOLUTION SUBCUTANEOUS
Qty: 1 ML | Refills: 2 | Status: ACTIVE | OUTPATIENT
Start: 2025-03-12

## 2025-04-02 ENCOUNTER — OFFICE VISIT (OUTPATIENT)
Dept: URGENT CARE | Facility: CLINIC | Age: 60
End: 2025-04-02
Payer: OTHER MISCELLANEOUS

## 2025-04-02 DIAGNOSIS — S93.402A SPRAIN OF LEFT ANKLE, UNSPECIFIED LIGAMENT, INITIAL ENCOUNTER: Primary | ICD-10-CM

## 2025-04-02 DIAGNOSIS — Z02.6 ENCOUNTER RELATED TO WORKER'S COMPENSATION CLAIM: ICD-10-CM

## 2025-04-02 PROCEDURE — 99214 OFFICE O/P EST MOD 30 MIN: CPT | Mod: S$GLB,,, | Performed by: STUDENT IN AN ORGANIZED HEALTH CARE EDUCATION/TRAINING PROGRAM

## 2025-04-02 RX ORDER — NAPROXEN 500 MG/1
500 TABLET ORAL 2 TIMES DAILY WITH MEALS
Qty: 60 TABLET | Refills: 0 | Status: SHIPPED | OUTPATIENT
Start: 2025-04-02

## 2025-04-02 RX ORDER — METHOCARBAMOL 500 MG/1
500 TABLET, FILM COATED ORAL 2 TIMES DAILY PRN
Qty: 30 TABLET | Refills: 0 | Status: SHIPPED | OUTPATIENT
Start: 2025-04-02

## 2025-04-02 NOTE — PROGRESS NOTES
Subjective:      Patient ID: Loi Cordova is a 59 y.o. female.    Chief Complaint: Ankle Injury    Patient's place of employment -  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 Begin MD note:  Patient reports left lateral lower leg/ankle spasming that is painful and intermittent. Ankle pain is mostly unchanged. Wearing walking boot as directed and adhering to work restrictions. Taking ibuprofen prn with some relief. Current pain 3/10 at rest.     ROS  Objective:     Physical Exam  Vitals and nursing note reviewed.   Constitutional:       General: She is not in acute distress.     Appearance: She is not ill-appearing.   HENT:      Head: Normocephalic.   Eyes:      Conjunctiva/sclera: Conjunctivae normal.   Pulmonary:      Effort: No respiratory distress.   Musculoskeletal:      Left ankle: Swelling (Mild) present. No ecchymosis. Tenderness present over the ATF ligament and AITF ligament. Decreased range of motion. Normal pulse.      Left Achilles Tendon: Normal.   Skin:     General: Skin is warm and dry.   Neurological:      Mental Status: She is alert and oriented to person, place, and time.   Psychiatric:         Attention and Perception: Attention normal.         Mood and Affect: Mood normal.         Behavior: Behavior normal.        Assessment:      1. Sprain of left ankle, unspecified ligament, initial encounter    2. Encounter related to worker's compensation claim      Plan:     Mrs. Cordova has not had any improvement in her left ankle symptoms.  As previously noted, patient has a known torn ligament to the ATFL noted on MRI in March of 2022 and was not surgically repaired after being lost to follow-up when her treating orthopedist left the clinic.  Ordered MRI to  determine the extent of the damage and compared to previous MRI.  Also ordered physical therapy to begin a guided exercise and treatment program for relief in her pain and improvement in her range of motion.  Depending on the MRI results, patient may need surgical intervention and referral to ortho will be placed at that time if indicated.  I have changed her medication regimen today to discontinue ibuprofen and begin naproxen.  Begin methocarbamol as needed for muscle spasms.  Proper use of medications discussed as well as potential adverse effects.  She will follow-up with me in approximately 3 weeks at which time we reviewed the MRI findings.  Okay to return to clinic sooner if needed.    Medications Ordered This Encounter   Medications    methocarbamoL (ROBAXIN) 500 MG Tab     Sig: Take 1 tablet (500 mg total) by mouth 2 (two) times daily as needed (muscle spasms). May cause drowsiness. Do not drive or operate machinery after use.     Dispense:  30 tablet     Refill:  0    naproxen (NAPROSYN) 500 MG tablet     Sig: Take 1 tablet (500 mg total) by mouth 2 (two) times daily with meals. Take with food.     Dispense:  60 tablet     Refill:  0     Patient Instructions: MRI to be scheduled once authorized, PT to be scheduled once authorized   Restrictions: No Prolonged standing/walking  Follow up in about 20 days (around 4/22/2025).      I spent a total of 30 minutes on the day of the visit.   This includes face to face time and non-face to face time preparing to see the patient (eg, review of tests, prior records/notes), obtaining and/or reviewing separately obtained history, documenting clinical information in the electronic or other health record, independently interpreting results and communicating results to the patient.

## 2025-04-02 NOTE — LETTER
[facility] - [place] [address]  Phone: [phone]  Fax: [phone]  [employer] [facility]: [id number]    Pt Name: Loi Cordova  Injury Date: 03/10/2025   Employee ID: [id number] Date of Treatment: 04/02/2025   Company: [employer] to record [id number]      Appointment Time: 11:15 AM Arrived: 11:07 AM   Provider: Karla Love MD Time Out:12:05 PM     Office Treatment:   1. Sprain of left ankle, unspecified ligament, initial encounter    2. Encounter related to worker's compensation claim      Medications Ordered This Encounter   Medications    methocarbamoL (ROBAXIN) 500 MG Tab    naproxen (NAPROSYN) 500 MG tablet          Patient Instructions: MRI to be scheduled once authorized, PT to be scheduled once authorized        Restrictions: No Prolonged standing/walking     Return Appointment: 4/22/2025 at 2:00 PM    OMA

## 2025-04-07 ENCOUNTER — TELEPHONE (OUTPATIENT)
Dept: URGENT CARE | Facility: CLINIC | Age: 60
End: 2025-04-07
Payer: COMMERCIAL

## 2025-04-11 ENCOUNTER — PATIENT OUTREACH (OUTPATIENT)
Dept: ADMINISTRATIVE | Facility: HOSPITAL | Age: 60
End: 2025-04-11
Payer: COMMERCIAL

## 2025-04-16 PROBLEM — Z74.09 IMPAIRED MOBILITY AND ACTIVITIES OF DAILY LIVING: Status: RESOLVED | Noted: 2021-10-29 | Resolved: 2025-04-16

## 2025-04-16 PROBLEM — M25.672 DECREASED RANGE OF MOTION OF LEFT ANKLE: Status: ACTIVE | Noted: 2025-04-16

## 2025-04-16 PROBLEM — R29.898 WEAKNESS OF LEFT LOWER EXTREMITY: Status: RESOLVED | Noted: 2021-10-29 | Resolved: 2025-04-16

## 2025-04-16 PROBLEM — M25.672 DECREASED RANGE OF MOTION OF LEFT ANKLE: Status: RESOLVED | Noted: 2021-10-29 | Resolved: 2025-04-16

## 2025-04-16 PROBLEM — Z78.9 IMPAIRED MOBILITY AND ACTIVITIES OF DAILY LIVING: Status: RESOLVED | Noted: 2021-10-29 | Resolved: 2025-04-16

## 2025-04-16 PROBLEM — R29.898 DECREASED STRENGTH OF LOWER EXTREMITY: Status: ACTIVE | Noted: 2025-04-16

## 2025-04-22 ENCOUNTER — OFFICE VISIT (OUTPATIENT)
Dept: URGENT CARE | Facility: CLINIC | Age: 60
End: 2025-04-22
Payer: OTHER MISCELLANEOUS

## 2025-04-22 VITALS
HEIGHT: 60 IN | WEIGHT: 190 LBS | HEART RATE: 73 BPM | RESPIRATION RATE: 16 BRPM | DIASTOLIC BLOOD PRESSURE: 83 MMHG | OXYGEN SATURATION: 97 % | SYSTOLIC BLOOD PRESSURE: 130 MMHG | BODY MASS INDEX: 37.3 KG/M2

## 2025-04-22 DIAGNOSIS — Z02.6 ENCOUNTER RELATED TO WORKER'S COMPENSATION CLAIM: ICD-10-CM

## 2025-04-22 DIAGNOSIS — S93.492A SPRAIN OF ANTERIOR TALOFIBULAR LIGAMENT OF LEFT ANKLE, INITIAL ENCOUNTER: Primary | ICD-10-CM

## 2025-04-22 PROCEDURE — 99214 OFFICE O/P EST MOD 30 MIN: CPT | Mod: S$GLB,,, | Performed by: STUDENT IN AN ORGANIZED HEALTH CARE EDUCATION/TRAINING PROGRAM

## 2025-04-22 NOTE — PROGRESS NOTES
Subjective:      Patient ID: Loi Cordova is a 59 y.o. female.    Chief Complaint: Ankle Injury    Patient's place of employment -  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 Begin MD note:  Had MRI since last visit. Here to discuss results. Had her 2nd PT visit today and they discussed weaning out of walking boot when she can. She continues to report pain to the left ankle when not wearing the boot. Her pain today is 4/10 due to the manipulation in PT today.  Work is going well with limitations.  However, she does express significant difficulty ambulating throughout the hospital with aggravation of ankle pain with prolonged walking.  We would like to discuss a knee scooter.    ROS  Objective:     Physical Exam  Vitals and nursing note reviewed.   Constitutional:       General: She is not in acute distress.     Appearance: She is not ill-appearing.   HENT:      Head: Normocephalic.   Eyes:      Conjunctiva/sclera: Conjunctivae normal.   Pulmonary:      Effort: No respiratory distress.   Musculoskeletal:      Left ankle: Swelling (Mild) present. Tenderness present over the ATF ligament. Decreased range of motion.   Skin:     General: Skin is warm and dry.   Neurological:      Mental Status: She is alert and oriented to person, place, and time.   Psychiatric:         Attention and Perception: Attention normal.         Mood and Affect: Mood normal.         Behavior: Behavior normal.        Assessment:      1. Sprain of anterior talofibular ligament of left ankle, initial encounter    2. Encounter related to worker's compensation claim      EXAMINATION:  MRI ANKLE WITHOUT CONTRAST LEFT     CLINICAL HISTORY:  Ankle trauma, dislocation/ligament injury suspected (Age >= 5y);  Sprain  of unspecified ligament of left ankle, initial encounter     TECHNIQUE:  MRI ankle performed per routine protocol without contrast.     COMPARISON:  03/11/2025 radiographs     FINDINGS:  Ligaments: Anterior and posterior inferior tibiofibular ligaments are intact.  Chronic low grade, partial high-grade tear of the anterior talofibular ligament.  The posterior talofibular ligament and calcaneal fibular ligament are intact.  Deep and superficial components of deltoid ligament are intact.  Spring ligament complex and Lisfranc ligament are intact.     Tendons: Medial ankle flexor, dorsal ankle extensor, and peroneus tendons are intact.  The Achilles tendon is intact.     Joints: Trace of tibiotalar joint fluid.  Trace of subtalar joint fluid.  There is no significant degenerative change.     Bones:  No fracture or infiltrative process.     Miscellaneous: Plantar fascia, sinus tarsi, and tarsal tunnel are unremarkable.     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     Nonspecific trace of fluid at the tibiotalar joint and subtalar joint.     No acute fracture identified.        Electronically signed by:Vanessa Mendiola MD  Date:                                            04/14/2025  Time:                                           15:25    Plan:     The patient and I reviewed her MRI results in detail.  Discussed lack of acute change to her chronic ATFL partial tear.  Discussed findings consistent with suspicion for reaggravation of her sprain injury.  Recommended continued physical therapy.  She will follow-up in 4 weeks and near completion of PT.  Plan to consider referral to foot/ankle specialists at that time if the burning pain persists.  She may be a candidate for intra-articular corticosteroid injection.  She will continue to wear the walking boot and wean out according to her therapist recommendations.  Work restrictions to remain the same at work.  Patient requests  a steerable rolling walker/scooter to help with ambulation from the parking area through the hospital into the cafeteria at work.  I have placed a DME order and had a prescription of bloated today.  Notified [facility] to sent to the  for review.    Okay to return to clinic sooner if needed.  Patient agreement with the plan of care. Note dictated with voice recognition software, please excuse any grammatical errors.       Patient Instructions: Continue Physical Therapy (Request for steerable knee scooter will be made today and submitted to )   Restrictions: No Prolonged standing/walking  Follow up in about 4 weeks (around 5/20/2025).    I spent a total of 33 minutes on the day of the visit. This includes face to face time and non-face to face time preparing to see the patient (eg, review of tests, prior records/notes), obtaining and/or reviewing separately obtained history, documenting clinical information in the electronic or other health record, independently interpreting results and communicating results to the patient.

## 2025-04-22 NOTE — LETTER
[facility] - [place] [address]  Phone: [phone]  Fax: [phone]  [facility] [facility]: [id number]    Pt Name: Loi Cordova  Injury Date: 03/10/2025   Employee ID: [id number] Date of Treatment: 04/22/2025   Company: [employer]      Appointment Time: 01:45 PM Arrived: 1:32 PM   Provider: Karla Love MD Time Out: 2:40 PM     Office Treatment:   1. Sprain of anterior talofibular ligament of left ankle, initial encounter    2. Encounter related to worker's compensation claim          Patient Instructions: Continue Physical Therapy (Request for steerable knee scooter will be made today and submitted to )        Restrictions: No Prolonged standing/walking     Return Appointment: 5/20/2025 at 2:00 PM    KW

## 2025-05-07 ENCOUNTER — RESULTS FOLLOW-UP (OUTPATIENT)
Dept: FAMILY MEDICINE | Facility: CLINIC | Age: 60
End: 2025-05-07

## 2025-05-14 ENCOUNTER — OFFICE VISIT (OUTPATIENT)
Dept: FAMILY MEDICINE | Facility: CLINIC | Age: 60
End: 2025-05-14
Payer: COMMERCIAL

## 2025-05-14 VITALS
WEIGHT: 193.88 LBS | BODY MASS INDEX: 38.06 KG/M2 | SYSTOLIC BLOOD PRESSURE: 129 MMHG | DIASTOLIC BLOOD PRESSURE: 70 MMHG | HEART RATE: 85 BPM | HEIGHT: 60 IN | OXYGEN SATURATION: 97 %

## 2025-05-14 DIAGNOSIS — I25.10 CAD S/P PERCUTANEOUS CORONARY ANGIOPLASTY: Chronic | ICD-10-CM

## 2025-05-14 DIAGNOSIS — Z98.61 CAD S/P PERCUTANEOUS CORONARY ANGIOPLASTY: Chronic | ICD-10-CM

## 2025-05-14 DIAGNOSIS — G25.81 RESTLESS LEG SYNDROME: ICD-10-CM

## 2025-05-14 DIAGNOSIS — E11.65 TYPE 2 DIABETES MELLITUS WITH HYPERGLYCEMIA, WITHOUT LONG-TERM CURRENT USE OF INSULIN: Primary | Chronic | ICD-10-CM

## 2025-05-14 DIAGNOSIS — E66.812 CLASS 2 SEVERE OBESITY DUE TO EXCESS CALORIES WITH SERIOUS COMORBIDITY AND BODY MASS INDEX (BMI) OF 37.0 TO 37.9 IN ADULT: ICD-10-CM

## 2025-05-14 DIAGNOSIS — Z12.31 BREAST CANCER SCREENING BY MAMMOGRAM: ICD-10-CM

## 2025-05-14 DIAGNOSIS — E66.01 CLASS 2 SEVERE OBESITY DUE TO EXCESS CALORIES WITH SERIOUS COMORBIDITY AND BODY MASS INDEX (BMI) OF 37.0 TO 37.9 IN ADULT: ICD-10-CM

## 2025-05-14 DIAGNOSIS — I73.9 PAD (PERIPHERAL ARTERY DISEASE): Chronic | ICD-10-CM

## 2025-05-14 DIAGNOSIS — E11.42 TYPE 2 DIABETES MELLITUS WITH DIABETIC POLYNEUROPATHY, WITHOUT LONG-TERM CURRENT USE OF INSULIN: Chronic | ICD-10-CM

## 2025-05-14 DIAGNOSIS — I25.118 CORONARY ARTERY DISEASE OF NATIVE ARTERY OF NATIVE HEART WITH STABLE ANGINA PECTORIS: Chronic | ICD-10-CM

## 2025-05-14 DIAGNOSIS — I10 ESSENTIAL HYPERTENSION: Chronic | ICD-10-CM

## 2025-05-14 PROCEDURE — 99214 OFFICE O/P EST MOD 30 MIN: CPT | Mod: S$GLB,,, | Performed by: FAMILY MEDICINE

## 2025-05-14 PROCEDURE — 3074F SYST BP LT 130 MM HG: CPT | Mod: CPTII,S$GLB,, | Performed by: FAMILY MEDICINE

## 2025-05-14 PROCEDURE — 3008F BODY MASS INDEX DOCD: CPT | Mod: CPTII,S$GLB,, | Performed by: FAMILY MEDICINE

## 2025-05-14 PROCEDURE — 4010F ACE/ARB THERAPY RXD/TAKEN: CPT | Mod: CPTII,S$GLB,, | Performed by: FAMILY MEDICINE

## 2025-05-14 PROCEDURE — 3046F HEMOGLOBIN A1C LEVEL >9.0%: CPT | Mod: CPTII,S$GLB,, | Performed by: FAMILY MEDICINE

## 2025-05-14 PROCEDURE — 1160F RVW MEDS BY RX/DR IN RCRD: CPT | Mod: CPTII,S$GLB,, | Performed by: FAMILY MEDICINE

## 2025-05-14 PROCEDURE — 3078F DIAST BP <80 MM HG: CPT | Mod: CPTII,S$GLB,, | Performed by: FAMILY MEDICINE

## 2025-05-14 PROCEDURE — 3072F LOW RISK FOR RETINOPATHY: CPT | Mod: CPTII,S$GLB,, | Performed by: FAMILY MEDICINE

## 2025-05-14 PROCEDURE — 3061F NEG MICROALBUMINURIA REV: CPT | Mod: CPTII,S$GLB,, | Performed by: FAMILY MEDICINE

## 2025-05-14 PROCEDURE — 3066F NEPHROPATHY DOC TX: CPT | Mod: CPTII,S$GLB,, | Performed by: FAMILY MEDICINE

## 2025-05-14 PROCEDURE — 99999 PR PBB SHADOW E&M-EST. PATIENT-LVL V: CPT | Mod: PBBFAC,,, | Performed by: FAMILY MEDICINE

## 2025-05-14 PROCEDURE — 1159F MED LIST DOCD IN RCRD: CPT | Mod: CPTII,S$GLB,, | Performed by: FAMILY MEDICINE

## 2025-05-14 RX ORDER — INSULIN DEGLUDEC 100 U/ML
5 INJECTION, SOLUTION SUBCUTANEOUS NIGHTLY
Qty: 15 ML | Refills: 0 | Status: SHIPPED | OUTPATIENT
Start: 2025-05-14

## 2025-05-14 RX ORDER — ROPINIROLE 0.5 MG/1
0.5 TABLET, FILM COATED ORAL NIGHTLY
Qty: 90 TABLET | Refills: 3 | Status: SHIPPED | OUTPATIENT
Start: 2025-05-14

## 2025-05-14 RX ORDER — ONDANSETRON 8 MG/1
8 TABLET, ORALLY DISINTEGRATING ORAL 2 TIMES DAILY
Qty: 60 TABLET | Refills: 3 | Status: SHIPPED | OUTPATIENT
Start: 2025-05-14

## 2025-05-14 RX ORDER — BLOOD-GLUCOSE SENSOR
1 EACH MISCELLANEOUS
Qty: 5 EACH | Refills: 5 | Status: SHIPPED | OUTPATIENT
Start: 2025-05-14 | End: 2026-05-14

## 2025-05-14 NOTE — PROGRESS NOTES
PATIENT VISIT FAMILY MEDICINE    CC:   Chief Complaint   Patient presents with    Follow-up    Diabetes       HPI:      MUSCULOSKELETAL:  She has a chronic ligament tear in ankle that never healed following a prior fracture. She reports bad burning sensation in ankle with calcium buildup in the area. She is currently attending physical therapy sessions.    DIABETES:  She reports elevated blood sugar readings: 376 in the morning, 384 at 10:40, and subsequently 360. She takes Mounjaro 5mg weekly on Fridays, which was decreased from a previous higher dose. She experiences significant side effects including nausea and vomiting for the first two days post-injection, with vomiting most severe on the second day.    MEDICAL HISTORY:  Gastroparesis study in 2022 was normal.          MEDS: Current Medications[1]    OBJECTIVE:   Vitals:    05/14/25 1355   BP: 129/70   BP Location: Left arm   Patient Position: Sitting   Pulse: 85   SpO2: 97%   Weight: 87.9 kg (193 lb 14.3 oz)   Height: 5' (1.524 m)     Body mass index is 37.87 kg/m².      Physical Exam      Physical exam  Vitals and nursing note reviewed.   Constitutional:   Appearance: Normal appearance.   HENT:   Head: Normocephalic and atraumatic.   Eyes:   General: No scleral icterus.  Extraocular Movements: Extraocular movements intact.   Pulmonary:   Effort: Pulmonary effort is normal.   Neurological:   Mental Status: alert.            LABS:   A1C:  Recent Labs   Lab 05/05/25  0701   Hemoglobin A1c 10.3 H     CBC:  Recent Labs   Lab 05/05/25  0701   WBC 7.89   RBC 4.43   HGB 13.4   HCT 40.6   Platelet Count 453 H   MCV 92   MCH 30.2   MCHC 33.0     CMP:  Recent Labs   Lab 05/05/25  0701   Glucose 321 H   Calcium 9.9   Albumin 4.1   Protein Total 7.7   Sodium 140   Potassium 5.3 H   CO2 24   Chloride 104   BUN 15   Creatinine 1.0   ALP 94   ALT 45 H   AST 30   Bilirubin Total 0.5     LIPIDS:  Recent Labs   Lab 05/05/25  0701   TSH 2.383   HDL Cholesterol 58   Cholesterol  Total 186   Triglyceride 263 H   LDL Cholesterol 75.4   HDL/Cholesterol Ratio 31.2   Non HDL Cholesterol 128   Cholesterol/HDL Ratio 3.2     TSH:  Recent Labs   Lab 05/05/25  0701   TSH 2.383         ASSESSMENT & PLAN:    Problem List Items Addressed This Visit          Neuro    Restless leg syndrome (Chronic)    Overview   Stable on requip         Relevant Medications    rOPINIRole (REQUIP) 0.5 MG tablet       Cardiac/Vascular    CAD S/P percutaneous coronary angioplasty (Chronic)    Overview   Followed by Cardiology. Continue ASA, statin, BB         Coronary artery disease of native artery of native heart with stable angina pectoris (Chronic)    Overview   Stable. Followed by Cardiology         Essential hypertension (Chronic)    Overview   Well controlled. Continue amlodipine, lisinopril, metoprolol         PAD (peripheral artery disease) (Chronic)    Overview   Stable. Continue current medical therapy              Endocrine    Type 2 diabetes mellitus with diabetic polyneuropathy, without long-term current use of insulin (Chronic)    Overview   Last A1c is 10.3 on 05/05/2025  Tolerating mounjaro 5mg, did not tolerate high dose 2/2 to vomiting  Start tresiba 5U nightly, dexcom G7           Relevant Medications    insulin degludec (TRESIBA FLEXTOUCH U-100) 100 unit/mL (3 mL) insulin pen    Type 2 diabetes mellitus with hyperglycemia, without long-term current use of insulin - Primary (Chronic)    Overview   Last A1c is 10.3 on 05/05/2025  Tolerating mounjaro 5mg, did not tolerate high dose 2/2 to vomiting  Start tresiba 5U nightly, dexcom G7         Relevant Medications    insulin degludec (TRESIBA FLEXTOUCH U-100) 100 unit/mL (3 mL) insulin pen     Other Visit Diagnoses         Breast cancer screening by mammogram        Relevant Orders    Mammo Digital Screening Bilat w/ Jatin (XPD)            FOLLOW-UP:  - Instructed patient to follow up in 1 month to assess response to insulin therapy and glucose control.  -  Advised to contact the office if experiencing any issues with the new insulin regimen.  - Recommend return for assistance with Dexcom setup if needed.           RTC/ED precautions discussed where applicable.   Encouraged patient to let me know if there are any further questions/concerns.     Advise patient/caretaker to check with insurance regarding orders to avoid unexpected fees/costs.     The patient/caretaker indicates understanding of these issues and agrees with the plan.    This note was generated with the assistance of ambient listening technology. I attest to having reviewed and edited the generated note for accuracy, though some syntax or spelling errors may persist. Please contact the author of this note for any clarification.      Dr. Hari Francis MD  Family Medicine       [1]   Current Outpatient Medications:     albuterol (PROVENTIL HFA) 90 mcg/actuation inhaler, Inhale 2 puffs into the lungs every 6 (six) hours as needed for Wheezing or Shortness of Breath. Rescue, Disp: 18 g, Rfl: 6    ALPRAZolam (XANAX) 0.25 MG tablet, Take 1 tablet (0.25 mg total) by mouth daily as needed for Anxiety., Disp: 30 tablet, Rfl: 0    amLODIPine (NORVASC) 5 MG tablet, Take 1 tablet (5 mg total) by mouth every evening., Disp: 90 tablet, Rfl: 3    aspirin 81 MG Chew, Take 81 mg by mouth every evening., Disp: , Rfl:     atorvastatin (LIPITOR) 80 MG tablet, Take 1 tablet (80 mg total) by mouth every evening., Disp: 90 tablet, Rfl: 3    azelastine (ASTELIN) 137 mcg (0.1 %) nasal spray, 1 spray (137 mcg total) in each nostril route 2 (two) times daily., Disp: 30 mL, Rfl: 0    blood sugar diagnostic Strp, Use 1 strip to check blood sugar once daily, Disp: 100 each, Rfl: 0    blood-glucose meter Misc, USE ONCE DAILY TO MONITOR GLUCOSE, Disp: 1 each, Rfl: 0    fenofibrate 160 MG Tab, Take 1 tablet (160 mg total) by mouth once daily., Disp: 90 tablet, Rfl: 3    fluticasone furoate-vilanteroL (BREO ELLIPTA) 200-25 mcg/dose DsDv  diskus inhaler, Inhale 1 puff into the lungs once daily. Controller, Disp: 60 each, Rfl: 3    furosemide (LASIX) 40 MG tablet, Take 1 tablet (40 mg total) by mouth daily as needed (edema)., Disp: 90 tablet, Rfl: 1    ibuprofen (ADVIL,MOTRIN) 600 MG tablet, Take 1 tablet (600 mg total) by mouth every 6 (six) hours as needed for Pain., Disp: 20 tablet, Rfl: 0    lancets 33 gauge Misc, USE ONCE DAILY TO MONITOR GLUCOSE, Disp: 100 each, Rfl: 3    lisinopriL (PRINIVIL,ZESTRIL) 5 MG tablet, Take 1 tablet (5 mg total) by mouth once daily., Disp: 90 tablet, Rfl: 3    mepolizumab 100 mg/mL AtIn, Inject 1 mL (100 mg total) into the skin every 28 days., Disp: 1 mL, Rfl: 2    methocarbamoL (ROBAXIN) 500 MG Tab, Take 1 tablet (500 mg total) by mouth 2 (two) times daily as needed (muscle spasms). May cause drowsiness. Do not drive or operate machinery after use., Disp: 30 tablet, Rfl: 0    metoprolol succinate (TOPROL-XL) 100 MG 24 hr tablet, Take 1 tablet (100 mg total) by mouth once daily., Disp: 90 tablet, Rfl: 3    mirabegron (MYRBETRIQ) 25 mg Tb24 ER tablet, Take 1 tablet (25 mg total) by mouth once daily., Disp: 30 tablet, Rfl: 11    naproxen (NAPROSYN) 500 MG tablet, Take 1 tablet (500 mg total) by mouth 2 (two) times daily with meals. Take with food., Disp: 60 tablet, Rfl: 0    nitroGLYCERIN (NITROSTAT) 0.4 MG SL tablet, Place 1 tablet under the tongue once every 5 minutes for 3 doses for chest pain, if pain continues call 911, Disp: 25 tablet, Rfl: 3    omeprazole (PRILOSEC) 40 MG capsule, Take 1 capsule (40 mg total) by mouth 2 (two) times a day., Disp: 180 capsule, Rfl: 3    potassium chloride (K-TAB) 20 mEq, Take 1 tablet (20 mEq total) by mouth daily as needed (edema). Take with lasix if needed for edema, Disp: 90 tablet, Rfl: 1    prochlorperazine (COMPAZINE) 10 MG tablet, Take 1 tablet (10 mg total) by mouth every 6 (six) hours as needed., Disp: 15 tablet, Rfl: 0    tirzepatide (MOUNJARO) 5 mg/0.5 mL PnIj, Inject 5  mg into the skin every 7 days., Disp: 4 Pen, Rfl: 5    traZODone (DESYREL) 100 MG tablet, Take 1 tablet (100 mg total) by mouth every evening., Disp: 90 tablet, Rfl: 3    blood-glucose sensor (DEXCOM G7 SENSOR) Jeannine, 1 each by Misc.(Non-Drug; Combo Route) route every 10 days., Disp: 5 each, Rfl: 5    cyclobenzaprine (FLEXERIL) 10 MG tablet, Take 1 tablet (10 mg total) by mouth 3 (three) times daily as needed for Muscle spasms. (Patient not taking: Reported on 5/14/2025), Disp: 60 tablet, Rfl: 0    insulin degludec (TRESIBA FLEXTOUCH U-100) 100 unit/mL (3 mL) insulin pen, Inject 5 Units into the skin every evening., Disp: 15 mL, Rfl: 0    ondansetron (ZOFRAN-ODT) 8 MG TbDL, Dissolve 1 tablet (8 mg total) by mouth 2 (two) times daily., Disp: 60 tablet, Rfl: 3    rOPINIRole (REQUIP) 0.5 MG tablet, Take 1 tablet (0.5 mg total) by mouth every evening., Disp: 90 tablet, Rfl: 3  No current facility-administered medications for this visit.    Facility-Administered Medications Ordered in Other Visits:     0.9%  NaCl infusion, , Intravenous, Continuous, Sarah Gamez MD, Stopped at 01/09/20 0925    0.9%  NaCl infusion, , Intravenous, Continuous, Sarah Gamez MD, Stopped at 01/09/20 1026    0.9%  NaCl infusion, , Intravenous, Continuous, Angélica De La Cruz MD    ceFAZolin injection 2 g, 2 g, Intravenous, On Call Procedure, Angélica De La Cruz MD    lidocaine (PF) 10 mg/ml (1%) injection 10 mg, 1 mL, Intradermal, Once, Angélica De La Cruz MD    sodium chloride 0.9% flush 10 mL, 10 mL, Intravenous, PRN, Sarah Gamez MD    sodium chloride 0.9% flush 10 mL, 10 mL, Intravenous, PRN, Sarah Gamez MD

## 2025-05-20 ENCOUNTER — OFFICE VISIT (OUTPATIENT)
Dept: URGENT CARE | Facility: CLINIC | Age: 60
End: 2025-05-20
Payer: COMMERCIAL

## 2025-05-20 VITALS
OXYGEN SATURATION: 97 % | HEIGHT: 60 IN | HEART RATE: 59 BPM | DIASTOLIC BLOOD PRESSURE: 85 MMHG | WEIGHT: 193 LBS | BODY MASS INDEX: 37.89 KG/M2 | RESPIRATION RATE: 17 BRPM | SYSTOLIC BLOOD PRESSURE: 135 MMHG

## 2025-05-20 DIAGNOSIS — S93.492A SPRAIN OF ANTERIOR TALOFIBULAR LIGAMENT OF LEFT ANKLE, INITIAL ENCOUNTER: Primary | ICD-10-CM

## 2025-05-20 DIAGNOSIS — E11.42 TYPE 2 DIABETES MELLITUS WITH DIABETIC POLYNEUROPATHY, WITHOUT LONG-TERM CURRENT USE OF INSULIN: Chronic | ICD-10-CM

## 2025-05-20 DIAGNOSIS — Z02.6 ENCOUNTER RELATED TO WORKER'S COMPENSATION CLAIM: ICD-10-CM

## 2025-05-20 PROCEDURE — 99214 OFFICE O/P EST MOD 30 MIN: CPT | Mod: S$GLB,,, | Performed by: STUDENT IN AN ORGANIZED HEALTH CARE EDUCATION/TRAINING PROGRAM

## 2025-05-20 RX ORDER — BLOOD-GLUCOSE SENSOR
1 EACH MISCELLANEOUS
Qty: 5 EACH | Refills: 5 | OUTPATIENT
Start: 2025-05-20 | End: 2026-05-20

## 2025-05-20 RX ORDER — BLOOD-GLUCOSE TRANSMITTER
1 EACH MISCELLANEOUS ONCE
Qty: 1 EACH | Refills: 0 | Status: CANCELLED | OUTPATIENT
Start: 2025-05-20 | End: 2025-05-20

## 2025-05-20 RX ORDER — BLOOD-GLUCOSE TRANSMITTER
1 EACH MISCELLANEOUS ONCE
Qty: 1 EACH | Refills: 0 | Status: SHIPPED | OUTPATIENT
Start: 2025-05-20 | End: 2025-05-21

## 2025-05-20 NOTE — TELEPHONE ENCOUNTER
Refill Decision Note  Quick DC. Request already responded to by other means (e.g. phone or fax)    Loi Art  is requesting a refill authorization.  Brief Assessment and Rationale for Refill:  Quick Discontinue     Medication Therapy Plan:  DEXCOM 6 WAS DISCONTINUED AND CHANGED TO DEXCOM 7 AND SENT TO [facility]    Medication Reconciliation Completed: No   Comments:           Note composed:12:14 PM 05/20/2025

## 2025-05-20 NOTE — PROGRESS NOTES
Subjective:      Patient ID: Loi Cordova is a 59 y.o. female.    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 Begin MD note:  Patient says her ROM is improving slightly in PT. She says she has pressure on her lateral ankle bone from the boot and right hip pain from the weight of the left boot. Keeping up with [facility]. Says they have been doing a gradual wean down of wearing the walking boot where she'll have it off for a few hours a day. Would like to discuss seeing ortho due to persistent pain and stiffness. Work is going well with restrictions. No other new reported complaints or concerns at this time.       ROS  Objective:     Physical Exam  Vitals and nursing note reviewed.   Constitutional:       General: She is not in acute distress.     Appearance: She is not ill-appearing.   HENT:      Head: Normocephalic.   Eyes:      Conjunctiva/sclera: Conjunctivae normal.   Pulmonary:      Effort: No respiratory distress.   Musculoskeletal:      Left ankle: Tenderness present over the ATF ligament. Decreased range of motion (restricted in all planes).   Skin:     General: Skin is warm and dry.   Neurological:      Mental Status: She is alert and oriented to person, place, and time.   Psychiatric:         Attention and Perception: Attention normal.         Mood and Affect: Mood normal.         Behavior: Behavior normal.        Assessment:      1. Sprain of anterior talofibular ligament of left ankle, initial encounter    2. Encounter related to worker's compensation claim      Plan:     Discussed weaning from boot into ankle brace. Message sent to treating PT for today. She will f/u at near completion of PT to discuss  next steps. Consider referral to sports med for consult if needed after remaining 5 sessions. Discussed given baseline stiffness due to chronic tear without repair previously, expectation for complete resolution is minimal, but an MMI determination will be made after completion of PT and consult recs (if indicated.     Patient voiced understanding and agreement with the plan of care and did not have any questions or concerns prior to completion of the visit.         Patient Instructions: Continue Physical Therapy, Daily home exercises/warm soaks   Restrictions: No Prolonged standing/walking  Follow up in about 2 weeks (around 6/3/2025).    I spent a total of 30 minutes on the day of the visit.   This includes face to face time and non-face to face time preparing to see the patient (eg, review of tests, prior records/notes), obtaining and/or reviewing separately obtained history, documenting clinical information in the electronic or other health record, independently interpreting results and communicating results to the patient.      5/22/25- Addendum: notified by PT that they do not have braces in clinic. Ankle brace order placed today and patient aware that she can pick it up from [facility] during business hours. Notified [facility] staff at [facility].

## 2025-05-20 NOTE — TELEPHONE ENCOUNTER
No care due was identified.  [facility] Embedded Care Due Messages. Reference number: [id number].   5/20/2025 8:14:54 AM CDT

## 2025-05-20 NOTE — LETTER
[facility] - [place] [address]  Phone: [phone]  Fax: [phone]  [facility] [facility]: [id number]    Pt Name: Loi Cordova  Injury Date: 03/10/2025   Employee ID: [id number] Date of Treatment: 05/20/2025   Company: [employer]      Appointment Time: 09:20 AM Arrived: 9:07 AM   Provider: Karla Love MD Time Out:9:55 AM     Office Treatment:   1. Sprain of anterior talofibular ligament of left ankle, initial encounter    2. Encounter related to worker's compensation claim          Patient Instructions: Continue Physical Therapy, Daily home exercises/warm soaks        Restrictions: No Prolonged standing/walking     Return Appointment: 6/3/2025 at 9:30 am    Revised, disregard previous letter.    KW

## 2025-05-20 NOTE — LETTER
[facility] - [place] [address]  Phone: [phone]  Fax: [phone]  [facility] [facility]: [id number]    Pt Name: Loi Cordova  Injury Date:  03/10/2025   Employee ID: [id number] Date of Treatment: 05/20/2025   Company: [employer]      Appointment Time: 09:20 AM Arrived: 9:07 AM   Provider: Karla Love MD Time Out: 9:55 AM     Office Treatment:   1. Sprain of anterior talofibular ligament of left ankle, initial encounter    2. Encounter related to worker's compensation claim          Patient Instructions: Continue Physical Therapy, Daily home exercises/warm soaks        Restrictions: No above the shoulder/overhead work     Return Appointment: 6/3/2025 at 9:30 AM    KW

## 2025-06-02 DIAGNOSIS — J45.51 SEVERE PERSISTENT ASTHMA WITH ACUTE EXACERBATION: ICD-10-CM

## 2025-06-02 RX ORDER — MEPOLIZUMAB 100 MG/ML
100 INJECTION, SOLUTION SUBCUTANEOUS
Qty: 1 ML | Refills: 2 | Status: ACTIVE | OUTPATIENT
Start: 2025-06-02

## 2025-06-03 ENCOUNTER — OFFICE VISIT (OUTPATIENT)
Dept: URGENT CARE | Facility: CLINIC | Age: 60
End: 2025-06-03
Payer: COMMERCIAL

## 2025-06-03 VITALS
OXYGEN SATURATION: 97 % | HEART RATE: 71 BPM | WEIGHT: 193 LBS | HEIGHT: 60 IN | BODY MASS INDEX: 37.89 KG/M2 | SYSTOLIC BLOOD PRESSURE: 128 MMHG | DIASTOLIC BLOOD PRESSURE: 85 MMHG | RESPIRATION RATE: 15 BRPM

## 2025-06-03 DIAGNOSIS — Z02.6 ENCOUNTER RELATED TO WORKER'S COMPENSATION CLAIM: ICD-10-CM

## 2025-06-03 DIAGNOSIS — S93.492A SPRAIN OF ANTERIOR TALOFIBULAR LIGAMENT OF LEFT ANKLE, INITIAL ENCOUNTER: Primary | ICD-10-CM

## 2025-06-03 PROCEDURE — 99214 OFFICE O/P EST MOD 30 MIN: CPT | Mod: S$GLB,,, | Performed by: STUDENT IN AN ORGANIZED HEALTH CARE EDUCATION/TRAINING PROGRAM

## 2025-06-05 ENCOUNTER — RESULTS FOLLOW-UP (OUTPATIENT)
Dept: FAMILY MEDICINE | Facility: CLINIC | Age: 60
End: 2025-06-05

## 2025-06-09 ENCOUNTER — TELEPHONE (OUTPATIENT)
Dept: ORTHOPEDICS | Facility: CLINIC | Age: 60
End: 2025-06-09
Payer: COMMERCIAL

## 2025-06-10 DIAGNOSIS — E11.42 TYPE 2 DIABETES MELLITUS WITH DIABETIC POLYNEUROPATHY, WITHOUT LONG-TERM CURRENT USE OF INSULIN: ICD-10-CM

## 2025-06-10 RX ORDER — ATORVASTATIN CALCIUM 80 MG/1
80 TABLET, FILM COATED ORAL NIGHTLY
Qty: 90 TABLET | Refills: 3 | Status: SHIPPED | OUTPATIENT
Start: 2025-06-10

## 2025-06-10 NOTE — TELEPHONE ENCOUNTER
Refill Decision Note   Loi Cordova  is requesting a refill authorization.  Brief Assessment and Rationale for Refill:  Approve     Medication Therapy Plan:         Comments:     Note composed:12:22 PM 06/10/2025

## 2025-06-10 NOTE — TELEPHONE ENCOUNTER
No care due was identified.  [facility] Embedded Care Due Messages. Reference number: [id number].   6/10/2025 10:37:16 AM CDT

## 2025-06-16 ENCOUNTER — OFFICE VISIT (OUTPATIENT)
Dept: ORTHOPEDICS | Facility: CLINIC | Age: 60
End: 2025-06-16
Payer: COMMERCIAL

## 2025-06-16 VITALS — HEIGHT: 60 IN | BODY MASS INDEX: 37.87 KG/M2 | WEIGHT: 192.88 LBS

## 2025-06-16 DIAGNOSIS — S93.402S MODERATE LEFT ANKLE SPRAIN, SEQUELA: ICD-10-CM

## 2025-06-16 DIAGNOSIS — M19.072 ARTHROSIS OF LEFT ANKLE: Primary | ICD-10-CM

## 2025-06-16 PROCEDURE — 99203 OFFICE O/P NEW LOW 30 MIN: CPT | Mod: 25,S$GLB,, | Performed by: ORTHOPAEDIC SURGERY

## 2025-06-16 PROCEDURE — 99999 PR PBB SHADOW E&M-EST. PATIENT-LVL IV: CPT | Mod: PBBFAC,,, | Performed by: ORTHOPAEDIC SURGERY

## 2025-06-16 PROCEDURE — 20605 DRAIN/INJ JOINT/BURSA W/O US: CPT | Mod: LT,S$GLB,, | Performed by: ORTHOPAEDIC SURGERY

## 2025-06-16 RX ORDER — TRIAMCINOLONE ACETONIDE 40 MG/ML
40 INJECTION, SUSPENSION INTRA-ARTICULAR; INTRAMUSCULAR
Status: COMPLETED | OUTPATIENT
Start: 2025-06-16 | End: 2025-06-16

## 2025-06-16 RX ADMIN — TRIAMCINOLONE ACETONIDE 40 MG: 40 INJECTION, SUSPENSION INTRA-ARTICULAR; INTRAMUSCULAR at 01:06

## 2025-06-16 NOTE — PROGRESS NOTES
DATE: 6/16/2025  PATIENT: Loi Cordova    CHIEF COMPLAINT: left ankle pain    HISTORY:  Loi Cordova is a 59 y.o. female w/ PMH of emphysema, GERD, CHF, HLD and HTN here for evaluation of left ankle pain.    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 Patient reports the treadmill belt moved while she was standing on the treadmill attempting to clean it, causing her to roll her ankle. She has had pain since this incident. Denies any additional falls or trauma. Of note, patient reports a fall down stairs 2 years ago causing a left ankle fracture that was treated nonoperatively. Patient reports she has had stiffness in her left ankle since the initial injury 2 years ago. The patient describes the pain as a burning pain.  The pain is worse with bearing weight and improved by rest. There is no associated numbness and tingling.  Prior treatments have included PT, CAM boot and anti-inflammatory medications.      PAST MEDICAL/SURGICAL HISTORY:  Past Medical History:   Diagnosis Date    Acute cystitis with hematuria 07/23/2023    Allergy     Asthma     Chronic diastolic heart failure     Coronary artery disease     GERD (gastroesophageal reflux disease)     History of back surgery 02/20/2018    Hyperlipidemia     Hypertension     Pulmonary emphysema 08/11/2023    Type 2 diabetes mellitus with diabetic polyneuropathy, without long-term current use of insulin 02/08/2019     Past Surgical History:   Procedure Laterality Date    BILATERAL OOPHORECTOMY Bilateral 2000    BREAST BIOPSY Left 2/14/2020    Procedure: BIOPSY, BREAST-Left medial breast palpation guided;  Surgeon: Paulino Espinoza MD;  Location: [address];  Service: General;  Laterality: Left;    CARDIAC CATHETERIZATION      7 stents    CHOLECYSTECTOMY      COLONOSCOPY N/A 7/16/2019    Procedure: COLONOSCOPY;  Surgeon: Sarah Gamez MD;  Location: [facility] ENDO;  Service: Endoscopy;  Laterality: N/A;     ESOPHAGOGASTRODUODENOSCOPY N/A 1/9/2020    Procedure: EGD (ESOPHAGOGASTRODUODENOSCOPY);  Surgeon: Sarah Gamez MD;  Location: [facility];  Service: Endoscopy;  Laterality: N/A;    HYSTERECTOMY      PARTIAL HYSTERECTOMY N/A 1990    Uterus taken out    SHOULDER SURGERY      TOTAL KNEE ARTHROPLASTY         Current Medications: Current Medications[1]    Social History: Social History[2]    REVIEW OF SYSTEMS:  Constitution: Negative. Negative for chills, fever and night sweats.   Cardiovascular: Negative for chest pain and syncope.   Respiratory: Negative for cough and shortness of breath.   Gastrointestinal: See HPI. Negative for nausea/vomiting. Negative for abdominal pain.  Genitourinary: See HPI. Negative for discoloration or dysuria.  Skin: Negative for dry skin, itching and rash.   Hematologic/Lymphatic: Negative for bleeding problem. Does not bruise/bleed easily.   Musculoskeletal: see HPI.  Neurological: See HPI. No seizures.   Endocrine: Negative for polydipsia, polyphagia and polyuria.   Allergic/Immunologic: Negative for hives and persistent infections.    PHYSICAL EXAMINATION:    LMP 07/29/1990 (Within Years) Comment: Partical Hysterectomy in 1990, Gutierrez Ooopherectomy in 2000    General: The patient is a 59 y.o. female in no apparent distress, the patient is oriented to person, place and time.   Psych: Normal mood and affect  HEENT:  NCAT, sclera nonicteric  Lungs:  Respirations are equal and unlabored.  CV:  2+ bilateral upper and lower extremity pulses.  Skin:  Intact throughout.  Musculoskeletal: No pain with the range of motion of the bilateral hips. No trochanteric tenderness to palpation. No pain with range of motion about the bilateral knees.      Left Foot and Ankle Exam    INSPECTION:        Gait:    Normal   Scars:   None   Swelling:  Mild at ankle   Color:   Normal   Atrophy:  None  Heel / Toe Walking: No difficulty    ALIGNMENT:    Hindfoot  Normal     Midfoot: Normal  Forefoot: Normal     Collective Ankle-Hindfoot Alignment    Good - plantigrade (PG), well aligned        TENDERNESS:  LATERAL:      Sinus tarsi:  None    Syndesmosis:  None     ATFL:   None      CFL:   None     Anterolateral gutter: None    Fibula:   None   Peroneal tendons: TTP     Peroneal tubercle.  TTP    ANTERIOR:  Anteromedial joint line:  TTP  Anterolateral joint line:  TTP  Talonavicular:    None  Anterior tibialis:   None   Extensor tendons:   None     POSTERIOR:  Medial/lateral achilles:  None   Medial/lateral achilles insertion: None    MEDIAL:      Deltoid:  None     Malleolus:  None     PTT:   None     Navicular:  None            CALCANEUS:  Retrocalcaneal:   None   Medial achilles:   None  Lateral achilles:   None  Calcaneal tuberosity:   None    FOOT:    Calcaneal cuboid  None    MT / MT heads:  None   Navicular   None     Medial cord origin PF:  None   Cuneiforms:   None     Web space:   None   Lisfranc    None     Tarsal tunnel:   None   Base of the fifth metatarsal  None    Tinels sign   Negative        RANGE OF MOTION:  RIGHT/ LEFT (* = pain)     Ankle DF/PF:  5/20  15/45         Eversion/Inversion: 15/25 15/25     Midfoot ABD/ADD: 10/10 10/10     First MTP DF/PF:  60/25 60/25               STRENGTH: (affected) (* = pain)  Anterior tibialis: 5/5  Posterior tibialis: 5/5  Gastroc-soleus: 5/5  Peroneals:  5/5  EHL:   5/5  FHL:   5/5        SPECIAL TESTS:   ANKLE INSTABILITY: (*pain)    Anterior drawer:   Normal      (C-W contralateral side)     Inversion:   30°     Eversion  10°            Collective Instability: (Ant-post and varus-valgus)     Stable        PROVOCATIVE TESTING:    Forced DF/ER: No pain at syndesmosis.    Mid-leg squeeze  No pain at syndesmosis.    Forced DF:  No pain anterior joint line.      Forced PF:  No pain posterior ankle.     Forced INV:  No pain laterally.    Forced EV:  No pain medially.    Wangs sign: Normal ankle plantar flexion.     Resisted  peroneal No subluxation or pain.    1st-2nd MT toggle No pain at Lisfranc.    MT-T torque  No pain at Lisfranc.      NEUROLOGIC TESTING:  All dermatomes foot, ankle and leg have normal sensation light touch  Ankle Reflexes 2+, symmetric   Negative Babinski and No Clonus    VASCULAR:  2+ pulses PT/DT with brisk capillary refill toes.          IMAGING:   Radiographs of the left ankle were ordered and personally reviewed with the patient today, revealing no fractures or dislocations.  MRI of left ankle show a chronic low grade, partial tear of the anterior talofibular ligament, some trace of fluid at the tibiotalar joint and subtalar joint.          ASSESSMENT/PLAN:    1. Arthrosis of left ankle  triamcinolone acetonide injection 40 mg      2. Moderate left ankle sprain, sequela              59 y.o. yo female with PMH of emphysema, GERD, CHF, HLD and HTN presents with left ankle pain and tenderness and stiffness of the left ankle on exam, imaging reveals partial tear of the anterior talofibular ligament, some trace of fluid at the tibiotalar joint and subtalar joint, likely has left ankle arthrosis - ankle sprain sequela.    Plan: The patient and I had a thorough discussion today.  We discussed the working diagnosis as well as several other potential alternative diagnoses.  Treatment options were discussed, both conservative and surgical.  Conservative treatment options would include things such as activity modifications, a period of rest, tylenol, anti-inflammatory medications, physical therapy, immobilization, corticosteroid injections, and others.     At this time, the patient would like to proceed with:  - CSI injection - administered to L ankle in clinic today  - WBAT  - Ok to continue wearing CAM walking boot for comfort  - pain control: OTC NSAIDs/tylenol as needed for pain  - Continue PT      Return to clinic in 12 weeks to evaluate progress.        Maryellen Zelaya MD  [facility], PGY-1    I have  personally taken the history and examined this patient and agree with the residents note as stated above.  After verbal consent and sterile prep I injected the left ankle with 2 cc lidocaine and 40 mg of triamcinolone.  If she gets good short-term response then she may be a candidate for arthroscopic debridement for possible impingement lesion.           [1]   Current Outpatient Medications:     albuterol (PROVENTIL HFA) 90 mcg/actuation inhaler, Inhale 2 puffs into the lungs every 6 (six) hours as needed for Wheezing or Shortness of Breath. Rescue, Disp: 18 g, Rfl: 6    ALPRAZolam (XANAX) 0.25 MG tablet, Take 1 tablet (0.25 mg total) by mouth daily as needed for Anxiety., Disp: 30 tablet, Rfl: 0    amLODIPine (NORVASC) 5 MG tablet, Take 1 tablet (5 mg total) by mouth every evening., Disp: 90 tablet, Rfl: 3    aspirin 81 MG Chew, Take 81 mg by mouth every evening., Disp: , Rfl:     atorvastatin (LIPITOR) 80 MG tablet, Take 1 tablet (80 mg total) by mouth every evening., Disp: 90 tablet, Rfl: 3    azelastine (ASTELIN) 137 mcg (0.1 %) nasal spray, 1 spray (137 mcg total) in each nostril route 2 (two) times daily., Disp: 30 mL, Rfl: 0    blood sugar diagnostic Strp, Use 1 strip to check blood sugar once daily, Disp: 100 each, Rfl: 0    blood-glucose meter Misc, USE ONCE DAILY TO MONITOR GLUCOSE, Disp: 1 each, Rfl: 0    blood-glucose sensor (DEXCOM G7 SENSOR) Jeannine, 1 each by Misc.(Non-Drug; Combo Route) route every 10 days., Disp: 5 each, Rfl: 5    blood-glucose transmitter (DEXCOM G6 TRANSMITTER) Jeannine, Change every 90 days to check blood sugar, Disp: 1 each, Rfl: 0    cyclobenzaprine (FLEXERIL) 10 MG tablet, Take 1 tablet (10 mg total) by mouth 3 (three) times daily as needed for Muscle spasms., Disp: 60 tablet, Rfl: 0    fenofibrate 160 MG Tab, Take 1 tablet (160 mg total) by mouth once daily., Disp: 90 tablet, Rfl: 3    fluticasone furoate-vilanteroL (BREO ELLIPTA) 200-25 mcg/dose DsDv diskus inhaler, Inhale 1 puff  "into the lungs once daily. Controller, Disp: 60 each, Rfl: 3    furosemide (LASIX) 40 MG tablet, Take 1 tablet (40 mg total) by mouth daily as needed (edema)., Disp: 90 tablet, Rfl: 1    ibuprofen (ADVIL,MOTRIN) 600 MG tablet, Take 1 tablet (600 mg total) by mouth every 6 (six) hours as needed for Pain., Disp: 20 tablet, Rfl: 0    insulin degludec (TRESIBA FLEXTOUCH U-100) 100 unit/mL (3 mL) insulin pen, Inject 10 Units into the skin every evening for 7 days, THEN 15 Units every evening for 23 days., Disp: , Rfl:     lancets 33 gauge Misc, USE ONCE DAILY TO MONITOR GLUCOSE, Disp: 100 each, Rfl: 3    lisinopriL (PRINIVIL,ZESTRIL) 5 MG tablet, Take 1 tablet (5 mg total) by mouth once daily., Disp: 90 tablet, Rfl: 3    mepolizumab (NUCALA) 100 mg/mL AtIn, Inject 1 mL (100 mg total) into the skin every 28 days., Disp: 1 mL, Rfl: 2    methocarbamoL (ROBAXIN) 500 MG Tab, Take 1 tablet (500 mg total) by mouth 2 (two) times daily as needed (muscle spasms). May cause drowsiness. Do not drive or operate machinery after use., Disp: 30 tablet, Rfl: 0    metoprolol succinate (TOPROL-XL) 100 MG 24 hr tablet, Take 1 tablet (100 mg total) by mouth once daily., Disp: 90 tablet, Rfl: 3    mirabegron (MYRBETRIQ) 25 mg Tb24 ER tablet, Take 1 tablet (25 mg total) by mouth once daily., Disp: 30 tablet, Rfl: 11    naproxen (NAPROSYN) 500 MG tablet, Take 1 tablet (500 mg total) by mouth 2 (two) times daily with meals. Take with food., Disp: 60 tablet, Rfl: 0    nitroGLYCERIN (NITROSTAT) 0.4 MG SL tablet, Place 1 tablet under the tongue once every 5 minutes for 3 doses for chest pain, if pain continues call 911, Disp: 25 tablet, Rfl: 3    omeprazole (PRILOSEC) 40 MG capsule, Take 1 capsule (40 mg total) by mouth 2 (two) times a day., Disp: 180 capsule, Rfl: 3    ondansetron (ZOFRAN-ODT) 8 MG TbDL, Dissolve 1 tablet (8 mg total) by mouth 2 (two) times daily., Disp: 60 tablet, Rfl: 3    pen needle, diabetic 32 gauge x 1/4" Ndle, Use with " Tresiba once daily., Disp: 100 each, Rfl: 5    potassium chloride (K-TAB) 20 mEq, Take 1 tablet (20 mEq total) by mouth daily as needed (edema). Take with lasix if needed for edema, Disp: 90 tablet, Rfl: 1    prochlorperazine (COMPAZINE) 10 MG tablet, Take 1 tablet (10 mg total) by mouth every 6 (six) hours as needed., Disp: 15 tablet, Rfl: 0    rOPINIRole (REQUIP) 0.5 MG tablet, Take 1 tablet (0.5 mg total) by mouth every evening., Disp: 90 tablet, Rfl: 3    tirzepatide (MOUNJARO) 5 mg/0.5 mL PnIj, Inject 5 mg into the skin every 7 days., Disp: 4 Pen, Rfl: 5    traZODone (DESYREL) 100 MG tablet, Take 1 tablet (100 mg total) by mouth every evening., Disp: 90 tablet, Rfl: 3  No current facility-administered medications for this visit.    Facility-Administered Medications Ordered in Other Visits:     0.9%  NaCl infusion, , Intravenous, Continuous, Sarah Gamez MD, Stopped at 01/09/20 0925    0.9%  NaCl infusion, , Intravenous, Continuous, Sarah Gamez MD, Stopped at 01/09/20 1026    0.9%  NaCl infusion, , Intravenous, Continuous, Angélica De La Cruz MD    ceFAZolin injection 2 g, 2 g, Intravenous, On Call Procedure, Angélica De La Cruz MD    lidocaine (PF) 10 mg/ml (1%) injection 10 mg, 1 mL, Intradermal, Once, Angélica De La Cruz MD    sodium chloride 0.9% flush 10 mL, 10 mL, Intravenous, PRN, Sarah Gamez MD    sodium chloride 0.9% flush 10 mL, 10 mL, Intravenous, PRN, Sarah Gamez MD  [2]   Social History  Socioeconomic History    Marital status:    Tobacco Use    Smoking status: Former     Current packs/day: 0.50     Average packs/day: 0.5 packs/day for 34.0 years (17.0 ttl pk-yrs)     Types: Cigarettes, Vaping w/o nicotine     Passive exposure: Never    Smokeless tobacco: Never    Tobacco comments:     in smoking program 1/25/21 now smokes vapes without nicotine   Substance and Sexual Activity    Alcohol use: Not Currently     Comment: 6 months    Drug use: No    Sexual  activity: Yes     Partners: Male     Social Drivers of Health     Financial Resource Strain: Low Risk  (3/28/2024)    Overall Financial Resource Strain (CARDIA)     Difficulty of Paying Living Expenses: Not hard at all   Food Insecurity: Unknown (3/28/2024)    Hunger Vital Sign     Worried About Running Out of Food in the Last Year: Never true   Transportation Needs: No Transportation Needs (3/28/2024)    PRAPARE - Transportation     Lack of Transportation (Medical): No     Lack of Transportation (Non-Medical): No   Physical Activity: Unknown (3/28/2024)    Exercise Vital Sign     Days of Exercise per Week: 0 days   Recent Concern: Physical Activity - Inactive (3/28/2024)    Exercise Vital Sign     Days of Exercise per Week: 0 days     Minutes of Exercise per Session: 0 min   Stress: Stress Concern Present (3/28/2024)    [employer] [place] [employer] - Occupational Stress Questionnaire     Feeling of Stress : To some extent   Housing Stability: Low Risk  (3/28/2024)    Housing Stability Vital Sign     Unable to Pay for Housing in the Last Year: No     Number of Places Lived in the Last Year: 1     Unstable Housing in the Last Year: No

## 2025-06-24 ENCOUNTER — PATIENT MESSAGE (OUTPATIENT)
Dept: ORTHOPEDICS | Facility: CLINIC | Age: 60
End: 2025-06-24
Payer: COMMERCIAL

## 2025-06-24 ENCOUNTER — OFFICE VISIT (OUTPATIENT)
Dept: URGENT CARE | Facility: CLINIC | Age: 60
End: 2025-06-24
Payer: COMMERCIAL

## 2025-06-24 VITALS
OXYGEN SATURATION: 98 % | BODY MASS INDEX: 37.69 KG/M2 | DIASTOLIC BLOOD PRESSURE: 67 MMHG | HEIGHT: 60 IN | RESPIRATION RATE: 16 BRPM | HEART RATE: 66 BPM | WEIGHT: 192 LBS | SYSTOLIC BLOOD PRESSURE: 121 MMHG

## 2025-06-24 DIAGNOSIS — Z02.6 ENCOUNTER RELATED TO WORKER'S COMPENSATION CLAIM: ICD-10-CM

## 2025-06-24 DIAGNOSIS — M19.072 ARTHROSIS OF LEFT ANKLE: Primary | ICD-10-CM

## 2025-06-24 DIAGNOSIS — S93.492D SPRAIN OF ANTERIOR TALOFIBULAR LIGAMENT OF LEFT ANKLE, SUBSEQUENT ENCOUNTER: ICD-10-CM

## 2025-06-24 PROCEDURE — 99214 OFFICE O/P EST MOD 30 MIN: CPT | Mod: S$GLB,,, | Performed by: STUDENT IN AN ORGANIZED HEALTH CARE EDUCATION/TRAINING PROGRAM

## 2025-06-24 NOTE — LETTER
[facility] - [place] [address]  Phone: [phone]  Fax: [phone]  [facility]: [email or web address]    Pt Name: Loi Cordova  Injury Date: 03/10/2025   Employee ID: [id number] Date of Treatment: 06/24/2025   Company: [employer]      Appointment Time: 09:15 AM Arrived: 9:22 am   Provider: Karla Love MD Time Out:10:30 am     Office Treatment:   1. Arthrosis of left ankle    2. Sprain of anterior talofibular ligament of left ankle, subsequent encounter    3. Encounter related to worker's compensation claim          Patient Instructions:  (Follow up with Ortho regarding surgery scheduling, next available)        Restrictions: No Prolonged standing/walking     Return Appointment: 7/29/2025 at 10:00 am    KW

## 2025-06-24 NOTE — PROGRESS NOTES
Subjective:      Patient ID: Loi Cordova is a 59 y.o. female.    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 Mc HENRY note:  Patient following up on chronic left ankle pain.  Had consultation with ortho 1 week ago on June 16th.  Received a steroid injection to the left ankle which did not provide any relief.  Patient is still wearing the walking boot which is now very worn and ragged.  Requesting a new one.  She also shares the news that after her ortho appointment she found out that her  was diagnosed with lung cancer.  They discussed continuation of PT and follow-up in 3 months to discuss surgical option if no improvement.  However, patient states that she would like her appointment with ortho moved up as she would like to proceed with surgery so that she may be prepared to care for her .  No other new complaints or concerns at this time.      ROS  Objective:     Physical Exam  Vitals and nursing note reviewed.   Constitutional:       General: She is not in acute distress.     Appearance: She is not ill-appearing.   HENT:      Head: Normocephalic.   Eyes:      Conjunctiva/sclera: Conjunctivae normal.   Pulmonary:      Effort: No respiratory distress.   Musculoskeletal:      Left ankle: Tenderness present. Decreased range of motion.   Skin:     General: Skin is warm and dry.   Neurological:      Mental Status: She is alert and oriented to person, place, and time.   Psychiatric:         Attention and Perception: Attention normal.         Mood and Affect: Mood normal.         Behavior: Behavior normal.        Assessment:      1. Arthrosis of left ankle    2. Sprain of anterior talofibular ligament of  left ankle, subsequent encounter    3. Encounter related to worker's compensation claim      Plan:     Mrs. Cordova has a chronic ankle sprain that was re-injured 3 months ago. She failed PT including having increased symptoms toward the end and was referred to ortho for consultation. She received a CSI to the left ankle 1 week ago with no relief, per patient. Original plan was for her to continue PT and CAM boot with WBAT. No PT ordered during ortho visit. Rohit and I agreed that given lack of improvement in PT combined with her 's recent diagnosis and need for chemo/surgery etc in near future, that it is reasonable to discuss proceeding with the surgical option. She would like to have this issue resolved so she can resume her regular job duties and be able to care for her  without limitations.     Patient provided with a new CAM boot today and I will send this note to patient's orthopedist. Encouraged her to reach out to Dr. Johnson's office also. F/u with me scheduled for 5 weeks to insure she is established with ortho and has a plan of care. Ok for patient to cancel this appointment if needed. Continue with same work limitiations.       Patient Instructions:  (Follow up with Ortho regarding surgery scheduling, next available)   Restrictions: No Prolonged standing/walking  Follow up in about 5 weeks (around 7/29/2025).    I spent a total of 30 minutes on the day of the visit. This includes face to face time and non-face to face time preparing to see the patient (eg, review of tests, prior records/notes), obtaining and/or reviewing separately obtained history, documenting clinical information in the electronic or other health record, independently interpreting results and communicating results to the patient.

## 2025-06-27 ENCOUNTER — OFFICE VISIT (OUTPATIENT)
Dept: ORTHOPEDICS | Facility: CLINIC | Age: 60
End: 2025-06-27
Payer: COMMERCIAL

## 2025-06-27 VITALS — WEIGHT: 183 LBS | HEIGHT: 60 IN | BODY MASS INDEX: 35.93 KG/M2

## 2025-06-27 DIAGNOSIS — S93.402S MODERATE LEFT ANKLE SPRAIN, SEQUELA: Primary | ICD-10-CM

## 2025-06-27 DIAGNOSIS — M19.072 ARTHROSIS OF LEFT ANKLE: ICD-10-CM

## 2025-06-27 PROCEDURE — 99999 PR PBB SHADOW E&M-EST. PATIENT-LVL V: CPT | Mod: PBBFAC,,, | Performed by: ORTHOPAEDIC SURGERY

## 2025-06-27 RX ORDER — CLINDAMYCIN PHOSPHATE 900 MG/50ML
900 INJECTION, SOLUTION INTRAVENOUS
OUTPATIENT
Start: 2025-06-27

## 2025-06-27 NOTE — PROGRESS NOTES
Loi Cordova  Returns today for follow-up.  This is a 59-year-old female who presented to me on 06/16/2025 for an injury she sustained to her left ankle on-the-job when she twisted her left ankle.  Prior to seeing me she has been treated with physical therapy, cam boot, and anti-inflammatory medications.  She had a history significant for a previous ankle fracture that was treated nonoperatively that occurred about two years prior.  She reported that prior to her most recent injury she had chronic stiffness of her ankle.  Plain x-rays revealed no obvious abnormalities an MRI revealed a chronic low-grade partial tear of the anterior talofibular ligament along with some trace fluid of the ankle and subtalar joints.  I suspected that she had sequela of a left ankle sprain with some ankle arthrosis and I performed a corticosteroid injection for diagnostic and therapeutic purposes.  She returns today and reports that she had some short-term relief from the lidocaine component of the injection but has not had any prolonged relief.  She would like to proceed with arthroscopy.    Examination:  The left ankle reveals no significant swelling this morning.  She has continued tenderness over the anterolateral aspect of the ankle and just distal to the fibula.  She does report some pain with dorsiflexion of the ankle.  There is no gross instability of the ankle and she has good function and strength of the peroneal tendons without any pain on resistance to eversion of the hindfoot.    Impression:   1. Moderate left ankle sprain, sequela        2. Arthrosis of left ankle              Recommendation:  The MRI shows moderate fluid in the anterolateral aspect of the ankle with chronic injury to the anterior talofibular ligament and I suspect she may have an impingement lesion based on the short-term response to the injection.  We will proceed with the arthroscopic evaluation and debridement.  She will return for preoperative H&P and  consent

## 2025-06-29 DIAGNOSIS — K21.9 GASTROESOPHAGEAL REFLUX DISEASE, UNSPECIFIED WHETHER ESOPHAGITIS PRESENT: ICD-10-CM

## 2025-06-30 ENCOUNTER — PATIENT MESSAGE (OUTPATIENT)
Dept: ADMINISTRATIVE | Facility: OTHER | Age: 60
End: 2025-06-30
Payer: COMMERCIAL

## 2025-06-30 RX ORDER — OMEPRAZOLE 40 MG/1
40 CAPSULE, DELAYED RELEASE ORAL 2 TIMES DAILY
Qty: 180 CAPSULE | Refills: 3 | OUTPATIENT
Start: 2025-06-30

## 2025-07-03 DIAGNOSIS — K21.9 GASTROESOPHAGEAL REFLUX DISEASE, UNSPECIFIED WHETHER ESOPHAGITIS PRESENT: ICD-10-CM

## 2025-07-03 RX ORDER — OMEPRAZOLE 40 MG/1
40 CAPSULE, DELAYED RELEASE ORAL 2 TIMES DAILY
Qty: 180 CAPSULE | Refills: 3 | OUTPATIENT
Start: 2025-07-03

## 2025-07-07 ENCOUNTER — PATIENT MESSAGE (OUTPATIENT)
Dept: FAMILY MEDICINE | Facility: CLINIC | Age: 60
End: 2025-07-07
Payer: COMMERCIAL

## 2025-07-07 DIAGNOSIS — K21.9 GASTROESOPHAGEAL REFLUX DISEASE, UNSPECIFIED WHETHER ESOPHAGITIS PRESENT: ICD-10-CM

## 2025-07-08 ENCOUNTER — PATIENT MESSAGE (OUTPATIENT)
Dept: ORTHOPEDICS | Facility: CLINIC | Age: 60
End: 2025-07-08
Payer: COMMERCIAL

## 2025-07-09 RX ORDER — OMEPRAZOLE 40 MG/1
40 CAPSULE, DELAYED RELEASE ORAL 2 TIMES DAILY
Qty: 180 CAPSULE | Refills: 3 | Status: SHIPPED | OUTPATIENT
Start: 2025-07-09

## 2025-07-09 NOTE — TELEPHONE ENCOUNTER
No care due was identified.  [facility] Embedded Care Due Messages. Reference number: [id number].   7/09/2025 1:45:51 PM CDT

## 2025-07-14 ENCOUNTER — OFFICE VISIT (OUTPATIENT)
Dept: ORTHOPEDICS | Facility: CLINIC | Age: 60
End: 2025-07-14
Payer: COMMERCIAL

## 2025-07-14 VITALS — HEIGHT: 60 IN | WEIGHT: 185.44 LBS | BODY MASS INDEX: 36.4 KG/M2

## 2025-07-14 DIAGNOSIS — M25.872 ANKLE IMPINGEMENT SYNDROME, LEFT: Primary | ICD-10-CM

## 2025-07-14 DIAGNOSIS — S93.402S MODERATE LEFT ANKLE SPRAIN, SEQUELA: ICD-10-CM

## 2025-07-14 DIAGNOSIS — S93.402A SPRAIN OF LEFT ANKLE, UNSPECIFIED LIGAMENT, INITIAL ENCOUNTER: ICD-10-CM

## 2025-07-14 DIAGNOSIS — M19.072 ARTHROSIS OF LEFT ANKLE: ICD-10-CM

## 2025-07-14 PROCEDURE — 99499 UNLISTED E&M SERVICE: CPT | Mod: S$GLB,,, | Performed by: PHYSICIAN ASSISTANT

## 2025-07-14 PROCEDURE — 99999 PR PBB SHADOW E&M-EST. PATIENT-LVL III: CPT | Mod: PBBFAC,,, | Performed by: PHYSICIAN ASSISTANT

## 2025-07-14 RX ORDER — CELECOXIB 200 MG/1
200 CAPSULE ORAL EVERY 12 HOURS PRN
Qty: 60 CAPSULE | Refills: 0 | Status: SHIPPED | OUTPATIENT
Start: 2025-07-14

## 2025-07-14 RX ORDER — OXYCODONE HYDROCHLORIDE 5 MG/1
5 TABLET ORAL EVERY 6 HOURS PRN
Qty: 12 TABLET | Refills: 0 | Status: SHIPPED | OUTPATIENT
Start: 2025-07-14

## 2025-07-14 RX ORDER — GABAPENTIN 300 MG/1
300 CAPSULE ORAL NIGHTLY
Qty: 14 CAPSULE | Refills: 0 | Status: SHIPPED | OUTPATIENT
Start: 2025-07-14

## 2025-07-14 RX ORDER — METHOCARBAMOL 500 MG/1
1500 TABLET, FILM COATED ORAL EVERY 8 HOURS PRN
Qty: 16 TABLET | Refills: 0 | Status: SHIPPED | OUTPATIENT
Start: 2025-07-14

## 2025-07-14 NOTE — H&P
[facility]  Orthopedic Surgery  Clinic Note      Subjective:   History of Present Illness    CHIEF COMPLAINT:  Patient is here today for pre-operative visit in preparation for left ankle arthroscopy with debridement performed by Dr. Johnson on 7/23/2025.  /Patient was last seen and treated in the clinic on 6/27/2025.  There has been no significant change in medical status since last visit. Patient has not acute complaints today other than typical pain.    HISTORY OF PRESENT ILLNESS:  She reports persistent anterior and lateral left ankle pain localized under the malleolus on the left side with limited range of motion in the ankle. A previous injection provided only short-term relief.    MEDICAL HISTORY:  She has a significant medical history including cardiac stents placed several years ago, diabetes mellitus, peripheral arterial disease, and obesity with BMI of 36. She denies having a clotting disorder.    MEDICATIONS:  She takes medications regularly as prescribed and denies current use of blood thinners.      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      ROS:  Musculoskeletal: -joint pain, -muscle pain, +limb pain, +limited movement, +joint stiffness        Medications: I have reviewed medication list in the chart at the time of this encounter.     Review of patient's allergies indicates:   Allergen Reactions    Crayfish Anaphylaxis     (crawfish only)    Rocephin [ceftriaxone] Rash      Past Medical History:   Diagnosis Date    Acute cystitis with hematuria 07/23/2023    Allergy     Asthma     Chronic diastolic heart failure     Coronary artery disease     GERD (gastroesophageal reflux disease)     History of back surgery 02/20/2018    Hyperlipidemia     Hypertension     Pulmonary emphysema 08/11/2023    Type 2 diabetes mellitus with diabetic polyneuropathy, without long-term current use of insulin 02/08/2019     Past Surgical History:    Procedure Laterality Date    BILATERAL OOPHORECTOMY Bilateral 2000    BREAST BIOPSY Left 2/14/2020    Procedure: BIOPSY, BREAST-Left medial breast palpation guided;  Surgeon: Paulino Espinoza MD;  Location: [address];  Service: General;  Laterality: Left;    CARDIAC CATHETERIZATION      7 stents    CHOLECYSTECTOMY      COLONOSCOPY N/A 7/16/2019    Procedure: COLONOSCOPY;  Surgeon: Sarah Gamez MD;  Location: [facility];  Service: Endoscopy;  Laterality: N/A;    ESOPHAGOGASTRODUODENOSCOPY N/A 1/9/2020    Procedure: EGD (ESOPHAGOGASTRODUODENOSCOPY);  Surgeon: Sarah Gamez MD;  Location: [facility];  Service: Endoscopy;  Laterality: N/A;    HYSTERECTOMY      PARTIAL HYSTERECTOMY N/A 1990    Uterus taken out    SHOULDER SURGERY      TOTAL KNEE ARTHROPLASTY         Objective:     Physical Exam  Constitutional:       General: She is not in acute distress.     Appearance: She is not ill-appearing, toxic-appearing or diaphoretic.   HENT:      Head: Atraumatic.      Right Ear: External ear normal.      Left Ear: External ear normal.      Nose: Nose normal.   Eyes:      Extraocular Movements: Extraocular movements intact.   Pulmonary:      Effort: No respiratory distress.      Breath sounds: No stridor. No wheezing.   Abdominal:      General: There is no distension.   Musculoskeletal:      Cervical back: Neck supple. No rigidity.      Right lower leg: No edema.      Left lower leg: No edema.   Skin:     Coloration: Skin is not jaundiced.      Findings: No rash.   Neurological:      General: No focal deficit present.      Mental Status: She is alert. Mental status is at baseline.   Psychiatric:         Behavior: Behavior normal.         Thought Content: Thought content normal.         Judgment: Judgment normal.          Right Ankle Exam     Tenderness   The patient is experiencing no tenderness.    Range of Motion   The patient has normal right ankle ROM.    Muscle Strength   The patient has normal right  ankle strength.       Left Ankle Exam     Range of Motion   Dorsiflexion:  0   Plantar flexion:  20   Eversion:  0   Inversion:  5     Other   Erythema: absent  Scars: absent  Sensation: normal  Pulse: present           left ankle:  normal and antalgic gait. In short CAM boot.    Negative squeeze (syndesmosis) test.   Negative anterior drawer test.   Negative posterior drawer test.   Negative talar tilt (CF ligament stability) test.   Negative heel thrust (posterior impingement) test.   Positive forced dorsiflexion (anterior impingement) sign.  No tenderness at K[place] (crossover of FHL and FDL).   No tenderness calcaneal tuberosity or plantar fascia.   No tenderness at or defect noted to achilles tendon.      Imaging:  I have independently interpreted and reviewed XR of L ankle and MRI of left ankle.    X-Ray Ankle Complete 3 View Left  Order: [id number]   Status: Final result       Next appt: 07/29/2025 at 10:00 AM in Urgent Care ([facility], [facility])       Dx: Injury    Test Result Released: Yes (seen)    0 Result Notes  Details    Reading Physician Reading Date Result Priority   Sanya Ruiz MD  [phone]  3/11/2025 STAT     Narrative & Impression  EXAMINATION:  XR ANKLE COMPLETE 3 VIEW LEFT     CLINICAL HISTORY:  Injury, unspecified, initial encounter.     TECHNIQUE:  AP, lateral and oblique views of the left ankle were performed.     COMPARISON:  12/16/2021.     FINDINGS:  No acute displaced fracture.  No dislocation.  Chronic appearing calcification adjacent to the lateral malleolus.  Soft tissues are symmetric.  No unexpected radiopaque foreign body.     Impression:     No acute displaced fracture.        Electronically signed by:Sanya Ruiz MD  Date:                                            03/11/2025  Time:                                           14:09        Exam Ended: 03/11/25 13:41 CDT Last Resulted: 03/11/25 14:09 CDT       MRI Ankle Without Contrast Left  Order:  [id number]   Status: Final result       Next appt: 07/29/2025 at 10:00 AM in Urgent Care ([facility], [facility])       Dx: Encounter related to worker's compens...    Test Result Released: Yes (seen)    0 Result Notes  Details    Reading Physician Reading Date Result Priority   Vanessa Mendiola MD  [phone]  4/14/2025 STAT     Narrative & Impression  EXAMINATION:  MRI ANKLE WITHOUT CONTRAST LEFT     CLINICAL HISTORY:  Ankle trauma, dislocation/ligament injury suspected (Age >= 5y);  Sprain of unspecified ligament of left ankle, initial encounter     TECHNIQUE:  MRI ankle performed per routine protocol without contrast.     COMPARISON:  03/11/2025 radiographs     FINDINGS:  Ligaments: Anterior and posterior inferior tibiofibular ligaments are intact.  Chronic low grade, partial high-grade tear of the anterior talofibular ligament.  The posterior talofibular ligament and calcaneal fibular ligament are intact.  Deep and superficial components of deltoid ligament are intact.  Spring ligament complex and Lisfranc ligament are intact.     Tendons: Medial ankle flexor, dorsal ankle extensor, and peroneus tendons are intact.  The Achilles tendon is intact.     Joints: Trace of tibiotalar joint fluid.  Trace of subtalar joint fluid.  There is no significant degenerative change.     Bones:  No fracture or infiltrative process.     Miscellaneous: Plantar fascia, sinus tarsi, and tarsal tunnel are unremarkable.     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     Nonspecific trace of fluid at the tibiotalar joint and subtalar joint.     No acute fracture identified.        Electronically signed by:Vanessa Mendiola MD  Date:                                            04/14/2025  Time:                                           15:25        Exam Ended: 04/14/25 14:11 CDT Last Resulted: 04/14/25 15:25 CDT         Assessment:       1. Ankle impingement syndrome, left    2.  Moderate left ankle sprain, sequela    3. Arthrosis of left ankle    4. Sprain of left ankle, unspecified ligament, initial encounter       Plan:       Orders Placed This Encounter    celecoxib (CELEBREX) 200 MG capsule    gabapentin (NEURONTIN) 300 MG capsule    oxyCODONE (ROXICODONE) 5 MG immediate release tablet    methocarbamoL (ROBAXIN) 500 MG Tab      Assessment & Plan    IMPRESSION:  - Plan ankle scope with debridement on left ankle for pain relief.  - Procedure duration estimated at 30 minutes depending on findings.  - Considered history of diabetes, heart disease (previous stents), and medication compliance.  - Noted BMI of 36 as a risk factor.    PATIENT EDUCATION:  - Explained procedure involves 2 incisions for scope and instrument insertion.  - Discussed potential risks including infection, nerve/blood vessel injury, pain relief outcomes, need for further surgery, wound healing problems, and the rest outline on consent forms.     ACTION ITEMS/LIFESTYLE:  - Patient to wear boot for 2 weeks post-procedure. WBAT.  - Patient to use provided cleansing solution night before and morning of surgery, avoiding face and private regions, with extra attention to foot and ankle.  - Patient can return to work after a few days in the boot, based on comfort level and pain management.    MEDICATIONS:  - Started Celebrex every 12 hours as needed for mild to moderate pain.  - Started Gabapentin once at night for nerve pain.  - Started Robaxin every 8 hours as needed for muscle spasm and tension, to be taken only at home due to sedating effects.  - Started oxycodone for significant pain (10/10 pain level), to be used only if other medications are ineffective.    ORDERS:    FOLLOW UP:  - Follow up 2 weeks after surgery for post op care.         Future Appointments   Date Time Provider Department Center   7/29/2025 10:00 AM [facility], [facility] URGENT  [place]   8/11/2025 11:30 AM Ilda Gurrola PA-C [facility] ORTHO [address] Gurrola PA-C  Orthopedic Surgery  [facility]

## 2025-07-14 NOTE — PROGRESS NOTES
[facility]  Orthopedic Surgery  Pre op Note    Patient is here today for pre-operative visit in preparation for left ankle arthroscopy with debridement performed by Dr. Johnson on 7/23/2025.  /Patient was last seen and treated in the clinic on 6/27/2025.  There has been no significant change in medical status since last visit. Patient has not acute complaints today other than typical pain.    Allergies, Medications, past medical and surgical history reviewed.     Focused examination performed. See H&P from this encounter.     Patient did surgeon in clinic today. All questions answered.      Patient verbalized an understanding to the education and goals. Patient has displayed readiness to engage in care and is ready to proceed with surgery.  Patient reports someone is able and ready to provide assistance at home after discharge.      Patient has show CAM boot on that she will bring with her on surgery day.    Pre, lorie, and post operative procedure and expectations were discussed.  Questions were answered. The patient has been educated and is ready to proceed with surgery.  We discussed surgical outcomes, plans, procedures, pre, lorie, and post operative expectations and care. The risks, benefits and alternatives to surgery were discussed with the patient; these include bleeding, infection, vessel/nerve damage, pain, numbness, tingling, complex regional pain syndrome, hardware/surgical failure if used, need for further surgery, malunion, nonunion, DVT, PE, arthritis and death. Patient also understands that the risks of surgery may be greater for some patients due to health or lifestyle issues, such as a current condition or a history of heart disease, diabetes, obesity, clotting disorders, recurrent infections, smoking, sedentary lifestyle, or noncompliance with medications, therapy, or followup. Patient states an understanding and wishes to proceed with surgery.   All questions were answered.  No guarantees  were implied or stated.  Surgical consent was/were obtained.    Medications ordered for bedside delivery. We discussed proper use of each medication.    The patient will contact us if the have any questions or concerns or if there are changes in their medical condition prior to surgery.          Ilda Gurrola PA-C  Orthopedic Surgery  [facility]    Recent Visits  Date Type Provider Dept   06/27/25 Office Visit Cyrus Johnson MD [facility]   06/16/25 Office Visit Cyrus Johnson MD [facility]   Showing recent visits within past 360 days and meeting all other requirements  Today's Visits  Date Type Provider Dept   07/14/25 Office Visit Ilda Gurrola PA-C [facility]   Showing today's visits and meeting all other requirements  Future Appointments  Date Type Provider Dept   08/11/25 Appointment Ilda Gurrola PA-C [facility]   Showing future appointments within next 720 days and meeting all other requirements      Future Appointments   Date Time Provider Department Center   7/29/2025 10:00 AM [facility] [place] [facility]   8/11/2025 11:30 AM [facility]

## 2025-07-16 ENCOUNTER — PATIENT MESSAGE (OUTPATIENT)
Dept: PREADMISSION TESTING | Facility: HOSPITAL | Age: 60
End: 2025-07-16
Payer: COMMERCIAL

## 2025-07-22 ENCOUNTER — PATIENT MESSAGE (OUTPATIENT)
Dept: FAMILY MEDICINE | Facility: CLINIC | Age: 60
End: 2025-07-22
Payer: COMMERCIAL

## 2025-07-22 DIAGNOSIS — F41.9 ANXIETY: ICD-10-CM

## 2025-07-22 RX ORDER — ALPRAZOLAM 0.25 MG/1
0.25 TABLET ORAL DAILY PRN
Qty: 30 TABLET | Refills: 0 | Status: SHIPPED | OUTPATIENT
Start: 2025-07-22 | End: 2025-08-23

## 2025-07-22 NOTE — TELEPHONE ENCOUNTER
No care due was identified.  [facility] Embedded Care Due Messages. Reference number: [id number].   7/22/2025 10:00:46 AM CDT

## 2025-07-28 ENCOUNTER — PATIENT MESSAGE (OUTPATIENT)
Dept: ADMINISTRATIVE | Facility: OTHER | Age: 60
End: 2025-07-28
Payer: COMMERCIAL

## 2025-07-29 ENCOUNTER — TELEPHONE (OUTPATIENT)
Dept: URGENT CARE | Facility: CLINIC | Age: 60
End: 2025-07-29
Payer: COMMERCIAL

## 2025-07-29 NOTE — TELEPHONE ENCOUNTER
Patient called and states that the doctor that she see's for her work related injury informed her that if she hasn't seen the doctor for a Peer to Peer by the next visit she can reschedule the appointment and also was suppose to have surgery on 7/11/2025. At the patients request for three weeks out I have rescheduled her appointment. AFG

## 2025-08-02 ENCOUNTER — HOSPITAL ENCOUNTER (EMERGENCY)
Facility: HOSPITAL | Age: 60
Discharge: HOME | End: 2025-08-02
Attending: EMERGENCY MEDICINE
Payer: COMMERCIAL

## 2025-08-02 VITALS
HEART RATE: 87 BPM | BODY MASS INDEX: 35.73 KG/M2 | OXYGEN SATURATION: 99 % | WEIGHT: 182 LBS | SYSTOLIC BLOOD PRESSURE: 140 MMHG | DIASTOLIC BLOOD PRESSURE: 95 MMHG | RESPIRATION RATE: 19 BRPM | TEMPERATURE: 98 F | HEIGHT: 60 IN

## 2025-08-02 DIAGNOSIS — S61.214A LACERATION OF RIGHT RING FINGER WITHOUT FOREIGN BODY WITHOUT DAMAGE TO NAIL, INITIAL ENCOUNTER: ICD-10-CM

## 2025-08-02 DIAGNOSIS — S61.216A LACERATION OF RIGHT LITTLE FINGER WITHOUT FOREIGN BODY WITHOUT DAMAGE TO NAIL, INITIAL ENCOUNTER: Primary | ICD-10-CM

## 2025-08-02 PROCEDURE — 63600175 PHARM REV CODE 636 W HCPCS: Mod: ER

## 2025-08-02 PROCEDURE — 99283 EMERGENCY DEPT VISIT LOW MDM: CPT | Mod: 25,ER

## 2025-08-02 PROCEDURE — 90715 TDAP VACCINE 7 YRS/> IM: CPT | Mod: ER

## 2025-08-02 PROCEDURE — 12002 RPR S/N/AX/GEN/TRNK2.6-7.5CM: CPT | Mod: ER

## 2025-08-02 PROCEDURE — 90471 IMMUNIZATION ADMIN: CPT | Mod: ER

## 2025-08-02 RX ORDER — CLINDAMYCIN HYDROCHLORIDE 150 MG/1
300 CAPSULE ORAL 4 TIMES DAILY
Qty: 24 CAPSULE | Refills: 0 | Status: SHIPPED | OUTPATIENT
Start: 2025-08-02 | End: 2025-08-05

## 2025-08-02 RX ADMIN — CLOSTRIDIUM TETANI TOXOID ANTIGEN (FORMALDEHYDE INACTIVATED), CORYNEBACTERIUM DIPHTHERIAE TOXOID ANTIGEN (FORMALDEHYDE INACTIVATED), BORDETELLA PERTUSSIS TOXOID ANTIGEN (GLUTARALDEHYDE INACTIVATED), BORDETELLA PERTUSSIS FILAMENTOUS HEMAGGLUTININ ANTIGEN (FORMALDEHYDE INACTIVATED), BORDETELLA PERTUSSIS PERTACTIN ANTIGEN, AND BORDETELLA PERTUSSIS FIMBRIAE 2/3 ANTIGEN 0.5 ML: 5; 2; 2.5; 5; 3; 5 INJECTION, SUSPENSION INTRAMUSCULAR at 04:08

## 2025-08-17 DIAGNOSIS — I10 ESSENTIAL HYPERTENSION: ICD-10-CM

## 2025-08-17 DIAGNOSIS — E11.42 TYPE 2 DIABETES MELLITUS WITH DIABETIC POLYNEUROPATHY, WITHOUT LONG-TERM CURRENT USE OF INSULIN: ICD-10-CM

## 2025-08-19 RX ORDER — METOPROLOL SUCCINATE 100 MG/1
100 TABLET, EXTENDED RELEASE ORAL DAILY
Qty: 90 TABLET | Refills: 3 | Status: SHIPPED | OUTPATIENT
Start: 2025-08-19

## 2025-08-19 RX ORDER — LISINOPRIL 5 MG/1
5 TABLET ORAL DAILY
Qty: 90 TABLET | Refills: 3 | Status: SHIPPED | OUTPATIENT
Start: 2025-08-19 | End: 2026-08-19

## 2025-08-19 RX ORDER — FENOFIBRATE 160 MG/1
160 TABLET ORAL DAILY
Qty: 90 TABLET | Refills: 3 | Status: SHIPPED | OUTPATIENT
Start: 2025-08-19 | End: 2026-08-19

## 2025-08-19 RX ORDER — AMLODIPINE BESYLATE 5 MG/1
5 TABLET ORAL NIGHTLY
Qty: 90 TABLET | Refills: 3 | Status: SHIPPED | OUTPATIENT
Start: 2025-08-19

## 2025-08-20 ENCOUNTER — PATIENT MESSAGE (OUTPATIENT)
Dept: HEMATOLOGY/ONCOLOGY | Facility: CLINIC | Age: 60
End: 2025-08-20
Payer: COMMERCIAL

## 2025-08-22 DIAGNOSIS — J45.51 SEVERE PERSISTENT ASTHMA WITH ACUTE EXACERBATION: ICD-10-CM

## 2025-08-22 RX ORDER — MEPOLIZUMAB 100 MG/ML
100 INJECTION, SOLUTION SUBCUTANEOUS
Qty: 1 ML | Refills: 2 | Status: ACTIVE | OUTPATIENT
Start: 2025-08-22

## 2025-08-25 RX ORDER — BLOOD-GLUCOSE TRANSMITTER
1 EACH MISCELLANEOUS ONCE
Qty: 1 EACH | Refills: 0 | Status: SHIPPED | OUTPATIENT
Start: 2025-08-25 | End: 2025-11-25

## 2025-09-02 ENCOUNTER — PATIENT OUTREACH (OUTPATIENT)
Dept: ADMINISTRATIVE | Facility: HOSPITAL | Age: 60
End: 2025-09-02
Payer: COMMERCIAL

## 2025-09-04 ENCOUNTER — OFFICE VISIT (OUTPATIENT)
Dept: ORTHOPEDICS | Facility: CLINIC | Age: 60
End: 2025-09-04
Payer: COMMERCIAL

## 2025-09-04 VITALS — HEIGHT: 60 IN | BODY MASS INDEX: 35.76 KG/M2 | WEIGHT: 182.13 LBS

## 2025-09-04 DIAGNOSIS — S93.402S MODERATE LEFT ANKLE SPRAIN, SEQUELA: ICD-10-CM

## 2025-09-04 DIAGNOSIS — M25.872 ANKLE IMPINGEMENT SYNDROME, LEFT: Primary | ICD-10-CM

## 2025-09-04 PROCEDURE — 99999 PR PBB SHADOW E&M-EST. PATIENT-LVL V: CPT | Mod: PBBFAC,,, | Performed by: ORTHOPAEDIC SURGERY

## (undated) DEVICE — ADHESIVE DERMABOND ADVANCED

## (undated) DEVICE — COVER PROBE NL STRL 3.6X96IN

## (undated) DEVICE — SEE MEDLINE ITEM 146417

## (undated) DEVICE — ELECTRODE BLADE INSULATED 1 IN

## (undated) DEVICE — SYR SLIP TIP 1CC

## (undated) DEVICE — CATH IV INTROCAN 24G X 3/4

## (undated) DEVICE — TRAY MINOR GEN SURG

## (undated) DEVICE — SOL 0.9% NACL IRRI.IN STERIL

## (undated) DEVICE — SPONGE LAP 18X18 PREWASHED

## (undated) DEVICE — SUT 2/0 30IN SILK BLK BRAI

## (undated) DEVICE — CONTAINER SPECIMEN STRL 4OZ

## (undated) DEVICE — GAUZE SPONGE 4X4 12PLY

## (undated) DEVICE — SYS LABEL CORRECT MED

## (undated) DEVICE — BRA POST SURGICAL WHT 40-42IN

## (undated) DEVICE — NDL 18GA X1 1/2 REG BEVEL

## (undated) DEVICE — PACK UNIVERSAL SPLIT II

## (undated) DEVICE — GAUZE FLUFF XXLG 36X36 2 PLY

## (undated) DEVICE — BANDAGE GAUZE 6PLY FLUFF 2X3

## (undated) DEVICE — ELECTRODE REM PLYHSV RETURN 9

## (undated) DEVICE — SUT MCRYL PLUS 4-0 PS2 27IN

## (undated) DEVICE — BRA SURGICAL XL 40-42

## (undated) DEVICE — SUT VICRYL 3-0 27 SH

## (undated) DEVICE — DRAPE STERI INSTRUMENT 1018